# Patient Record
Sex: MALE | Race: WHITE | NOT HISPANIC OR LATINO | Employment: OTHER | URBAN - METROPOLITAN AREA
[De-identification: names, ages, dates, MRNs, and addresses within clinical notes are randomized per-mention and may not be internally consistent; named-entity substitution may affect disease eponyms.]

---

## 2017-03-22 ENCOUNTER — ALLSCRIPTS OFFICE VISIT (OUTPATIENT)
Dept: OTHER | Facility: OTHER | Age: 62
End: 2017-03-22

## 2017-03-29 ENCOUNTER — ALLSCRIPTS OFFICE VISIT (OUTPATIENT)
Dept: OTHER | Facility: OTHER | Age: 62
End: 2017-03-29

## 2017-05-11 ENCOUNTER — GENERIC CONVERSION - ENCOUNTER (OUTPATIENT)
Dept: OTHER | Facility: OTHER | Age: 62
End: 2017-05-11

## 2017-05-18 ENCOUNTER — ALLSCRIPTS OFFICE VISIT (OUTPATIENT)
Dept: OTHER | Facility: OTHER | Age: 62
End: 2017-05-18

## 2017-05-18 DIAGNOSIS — E66.01 MORBID (SEVERE) OBESITY DUE TO EXCESS CALORIES (HCC): ICD-10-CM

## 2017-05-18 DIAGNOSIS — R73.02 IMPAIRED GLUCOSE TOLERANCE: ICD-10-CM

## 2017-05-18 DIAGNOSIS — M19.90 OSTEOARTHRITIS: ICD-10-CM

## 2017-05-18 DIAGNOSIS — E55.9 VITAMIN D DEFICIENCY: ICD-10-CM

## 2017-05-18 DIAGNOSIS — F32.9 MAJOR DEPRESSIVE DISORDER, SINGLE EPISODE: ICD-10-CM

## 2017-05-21 ENCOUNTER — APPOINTMENT (OUTPATIENT)
Dept: LAB | Facility: HOSPITAL | Age: 62
End: 2017-05-21
Payer: COMMERCIAL

## 2017-05-21 ENCOUNTER — TRANSCRIBE ORDERS (OUTPATIENT)
Dept: ADMINISTRATIVE | Facility: HOSPITAL | Age: 62
End: 2017-05-21

## 2017-05-21 DIAGNOSIS — E66.01 MORBID (SEVERE) OBESITY DUE TO EXCESS CALORIES (HCC): ICD-10-CM

## 2017-05-21 DIAGNOSIS — R73.02 IMPAIRED GLUCOSE TOLERANCE: ICD-10-CM

## 2017-05-21 DIAGNOSIS — M19.90 OSTEOARTHRITIS: ICD-10-CM

## 2017-05-21 DIAGNOSIS — F32.9 MAJOR DEPRESSIVE DISORDER, SINGLE EPISODE: ICD-10-CM

## 2017-05-21 DIAGNOSIS — E55.9 VITAMIN D DEFICIENCY: ICD-10-CM

## 2017-05-21 LAB
25(OH)D3 SERPL-MCNC: 17.9 NG/ML (ref 30–100)
ALBUMIN SERPL BCP-MCNC: 3.8 G/DL (ref 3.5–5)
ALP SERPL-CCNC: 53 U/L (ref 46–116)
ALT SERPL W P-5'-P-CCNC: 38 U/L (ref 12–78)
ANION GAP SERPL CALCULATED.3IONS-SCNC: 9 MMOL/L (ref 4–13)
AST SERPL W P-5'-P-CCNC: 18 U/L (ref 5–45)
BASOPHILS # BLD AUTO: 0.04 THOUSANDS/ΜL (ref 0–0.1)
BASOPHILS NFR BLD AUTO: 1 % (ref 0–1)
BILIRUB SERPL-MCNC: 0.5 MG/DL (ref 0.2–1)
BUN SERPL-MCNC: 14 MG/DL (ref 5–25)
CALCIUM SERPL-MCNC: 8.6 MG/DL (ref 8.3–10.1)
CHLORIDE SERPL-SCNC: 102 MMOL/L (ref 100–108)
CHOLEST SERPL-MCNC: 210 MG/DL (ref 50–200)
CO2 SERPL-SCNC: 27 MMOL/L (ref 21–32)
CREAT SERPL-MCNC: 0.82 MG/DL (ref 0.6–1.3)
EOSINOPHIL # BLD AUTO: 0.34 THOUSAND/ΜL (ref 0–0.61)
EOSINOPHIL NFR BLD AUTO: 7 % (ref 0–6)
ERYTHROCYTE [DISTWIDTH] IN BLOOD BY AUTOMATED COUNT: 13.9 % (ref 11.6–15.1)
EST. AVERAGE GLUCOSE BLD GHB EST-MCNC: 128 MG/DL
GFR SERPL CREATININE-BSD FRML MDRD: >60 ML/MIN/1.73SQ M
GLUCOSE P FAST SERPL-MCNC: 121 MG/DL (ref 65–99)
HBA1C MFR BLD: 6.1 % (ref 4.2–6.3)
HCT VFR BLD AUTO: 41.1 % (ref 36.5–49.3)
HDLC SERPL-MCNC: 66 MG/DL (ref 40–60)
HGB BLD-MCNC: 13.4 G/DL (ref 12–17)
LDLC SERPL CALC-MCNC: 106 MG/DL (ref 0–100)
LYMPHOCYTES # BLD AUTO: 1.26 THOUSANDS/ΜL (ref 0.6–4.47)
LYMPHOCYTES NFR BLD AUTO: 26 % (ref 14–44)
MCH RBC QN AUTO: 28.9 PG (ref 26.8–34.3)
MCHC RBC AUTO-ENTMCNC: 32.6 G/DL (ref 31.4–37.4)
MCV RBC AUTO: 89 FL (ref 82–98)
MONOCYTES # BLD AUTO: 0.48 THOUSAND/ΜL (ref 0.17–1.22)
MONOCYTES NFR BLD AUTO: 10 % (ref 4–12)
NEUTROPHILS # BLD AUTO: 2.83 THOUSANDS/ΜL (ref 1.85–7.62)
NEUTS SEG NFR BLD AUTO: 56 % (ref 43–75)
PLATELET # BLD AUTO: 189 THOUSANDS/UL (ref 149–390)
PMV BLD AUTO: 9.8 FL (ref 8.9–12.7)
POTASSIUM SERPL-SCNC: 3.9 MMOL/L (ref 3.5–5.3)
PROT SERPL-MCNC: 6.9 G/DL (ref 6.4–8.2)
RBC # BLD AUTO: 4.63 MILLION/UL (ref 3.88–5.62)
SODIUM SERPL-SCNC: 138 MMOL/L (ref 136–145)
TRIGL SERPL-MCNC: 191 MG/DL
TSH SERPL DL<=0.05 MIU/L-ACNC: 1.81 UIU/ML (ref 0.36–3.74)
WBC # BLD AUTO: 4.95 THOUSAND/UL (ref 4.31–10.16)

## 2017-05-21 PROCEDURE — 36415 COLL VENOUS BLD VENIPUNCTURE: CPT

## 2017-05-21 PROCEDURE — 84443 ASSAY THYROID STIM HORMONE: CPT

## 2017-05-21 PROCEDURE — 85025 COMPLETE CBC W/AUTO DIFF WBC: CPT

## 2017-05-21 PROCEDURE — 82306 VITAMIN D 25 HYDROXY: CPT

## 2017-05-21 PROCEDURE — 83036 HEMOGLOBIN GLYCOSYLATED A1C: CPT

## 2017-05-21 PROCEDURE — 80061 LIPID PANEL: CPT

## 2017-05-21 PROCEDURE — 80053 COMPREHEN METABOLIC PANEL: CPT

## 2017-05-22 ENCOUNTER — GENERIC CONVERSION - ENCOUNTER (OUTPATIENT)
Dept: OTHER | Facility: OTHER | Age: 62
End: 2017-05-22

## 2017-06-28 ENCOUNTER — ALLSCRIPTS OFFICE VISIT (OUTPATIENT)
Dept: OTHER | Facility: OTHER | Age: 62
End: 2017-06-28

## 2017-09-05 ENCOUNTER — ALLSCRIPTS OFFICE VISIT (OUTPATIENT)
Dept: OTHER | Facility: OTHER | Age: 62
End: 2017-09-05

## 2017-09-05 DIAGNOSIS — Z12.5 ENCOUNTER FOR SCREENING FOR MALIGNANT NEOPLASM OF PROSTATE: ICD-10-CM

## 2017-09-05 DIAGNOSIS — R73.02 IMPAIRED GLUCOSE TOLERANCE: ICD-10-CM

## 2017-09-05 DIAGNOSIS — F32.9 MAJOR DEPRESSIVE DISORDER, SINGLE EPISODE: ICD-10-CM

## 2017-09-05 DIAGNOSIS — E78.1 PURE HYPERGLYCERIDEMIA: ICD-10-CM

## 2017-09-05 DIAGNOSIS — E66.01 MORBID (SEVERE) OBESITY DUE TO EXCESS CALORIES (HCC): ICD-10-CM

## 2017-09-05 DIAGNOSIS — E55.9 VITAMIN D DEFICIENCY: ICD-10-CM

## 2017-10-25 NOTE — PROGRESS NOTES
Assessment  1  Morbid obesity (278 01) (E66 01)   2  Depression (311) (F32 9)    Plan  Depression    · Call [phone] if: The symptoms seem worse ; Status:Complete;   Done:  50OWN5640   · Call [phone] if: You are unable to eat a normal amount of food and you do not feel  hungry ; Status:Complete;   Done: 55SYK3582   · Call [phone] if: You or your family members notice any confusion or difficulty with  memory ; Status:Complete;   Done: 60YVC5202   · Call [phone] if: Your depression is worse ; Status:Complete;   Done: 39VMN0285   · Call [phone] if: Your moods often change suddenly for no reason ;  Status:Complete;   Done: 62XNC0827   · Call [phone] if: Your symptoms return during treatment ; Status:Complete;    Done: 59MJQ1603   · Call 911 if: You are considering suicide ; Status:Complete;   Done: 29BPI4193   · Call 911 if: You are thinking about harming yourself or someone else ; Status:Complete;    Done: 52YSE6937   · Continue with our present treatment plan ; Status:Complete;   Done: 11LFK1192   · Many things can be done to treat your insomnia ; Status:Complete;   Done: 78VQB1624   · There are ways to decrease your stress and improve your sense of well-being  We  encourage you to keep active and exercise regularly  Make time to take care of yourself  and participate in activities that you enjoy  Stay connected to friends and family that can  support and comfort you  If at any time you have thoughts of harming yourself or  someone else, contact us immediately ; Status:Active; Requested for:62Byd5966;   Depression, Hypertriglyceridemia, Impaired glucose tolerance, Morbid obesity, Vitamin D  deficiency    · (1) CBC/ PLT (NO DIFF); Status:Active; Requested EXE:48KSO3879;    · (1) COMPREHENSIVE METABOLIC PANEL; Status:Active; Requested FEU:79LOI8527;    · (1) HEMOGLOBIN A1C; Status:Active; Requested YKR:43AAY0575;    · (1) LIPID PANEL, FASTING; Status:Active;  Requested EPN:08IYX2530;    · (1) VITAMIN D 25-HYDROXY; Status:Active; Requested for:01Udi3029;   Depression, Morbid obesity    · BuPROPion HCl ER (XL) 150 MG Oral Tablet Extended Release 24 Hour; TAKE 1  TABLET DAILY IN AM AS DIRECTED  Prostate cancer screening    · (1) PSA (SCREEN) (Dx V76 44 Screen for Prostate Cancer); Status:Active; Requested  [facility]:29GVV0826;     Discussion/Summary    Weight has been relatively stable  He is satisfied with the current dosage of the medication  He is going to try to watch his diet more closely  Going to attempt to increase aerobic activity but this is difficult because of his orthopedic issues  Continue follow-up with orthopedics as previously  Continue with the Wellbutrin as previously  Discussed with him possibly increasing the dose at the next visit  Continue to follow up with a counselor  Will have him follow up in about 3 months or sooner if needed  Possible side effects of new medications were reviewed with the patient/guardian today  The treatment plan was reviewed with the patient/guardian  The patient/guardian understands and agrees with the treatment plan      Chief Complaint  CC Patient is here for follow up visit  No complaints  History of Present Illness  He presents for follow-up regarding his medications and weight loss  He has been trying to watch his diet more closely  Admits that this is difficult at times  Has been tolerating the Wellbutrin without difficulty  Mood is somewhat better  Denies any significant side effects from the medication  Does notice it may suppress his appetite somewhat  Sleep is not significantly affected  Still with pain in the joints especially his left knee  Still remains very active both at work and around his house  Usually sleeps relatively well  Review of Systems    Constitutional: not feeling poorly-- and-- not feeling tired  Eyes: no eyesight problems  Cardiovascular: no chest pain-- and-- no palpitations     Respiratory: no shortness of breath  Musculoskeletal: arthralgias-- and-- joint stiffness  Psychiatric: as noted in HPI  ROS reviewed  Active Problems  1  Abrasion of finger of right hand (915 0) (S60 419A)   2  Chronic back pain (724 5,338 29) (M54 9,G89 29)   3  Depression (311) (F32 9)   4  Elevated blood pressure reading without diagnosis of hypertension (796 2) (R03 0)   5  Heart murmur (785 2) (R01 1)   6  Hypertriglyceridemia (272 1) (E78 1)   7  Impaired glucose tolerance (790 22) (R73 02)   8  Morbid obesity (278 01) (E66 01)   9  Nail avulsion, finger (883 0) (S61 309A)   10  Osteoarthritis (715 90) (M19 90)   11  Prostate cancer screening (V76 44) (Z12 5)   12  Screening for colon cancer (V76 51) (Z12 11)   13  Vitamin D deficiency (268 9) (E55 9)    Past Medical History  1  History of Dislocation of wrist, right, initial encounter (833 00) (S63 004A)   2  History of Fractured sternum (807 2) (S22 20XA)   3  History of compression fracture of spine (V15 51) (Z87 81)   4  History of concussion (V15 52) (Z87 820)   5  History of esophageal reflux (V12 79) (Z87 19)   6  History of Nasal fracture (802 0) (S02  2XXA)   7  History of S/P colonoscopy (V45 89) (D31 008)    The active problems and past medical history were reviewed and updated today  Surgical History  1  History of Gallbladder Surgery    The surgical history was reviewed and updated today  Family History  Mother    1  Family history of dementia (V17 2) (Z81 8)   2  Family history of hyperlipidemia (V18 19) (Z83 49)   3  Family history of High cholesterol  Father    4  Family history of malignant neoplasm (V16 9) (Z80 9)   5  Family history of malignant neoplasm of urinary bladder (L64 29) (Z80 52)    The family history was reviewed and updated today         Social History   · Always uses seat belt   · Daily caffeine consumption   · Former smokeless tobacco user   · Full-time employment   ·    · No living will   · Occasional alcohol use  The social history was reviewed and updated today  Current Meds   1  BuPROPion HCl ER (XL) 300 MG Oral Tablet Extended Release 24 Hour; TAKE 1   TABLET DAILY; Therapy: 48LHJ0002 to (Pepe Barksdale)  Requested for: 08Aug2017; Last   Rx:08Aug2017 Ordered   2  Mobic 15 MG Oral Tablet; TAKE 1 TABLET DAILY; Therapy: (Recorded:15Sep2016) to Recorded   3  TraMADol HCl - 50 MG Oral Tablet; Take 1 tablet daily; Therapy: (Recorded:15Sep2016) to Recorded   4  Tylenol Arthritis Pain 650 MG TBCR; take 1 tablet daily prn; Therapy: (Recorded:15Sep2016) to Recorded   5  Voltaren 1 % GEL; APPLY SPARINGLY TO AFFECTED AREA(S) ONCE DAILY; Therapy: (Recorded:15Sep2016) to Recorded    The medication list was reviewed and updated today  Allergies  1  Neosporin OINT  2  Adhesive Tape    Vitals  Vital Signs    Recorded: 05Sep2017 11:21AM   Temperature 97 7 F   Heart Rate 76   Respiration 18   Systolic 933, LUE, Sitting   Diastolic 76, LUE, Sitting   Height 6 ft    Weight 349 lb    BMI Calculated 47 33   BSA Calculated 2 7   O2 Saturation 97     Physical Exam    Constitutional   General appearance: Abnormal   morbidly obese,-- appears tired-- and-- well hydrated  Eyes   Conjunctiva and lids: No swelling, erythema, or discharge  Pulmonary   Auscultation of lungs: Clear to auscultation, equal breath sounds bilaterally, no wheezes, no rales, no rhonci  Cardiovascular   Auscultation of heart: Normal rate and rhythm, normal S1 and S2, without murmurs  Musculoskeletal   Gait and station: Abnormal  -- slow antalgic gait  Inspection/palpation of joints, bones, and muscles: Abnormal  -- Degenerative changes most notable in left knee  Psychiatric   Mood and affect: Abnormal  -- Tired appearing          Signatures   Electronically signed by : Rama Spurling, M D ; Oct 24 2017  9:22PM EST                       (Author)

## 2018-01-09 NOTE — RESULT NOTES
Verified Results  (1) LIPID PANEL, FASTING 35Yek7532 11:04AM [facility]     Test Name Result Flag Reference   CHOLESTEROL 223 mg/dL H    HDL,DIRECT 71 mg/dL H 40-60   Specimen collection should occur prior to Metamizole administration due to the potential for falsely depressed results  LDL CHOLESTEROL CALCULATED 120 mg/dL H 0-100   Triglyceride:         Normal              <150 mg/dl       Borderline High    150-199 mg/dl       High               200-499 mg/dl       Very High          >499 mg/dl  Cholesterol:         Desirable        <200 mg/dl      Borderline High  200-239 mg/dl      High             >239 mg/dl  HDL Cholesterol:        High    >59 mg/dL      Low     <41 mg/dL  LDL CALCULATED:    This screening LDL is a calculated result  It does not have the accuracy of the Direct Measured LDL in the monitoring of patients with hyperlipidemia and/or statin therapy  Direct Measure LDL (MOE259) must be ordered separately in these patients  TRIGLYCERIDES 161 mg/dL H <=150   Specimen collection should occur prior to N-Acetylcysteine or Metamizole administration due to the potential for falsely depressed results  (1) COMPREHENSIVE METABOLIC PANEL 11SWA3724 14:49PF [facility]     Test Name Result Flag Reference   GLUCOSE,RANDM 121 mg/dL     If the patient is fasting, the ADA then defines impaired fasting glucose as > 100 mg/dL and diabetes as > or equal to 123 mg/dL     SODIUM 136 mmol/L  136-145   POTASSIUM 4 4 mmol/L  3 5-5 3   CHLORIDE 100 mmol/L  100-108   CARBON DIOXIDE 27 mmol/L  21-32   ANION GAP (CALC) 9 mmol/L  4-13   BLOOD UREA NITROGEN 21 mg/dL  5-25   CREATININE 0 73 mg/dL  0 60-1 30   Standardized to IDMS reference method   CALCIUM 9 0 mg/dL  8 3-10 1   BILI, TOTAL 0 70 mg/dL  0 20-1 00   ALK PHOSPHATAS 58 U/L     ALT (SGPT) 33 U/L  12-78   AST(SGOT) 16 U/L  5-45   ALBUMIN 3 9 g/dL  3 5-5 0   TOTAL PROTEIN 7 1 g/dL  6 4-8 2   eGFR Non-      >60 0 ml/min/1 73sq m   [facility] Disease Education Program recommendations are as follows:  GFR calculation is accurate only with a steady state creatinine  Chronic Kidney disease less than 60 ml/min/1 73 sq  meters  Kidney failure less than 15 ml/min/1 73 sq  meters  (1) CBC/PLT/DIFF [id number] 11:04AM [facility]     Test Name Result Flag Reference   WBC COUNT 6 01 Thousand/uL  4 31-10 16   RBC COUNT 4 90 Million/uL  3 88-5 62   HEMOGLOBIN 14 2 g/dL  12 0-17 0   HEMATOCRIT 42 5 %  36 5-49 3   MCV 87 fL  82-98   MCH 29 0 pg  26 8-34 3   MCHC 33 4 g/dL  31 4-37 4   RDW 14 0 %  11 6-15 1   MPV 9 6 fL  8 9-12 7   PLATELET COUNT 824 Thousands/uL  149-390   This is an appended report  These results have been appended to a previously verified report  (1) CBC/PLT/DIFF [id number] 11:04AM [facility]     Test Name Result Flag Reference   NEUTROPHILS - REL 64 %  43-75   LYMPHOCYTES - REL 27 %  14-44   MONOCYTES - REL 6 %  4-12   EOSINOPHILS - REL 3 %  0-6   BASOPHILS - REL 0 %  0-1   NEUTROPHILS ABS 3 85 Thousand/uL  1 85-7 62   LYMPHOTCYTES ABS 1 62 Thousand/uL  0 60-4 47   MONOCYTES ABS 0 36 Thousand/uL  0 00-1 22   EOSINOPHILS ABS 0 18 Thousand/uL  0 00-0 40   BASOPHILS ABS 0 00 Thousand/uL  0 00-0 10   TOTAL COUNTED 100     PLT ESTIMATE Adequate  Adequate     (1) VITAMIN D 25-HYDROXY [id number] 11:04AM [facility]     Test Name Result Flag Reference   VIT D 25-HYDROX 20 2 ng/mL L 30 0-100 0   This assay is a certified procedure of the CDC Vitamin D Standardization Certification Program (VDSCP)     Deficiency <20ng/ml   Insufficiency 20-30ng/ml   Sufficient  ng/ml     *Patients undergoing fluorescein dye angiography may retain small amounts of fluorescein in the body for 48-72 hours post procedure  Samples containing fluorescein can produce falsely elevated Vitamin D values  If the patient had this procedure, a specimen should be resubmitted post fluorescein clearance

## 2018-01-12 VITALS
HEART RATE: 76 BPM | WEIGHT: 315 LBS | HEIGHT: 72 IN | OXYGEN SATURATION: 97 % | TEMPERATURE: 96.4 F | DIASTOLIC BLOOD PRESSURE: 78 MMHG | BODY MASS INDEX: 42.66 KG/M2 | SYSTOLIC BLOOD PRESSURE: 132 MMHG | RESPIRATION RATE: 18 BRPM

## 2018-01-13 VITALS
DIASTOLIC BLOOD PRESSURE: 82 MMHG | TEMPERATURE: 96.1 F | HEART RATE: 66 BPM | BODY MASS INDEX: 42.66 KG/M2 | RESPIRATION RATE: 18 BRPM | WEIGHT: 315 LBS | HEIGHT: 72 IN | OXYGEN SATURATION: 98 % | SYSTOLIC BLOOD PRESSURE: 162 MMHG

## 2018-01-13 VITALS
BODY MASS INDEX: 42.66 KG/M2 | HEART RATE: 76 BPM | WEIGHT: 315 LBS | DIASTOLIC BLOOD PRESSURE: 76 MMHG | OXYGEN SATURATION: 97 % | TEMPERATURE: 97.7 F | SYSTOLIC BLOOD PRESSURE: 112 MMHG | RESPIRATION RATE: 18 BRPM | HEIGHT: 72 IN

## 2018-01-14 VITALS
TEMPERATURE: 97.5 F | HEIGHT: 72 IN | OXYGEN SATURATION: 98 % | RESPIRATION RATE: 18 BRPM | SYSTOLIC BLOOD PRESSURE: 132 MMHG | DIASTOLIC BLOOD PRESSURE: 68 MMHG | HEART RATE: 78 BPM

## 2018-01-14 VITALS
OXYGEN SATURATION: 98 % | SYSTOLIC BLOOD PRESSURE: 118 MMHG | HEART RATE: 67 BPM | BODY MASS INDEX: 42.66 KG/M2 | WEIGHT: 315 LBS | RESPIRATION RATE: 18 BRPM | DIASTOLIC BLOOD PRESSURE: 72 MMHG | TEMPERATURE: 96.6 F | HEIGHT: 72 IN

## 2018-01-17 NOTE — RESULT NOTES
Verified Results  (1) CBC/PLT/DIFF [id number] 08:49AM [facility] Order Number: [id number]     Test Name Result Flag Reference   WBC COUNT 4 95 Thousand/uL  4 31-10 16   RBC COUNT 4 63 Million/uL  3 88-5 62   HEMOGLOBIN 13 4 g/dL  12 0-17 0   HEMATOCRIT 41 1 %  36 5-49 3   MCV 89 fL  82-98   MCH 28 9 pg  26 8-34 3   MCHC 32 6 g/dL  31 4-37 4   RDW 13 9 %  11 6-15 1   MPV 9 8 fL  8 9-12 7   PLATELET COUNT 931 Thousands/uL  149-390   NEUTROPHILS RELATIVE PERCENT 56 %  43-75   LYMPHOCYTES RELATIVE PERCENT 26 %  14-44   MONOCYTES RELATIVE PERCENT 10 %  4-12   EOSINOPHILS RELATIVE PERCENT 7 % H 0-6   BASOPHILS RELATIVE PERCENT 1 %  0-1   NEUTROPHILS ABSOLUTE COUNT 2 83 Thousands/? ??L  1 85-7 62   LYMPHOCYTES ABSOLUTE COUNT 1 26 Thousands/? ??L  0 60-4 47   MONOCYTES ABSOLUTE COUNT 0 48 Thousand/? ??L  0 17-1 22   EOSINOPHILS ABSOLUTE COUNT 0 34 Thousand/? ??L  0 00-0 61   BASOPHILS ABSOLUTE COUNT 0 04 Thousands/? ??L  0 00-0 10     (1) COMPREHENSIVE METABOLIC PANEL [id number] 61:85TQ [facility] Order Number: [id number]     Test Name Result Flag Reference   SODIUM 138 mmol/L  136-145   POTASSIUM 3 9 mmol/L  3 5-5 3   CHLORIDE 102 mmol/L  100-108   CARBON DIOXIDE 27 mmol/L  21-32   ANION GAP (CALC) 9 mmol/L  4-13   BLOOD UREA NITROGEN 14 mg/dL  5-25   CREATININE 0 82 mg/dL  0 60-1 30   Standardized to IDMS reference method   CALCIUM 8 6 mg/dL  8 3-10 1   BILI, TOTAL 0 50 mg/dL  0 20-1 00   ALK PHOSPHATAS 53 U/L     ALT (SGPT) 38 U/L  12-78   AST(SGOT) 18 U/L  5-45   ALBUMIN 3 8 g/dL  3 5-5 0   TOTAL PROTEIN 6 9 g/dL  6 4-8 2   eGFR Non-African American      >60 0 ml/min/1 73sq m   [facility] Disease Education Program recommendations are as follows:  GFR calculation is accurate only with a steady state creatinine  Chronic Kidney disease less than 60 ml/min/1 73 sq  meters  Kidney failure less than 15 ml/min/1 73 sq  meters     GLUCOSE FASTING 121 mg/dL H 65-99     (1) HEMOGLOBIN A1C [id number] 08:49AM [facility] Order Number: [id number]     Test Name Result Flag Reference   HEMOGLOBIN A1C 6 1 %  4 2-6 3   EST  AVG  GLUCOSE 128 mg/dl       (1) LIPID PANEL, FASTING [id number] 08:49AM [facility] Order Number: [id number]     Test Name Result Flag Reference   CHOLESTEROL 210 mg/dL H    HDL,DIRECT 66 mg/dL H 40-60   Specimen collection should occur prior to Metamizole administration due to the potential for falsely depressed results  LDL CHOLESTEROL CALCULATED 106 mg/dL H 0-100   Triglyceride:         Normal              <150 mg/dl       Borderline High    150-199 mg/dl       High               200-499 mg/dl       Very High          >499 mg/dl  Cholesterol:         Desirable        <200 mg/dl      Borderline High  200-239 mg/dl      High             >239 mg/dl  HDL Cholesterol:        High    >59 mg/dL      Low     <41 mg/dL  LDL CALCULATED:    This screening LDL is a calculated result  It does not have the accuracy of the Direct Measured LDL in the monitoring of patients with hyperlipidemia and/or statin therapy  Direct Measure LDL (GBX141) must be ordered separately in these patients  TRIGLYCERIDES 191 mg/dL H <=150   Specimen collection should occur prior to N-Acetylcysteine or Metamizole administration due to the potential for falsely depressed results  (1) TSH [id number] 08:49AM [facility] Order Number: [id number]     Test Name Result Flag Reference   TSH 1 808 uIU/mL  0 358-3 740   Patients undergoing fluorescein dye angiography may retain small amounts of fluorescein in the body for 48-72 hours post procedure  Samples containing fluorescein can produce falsely depressed TSH values  If the patient had this procedure,a specimen should be resubmitted post fluorescein clearance       (1) VITAMIN D 25-HYDROXY [id number] 08:49AM [facility] Order Number: [id number]     Test Name Result Flag Reference   VIT D 25-HYDROX 17 9 ng/mL L 30 0-100 0   This assay is a certified procedure of the CDC Vitamin D Standardization Certification Program (VDSCP)     Deficiency <20ng/ml   Insufficiency 20-30ng/ml   Sufficient  ng/ml     *Patients undergoing fluorescein dye angiography may retain small amounts of fluorescein in the body for 48-72 hours post procedure  Samples containing fluorescein can produce falsely elevated Vitamin D values  If the patient had this procedure, a specimen should be resubmitted post fluorescein clearance

## 2018-03-16 ENCOUNTER — OFFICE VISIT (OUTPATIENT)
Dept: INTERNAL MEDICINE CLINIC | Facility: CLINIC | Age: 63
End: 2018-03-16
Payer: COMMERCIAL

## 2018-03-16 VITALS
DIASTOLIC BLOOD PRESSURE: 82 MMHG | HEART RATE: 88 BPM | WEIGHT: 315 LBS | OXYGEN SATURATION: 97 % | RESPIRATION RATE: 16 BRPM | SYSTOLIC BLOOD PRESSURE: 140 MMHG | TEMPERATURE: 97.6 F | BODY MASS INDEX: 42.66 KG/M2 | HEIGHT: 72 IN

## 2018-03-16 DIAGNOSIS — E66.01 MORBID OBESITY (HCC): ICD-10-CM

## 2018-03-16 DIAGNOSIS — R06.02 SHORTNESS OF BREATH ON EXERTION: Primary | ICD-10-CM

## 2018-03-16 PROBLEM — F32.A DEPRESSION: Status: ACTIVE | Noted: 2017-05-18

## 2018-03-16 PROCEDURE — 99214 OFFICE O/P EST MOD 30 MIN: CPT | Performed by: NURSE PRACTITIONER

## 2018-03-16 RX ORDER — BUPROPION HYDROCHLORIDE 150 MG/1
TABLET ORAL
COMMUNITY
Start: 2017-05-18 | End: 2018-04-06

## 2018-03-16 RX ORDER — SENNOSIDES 8.6 MG
650 CAPSULE ORAL EVERY 8 HOURS
COMMUNITY
End: 2019-08-28

## 2018-03-16 RX ORDER — MELOXICAM 15 MG/1
15 TABLET ORAL
COMMUNITY
Start: 2018-03-13 | End: 2018-04-06

## 2018-03-16 RX ORDER — MELOXICAM 15 MG/1
TABLET ORAL
COMMUNITY
Start: 2018-03-13 | End: 2019-02-26

## 2018-03-16 RX ORDER — TRAMADOL HYDROCHLORIDE 50 MG/1
TABLET ORAL
COMMUNITY
Start: 2018-03-13 | End: 2019-08-28

## 2018-03-16 NOTE — PROGRESS NOTES
Assessment/Plan: Patient to have ECHO done and CT of the chest without contrast   I feel most of his symptoms are related to his weight gain  He was given a referral for weight management  He was instructed if any chest pain, any increase SOB, or palpitations to go to Er  Will follow up with him after testing is done  If any issues or concerns please call the office  No problem-specific Assessment & Plan notes found for this encounter  Problem List Items Addressed This Visit     Morbid obesity ([facility] [address] )    Relevant Orders    Ambulatory referral to Weight Management    Shortness of breath on exertion - Primary    Relevant Orders    Echo complete with contrast if indicated    CT chest wo contrast            Subjective:      Patient ID: Char Dooley is a 60 y o  male  [facility] is here today for an acute visit  He states for the past several weeks he has been having issues with SOB at work and while walking  He states he did have some SOB in the past but this is the worse it has ever been  He was called in the his boss's office at work due to them noticing this and was checked out by his company doctor  They did recommend following up with his PCP  He states he does know he needs to lose weight and has been trying to lose weight and is out walking with his two dogs  He does need a left knee replacement and does limp when he walks due to his pain  He did smoke in the past and chew but has not done this in over 2 years  He denies any chest pain or palpitations  He would like to possible see weight management for weight loss and does not want any kind of surgery done  He denies any other issues  The following portions of the patient's history were reviewed and updated as appropriate:   He  has no past medical history on file    He   Patient Active Problem List    Diagnosis Date Noted    Shortness of breath on exertion 03/16/2018    Depression 05/18/2017    Morbid obesity ([facility] [address] ) 05/18/2017    Chronic back pain 09/15/2016    Elevated blood-pressure reading without diagnosis of hypertension 09/15/2016    Heart murmur 09/15/2016    Hypertriglyceridemia 09/15/2016    Osteoarthritis 09/15/2016    Vitamin D deficiency 09/15/2016     He  has no past surgical history on file  His family history is not on file  He  has no tobacco, alcohol, and drug history on file  Current Outpatient Prescriptions   Medication Sig Dispense Refill    buPROPion (WELLBUTRIN XL) 150 mg 24 hr tablet Take by mouth      meloxicam (MOBIC) 15 mg tablet Take 15 mg by mouth      traMADol (ULTRAM) 50 mg tablet 1/2 to 1 tab po qdaily prn      acetaminophen (TYLENOL) 650 mg CR tablet Take 650 mg by mouth every 8 (eight) hours      Cholecalciferol 1000 UNIT/10ML LIQD Take 1,000 Units by mouth      meloxicam (MOBIC) 15 mg tablet        No current facility-administered medications for this visit  No current outpatient prescriptions on file prior to visit  No current facility-administered medications on file prior to visit  He is allergic to cephalexin; latex; neomycin-bacitracin zn-polymyx; and other       Review of Systems   Constitutional: Negative  HENT: Negative  Eyes: Negative  Respiratory: Positive for shortness of breath  Cardiovascular: Negative  Gastrointestinal: Negative  Endocrine: Negative  Genitourinary: Negative  Musculoskeletal: Negative  Skin: Negative  Allergic/Immunologic: Negative  Neurological: Negative  Hematological: Negative  Psychiatric/Behavioral: Negative  Objective:      /82 (BP Location: Right arm, Patient Position: Sitting, Cuff Size: Large)   Pulse 88   Temp 97 6 °F (36 4 °C) (Temporal)   Resp 16   Ht 6' (1 829 m)   Wt (!) 161 kg (356 lb)   SpO2 97%   BMI 48 28 kg/m²          Physical Exam   Constitutional: He is oriented to person, place, and time  He appears well-developed and well-nourished     HENT:   Head: Normocephalic and atraumatic  Right Ear: External ear normal    Left Ear: External ear normal    Nose: Nose normal    Mouth/Throat: Oropharynx is clear and moist    Eyes: Conjunctivae and EOM are normal  Pupils are equal, round, and reactive to light  Neck: Normal range of motion  Neck supple  Cardiovascular: Normal rate, regular rhythm, normal heart sounds and intact distal pulses  Pulmonary/Chest: Effort normal and breath sounds normal    Abdominal: Soft  Bowel sounds are normal    Musculoskeletal: Normal range of motion  Neurological: He is alert and oriented to person, place, and time  He has normal reflexes  Skin: Skin is warm and dry  Psychiatric: He has a normal mood and affect  His behavior is normal  Judgment and thought content normal    Vitals reviewed

## 2018-03-16 NOTE — PATIENT INSTRUCTIONS
Heart Healthy Diet   WHAT YOU NEED TO KNOW:   What is a heart healthy diet? A heart healthy diet is an eating plan low in total fat, unhealthy fats, and sodium (salt)  A heart healthy diet helps decrease your risk for heart disease and stroke  Limit the amount of fat you eat to 25% to 35% of your total daily calories  Limit sodium to less than 2,300 mg each day  What is healthy fat, and where is it found? Healthy fats can help improve cholesterol levels  The risk for heart disease is decreased when cholesterol levels are normal  Choose healthy fats, such as the following:  · Unsaturated fat  is found in foods such as soybean, canola, olive, corn, and safflower oils  It is also found in soft tub margarine that is made with liquid vegetable oil  · Omega-3 fat  is found in certain fish, such as salmon, tuna, and trout, and in walnuts and flaxseed  What is unhealthy fat, and where is it found? Unhealthy fats can cause unhealthy cholesterol levels in your blood and increase your risk of heart disease  Limit unhealthy fats, such as the following:  · Cholesterol  is found in animal foods, such as eggs and lobster, and in dairy products made from whole milk  Limit cholesterol to less than 300 milligrams (mg) each day  You may need to limit cholesterol to 200 mg each day if you have heart disease  · Saturated fat  is found in meats, such as farah and hamburger  It is also found in chicken or turkey skin, whole milk, and butter  Limit saturated fat to less than 7% of your total daily calories  Limit saturated fat to less than 6% if you have heart disease or are at increased risk for it  · Trans fat  is found in packaged foods, such as potato chips and cookies  It is also in hard margarine, some fried foods, and shortening  Avoid trans fats as much as possible  What can I eat and drink on a heart healthy diet?   Ask your dietitian or healthcare provider how many servings to have from each of the following food groups:  · Grains:      ¨ Whole-wheat breads, cereals, and pastas, and brown rice    ¨ Low-fat, low-sodium crackers and chips    · Vegetables:      ¨ Broccoli, green beans, green peas, and spinach    ¨ Collards, kale, and lima beans    ¨ Carrots, sweet potatoes, tomatoes, and peppers    ¨ Canned vegetables with no salt added    · Fruits:      ¨ Bananas, peaches, pears, and pineapple    ¨ Grapes, raisins, and dates    ¨ Oranges, tangerines, grapefruit, orange juice, and grapefruit juice    ¨ Apricots, mangoes, melons, and papaya    ¨ Raspberries and strawberries    ¨ Canned fruit with no added sugar    · Low-fat dairy products:      ¨ Nonfat (skim) milk, 1% milk, and low-fat almond, cashew, or soy milks fortified with calcium    ¨ Low-fat cheese, regular or frozen yogurt, and cottage cheese    · Meats and proteins , such as lean cuts of beef and pork (loin, leg, round), skinless chicken and turkey, legumes, soy products, egg whites, and nuts  Which foods and drinks do I need to limit or avoid?   Ask your dietitian or healthcare provider about these and other foods that are high in unhealthy fat, sodium, and sugar:  · Snack or packaged foods , such as frozen dinners, cookies, macaroni and cheese, and cereals with more than 300 mg of sodium per serving    · Canned or dry mixes  for cakes, soups, sauces, or gravies    · Vegetables with added sodium , such as instant potatoes, vegetables with added sauces, or regular canned vegetables    · Other foods high in sodium , such as ketchup, barbecue sauce, salad dressing, pickles, olives, soy sauce, and miso    · High-fat dairy foods  such as whole or 2% milk, cream cheese, or sour cream, and cheeses     · High-fat protein foods  such as high-fat cuts of beef (T-bone steaks, ribs), chicken or turkey with skin, and organ meats, such as liver    · Cured or smoked meats , such as hot dogs, farah, and sausage    · Unhealthy fats and oils , such as butter, stick margarine, shortening, and cooking oils such as coconut or palm oil    · Food and drinks high in sugar , such as soft drinks (soda), sports drinks, sweetened tea, candy, cake, cookies, pies, and doughnuts  What other diet guidelines should I follow? · Eat more foods containing omega-3 fats  Eat fish high in omega-3 fats at least 2 times a week  · Limit alcohol  Too much alcohol can damage your heart and raise your blood pressure  Women should limit alcohol to 1 drink a day  Men should limit alcohol to 2 drinks a day  A drink of alcohol is 12 ounces of beer, 5 ounces of wine, or 1½ ounces of liquor  · Choose low-sodium foods  High-sodium foods can lead to high blood pressure  Add little or no salt to food you prepare  Use herbs and spices in place of salt  · Eat more fiber  to help lower cholesterol levels  Eat at least 5 servings of fruits and vegetables each day  Eat 3 ounces of whole-grain foods each day  Legumes (beans) are also a good source of fiber  What lifestyle guidelines should I follow? · Do not smoke  Nicotine and other chemicals in cigarettes and cigars can cause lung and heart damage  Ask your healthcare provider for information if you currently smoke and need help to quit  E-cigarettes or smokeless tobacco still contain nicotine  Talk to your healthcare provider before you use these products  · Exercise regularly  to help you maintain a healthy weight and improve your blood pressure and cholesterol levels  Ask your healthcare provider about the best exercise plan for you  Do not start an exercise program without asking your healthcare provider  CARE AGREEMENT:   You have the right to help plan your care  Discuss treatment options with your caregivers to decide what care you want to receive  You always have the right to refuse treatment  The above information is an  only  It is not intended as medical advice for individual conditions or treatments   Talk to your doctor, nurse or pharmacist before following any medical regimen to see if it is safe and effective for you  © 2017 [address] [employer] Information is for End User's use only and may not be sold, redistributed or otherwise used for commercial purposes  All illustrations and images included in CareNotes® are the copyrighted property of A [employer]  or [facility]

## 2018-03-21 ENCOUNTER — HOSPITAL ENCOUNTER (OUTPATIENT)
Dept: NON INVASIVE DIAGNOSTICS | Facility: HOSPITAL | Age: 63
Discharge: HOME | End: 2018-03-21
Payer: COMMERCIAL

## 2018-03-21 DIAGNOSIS — R06.02 SHORTNESS OF BREATH ON EXERTION: ICD-10-CM

## 2018-03-21 PROCEDURE — 93306 TTE W/DOPPLER COMPLETE: CPT

## 2018-03-22 PROCEDURE — 93325 DOPPLER ECHO COLOR FLOW MAPG: CPT | Performed by: INTERNAL MEDICINE

## 2018-03-22 PROCEDURE — 93308 TTE F-UP OR LMTD: CPT | Performed by: INTERNAL MEDICINE

## 2018-03-22 PROCEDURE — 93321 DOPPLER ECHO F-UP/LMTD STD: CPT | Performed by: INTERNAL MEDICINE

## 2018-03-22 NOTE — PROGRESS NOTES
Can you let Kimberli Milligan know his ECHO did come back essentially normal at this time    Will work on authorization of CT scan

## 2018-03-26 DIAGNOSIS — R06.00 DYSPNEA, UNSPECIFIED TYPE: Primary | ICD-10-CM

## 2018-03-27 ENCOUNTER — HOSPITAL ENCOUNTER (OUTPATIENT)
Dept: RADIOLOGY | Facility: HOSPITAL | Age: 63
Discharge: HOME | End: 2018-03-27
Payer: COMMERCIAL

## 2018-03-27 DIAGNOSIS — R06.00 DYSPNEA, UNSPECIFIED TYPE: ICD-10-CM

## 2018-03-27 PROCEDURE — 71046 X-RAY EXAM CHEST 2 VIEWS: CPT

## 2018-03-28 ENCOUNTER — HOSPITAL ENCOUNTER (OUTPATIENT)
Dept: PULMONOLOGY | Facility: HOSPITAL | Age: 63
Discharge: HOME | End: 2018-03-28
Payer: COMMERCIAL

## 2018-03-28 DIAGNOSIS — R06.00 DYSPNEA, UNSPECIFIED TYPE: ICD-10-CM

## 2018-03-28 PROCEDURE — 94060 EVALUATION OF WHEEZING: CPT | Performed by: INTERNAL MEDICINE

## 2018-03-28 PROCEDURE — 94729 DIFFUSING CAPACITY: CPT

## 2018-03-28 PROCEDURE — 94060 EVALUATION OF WHEEZING: CPT

## 2018-03-28 PROCEDURE — 94729 DIFFUSING CAPACITY: CPT | Performed by: INTERNAL MEDICINE

## 2018-03-28 PROCEDURE — 94760 N-INVAS EAR/PLS OXIMETRY 1: CPT

## 2018-03-28 PROCEDURE — 94726 PLETHYSMOGRAPHY LUNG VOLUMES: CPT | Performed by: INTERNAL MEDICINE

## 2018-03-28 PROCEDURE — 94727 GAS DIL/WSHOT DETER LNG VOL: CPT

## 2018-03-28 RX ORDER — ALBUTEROL SULFATE 2.5 MG/3ML
2.5 SOLUTION RESPIRATORY (INHALATION) ONCE AS NEEDED
Status: COMPLETED | OUTPATIENT
Start: 2018-03-28 | End: 2018-03-28

## 2018-03-28 RX ADMIN — ALBUTEROL SULFATE 2.5 MG: 2.5 SOLUTION RESPIRATORY (INHALATION) at 11:27

## 2018-04-06 ENCOUNTER — OFFICE VISIT (OUTPATIENT)
Dept: INTERNAL MEDICINE CLINIC | Facility: CLINIC | Age: 63
End: 2018-04-06
Payer: COMMERCIAL

## 2018-04-06 VITALS
BODY MASS INDEX: 42.66 KG/M2 | SYSTOLIC BLOOD PRESSURE: 130 MMHG | DIASTOLIC BLOOD PRESSURE: 68 MMHG | TEMPERATURE: 99.1 F | WEIGHT: 315 LBS | RESPIRATION RATE: 20 BRPM | OXYGEN SATURATION: 96 % | HEIGHT: 72 IN | HEART RATE: 87 BPM

## 2018-04-06 DIAGNOSIS — E66.01 MORBID OBESITY (HCC): Primary | ICD-10-CM

## 2018-04-06 DIAGNOSIS — R06.00 DYSPNEA ON EXERTION: ICD-10-CM

## 2018-04-06 PROCEDURE — 99214 OFFICE O/P EST MOD 30 MIN: CPT | Performed by: NURSE PRACTITIONER

## 2018-04-06 NOTE — PROGRESS NOTES
Assessment/Plan: Will refer patient to weight management at this time  Will order fasting labs to have done  Patient was instructed if any increased SOB or chest pain to go to Er  Will follow up after his visit with weight management  No problem-specific Assessment & Plan notes found for this encounter  Problem List Items Addressed This Visit     Morbid obesity ([facility] [address] ) - Primary    Relevant Orders    Ambulatory referral to Weight Management    CBC and differential    Comprehensive metabolic panel    TSH, 3rd generation with T4 reflex    Lipid panel    Dyspnea    Relevant Orders    CBC and differential    Comprehensive metabolic panel    TSH, 3rd generation with T4 reflex    Lipid panel            Subjective:      Patient ID: Kristen Both is a 60 y o  male  Doren Needles is here today for a follow up visit  He was seen several weeks ago after having SOB at work when ambulating  He did have a work up done and his ECHO was normal with an 55 to 60%  He did have a CXR done and PFTs which were normal   He is not experiencing any chest pain or SOB  He would like to be referred to weight management at this time and feels his SOB is related to his weight  He is trying to lose weight at home and is exercising every day by walking his dogs  He does have issues with his knees and in the future is looking at a possible knee replacement  He has not had blood work done in some time and is willing to have this done  He offers no other complaints  The following portions of the patient's history were reviewed and updated as appropriate:   He  has no past medical history on file    He   Patient Active Problem List    Diagnosis Date Noted    Dyspnea 03/16/2018    Depression 05/18/2017    Morbid obesity ([facility] [address] ) 05/18/2017    Chronic back pain 09/15/2016    Elevated blood-pressure reading without diagnosis of hypertension 09/15/2016    Heart murmur 09/15/2016    Hypertriglyceridemia 09/15/2016    Osteoarthritis 09/15/2016    Vitamin D deficiency 09/15/2016     He  has no past surgical history on file  His family history is not on file  He  has no tobacco, alcohol, and drug history on file  Current Outpatient Prescriptions   Medication Sig Dispense Refill    acetaminophen (TYLENOL) 650 mg CR tablet Take 650 mg by mouth every 8 (eight) hours      meloxicam (MOBIC) 15 mg tablet       traMADol (ULTRAM) 50 mg tablet 1/2 to 1 tab po qdaily prn       No current facility-administered medications for this visit  Current Outpatient Prescriptions on File Prior to Visit   Medication Sig    acetaminophen (TYLENOL) 650 mg CR tablet Take 650 mg by mouth every 8 (eight) hours    meloxicam (MOBIC) 15 mg tablet     traMADol (ULTRAM) 50 mg tablet 1/2 to 1 tab po qdaily prn    [DISCONTINUED] Cholecalciferol 1000 UNIT/10ML LIQD Take 1,000 Units by mouth    [DISCONTINUED] buPROPion (WELLBUTRIN XL) 150 mg 24 hr tablet Take by mouth    [DISCONTINUED] meloxicam (MOBIC) 15 mg tablet Take 15 mg by mouth     No current facility-administered medications on file prior to visit  He is allergic to cephalexin; latex; neomycin-bacitracin zn-polymyx; and other       Review of Systems   All other systems reviewed and are negative  Objective:      /68 (BP Location: Left arm, Patient Position: Sitting, Cuff Size: Large)   Pulse 87   Temp 99 1 °F (37 3 °C) (Probe)   Resp 20   Ht 6' (1 829 m)   Wt (!) 158 kg (349 lb)   SpO2 96%   BMI 47 33 kg/m²          Physical Exam   Constitutional: He is oriented to person, place, and time  He appears well-developed and well-nourished  HENT:   Head: Normocephalic and atraumatic  Right Ear: External ear normal    Left Ear: External ear normal    Nose: Nose normal    Mouth/Throat: Oropharynx is clear and moist    Eyes: Conjunctivae and EOM are normal  Pupils are equal, round, and reactive to light  Neck: Normal range of motion  Neck supple     Cardiovascular: Normal rate, regular rhythm, normal heart sounds and intact distal pulses  Pulmonary/Chest: Effort normal and breath sounds normal    Abdominal: Soft  Bowel sounds are normal    Musculoskeletal: Normal range of motion  Neurological: He is alert and oriented to person, place, and time  He has normal reflexes  Skin: Skin is warm and dry  Psychiatric: He has a normal mood and affect  His behavior is normal  Judgment and thought content normal    Vitals reviewed

## 2018-04-07 ENCOUNTER — LAB (OUTPATIENT)
Dept: LAB | Facility: HOSPITAL | Age: 63
End: 2018-04-07
Payer: COMMERCIAL

## 2018-04-07 DIAGNOSIS — E66.01 MORBID OBESITY (HCC): ICD-10-CM

## 2018-04-07 DIAGNOSIS — R06.00 DYSPNEA ON EXERTION: ICD-10-CM

## 2018-04-07 LAB
ALBUMIN SERPL BCP-MCNC: 4 G/DL (ref 3.5–5)
ALP SERPL-CCNC: 50 U/L (ref 46–116)
ALT SERPL W P-5'-P-CCNC: 35 U/L (ref 12–78)
ANION GAP SERPL CALCULATED.3IONS-SCNC: 10 MMOL/L (ref 4–13)
AST SERPL W P-5'-P-CCNC: 20 U/L (ref 5–45)
BASOPHILS # BLD AUTO: 0.03 THOUSANDS/ΜL (ref 0–0.1)
BASOPHILS NFR BLD AUTO: 1 % (ref 0–1)
BILIRUB SERPL-MCNC: 0.7 MG/DL (ref 0.2–1)
BUN SERPL-MCNC: 17 MG/DL (ref 5–25)
CALCIUM SERPL-MCNC: 9 MG/DL (ref 8.3–10.1)
CHLORIDE SERPL-SCNC: 103 MMOL/L (ref 100–108)
CHOLEST SERPL-MCNC: 220 MG/DL (ref 50–200)
CO2 SERPL-SCNC: 26 MMOL/L (ref 21–32)
CREAT SERPL-MCNC: 0.78 MG/DL (ref 0.6–1.3)
EOSINOPHIL # BLD AUTO: 0.26 THOUSAND/ΜL (ref 0–0.61)
EOSINOPHIL NFR BLD AUTO: 4 % (ref 0–6)
ERYTHROCYTE [DISTWIDTH] IN BLOOD BY AUTOMATED COUNT: 13.2 % (ref 11.6–15.1)
GFR SERPL CREATININE-BSD FRML MDRD: 96 ML/MIN/1.73SQ M
GLUCOSE P FAST SERPL-MCNC: 124 MG/DL (ref 65–99)
HCT VFR BLD AUTO: 43 % (ref 36.5–49.3)
HDLC SERPL-MCNC: 67 MG/DL (ref 40–60)
HGB BLD-MCNC: 14.6 G/DL (ref 12–17)
LDLC SERPL CALC-MCNC: 123 MG/DL (ref 0–100)
LYMPHOCYTES # BLD AUTO: 1.05 THOUSANDS/ΜL (ref 0.6–4.47)
LYMPHOCYTES NFR BLD AUTO: 18 % (ref 14–44)
MCH RBC QN AUTO: 29.7 PG (ref 26.8–34.3)
MCHC RBC AUTO-ENTMCNC: 34 G/DL (ref 31.4–37.4)
MCV RBC AUTO: 88 FL (ref 82–98)
MONOCYTES # BLD AUTO: 0.4 THOUSAND/ΜL (ref 0.17–1.22)
MONOCYTES NFR BLD AUTO: 7 % (ref 4–12)
NEUTROPHILS # BLD AUTO: 4.24 THOUSANDS/ΜL (ref 1.85–7.62)
NEUTS SEG NFR BLD AUTO: 70 % (ref 43–75)
NONHDLC SERPL-MCNC: 153 MG/DL
PLATELET # BLD AUTO: 219 THOUSANDS/UL (ref 149–390)
PMV BLD AUTO: 10 FL (ref 8.9–12.7)
POTASSIUM SERPL-SCNC: 4.2 MMOL/L (ref 3.5–5.3)
PROT SERPL-MCNC: 7.1 G/DL (ref 6.4–8.2)
RBC # BLD AUTO: 4.91 MILLION/UL (ref 3.88–5.62)
SODIUM SERPL-SCNC: 139 MMOL/L (ref 136–145)
TRIGL SERPL-MCNC: 148 MG/DL
TSH SERPL DL<=0.05 MIU/L-ACNC: 1 UIU/ML (ref 0.36–3.74)
WBC # BLD AUTO: 5.98 THOUSAND/UL (ref 4.31–10.16)

## 2018-04-07 PROCEDURE — 85025 COMPLETE CBC W/AUTO DIFF WBC: CPT

## 2018-04-07 PROCEDURE — 84443 ASSAY THYROID STIM HORMONE: CPT

## 2018-04-07 PROCEDURE — 80053 COMPREHEN METABOLIC PANEL: CPT

## 2018-04-07 PROCEDURE — 36415 COLL VENOUS BLD VENIPUNCTURE: CPT

## 2018-04-07 PROCEDURE — 80061 LIPID PANEL: CPT

## 2018-04-07 NOTE — PROGRESS NOTES
Can you let Marton Halsted know his blood work did come back showing his fasting sugar was up at 124 and one year it ago it was up as well I would like to check an A1C to make sure he is not diabetic  His cholesterol is up 220 we like this less than 200 and his LDL is up at 124 this is his bad cholesterol we like this less than 100  He can bring this down by adding fiber to his diet

## 2018-04-11 ENCOUNTER — TELEPHONE (OUTPATIENT)
Dept: INTERNAL MEDICINE CLINIC | Facility: CLINIC | Age: 63
End: 2018-04-11

## 2018-04-24 ENCOUNTER — HOSPITAL ENCOUNTER (OUTPATIENT)
Dept: CT IMAGING | Facility: HOSPITAL | Age: 63
Discharge: HOME | End: 2018-04-24
Payer: COMMERCIAL

## 2018-04-24 DIAGNOSIS — R06.02 SHORTNESS OF BREATH ON EXERTION: ICD-10-CM

## 2018-04-24 PROCEDURE — 71250 CT THORAX DX C-: CPT

## 2018-05-01 ENCOUNTER — OFFICE VISIT (OUTPATIENT)
Dept: INTERNAL MEDICINE CLINIC | Facility: CLINIC | Age: 63
End: 2018-05-01
Payer: COMMERCIAL

## 2018-05-01 VITALS
DIASTOLIC BLOOD PRESSURE: 82 MMHG | TEMPERATURE: 99.3 F | HEIGHT: 72 IN | HEART RATE: 73 BPM | WEIGHT: 315 LBS | OXYGEN SATURATION: 97 % | SYSTOLIC BLOOD PRESSURE: 124 MMHG | BODY MASS INDEX: 42.66 KG/M2

## 2018-05-01 DIAGNOSIS — R06.00 DYSPNEA ON EXERTION: Primary | ICD-10-CM

## 2018-05-01 DIAGNOSIS — E78.49 OTHER HYPERLIPIDEMIA: ICD-10-CM

## 2018-05-01 PROCEDURE — 3008F BODY MASS INDEX DOCD: CPT | Performed by: NURSE PRACTITIONER

## 2018-05-01 PROCEDURE — 99213 OFFICE O/P EST LOW 20 MIN: CPT | Performed by: NURSE PRACTITIONER

## 2018-05-01 NOTE — PROGRESS NOTES
Assessment/Plan: Patient is cleared to return to work from a Cardiac and Pulmonary standpoint  Most recent blood work did show his cholesterol was up at 220 and his LDL was up at 123  He was advised to increase fiber in his diet  Will repeat labs again in 6 months  He was advised if any issues or concerns to please call the office  No problem-specific Assessment & Plan notes found for this encounter  Problem List Items Addressed This Visit     Dyspnea - Primary    Relevant Orders    Comprehensive metabolic panel    CBC and differential    TSH, 3rd generation with T4 reflex      Other Visit Diagnoses     Other hyperlipidemia        Relevant Orders    Comprehensive metabolic panel    CBC and differential    TSH, 3rd generation with T4 reflex    Lipid panel            Subjective:      Patient ID: Charlene Austin is a 60 y o  male  Elizabeth Alarcon is here today for a follow up visit  He was recently instructed by his place of employment after having episodes of SOB while walking to have a work up done  He did have an ECHO, chest XR, and CT of the chest which as all normal   He did have recent labs done which were essentially normal with his cholesterol being up at 223 and his LDL being up at 123  He is in today for a visit to get cleared to return to work  He will be evaluated again by his employer's physician  He denies any chest pain, SOB, or palpitations  He is wearing a knee brace today for his chronic right knee pain  He did inquire about weight management at his last visit with his wife and they were both given referrals  He was waiting to return back to work before setting this up  He offers no other issues  The following portions of the patient's history were reviewed and updated as appropriate:   He  has a past medical history of Arthritis and Concussion    He   Patient Active Problem List    Diagnosis Date Noted    Dyspnea 03/16/2018    Depression 05/18/2017    Morbid obesity ([facility] [address] ) 05/18/2017    Chronic back pain 09/15/2016    Elevated blood-pressure reading without diagnosis of hypertension 09/15/2016    Heart murmur 09/15/2016    Hypertriglyceridemia 09/15/2016    Osteoarthritis 09/15/2016    Vitamin D deficiency 09/15/2016     He  has a past surgical history that includes Cholecystectomy open and Foot surgery (Right)  His family history is not on file  He  reports that he quit smoking about 5 years ago  His smoking use included Cigarettes  He quit smokeless tobacco use about 5 years ago  He reports that he drinks alcohol  His drug history is not on file  Current Outpatient Prescriptions   Medication Sig Dispense Refill    acetaminophen (TYLENOL) 650 mg CR tablet Take 650 mg by mouth every 8 (eight) hours      meloxicam (MOBIC) 15 mg tablet       traMADol (ULTRAM) 50 mg tablet 1/2 to 1 tab po qdaily prn       No current facility-administered medications for this visit  Current Outpatient Prescriptions on File Prior to Visit   Medication Sig    acetaminophen (TYLENOL) 650 mg CR tablet Take 650 mg by mouth every 8 (eight) hours    meloxicam (MOBIC) 15 mg tablet     traMADol (ULTRAM) 50 mg tablet 1/2 to 1 tab po qdaily prn     No current facility-administered medications on file prior to visit  He is allergic to cephalexin; latex; neomycin-bacitracin zn-polymyx; and other       Review of Systems   All other systems reviewed and are negative  Below is the patient's most recent value for Albumin, ALT, AST, BUN, Calcium, Chloride, Cholesterol, CO2, Creatinine, GFR, Glucose, HDL, Hematocrit, Hemoglobin, Hemoglobin A1C, LDL, Magnesium, Phosphorus, Platelets, Potassium, PSA, Sodium, Triglycerides, and WBC     Lab Results   Component Value Date    ALT 35 04/07/2018    AST 20 04/07/2018    BUN 17 04/07/2018    CALCIUM 9 0 04/07/2018     04/07/2018    CHOL 220 (H) 04/07/2018    CO2 26 04/07/2018    CREATININE 0 78 04/07/2018    HDL 67 (H) 04/07/2018    HCT 43 0 04/07/2018    HGB 14 6 04/07/2018    HGBA1C 6 1 05/21/2017     04/07/2018    K 4 2 04/07/2018     04/07/2018    TRIG 148 04/07/2018    WBC 5 98 04/07/2018     Note: for a comprehensive list of the patient's lab results, access the Results Review activity  Objective:      /82 (BP Location: Right arm, Patient Position: Sitting, Cuff Size: Large)   Pulse 73   Temp 99 3 °F (37 4 °C) (Temporal)   Ht 6' (1 829 m)   Wt (!) 156 kg (343 lb 8 oz)   SpO2 97%   BMI 46 59 kg/m²          Physical Exam   Constitutional: He is oriented to person, place, and time  He appears well-developed and well-nourished  HENT:   Head: Normocephalic and atraumatic  Right Ear: External ear normal    Left Ear: External ear normal    Nose: Nose normal    Mouth/Throat: Oropharynx is clear and moist    Eyes: Conjunctivae and EOM are normal  Pupils are equal, round, and reactive to light  Neck: Normal range of motion  Neck supple  Cardiovascular: Normal rate, regular rhythm, normal heart sounds and intact distal pulses  Pulmonary/Chest: Effort normal and breath sounds normal    Abdominal: Soft  Bowel sounds are normal    Musculoskeletal: Normal range of motion  Neurological: He is alert and oriented to person, place, and time  He has normal reflexes  Skin: Skin is warm and dry  Psychiatric: He has a normal mood and affect  His behavior is normal  Judgment and thought content normal    Vitals reviewed

## 2018-05-09 ENCOUNTER — TELEPHONE (OUTPATIENT)
Dept: INTERNAL MEDICINE CLINIC | Facility: CLINIC | Age: 63
End: 2018-05-09

## 2018-05-09 NOTE — TELEPHONE ENCOUNTER
Patient had called and stated that he was cleared to go back to work and the company doctor also cleared him as well but needs to have FCE test done?    Patient is asking what is FCE?

## 2018-10-01 ENCOUNTER — EVALUATION (OUTPATIENT)
Dept: PHYSICAL THERAPY | Facility: CLINIC | Age: 63
End: 2018-10-01
Payer: COMMERCIAL

## 2018-10-01 ENCOUNTER — TRANSCRIBE ORDERS (OUTPATIENT)
Dept: PHYSICAL THERAPY | Facility: CLINIC | Age: 63
End: 2018-10-01

## 2018-10-01 DIAGNOSIS — M17.0 PRIMARY OSTEOARTHRITIS OF BOTH KNEES: Primary | ICD-10-CM

## 2018-10-01 PROCEDURE — 97140 MANUAL THERAPY 1/> REGIONS: CPT | Performed by: PHYSICAL THERAPIST

## 2018-10-01 PROCEDURE — G8978 MOBILITY CURRENT STATUS: HCPCS | Performed by: PHYSICAL THERAPIST

## 2018-10-01 PROCEDURE — 97110 THERAPEUTIC EXERCISES: CPT | Performed by: PHYSICAL THERAPIST

## 2018-10-01 PROCEDURE — 97163 PT EVAL HIGH COMPLEX 45 MIN: CPT | Performed by: PHYSICAL THERAPIST

## 2018-10-01 PROCEDURE — G8979 MOBILITY GOAL STATUS: HCPCS | Performed by: PHYSICAL THERAPIST

## 2018-10-01 NOTE — PROGRESS NOTES
PT Evaluation     Today's date: 10/1/2018  Patient name: Stefanie Banerjee  : 1955  MRN: [id number]  Referring provider: Onesimo Kmi MD  Dx:   Encounter Diagnosis     ICD-10-CM    1  Primary osteoarthritis of both knees M17 0                   Assessment    Assessment details:   CURRENT FUNCTIONAL STATUS    Standing/ADL tolerance 5 minutes  Walking tolerance 300 feet  Ascends stairs step by step/descends stairs step by step  Difficulty arising from sitting: Moderate  Unable to squat  Work Status: Full Duty    SHORT TERM GOALS (2 WEEKS)    Increase knee AROM and PROM 10-15 degrees  Increase knee strength 3-5 lbs in all weak areas  Girth MP: 49 cm  Decrease pain to 0-5/10  Standing/ADL tolerance 15 minutes with a walker  Walking tolerance 200 feet with a walker  Ascends stairs step by step/descends stairs step by step  Difficulty arising from sitting: Mild  Avoid squatting  Work Status: Off    LONG TERM GOALS (DISCHARGE)    Knee AROM: -5-120 deg  Knee PROM: 0-125 deg  Knee Strength: E=87 lbs, F=61 lbs  Girth MP: 49 cm  Decrease pain to 0-2/10  Standing/ADL tolerance 60 minutes  Walking tolerance 1 mile  Ascends/descends stairs reciprocally  Difficulty arising from sitting: None  Able to squat 45 degrees  Work Status: Full Duty  Understanding of Dx/Px/POC: good   Prognosis: good    Goals  See assessment details above  Plan  Planned modality interventions: unattended electrical stimulation and cryotherapy  Planned therapy interventions: manual therapy, gait training, neuromuscular re-education, therapeutic exercise and therapeutic activities  Frequency: 2x week  Duration in weeks: 4  Plan details: Stefanie Banerjee is a 58 y o  male presenting to PT with pain, decreased range of motion, decreased strength, and decreased tolerance to activity  This patient would benefit from skilled PT services to address these issues and to maximize function   A home exercise program was provided and all questions were answered  Thank you for the referral           Subjective Evaluation    History of Present Illness  Mechanism of injury: CC: Bilateral knee pain, swelling, and instability  HPI: The patient's knee problems began 2 years ago without any history of injury  Testing revealed bilateral knee arthritis  He takes medication and wears a brace on his left knee for relief  He will be having his left knee replaced on 10/19/2018  He is currently working full duty as a   Hobbies include walking his dogs in the woods  Pain  Current pain ratin  At best pain ratin  At worst pain ratin    Patient Goals  Patient goals for therapy: increased strength, decreased pain, increased motion, improved balance, independence with ADLs/IADLs and return to sport/leisure activities          Objective     Observations     Additional Observation Details  Gait is severely antalgic without an AD  Patient is wearing a sleeve on his left knee  General Comments     Knee Comments  CURRENT OBJECTIVE MEASUREMENTS    R Knee AROM: - deg  L Knee AROM: - deg  L Knee PROM: - deg  Knee Strength R/L: E=87/75 lbs, F=61/56 lbs  Girth MP R/L: 50/49 cm               Flowsheet Rows      Most Recent Value   PT/OT G-Codes   Current Score  28   Projected Score  52        Precautions: None    Daily Treatment Diary     Manual  10/1        PROM RK        Hamstring Stretch         Calf Stretch                  Exercise Diary          QS 10x        SLR 10x        SAQ 10x        Heel slides with Strap 10x        T-Band Press         T-Band Hamstring         T-Band TKE         LAQ         Mini Squats         Steps Forward         Steps Lateral         PYR HAM: S  P , C         PYR QUAD: S   P , C         Leg Press: S         XO TKE         Hack Squat         SLS         NuStep: S 13, A 12 L 2 10'        Bike: S         Treadmill                  Modalities          CP/IFC

## 2018-10-01 NOTE — LETTER
[id number]    MD [address] [place] [address]    Patient: José Antonio Yañez   YOB: 1955   Date of Visit: 10/1/2018     Encounter Diagnosis     ICD-10-CM    1  Primary osteoarthritis of both knees M17 0        Dear Dr Simona Amanda:    Please review the attached Plan of Care from [facility] recent visit  Please verify that you agree therapy should continue by signing the attached document and sending it back to our office  If you have any questions or concerns, please don't hesitate to call  Sincerely,    Sol Chairez, PT      Referring Provider:      I certify that I have read the below Plan of Care and certify the need for these services furnished under this plan of treatment while under my care  Titi Rich MD  [facility]: [phone]          PT Evaluation     Today's date: 10/1/2018  Patient name: José Antonio Yañez  : 1955  MRN: [id number]  Referring provider: Gilbert Mathews MD  Dx:   Encounter Diagnosis     ICD-10-CM    1  Primary osteoarthritis of both knees M17 0                   Assessment    Assessment details:   CURRENT FUNCTIONAL STATUS    Standing/ADL tolerance 5 minutes  Walking tolerance 300 feet  Ascends stairs step by step/descends stairs step by step  Difficulty arising from sitting: Moderate  Unable to squat  Work Status: Full Duty    SHORT TERM GOALS (2 WEEKS)    Increase knee AROM and PROM 10-15 degrees  Increase knee strength 3-5 lbs in all weak areas  Girth MP: 49 cm  Decrease pain to 0-5/10  Standing/ADL tolerance 15 minutes with a walker  Walking tolerance 200 feet with a walker  Ascends stairs step by step/descends stairs step by step  Difficulty arising from sitting: Mild  Avoid squatting  Work Status: Off    LONG TERM GOALS (DISCHARGE)    Knee AROM: -5-120 deg  Knee PROM: 0-125 deg  Knee Strength: E=87 lbs, F=61 lbs    Girth MP: 49 cm  Decrease pain to 0-2/10  Standing/ADL tolerance 60 minutes  Walking tolerance 1 mile  Ascends/descends stairs reciprocally  Difficulty arising from sitting: None  Able to squat 45 degrees  Work Status: Full Duty  Understanding of Dx/Px/POC: good   Prognosis: good    Goals  See assessment details above  Plan  Planned modality interventions: unattended electrical stimulation and cryotherapy  Planned therapy interventions: manual therapy, gait training, neuromuscular re-education, therapeutic exercise and therapeutic activities  Frequency: 2x week  Duration in weeks: 4  Plan details: Bertrand Barahona is a 58 y o  male presenting to PT with pain, decreased range of motion, decreased strength, and decreased tolerance to activity  This patient would benefit from skilled PT services to address these issues and to maximize function  A home exercise program was provided and all questions were answered  Thank you for the referral           Subjective Evaluation    History of Present Illness  Mechanism of injury: CC: Bilateral knee pain, swelling, and instability  HPI: The patient's knee problems began 2 years ago without any history of injury  Testing revealed bilateral knee arthritis  He takes medication and wears a brace on his left knee for relief  He will be having his left knee replaced on 10/19/2018  He is currently working full duty as a   Hobbies include walking his dogs in the woods  Pain  Current pain ratin  At best pain ratin  At worst pain ratin    Patient Goals  Patient goals for therapy: increased strength, decreased pain, increased motion, improved balance, independence with ADLs/IADLs and return to sport/leisure activities          Objective     Observations     Additional Observation Details  Gait is severely antalgic without an AD  Patient is wearing a sleeve on his left knee      General Comments     Knee Comments  CURRENT OBJECTIVE MEASUREMENTS    R Knee AROM: - deg  L Knee AROM: - deg  L Knee PROM: - deg  Knee Strength R/L: E=87/75 lbs, F=61/56 lbs  Girth MP R/L: 50/49 cm               Flowsheet Rows      Most Recent Value   PT/OT G-Codes   Current Score  28   Projected Score  52        Precautions: None    Daily Treatment Diary     Manual  10/1        PROM RK        Hamstring Stretch         Calf Stretch                  Exercise Diary          QS 10x        SLR 10x        SAQ 10x        Heel slides with Strap 10x        T-Band Press         T-Band Hamstring         T-Band TKE         LAQ         Mini Squats         Steps Forward         Steps Lateral         PYR HAM: S  P , C         PYR QUAD: S   P , C         Leg Press: S         XO TKE         Hack Squat         SLS         NuStep: S 13, A 12 L 2 10'        Bike: S         Treadmill                  Modalities          CP/IFC

## 2018-10-02 ENCOUNTER — OFFICE VISIT (OUTPATIENT)
Dept: INTERNAL MEDICINE CLINIC | Facility: CLINIC | Age: 63
End: 2018-10-02
Payer: COMMERCIAL

## 2018-10-02 VITALS
HEIGHT: 72 IN | OXYGEN SATURATION: 99 % | HEART RATE: 62 BPM | BODY MASS INDEX: 42.66 KG/M2 | SYSTOLIC BLOOD PRESSURE: 132 MMHG | TEMPERATURE: 98.2 F | DIASTOLIC BLOOD PRESSURE: 80 MMHG | WEIGHT: 315 LBS

## 2018-10-02 DIAGNOSIS — M17.12 PRIMARY OSTEOARTHRITIS OF ONE KNEE, LEFT: ICD-10-CM

## 2018-10-02 DIAGNOSIS — E66.01 MORBID OBESITY (HCC): ICD-10-CM

## 2018-10-02 DIAGNOSIS — Z01.818 PRE-OP EVALUATION: Primary | ICD-10-CM

## 2018-10-02 PROBLEM — R06.00 DYSPNEA: Status: RESOLVED | Noted: 2018-03-16 | Resolved: 2018-10-02

## 2018-10-02 PROCEDURE — 99214 OFFICE O/P EST MOD 30 MIN: CPT | Performed by: FAMILY MEDICINE

## 2018-10-03 ENCOUNTER — OFFICE VISIT (OUTPATIENT)
Dept: PHYSICAL THERAPY | Facility: CLINIC | Age: 63
End: 2018-10-03
Payer: COMMERCIAL

## 2018-10-03 DIAGNOSIS — M17.0 PRIMARY OSTEOARTHRITIS OF BOTH KNEES: Primary | ICD-10-CM

## 2018-10-03 PROCEDURE — 97110 THERAPEUTIC EXERCISES: CPT | Performed by: PHYSICAL THERAPIST

## 2018-10-03 PROCEDURE — 97116 GAIT TRAINING THERAPY: CPT | Performed by: PHYSICAL THERAPIST

## 2018-10-03 NOTE — PROGRESS NOTES
Assessment/Plan:    No problem-specific Assessment & Plan notes found for this encounter  Diagnoses and all orders for this visit:    Pre-op evaluation    Primary osteoarthritis of one knee, left    Morbid obesity ([address] )        Recent preop testing was reviewed  Blood work is generally stable  Blood sugar was mildly elevated but he was not fasting for a full 8-10 hours  Remainder of laboratory testing was stable  Urinalysis was negative chest x-ray was normal with no cardiopulmonary abnormalities  EKG was normal sinus rhythm with no ischemic changes seen  He did have an echocardiogram done within the past 6 months which showed a normal ejection fraction  He has had significant weight loss which will help with recovery following the surgery  He is cleared for the upcoming left total knee replacement  He is  mild risk mostly because of his weight and mild hyperlipidemia  Based on his body habitus, it is likely that he does have a component of sleep apnea  Copy of this report will be sent to his orthopedic doctor  Subjective:      Patient ID: Shannon Rocha is a 58 y o  male  He presents for preop visit prior to his upcoming left total knee replacement on October 24ZG by Dr Anshul Liz  He has been working hard to lose weight in anticipation of the upcoming surgery  He has lost close to 50 lb per his report  Has been trying to eat smaller portions and more healthy  Cannot exercise regularly because of the knee pain but does remain active at work  Has generally been feeling well except for the left knee  Left knee has been increasingly painful and unsteady  He did have a minor fall recently when his left knee gave out but he did not have any significant injury  Denies any chest pain or palpitations  He did have some cardiac evaluation done within the last year because of some shortness of breath  No significant abnormalities were seen    He has difficulty walking extended distances or taking stairs because of the orthopedic issues  He does not feel overly short of breath with this at present  He does feel that the weight loss has helped with this  Denies any significant sleep issues  He does have larger neck and is significantly overweight despite the weight loss  He is likely at risk for sleep apnea  Denies any nausea, vomiting, or diarrhea  Denies any significant reflux symptoms  Denies any difficulty with anesthesia  Denies any urinary issues  Denies any dysuria  Denies any recent fevers or chills  Usually sleeps relatively well  Does have some chronic swelling in his legs near the end of his work day but does usually resolve after sleeping  The following portions of the patient's history were reviewed and updated as appropriate:   He  has a past medical history of Arthritis; Compression fracture of spine ([facility] [address] ); Concussion; Dislocation of wrist; Esophageal reflux; Fractured sternum; and Nasal fracture  He   Patient Active Problem List    Diagnosis Date Noted    Pre-op evaluation 10/02/2018    Primary osteoarthritis of one knee, left 08/31/2018    Depression 05/18/2017    Morbid obesity ([facility] [address] ) 05/18/2017    Chronic back pain 09/15/2016    Hypertriglyceridemia 09/15/2016    Osteoarthritis 09/15/2016    Vitamin D deficiency 09/15/2016    Osteoarthritis of spine with radiculopathy, lumbar region 03/07/2016     He  has a past surgical history that includes Cholecystectomy open; Foot surgery (Right); and Colonoscopy  His family history includes Cancer in his father; Dementia in his mother; Hyperlipidemia in his mother  He  reports that he quit smoking about 5 years ago  His smoking use included Cigarettes  He quit smokeless tobacco use about 5 years ago  He reports that he drinks alcohol  His drug history is not on file    Current Outpatient Prescriptions   Medication Sig Dispense Refill    acetaminophen (TYLENOL) 650 mg CR tablet Take 650 mg by mouth every 8 (eight) hours      meloxicam (MOBIC) 15 mg tablet       traMADol (ULTRAM) 50 mg tablet 1/2 to 1 tab po qdaily prn       No current facility-administered medications for this visit  Current Outpatient Prescriptions on File Prior to Visit   Medication Sig    acetaminophen (TYLENOL) 650 mg CR tablet Take 650 mg by mouth every 8 (eight) hours    meloxicam (MOBIC) 15 mg tablet     traMADol (ULTRAM) 50 mg tablet 1/2 to 1 tab po qdaily prn     No current facility-administered medications on file prior to visit  He is allergic to cephalexin; latex; neomycin-bacitracin zn-polymyx; other; and medical tape       Review of Systems   Constitutional: Positive for appetite change  Negative for activity change, chills and fever  HENT: Negative for congestion and hearing loss  Eyes: Negative for pain and visual disturbance  Respiratory: Negative for cough, chest tightness and shortness of breath  Cardiovascular: Positive for leg swelling  Negative for chest pain and palpitations  Gastrointestinal: Negative for abdominal pain, blood in stool, diarrhea, nausea and vomiting  Endocrine: Negative for polydipsia, polyphagia and polyuria  Genitourinary: Negative for dysuria, frequency and urgency  Musculoskeletal: Positive for arthralgias, back pain and gait problem  Skin: Negative for color change and rash  Neurological: Negative for dizziness, light-headedness and headaches  Hematological: Does not bruise/bleed easily  Psychiatric/Behavioral: Negative for behavioral problems  The patient is not nervous/anxious  Objective:      /80 (BP Location: Right arm, Patient Position: Sitting, Cuff Size: Large)   Pulse 62   Temp 98 2 °F (36 8 °C) (Temporal)   Ht 6' (1 829 m)   Wt (!) 146 kg (320 lb 14 4 oz)   SpO2 99%   BMI 43 52 kg/m²          Physical Exam   Constitutional: He is oriented to person, place, and time  He is cooperative  No distress     Morbidly obese   HENT:   Head: Normocephalic and atraumatic  Nose: Nose normal    Eyes: Pupils are equal, round, and reactive to light  Conjunctivae are normal    Neck: No JVD present  Carotid bruit is not present  No thyromegaly present  Cardiovascular: Normal rate, regular rhythm and normal heart sounds  Exam reveals no gallop and no friction rub  No murmur heard  Trace peripheral edema bilaterally   Pulmonary/Chest: He has no wheezes  He has no rales  He exhibits no tenderness  Abdominal: He exhibits no distension  There is no tenderness  There is no rebound and no guarding  Musculoskeletal:   Extensive degenerative changes bilateral knees left greater than right; tenderness bilateral knees over the joint line left greater than right   Lymphadenopathy:     He has no cervical adenopathy  Neurological: He is alert and oriented to person, place, and time  Skin: Skin is warm and dry  Psychiatric: He has a normal mood and affect  His behavior is normal    Nursing note and vitals reviewed

## 2018-10-03 NOTE — PROGRESS NOTES
Daily Note     Today's date: 10/3/2018  Patient name: Dale Ruiz  : 1955  MRN: [id number]  Referring provider: Markel Mcgee MD  Dx:   Encounter Diagnosis     ICD-10-CM    1  Primary osteoarthritis of both knees M17 0                   Subjective: Patient had a lot of pain and swelling in the knee after the last session  He does not wish to do any gym exercises today  He did not try his HEP due to pain  He is not sure how to walk on stairs with a cane  Objective: See treatment diary below      Assessment: Patient is independent with his HEP and walking on levels and stairs with a SPC  Plan: Resume PT after surgery        Precautions: None     Daily Treatment Diary      Manual  10/1  10/3           PROM RK             Hamstring Stretch               Calf Stretch                               Exercise Diary                QS 10x  5x           SLR 10x  5x           SAQ 10x  5x           Heel slides with Strap 10x  5x           T-Band Press               T-Band Hamstring               T-Band TKE               LAQ               Mini Squats               Steps Forward               Steps Lateral               PYR HAM: S  P , C               PYR QUAD: S   P , C               Leg Press: S               XO TKE               Hack Squat               SLS               NuStep: S 13, A 12 L 2 10'             Bike: S               Treadmill                Gait training  SPC levels and stairs             Modalities                CP/IFC

## 2018-10-26 NOTE — PROGRESS NOTES
Discharge Note     Today's date: 10/26/2018  Patient name: Roldan Nñúez  : 1955  MRN: [id number]  Referring provider: Jerry Gibson MD  Dx:   Encounter Diagnosis     ICD-10-CM    1  Primary osteoarthritis of both knees M17 0        The patient called our office stating that his knee surgery has been postponed  He therefore has been discharged from our care  No updated objective measures are available for this report due to self discharge

## 2018-12-27 ENCOUNTER — TRANSCRIBE ORDERS (OUTPATIENT)
Dept: LAB | Facility: CLINIC | Age: 63
End: 2018-12-27

## 2018-12-27 ENCOUNTER — APPOINTMENT (OUTPATIENT)
Dept: LAB | Facility: CLINIC | Age: 63
End: 2018-12-27
Payer: COMMERCIAL

## 2018-12-27 ENCOUNTER — OFFICE VISIT (OUTPATIENT)
Dept: INTERNAL MEDICINE CLINIC | Facility: CLINIC | Age: 63
End: 2018-12-27
Payer: COMMERCIAL

## 2018-12-27 VITALS
SYSTOLIC BLOOD PRESSURE: 136 MMHG | WEIGHT: 301 LBS | OXYGEN SATURATION: 98 % | DIASTOLIC BLOOD PRESSURE: 82 MMHG | BODY MASS INDEX: 40.77 KG/M2 | HEIGHT: 72 IN | TEMPERATURE: 98.5 F | HEART RATE: 64 BPM

## 2018-12-27 DIAGNOSIS — Z01.818 PRE-OP EVALUATION: Primary | ICD-10-CM

## 2018-12-27 DIAGNOSIS — M17.12 PRIMARY OSTEOARTHRITIS OF ONE KNEE, LEFT: ICD-10-CM

## 2018-12-27 DIAGNOSIS — Z01.818 PRE-OP EVALUATION: ICD-10-CM

## 2018-12-27 PROBLEM — M17.0 PRIMARY OSTEOARTHRITIS OF BOTH KNEES: Status: ACTIVE | Noted: 2018-12-06

## 2018-12-27 LAB
INR PPP: 0.98 (ref 0.86–1.17)
PROTHROMBIN TIME: 13.1 SECONDS (ref 11.8–14.2)

## 2018-12-27 PROCEDURE — 1036F TOBACCO NON-USER: CPT | Performed by: NURSE PRACTITIONER

## 2018-12-27 PROCEDURE — 85610 PROTHROMBIN TIME: CPT

## 2018-12-27 PROCEDURE — 99213 OFFICE O/P EST LOW 20 MIN: CPT | Performed by: NURSE PRACTITIONER

## 2018-12-27 PROCEDURE — 36415 COLL VENOUS BLD VENIPUNCTURE: CPT

## 2018-12-27 NOTE — PROGRESS NOTES
Assessment/Plan: Will obtain PT/INR today other labs were reviewed along with imaging which was within normal limits  /82  Once PT/INR is back patient will be cleared for surgery on 1/9/19  Will follow up after surgery  If any issues or concerns he was advised to call the office  No problem-specific Assessment & Plan notes found for this encounter  Problem List Items Addressed This Visit     Primary osteoarthritis of one knee, left    Pre-op evaluation - Primary    Relevant Orders    Protime-INR            Subjective:      Patient ID: Mj Tamayo is a 58 y o  male  Marvivi Morris is here today for a preoperative visit  He is having surgery with [facility] on 1/9 for a left knee replacement  He did have preoperative blood work done with CXR and EKG  He did also have a urine done which was all normal   He did not have a PT/INR done  He states he is feeling great and ready to have the surgery done  He is to stop his Mobic 5 days before surgery  He denies any chest pain, SOB, or palpitations  He offers no other issues  The following portions of the patient's history were reviewed and updated as appropriate:   He  has a past medical history of Arthritis; Compression fracture of spine ([address] ); Concussion; Dislocation of wrist; Esophageal reflux; Fractured sternum; and Nasal fracture  He   Patient Active Problem List    Diagnosis Date Noted    Primary osteoarthritis of both knees 12/06/2018    Pre-op evaluation 10/02/2018    Primary osteoarthritis of one knee, left 08/31/2018    Depression 05/18/2017    Morbid obesity ([facility] [address] ) 05/18/2017    Chronic back pain 09/15/2016    Hypertriglyceridemia 09/15/2016    Osteoarthritis 09/15/2016    Vitamin D deficiency 09/15/2016    Osteoarthritis of spine with radiculopathy, lumbar region 03/07/2016     He  has a past surgical history that includes Cholecystectomy open; Foot surgery (Right); and Colonoscopy    His family history includes Cancer in his father; Dementia in his mother; Hyperlipidemia in his mother  He  reports that he quit smoking about 5 years ago  His smoking use included Cigarettes  He quit smokeless tobacco use about 5 years ago  He reports that he drinks alcohol  His drug history is not on file  Current Outpatient Prescriptions   Medication Sig Dispense Refill    acetaminophen (TYLENOL) 650 mg CR tablet Take 650 mg by mouth every 8 (eight) hours      meloxicam (MOBIC) 15 mg tablet       traMADol (ULTRAM) 50 mg tablet 1/2 to 1 tab po qdaily prn       No current facility-administered medications for this visit  Current Outpatient Prescriptions on File Prior to Visit   Medication Sig    acetaminophen (TYLENOL) 650 mg CR tablet Take 650 mg by mouth every 8 (eight) hours    meloxicam (MOBIC) 15 mg tablet     traMADol (ULTRAM) 50 mg tablet 1/2 to 1 tab po qdaily prn     No current facility-administered medications on file prior to visit  He is allergic to latex; neomycin-bacitracin zn-polymyx; neomycin-polymyxin-pramoxine; other; cephalexin; and medical tape       Review of Systems   Constitutional: Negative  HENT: Negative  Eyes: Negative  Respiratory: Negative  Cardiovascular: Negative  Gastrointestinal: Negative  Endocrine: Negative  Genitourinary: Negative  Musculoskeletal:        Left knee pain   Skin: Negative  Allergic/Immunologic: Negative  Neurological: Negative  Hematological: Negative  Psychiatric/Behavioral: Negative  Objective:      /82 (BP Location: Left arm, Patient Position: Sitting, Cuff Size: Large)   Pulse 64   Temp 98 5 °F (36 9 °C) (Temporal)   Ht 6' (1 829 m)   Wt (!) 137 kg (301 lb)   SpO2 98%   BMI 40 82 kg/m²          Physical Exam   Constitutional: He is oriented to person, place, and time  He appears well-developed and well-nourished  HENT:   Head: Normocephalic and atraumatic     Right Ear: External ear normal    Left Ear: External ear normal  Nose: Nose normal    Mouth/Throat: Oropharynx is clear and moist    Eyes: Pupils are equal, round, and reactive to light  Conjunctivae and EOM are normal    Neck: Normal range of motion  Neck supple  Cardiovascular: Normal rate, regular rhythm, normal heart sounds and intact distal pulses  Pulmonary/Chest: Effort normal and breath sounds normal    Abdominal: Soft  Bowel sounds are normal    Musculoskeletal: Normal range of motion  Neurological: He is alert and oriented to person, place, and time  He has normal reflexes  Skin: Skin is warm and dry  Psychiatric: He has a normal mood and affect  His behavior is normal  Judgment and thought content normal    Vitals reviewed

## 2019-01-14 ENCOUNTER — TRANSCRIBE ORDERS (OUTPATIENT)
Dept: PHYSICAL THERAPY | Facility: CLINIC | Age: 64
End: 2019-01-14

## 2019-01-14 ENCOUNTER — EVALUATION (OUTPATIENT)
Dept: PHYSICAL THERAPY | Facility: CLINIC | Age: 64
End: 2019-01-14
Payer: COMMERCIAL

## 2019-01-14 DIAGNOSIS — M25.562 CHRONIC PAIN OF LEFT KNEE: Primary | ICD-10-CM

## 2019-01-14 DIAGNOSIS — G89.29 CHRONIC PAIN OF LEFT KNEE: Primary | ICD-10-CM

## 2019-01-14 DIAGNOSIS — Z96.652 S/P TKR (TOTAL KNEE REPLACEMENT), LEFT: ICD-10-CM

## 2019-01-14 PROCEDURE — 97010 HOT OR COLD PACKS THERAPY: CPT | Performed by: PHYSICAL THERAPIST

## 2019-01-14 PROCEDURE — 97140 MANUAL THERAPY 1/> REGIONS: CPT | Performed by: PHYSICAL THERAPIST

## 2019-01-14 PROCEDURE — 97162 PT EVAL MOD COMPLEX 30 MIN: CPT | Performed by: PHYSICAL THERAPIST

## 2019-01-14 PROCEDURE — 97110 THERAPEUTIC EXERCISES: CPT | Performed by: PHYSICAL THERAPIST

## 2019-01-14 PROCEDURE — G8979 MOBILITY GOAL STATUS: HCPCS | Performed by: PHYSICAL THERAPIST

## 2019-01-14 PROCEDURE — G8978 MOBILITY CURRENT STATUS: HCPCS | Performed by: PHYSICAL THERAPIST

## 2019-01-14 NOTE — PROGRESS NOTES
PT Evaluation     Today's date: 2019  Patient name: Bertrand Barahona  : 1955  MRN: [id number]  Referring provider: Evette Orourke MD  Dx:   Encounter Diagnosis     ICD-10-CM    1  Chronic pain of left knee M25 562     G89 29    2  S/P TKR (total knee replacement), left [id number]                   Assessment  Assessment details:   CURRENT FUNCTIONAL STATUS    Standing/ADL tolerance 10 minutes  Walking tolerance 100 feet with a walker  Ascends stairs step by step/descends stairs step by step  Difficulty arising from sitting: Moderate  Unable to squat  Work Status: Off    SHORT TERM GOALS (2 WEEKS)    Increase knee AROM and PROM 10-15 degrees  Increase knee strength 3-5 lbs in all weak areas  Girth MP: 54 cm  Decrease pain to 0-4/10  Standing/ADL tolerance 30 minutes with a walker  Walking tolerance 300 feet with a walker  Ascends stairs step by step/descends stairs step by step  Difficulty arising from sitting: Mild  Able to squat 30 degrees  Work Status: Remain off    LONG TERM GOALS (DISCHARGE)    Knee AROM: -5-120 deg  Knee PROM: 0-125 deg  Knee Strength: E=87 lbs, F=61 lbs  Girth MP: 50 cm  Decrease pain to 0-2/10  Standing/ADL tolerance 60 minutes  Walking tolerance 1 mile  Ascends/descends stairs reciprocally  Difficulty arising from sitting: None  Able to squat 45 degrees  Work Status: Full Duty  Understanding of Dx/Px/POC: good   Prognosis: good    Goals  See assessment details above  Plan  Plan details: Bertrand Barahona is a 58 y o  male presenting to PT with pain, decreased range of motion, decreased strength, and decreased tolerance to activity  This patient would benefit from skilled PT services to address these issues and to maximize function  A home exercise program was provided and all questions were answered   Thank you for the referral     Planned modality interventions: unattended electrical stimulation and cryotherapy  Planned therapy interventions: manual therapy, gait training, neuromuscular re-education, therapeutic exercise and therapeutic activities  Frequency: 2x week  Duration in weeks: 4        Subjective Evaluation    History of Present Illness  Mechanism of injury: CC: Left knee pain, swelling, stiffness, and weakness  HPI: The patient's knee problems began 2 years ago without any history of injury  He had his left knee replaced on 2019  His right knee also has advanced OA  He lives with his wife in a 2 story home  He is using a walker and is off of work as a   Hobbies include walking his dogs in the woods  Pain  Current pain ratin  At best pain ratin  At worst pain ratin    Patient Goals  Patient goals for therapy: increased strength, decreased pain, increased motion, improved balance, independence with ADLs/IADLs, return to sport/leisure activities, return to work and decreased edema          Objective     Observations     Additional Observation Details  Gait is mildly antalgic with a RW  Patient is wearing a sleeve with cold packs on his left knee  Incsion is healing well without redness or discharge  General Comments     Knee Comments  CURRENT OBJECTIVE MEASUREMENTS    L Knee AROM: -45-85 deg  L Knee PROM: -30-90 deg  Knee Strength: E=7 lbs, F=33 lbs  Girth MP: 57 cm                 Precautions: None    Daily Treatment Diary     Manual          PROM RK        Hamstring Stretch         Calf Stretch                  Exercise Diary          QS         SLR         SAQ         Heel slides with Strap 2/10        T-Band Press         T-Band Hamstring         T-Band TKE         LAQ 2/10        Mini Squats         Steps Forward         Steps Lateral         PYR HAM: S  P , C         PYR QUAD: S   P , C         Leg Press: S         XO TKE         Hack Squat         SLS         NuStep: S 14, A 10 L 3 7'        Bike: S         Treadmill                  Modalities          CP 15'

## 2019-01-14 NOTE — LETTER
[id number]    Michaela Alex MD  [address] [place] [address]    Patient: Gina Sandoval   YOB: 1955   Date of Visit: 2019     Encounter Diagnosis     ICD-10-CM    1  Chronic pain of left knee M25 562     G89 29    2  S/P TKR (total knee replacement), left [id number]        Dear Dr Wynema Dakins:    Please review the attached Plan of Care from [facility] recent visit  Please verify that you agree therapy should continue by signing the attached document and sending it back to our office  If you have any questions or concerns, please don't hesitate to call  Sincerely,    Colletta Colt, PT      Referring Provider:      I certify that I have read the below Plan of Care and certify the need for these services furnished under this plan of treatment while under my care  Michaela Alex MD  [facility] 31: [phone]          PT Evaluation     Today's date: 2019  Patient name: Gina Sandoval  : 1955  MRN: [id number]  Referring provider: Kyele Reyes MD  Dx:   Encounter Diagnosis     ICD-10-CM    1  Chronic pain of left knee M25 562     G89 29    2  S/P TKR (total knee replacement), left [id number]                   Assessment  Assessment details:   CURRENT FUNCTIONAL STATUS    Standing/ADL tolerance 10 minutes  Walking tolerance 100 feet with a walker  Ascends stairs step by step/descends stairs step by step  Difficulty arising from sitting: Moderate  Unable to squat  Work Status: Off    SHORT TERM GOALS (2 WEEKS)    Increase knee AROM and PROM 10-15 degrees  Increase knee strength 3-5 lbs in all weak areas  Girth MP: 54 cm  Decrease pain to 0-4/10  Standing/ADL tolerance 30 minutes with a walker  Walking tolerance 300 feet with a walker  Ascends stairs step by step/descends stairs step by step  Difficulty arising from sitting: Mild  Able to squat 30 degrees    Work Status: Remain off    LONG TERM GOALS (DISCHARGE)    Knee AROM: -5-120 deg  Knee PROM: 0-125 deg  Knee Strength: E=87 lbs, F=61 lbs  Girth MP: 50 cm  Decrease pain to 0-2/10  Standing/ADL tolerance 60 minutes  Walking tolerance 1 mile  Ascends/descends stairs reciprocally  Difficulty arising from sitting: None  Able to squat 45 degrees  Work Status: Full Duty  Understanding of Dx/Px/POC: good   Prognosis: good    Goals  See assessment details above  Plan  Plan details: Viktor Greenwood is a 58 y o  male presenting to PT with pain, decreased range of motion, decreased strength, and decreased tolerance to activity  This patient would benefit from skilled PT services to address these issues and to maximize function  A home exercise program was provided and all questions were answered  Thank you for the referral     Planned modality interventions: unattended electrical stimulation and cryotherapy  Planned therapy interventions: manual therapy, gait training, neuromuscular re-education, therapeutic exercise and therapeutic activities  Frequency: 2x week  Duration in weeks: 4        Subjective Evaluation    History of Present Illness  Mechanism of injury: CC: Left knee pain, swelling, stiffness, and weakness  HPI: The patient's knee problems began 2 years ago without any history of injury  He had his left knee replaced on 2019  His right knee also has advanced OA  He lives with his wife in a 2 story home  He is using a walker and is off of work as a   Hobbies include walking his dogs in the woods  Pain  Current pain ratin  At best pain ratin  At worst pain ratin    Patient Goals  Patient goals for therapy: increased strength, decreased pain, increased motion, improved balance, independence with ADLs/IADLs, return to sport/leisure activities, return to work and decreased edema          Objective     Observations     Additional Observation Details  Gait is mildly antalgic with a RW  Patient is wearing a sleeve with cold packs on his left knee  Incsion is healing well without redness or discharge  General Comments     Knee Comments  CURRENT OBJECTIVE MEASUREMENTS    L Knee AROM: -45-85 deg  L Knee PROM: -30-90 deg  Knee Strength: E=7 lbs, F=33 lbs  Girth MP: 57 cm                 Precautions: None    Daily Treatment Diary     Manual  1/14        PROM RK        Hamstring Stretch         Calf Stretch                  Exercise Diary          QS         SLR         SAQ         Heel slides with Strap 2/10        T-Band Press         T-Band Hamstring         T-Band TKE         LAQ 2/10        Mini Squats         Steps Forward         Steps Lateral         PYR HAM: S  P , C         PYR QUAD: S   P , C         Leg Press: S         XO TKE         Hack Squat         SLS         NuStep: S 14, A 10 L 3 7'        Bike: S         Treadmill                  Modalities          CP 15'

## 2019-01-15 ENCOUNTER — TRANSITIONAL CARE MANAGEMENT (OUTPATIENT)
Dept: INTERNAL MEDICINE CLINIC | Facility: CLINIC | Age: 64
End: 2019-01-15

## 2019-01-17 ENCOUNTER — OFFICE VISIT (OUTPATIENT)
Dept: PHYSICAL THERAPY | Facility: CLINIC | Age: 64
End: 2019-01-17
Payer: COMMERCIAL

## 2019-01-17 DIAGNOSIS — M25.562 CHRONIC PAIN OF LEFT KNEE: Primary | ICD-10-CM

## 2019-01-17 DIAGNOSIS — G89.29 CHRONIC PAIN OF LEFT KNEE: Primary | ICD-10-CM

## 2019-01-17 DIAGNOSIS — Z96.652 S/P TKR (TOTAL KNEE REPLACEMENT), LEFT: ICD-10-CM

## 2019-01-17 PROCEDURE — 97110 THERAPEUTIC EXERCISES: CPT | Performed by: PHYSICAL THERAPIST

## 2019-01-17 PROCEDURE — 97010 HOT OR COLD PACKS THERAPY: CPT | Performed by: PHYSICAL THERAPIST

## 2019-01-17 PROCEDURE — 97140 MANUAL THERAPY 1/> REGIONS: CPT | Performed by: PHYSICAL THERAPIST

## 2019-01-17 NOTE — PROGRESS NOTES
Daily Note     Today's date: 2019  Patient name: Sasha Sevilla  : 1955  MRN: [id number]  Referring provider: John Martini MD  Dx:   Encounter Diagnosis     ICD-10-CM    1  Chronic pain of left knee M25 562     G89 29    2  S/P TKR (total knee replacement), left Z96 652                   Subjective: Patient is still having a lot of pain and swelling  Objective: Knee extension AROM and strength significantly limited pain  Assessment: Patient needed assistance with quad exercises due to weakness, and he required frequent rests due to pain and fatigue  Plan: Progress treament per protocol         Precautions: None     Daily Treatment Diary      Manual             PROM RK  RK           Hamstring Stretch               Calf Stretch                               Exercise Diary                QS               SLR               SAQ    10x with A           Heel slides with Strap 2/10  2/10           T-Band Press    L 3 2/10           T-Band Hamstring    L 3 2/10           T-Band TKE    L 2 2/10           LAQ 2/10 Deferred           Mini Squats               Steps Forward               Steps Lateral               PYR HAM: S  P , C               PYR QUAD: S   P , C               Leg Press: S               XO TKE               Hack Squat               SLS               NuStep: S 14, A 10 L 3 7'  L 3 10'           Bike: S               Treadmill                               Modalities                CP 13'  15'

## 2019-01-21 ENCOUNTER — OFFICE VISIT (OUTPATIENT)
Dept: PHYSICAL THERAPY | Facility: CLINIC | Age: 64
End: 2019-01-21
Payer: COMMERCIAL

## 2019-01-21 DIAGNOSIS — M25.562 CHRONIC PAIN OF LEFT KNEE: Primary | ICD-10-CM

## 2019-01-21 DIAGNOSIS — G89.29 CHRONIC PAIN OF LEFT KNEE: Primary | ICD-10-CM

## 2019-01-21 PROCEDURE — 97110 THERAPEUTIC EXERCISES: CPT

## 2019-01-21 PROCEDURE — 97010 HOT OR COLD PACKS THERAPY: CPT

## 2019-01-21 PROCEDURE — 97140 MANUAL THERAPY 1/> REGIONS: CPT

## 2019-01-21 NOTE — PROGRESS NOTES
Daily Note     Today's date: 2019  Patient name: Palak Hernández  : 1955  MRN: [id number]  Referring provider: Hilary Dowd MD  Dx:   Encounter Diagnosis     ICD-10-CM    1  Chronic pain of left knee M25 562     G89 29                   Subjective: Patient reports he had increased soreness/swelling in the knee post last session  Objective: See treatment diary below      Assessment: Tolerated treatment well  Patient exhibited good technique with therapeutic exercises      Plan: Continue per plan of care       Precautions: None     Daily Treatment Diary      Manual           PROM RK  RK  LF         Hamstring Stretch               Calf Stretch                               Exercise Diary                QS      2/10         SLR               SAQ    10x with A  1/10  1/6         Heel slides with Strap /10  2/10  2/10         T-Band Press    L 3 2/10  L3 2/10         T-Band Hamstring    L 3 2/10  L3 2/10         T-Band TKE    L 2 2/10  L3 2/10         LAQ 2/10 Deferred  AA 10X         Mini Squats               Steps Forward               Steps Lateral               PYR HAM: S  P , C               PYR QUAD: S   P , C               Leg Press: S               XO TKE               Hack Squat               SLS               NuStep: S 14, A 10 L 3 7'  L 3 10'  L3 10'         Bike: S               Treadmill                               Modalities                CP 13'  15'  15'

## 2019-01-22 ENCOUNTER — APPOINTMENT (OUTPATIENT)
Dept: PHYSICAL THERAPY | Facility: CLINIC | Age: 64
End: 2019-01-22
Payer: COMMERCIAL

## 2019-01-24 ENCOUNTER — OFFICE VISIT (OUTPATIENT)
Dept: PHYSICAL THERAPY | Facility: CLINIC | Age: 64
End: 2019-01-24
Payer: COMMERCIAL

## 2019-01-24 DIAGNOSIS — Z96.652 S/P TKR (TOTAL KNEE REPLACEMENT), LEFT: ICD-10-CM

## 2019-01-24 DIAGNOSIS — M25.562 CHRONIC PAIN OF LEFT KNEE: Primary | ICD-10-CM

## 2019-01-24 DIAGNOSIS — G89.29 CHRONIC PAIN OF LEFT KNEE: Primary | ICD-10-CM

## 2019-01-24 PROCEDURE — 97010 HOT OR COLD PACKS THERAPY: CPT

## 2019-01-24 PROCEDURE — 97110 THERAPEUTIC EXERCISES: CPT

## 2019-01-24 PROCEDURE — 97140 MANUAL THERAPY 1/> REGIONS: CPT

## 2019-01-24 NOTE — PROGRESS NOTES
Daily Note     Today's date: 2019  Patient name: Stefanie Banerjee  : 1955  MRN: [id number]  Referring provider: Onesimo Kim MD  Dx:   Encounter Diagnosis     ICD-10-CM    1  Chronic pain of left knee M25 562     G89 29    2  S/P TKR (total knee replacement), left [id number]                   Subjective: Patient c/o increased soreness post sessions  Objective: See treatment diary below  Some progressions made to program       Assessment: Tolerated treatment well  Patient exhibited good technique with therapeutic exercises      Plan: Continue per plan of care       Precautions: None     Daily Treatment Diary      Manual         PROM RK  RK  LF  LF       Hamstring Stretch               Calf Stretch                               Exercise Diary                QS      2/10  2/10       SLR               SAQ    10x with A  1/10  1/6  3/10       Heel slides with Strap /10  2/10  2/10  2/10       T-Band Press    L 3 2/10  L3 2/10  L3 2/10       T-Band Hamstring    L 3 2/10  L3 2/10  L3 2/10       T-Band TKE    L 2 2/10  L3 2/10  L3 3/10       LAQ 2/10 Deferred  AA 10X  2/10       Mini Squats        13X       Steps Forward        4" 1/10       Steps Lateral        4" 2/10       PYR HAM: S  P , C               PYR QUAD: S   P , C               Leg Press: S               XO TKE               Hack Squat               SLS               NuStep: S 14, A 10 L 3 7'  L 3 10'  L3 10'  L3 10'       Bike: S               Treadmill                               Modalities                CP 13'  15'  15'  15'

## 2019-01-25 ENCOUNTER — OFFICE VISIT (OUTPATIENT)
Dept: PHYSICAL THERAPY | Facility: CLINIC | Age: 64
End: 2019-01-25
Payer: COMMERCIAL

## 2019-01-25 DIAGNOSIS — M25.562 CHRONIC PAIN OF LEFT KNEE: Primary | ICD-10-CM

## 2019-01-25 DIAGNOSIS — G89.29 CHRONIC PAIN OF LEFT KNEE: Primary | ICD-10-CM

## 2019-01-25 DIAGNOSIS — Z96.652 S/P TKR (TOTAL KNEE REPLACEMENT), LEFT: ICD-10-CM

## 2019-01-25 PROCEDURE — 97010 HOT OR COLD PACKS THERAPY: CPT | Performed by: PHYSICAL THERAPIST

## 2019-01-25 PROCEDURE — 97110 THERAPEUTIC EXERCISES: CPT | Performed by: PHYSICAL THERAPIST

## 2019-01-25 PROCEDURE — 97140 MANUAL THERAPY 1/> REGIONS: CPT | Performed by: PHYSICAL THERAPIST

## 2019-01-25 NOTE — PROGRESS NOTES
Daily Note     Today's date: 2019  Patient name: Michael Galo  : 1955  MRN: [id number]  Referring provider: Vince Rivera MD  Dx:   Encounter Diagnosis     ICD-10-CM    1  Chronic pain of left knee M25 562     G89 29    2  S/P TKR (total knee replacement), left Z96 652                   Subjective: Knee pain and stiffness are slowly improving  Objective: See treatment diary below      Assessment: Tolerated treatment well  Patient demonstrated fatigue post treatment      Plan: Progress treament per protocol         Precautions: None     Daily Treatment Diary      Manual       PROM RK  RK  LF  LF  RK     Hamstring Stretch               Calf Stretch                               Exercise Diary                QS      2/10  2/10  2/10     SLR               SAQ    10x with A  /10  1/  3/10  3/10     Heel slides with Strap /10  2/10  2/10  2/10  2/10     T-Band Press    L 3 2/10  L3 2/10  L3 2/10  L 3 2/10     T-Band Hamstring    L 3 2/10  L3 2/10  L3 2/10  L 3 2/10     T-Band TKE    L 2 2/10  L3 2/10  L3 3/10  L 3 3/10     LAQ 2/10 Deferred  AA 10X  2/10  2/10     Mini Squats        13X  18x     Steps Forward        4" 1/10  4" 1/10     Steps Lateral        4" 2/10  4" 2/10     PYR HAM: S  P , C               PYR QUAD: S   P , C               Leg Press: S               XO TKE               Hack Squat               SLS               NuStep: S 14, A 10 L 3 7'  L 3 10'  L3 10'  L3 10'  L 3 10'     Bike: S               Treadmill                               Modalities                CP 13'  15'  15'  15'  15'

## 2019-01-28 ENCOUNTER — OFFICE VISIT (OUTPATIENT)
Dept: PHYSICAL THERAPY | Facility: CLINIC | Age: 64
End: 2019-01-28
Payer: COMMERCIAL

## 2019-01-28 DIAGNOSIS — Z96.652 S/P TKR (TOTAL KNEE REPLACEMENT), LEFT: ICD-10-CM

## 2019-01-28 DIAGNOSIS — G89.29 CHRONIC PAIN OF LEFT KNEE: Primary | ICD-10-CM

## 2019-01-28 DIAGNOSIS — M25.562 CHRONIC PAIN OF LEFT KNEE: Primary | ICD-10-CM

## 2019-01-28 PROCEDURE — 97110 THERAPEUTIC EXERCISES: CPT

## 2019-01-28 PROCEDURE — 97140 MANUAL THERAPY 1/> REGIONS: CPT

## 2019-01-28 PROCEDURE — 97010 HOT OR COLD PACKS THERAPY: CPT

## 2019-01-28 NOTE — PROGRESS NOTES
Daily Note     Today's date: 2019  Patient name: Justus Green  : 1955  MRN: [id number]  Referring provider: John Caceres MD  Dx:   Encounter Diagnosis     ICD-10-CM    1  Chronic pain of left knee M25 562     G89 29    2  S/P TKR (total knee replacement), left [id number]                   Subjective: Patient reports he had increased pain post last session, attributed to 2 days of PT in a row  Objective: See treatment diary below  PROM flexion 102 degrees  Assessment: Tolerated treatment well  Patient would benefit from continued PT      Plan: Continue per plan of care       Precautions: None     Daily Treatment Diary      Manual     PROM RK  RK  LF  LF  RK  LF   Hamstring Stretch               Calf Stretch                               Exercise Diary                QS      2/10  2/10  2/10  2/10   SLR               SAQ    10x with A  /10  /  3/10  3/10  3/10   Heel slides with Strap /10  2/10  2/10  2/10  2/10  2/10   T-Band Press    L 3 2/10  L3 2/10  L3 2/10  L 3 2/10  L3 3/10   T-Band Hamstring    L 3 2/10  L3 2/10  L3 2/10  L 3 2/10  L3 3/10   T-Band TKE    L 2 2/10  L3 2/10  L3 3/10  L 3 3/10  L3 3/10   LAQ 2/10 Deferred  AA 10X  2/10  2/10  3/10   Mini Squats        13X  18x  25X   Steps Forward        4" 2/10  4" 2/10  4" 2/10   Steps Lateral        4" 1/10  4" 1/10  4" 1/10   PYR HAM: S  P , C               PYR QUAD: S   P , C               Leg Press: S               XO TKE               Hack Squat               SLS               NuStep: S 14, A 10 L 3 7'  L 3 10'  L3 10'  L3 10'  L 3 10'  L3 12'   Bike: S               Treadmill                               Modalities                CP 13'  15'  15'  15'  15'  15'

## 2019-01-30 ENCOUNTER — APPOINTMENT (OUTPATIENT)
Dept: PHYSICAL THERAPY | Facility: CLINIC | Age: 64
End: 2019-01-30
Payer: COMMERCIAL

## 2019-02-01 ENCOUNTER — OFFICE VISIT (OUTPATIENT)
Dept: PHYSICAL THERAPY | Facility: CLINIC | Age: 64
End: 2019-02-01
Payer: COMMERCIAL

## 2019-02-01 DIAGNOSIS — M25.562 CHRONIC PAIN OF LEFT KNEE: Primary | ICD-10-CM

## 2019-02-01 DIAGNOSIS — Z96.652 S/P TKR (TOTAL KNEE REPLACEMENT), LEFT: ICD-10-CM

## 2019-02-01 DIAGNOSIS — G89.29 CHRONIC PAIN OF LEFT KNEE: Primary | ICD-10-CM

## 2019-02-01 PROCEDURE — 97110 THERAPEUTIC EXERCISES: CPT

## 2019-02-01 PROCEDURE — 97140 MANUAL THERAPY 1/> REGIONS: CPT

## 2019-02-01 PROCEDURE — 97010 HOT OR COLD PACKS THERAPY: CPT

## 2019-02-01 NOTE — PROGRESS NOTES
Daily Note     Today's date: 2019  Patient name: Sylvia Mccord  : 1955  MRN: [id number]  Referring provider: Silas Terrazas MD  Dx:   Encounter Diagnosis     ICD-10-CM    1  Chronic pain of left knee M25 562     G89 29    2  S/P TKR (total knee replacement), left [id number]                   Subjective: Patient reports he usually has increased pain and fatique post sessions  He states he can feel the strength is improving  Objective: See treatment diary below  Some progressions to program       Assessment: Tolerated treatment well   Patient exhibited good technique with therapeutic exercises      Plan: Continue per plan of care           Precautions: None     Daily Treatment Diary      Manual     PROM LF  RK  LF  LF  RK  LF   Hamstring Stretch               Calf Stretch                               Exercise Diary                QS  2/10    2/10  2/10  2/10  2/10   SLR               SAQ  1# 3/10  10x with A  1/10  /6  3/10  3/10  3/10   Heel slides with Strap 1# 2/10  2/10  2/10  2/10  2/10  2/10   T-Band Press  1# L4 2/10  L 3 2/10  L3 2/10  L3 2/10  L 3 2/10  L3 3/10   T-Band Hamstring  L4 3/10  L 3 2/10  L3 2/10  L3 2/10  L 3 2/10  L3 3/10   T-Band TKE  L4 3/10  L 2 2/10  L3 2/10  L3 3/10  L 3 3/10  L3 3/10   LAQ 1# 3/10 Deferred  AA 10X  2/10  2/10  3/10   Mini Squats  3/10      13X  18x  25X   Steps Forward  6" 2/10      4" 2/10  4" 2/10  4" 2/10   Steps Lateral  4" 2/10      4" 1/10  4" 1/10  4" 1/10   PYR HAM: S  P , C               PYR QUAD: S   P , C               Leg Press: S               XO TKE               Hack Squat               SLS               NuStep: S 14, A 10 L 3 15'  L 3 10'  L3 10'  L3 10'  L 3 10'  L3 12'   Bike: S               Treadmill                               Modalities                CP 13'  15'  15'  15'  15'  15'

## 2019-02-04 ENCOUNTER — EVALUATION (OUTPATIENT)
Dept: PHYSICAL THERAPY | Facility: CLINIC | Age: 64
End: 2019-02-04
Payer: COMMERCIAL

## 2019-02-04 DIAGNOSIS — Z96.652 S/P TKR (TOTAL KNEE REPLACEMENT), LEFT: ICD-10-CM

## 2019-02-04 DIAGNOSIS — G89.29 CHRONIC PAIN OF LEFT KNEE: Primary | ICD-10-CM

## 2019-02-04 DIAGNOSIS — M25.562 CHRONIC PAIN OF LEFT KNEE: Primary | ICD-10-CM

## 2019-02-04 PROCEDURE — 97110 THERAPEUTIC EXERCISES: CPT | Performed by: PHYSICAL THERAPIST

## 2019-02-04 PROCEDURE — G8979 MOBILITY GOAL STATUS: HCPCS | Performed by: PHYSICAL THERAPIST

## 2019-02-04 PROCEDURE — 97140 MANUAL THERAPY 1/> REGIONS: CPT | Performed by: PHYSICAL THERAPIST

## 2019-02-04 PROCEDURE — G8978 MOBILITY CURRENT STATUS: HCPCS | Performed by: PHYSICAL THERAPIST

## 2019-02-04 PROCEDURE — 97010 HOT OR COLD PACKS THERAPY: CPT | Performed by: PHYSICAL THERAPIST

## 2019-02-04 NOTE — PROGRESS NOTES
PT Evaluation     Today's date: 2019  Patient name: Fide Das  : 1955  MRN: [id number]  Referring provider: Leena Suazo MD  Dx:   Encounter Diagnosis     ICD-10-CM    1  Chronic pain of left knee M25 562     G89 29    2  S/P TKR (total knee replacement), left [id number]                   Assessment  Assessment details:   CURRENT FUNCTIONAL STATUS    Standing/ADL tolerance 30 minutes  Walking tolerance 160 feet with a SPC  Ascends stairs reciprocally/descends stairs step by step  Difficulty arising from sitting: Mild  Able to squat 30 degrees  Work Status: Off    SHORT TERM GOALS (2 WEEKS)    Increase knee AROM and PROM 10-15 degrees  Increase knee strength 3-5 lbs in all weak areas  Girth MP: 55 cm  Decrease pain to 0-2/10  Standing/ADL tolerance 60 minutes without an AD  Walking tolerance 300 feet without an AD  Ascends/descends stairs reciprocally  Difficulty arising from sitting: Minimal  Able to squat 45 degrees  Work Status: Remain off    LONG TERM GOALS (DISCHARGE)    Knee AROM: -5-120 deg  Knee PROM: 0-125 deg  Knee Strength: E=87 lbs, F=61 lbs  Girth MP: 50 cm  Decrease pain to 0-2/10  Standing/ADL tolerance 60 minutes  Walking tolerance 1 mile  Ascends/descends stairs reciprocally  Difficulty arising from sitting: None  Able to squat 45 degrees  Work Status: Full Duty  Understanding of Dx/Px/POC: good   Prognosis: good    Goals  See assessment details above  Plan  Plan details: The patient has shown improvement in PT demonstrating decreased pain, increased range of motion, increased strength, and increased tolerance to activity  The patient continues to present with pain, decreased ROM, decreased strength, and decreased tolerance to activity  The patient would benefit from continued skilled PT services to address these issues and to maximize function        Planned modality interventions: unattended electrical stimulation and cryotherapy  Planned therapy interventions: manual therapy, gait training, neuromuscular re-education, therapeutic exercise and therapeutic activities  Frequency: 2x week  Duration in weeks: 4        Subjective Evaluation    History of Present Illness  Mechanism of injury: Subjective: The patient's knee pain and swelling is gradually improving  He no longer uses his SPC inside of his home  He has just recently resumed driving  Pain  Current pain ratin  At best pain ratin  At worst pain rating: 3    Patient Goals  Patient goals for therapy: increased strength, decreased pain, increased motion, improved balance, independence with ADLs/IADLs, return to sport/leisure activities, return to work and decreased edema          Objective     Observations     Additional Observation Details  Gait is mildly antalgic with a SPC  Incsion is healing well without redness or discharge  Moderate knee and leg swelling is noted  General Comments:      Knee Comments  CURRENT OBJECTIVE MEASUREMENTS    L Knee AROM: - deg  L Knee PROM: - deg  Knee Strength: E=32 lbs, F=48 lbs  Girth MP: 56 cm               Precautions: None     Daily Treatment Diary      Manual    2   PROM LF  RK  LF  LF  RK  LF   Hamstring Stretch               Calf Stretch                               Exercise Diary                QS  2/10  2/10  2/10  2/10  2/10  2/10   SLR               SAQ  1# 3/10  1# 3/10  1/10  1/6  3/10  3/10  3/10   Heel slides with Strap 1# 2/10  1# 3/10  2/10  2/10  2/10  2/10   T-Band Press  1# L4 2/10  L 4 1# 2/10  L3 2/10  L3 2/10  L 3 2/10  L3 3/10   T-Band Hamstring  L4 3/10  L 4 3/10  L3 2/10  L3 2/10  L 3 2/10  L3 3/10   T-Band TKE  L4 3/10  L 4 3/10  L3 2/10  L3 3/10  L 3 3/10  L3 3/10   LAQ 1# 3/10 1# 3/10  AA 10X  2/10  2/10  3/10   Mini Squats  3/10  3/10    13X  18x  25X   Steps Forward  6" 2/10  6" 2/10    4" 2/10  4" 2/10  4" 2/10   Steps Lateral  4" 2/10  4" 2/10    4" /10  4" /10  4" 1/10 PYR HAM: S  P  C               PYR QUAD: S   P  C               Leg Press: S               XO TKE               Hack Squat               SLS               NuStep: S 14, A 10 L 3 15'  L 3 15'  L3 10'  L3 10'  L 3 10'  L3 12'   Bike: S               Treadmill                               Modalities                CP 13'  15'  15'  15'  15'  15'

## 2019-02-04 NOTE — LETTER
1354    Zeb Sapp MD  [address] [place] [address]    Patient: Palak Hernández   YOB: 1955   Date of Visit: 2019     Encounter Diagnosis     ICD-10-CM    1  Chronic pain of left knee M25 562     G89 29    2  S/P TKR (total knee replacement), left [id number]        Dear Dr Marixa Tyler:    Please review the attached Plan of Care from [facility] recent visit  Please verify that you agree therapy should continue by signing the attached document and sending it back to our office  If you have any questions or concerns, please don't hesitate to call  Sincerely,    Mynor Wang, PT      Referring Provider:      I certify that I have read the below Plan of Care and certify the need for these services furnished under this plan of treatment while under my care  Zeb Sapp MD  [facility] 31: [phone]          PT Evaluation     Today's date: 2019  Patient name: Palak Hernández  : 1955  MRN: [id number]  Referring provider: Hilary Dowd MD  Dx:   Encounter Diagnosis     ICD-10-CM    1  Chronic pain of left knee M25 562     G89 29    2  S/P TKR (total knee replacement), left [id number]                   Assessment  Assessment details:   CURRENT FUNCTIONAL STATUS    Standing/ADL tolerance 30 minutes  Walking tolerance 160 feet with a SPC  Ascends stairs reciprocally/descends stairs step by step  Difficulty arising from sitting: Mild  Able to squat 30 degrees  Work Status: Off    SHORT TERM GOALS (2 WEEKS)    Increase knee AROM and PROM 10-15 degrees  Increase knee strength 3-5 lbs in all weak areas  Girth MP: 55 cm  Decrease pain to 0-2/10  Standing/ADL tolerance 60 minutes without an AD  Walking tolerance 300 feet without an AD  Ascends/descends stairs reciprocally  Difficulty arising from sitting: Minimal  Able to squat 45 degrees    Work Status: Remain off    [address] [place] [address] TERM GOALS (DISCHARGE)    Knee AROM: -5-120 deg  Knee PROM: 0-125 deg  Knee Strength: E=87 lbs, F=61 lbs  Girth MP: 50 cm  Decrease pain to 0-2/10  Standing/ADL tolerance 60 minutes  Walking tolerance 1 mile  Ascends/descends stairs reciprocally  Difficulty arising from sitting: None  Able to squat 45 degrees  Work Status: Full Duty  Understanding of Dx/Px/POC: good   Prognosis: good    Goals  See assessment details above  Plan  Plan details: The patient has shown improvement in PT demonstrating decreased pain, increased range of motion, increased strength, and increased tolerance to activity  The patient continues to present with pain, decreased ROM, decreased strength, and decreased tolerance to activity  The patient would benefit from continued skilled PT services to address these issues and to maximize function  Planned modality interventions: unattended electrical stimulation and cryotherapy  Planned therapy interventions: manual therapy, gait training, neuromuscular re-education, therapeutic exercise and therapeutic activities  Frequency: 2x week  Duration in weeks: 4        Subjective Evaluation    History of Present Illness  Mechanism of injury: Subjective: The patient's knee pain and swelling is gradually improving  He no longer uses his SPC inside of his home  He has just recently resumed driving  Pain  Current pain ratin  At best pain ratin  At worst pain rating: 3    Patient Goals  Patient goals for therapy: increased strength, decreased pain, increased motion, improved balance, independence with ADLs/IADLs, return to sport/leisure activities, return to work and decreased edema          Objective     Observations     Additional Observation Details  Gait is mildly antalgic with a SPC  Incsion is healing well without redness or discharge  Moderate knee and leg swelling is noted      General Comments:      Knee Comments  CURRENT OBJECTIVE MEASUREMENTS    L Knee AROM: - deg     L Knee PROM: - deg  Knee Strength: E=32 lbs, F=48 lbs  Girth MP: 56 cm               Precautions: None     Daily Treatment Diary      Manual  2/1  2/4 1/21 1/24 1/25 1/28   PROM LF  RK  LF  LF  RK  LF   Hamstring Stretch               Calf Stretch                               Exercise Diary                QS  2/10  2/10  2/10  2/10  2/10  2/10   SLR               SAQ  1# 3/10  1# 3/10  1/10  1/6  3/10  3/10  3/10   Heel slides with Strap 1# 2/10  1# 3/10  2/10  2/10  2/10  2/10   T-Band Press  1# L4 2/10  L 4 1# 2/10  L3 2/10  L3 2/10  L 3 2/10  L3 3/10   T-Band Hamstring  L4 3/10  L 4 3/10  L3 2/10  L3 2/10  L 3 2/10  L3 3/10   T-Band TKE  L4 3/10  L 4 3/10  L3 2/10  L3 3/10  L 3 3/10  L3 3/10   LAQ 1# 3/10 1# 3/10  AA 10X  2/10  2/10  3/10   Mini Squats  3/10  3/10    13X  18x  25X   Steps Forward  6" 2/10  6" 2/10    4" 2/10  4" 2/10  4" 2/10   Steps Lateral  4" 2/10  4" 2/10    4" 1/10  4" 1/10  4" 1/10   PYR HAM: S  P , C               PYR QUAD: S   P , C               Leg Press: S               XO TKE               Hack Squat               SLS               NuStep: S 14, A 10 L 3 15'  L 3 15'  L3 10'  L3 10'  L 3 10'  L3 12'   Bike: S               Treadmill                               Modalities                CP 13'  15'  15'  15'  15'  15'

## 2019-02-05 ENCOUNTER — TRANSCRIBE ORDERS (OUTPATIENT)
Dept: PHYSICAL THERAPY | Facility: CLINIC | Age: 64
End: 2019-02-05

## 2019-02-05 DIAGNOSIS — M25.572 CHRONIC PAIN OF LEFT ANKLE: ICD-10-CM

## 2019-02-05 DIAGNOSIS — Z96.652 S/P TKR (TOTAL KNEE REPLACEMENT), LEFT: ICD-10-CM

## 2019-02-05 DIAGNOSIS — M25.562 CHRONIC PAIN OF LEFT KNEE: Primary | ICD-10-CM

## 2019-02-05 DIAGNOSIS — G89.29 CHRONIC PAIN OF LEFT KNEE: Primary | ICD-10-CM

## 2019-02-05 DIAGNOSIS — G89.29 CHRONIC PAIN OF LEFT ANKLE: ICD-10-CM

## 2019-02-06 ENCOUNTER — OFFICE VISIT (OUTPATIENT)
Dept: PHYSICAL THERAPY | Facility: CLINIC | Age: 64
End: 2019-02-06
Payer: COMMERCIAL

## 2019-02-06 DIAGNOSIS — Z96.652 S/P TKR (TOTAL KNEE REPLACEMENT), LEFT: ICD-10-CM

## 2019-02-06 DIAGNOSIS — G89.29 CHRONIC PAIN OF LEFT KNEE: Primary | ICD-10-CM

## 2019-02-06 DIAGNOSIS — M25.562 CHRONIC PAIN OF LEFT KNEE: Primary | ICD-10-CM

## 2019-02-06 PROCEDURE — 97110 THERAPEUTIC EXERCISES: CPT

## 2019-02-06 PROCEDURE — 97010 HOT OR COLD PACKS THERAPY: CPT

## 2019-02-06 PROCEDURE — 97140 MANUAL THERAPY 1/> REGIONS: CPT

## 2019-02-06 NOTE — PROGRESS NOTES
Daily Note     Today's date: 2019  Patient name: Duong Lozano  : 1955  MRN: [id number]  Referring provider: Sylvie Santacruz MD  Dx:   Encounter Diagnosis     ICD-10-CM    1  Chronic pain of left knee M25 562     G89 29    2  S/P TKR (total knee replacement), left [id number]                   Subjective: Patient reports he is having less knee pain  He is trying to walk without limp that he had for years prior to [facility]  Objective: See treatment diary below  TM added with gait pattern improved  Assessment: Tolerated treatment well   Patient exhibited good technique with therapeutic exercises      Plan: Continue per plan of care            Precautions: None     Daily Treatment Diary      Manual  /   PROM LF  RK  LF  LF  RK  LF   Hamstring Stretch               Calf Stretch                               Exercise Diary                QS  2/10  2/10  3/10  2/10  2/10  2/10   SLR               SAQ  1# 3/10  1# 3/10  2# 3/10  3/10  3/10  3/10   Heel slides with Strap 1# 2/10  1# 3/10  2# 3/10  2/10  2/10  2/10   T-Band Press  1# L4 2/10  L 4 1# 2/10  2# L4 3/10  L3 2/10  L 3 2/10  L3 3/10   T-Band Hamstring  L4 3/10  L 4 3/10  L4 3/10  L3 2/10  L 3 2/10  L3 3/10   T-Band TKE  L4 3/10  L 4 3/10  L4 3/10  L3 3/10  L 3 3/10  L3 3/10   LAQ 1# 3/10 1# 3/10  2# 3/10  2/10  2/10  3/10   Mini Squats  3/10  3/10  3/10  13X  18x  25X   Steps Forward  6" 2/10  6" 2/10  6" 25X  4" 2/10  4" 2/10  4" 2/10   Steps Lateral  4" 2/10  4" 2/10  4" 25X  4" 1/10  4" 1/10  4" 1/10   PYR HAM: S  P , C               PYR QUAD: S   P , C               Leg Press: S               XO TKE               Hack Squat               SLS               NuStep: S 14, A 10 L 3 15'  L 3 15'  L4 15'  L3 10'  L 3 10'  L3 12'   Bike: S               Treadmill      1 8 6'                         Modalities                CP 13'  15'  15'  15'  15'  15'

## 2019-02-08 ENCOUNTER — OFFICE VISIT (OUTPATIENT)
Dept: PHYSICAL THERAPY | Facility: CLINIC | Age: 64
End: 2019-02-08
Payer: COMMERCIAL

## 2019-02-08 DIAGNOSIS — M25.562 CHRONIC PAIN OF LEFT KNEE: Primary | ICD-10-CM

## 2019-02-08 DIAGNOSIS — Z96.652 S/P TKR (TOTAL KNEE REPLACEMENT), LEFT: ICD-10-CM

## 2019-02-08 DIAGNOSIS — G89.29 CHRONIC PAIN OF LEFT KNEE: Primary | ICD-10-CM

## 2019-02-08 PROCEDURE — 97110 THERAPEUTIC EXERCISES: CPT | Performed by: PHYSICAL THERAPIST

## 2019-02-08 PROCEDURE — 97010 HOT OR COLD PACKS THERAPY: CPT | Performed by: PHYSICAL THERAPIST

## 2019-02-08 PROCEDURE — 97140 MANUAL THERAPY 1/> REGIONS: CPT | Performed by: PHYSICAL THERAPIST

## 2019-02-08 NOTE — PROGRESS NOTES
Daily Note     Today's date: 2019  Patient name: Freddy Benavides  : 1955  MRN: [id number]  Referring provider: Addi Brown MD  Dx:   Encounter Diagnosis     ICD-10-CM    1  Chronic pain of left knee M25 562     G89 29    2  S/P TKR (total knee replacement), left V31 825                   Subjective: Patient walked in the woods yesterday  He was very careful not to slip or fall  His pain and swelling was no worse afterward  Objective: See treatment diary below  Gait is less antalgic since initiating TM  Assessment: Tolerated treatment well  Patient exhibited good technique with therapeutic exercises      Plan: Progress treatment as tolerated        Precautions: None     Daily Treatment Diary      Manual     PROM LF  RK  LF  RK  RK  LF   Hamstring Stretch               Calf Stretch                               Exercise Diary                QS  2/10  2/10  3/10  3/10  2/10  2/10   SLR               SAQ  1# 3/10  1# 3/10  2# 3/10  2# 3/10  3/10  3/10   Heel slides with Strap 1# 2/10  1# 3/10  2# 3/10 2# 3/10  2/10  2/10   T-Band Press  1# L4 2/10  L 4 1# 2/10  2# L4 3/10  L4 2# 3/10  L 3 2/10  L3 3/10   T-Band Hamstring  L4 3/10  L 4 3/10  L4 3/10  L4 3/10  L 3 2/10  L3 3/10   T-Band TKE  L4 3/10  L 4 3/10  L4 3/10  L4 3/10  L 3 3/10  L3 3/10   LAQ 1# 3/10 1# 3/10  2# 3/10  2# 3/10  2/10  3/10   Mini Squats  3/10  3/10  3/10  3/10  18x  25X   Steps Forward  6" 2/10  6" 2/10  6" 25X  6" 25x  4" 2/10  4" 2/10   Steps Lateral  4" 2/10  4" 2/10  4" 25X  4" 25x  4" 1/10  4" 1/10   PYR HAM: S  P , C               PYR QUAD: S   P , C               Leg Press: S               XO TKE               Hack Squat               SLS               NuStep: S 14, A 10 L 3 15'  L 3 15'  L4 15'  L4 15'  L 3 10'  L3 12'   Bike: S               Treadmill      1 8 6'  1 8 6'                       Modalities                CP 13'  15'  15'  15'  15'  15'

## 2019-02-11 ENCOUNTER — OFFICE VISIT (OUTPATIENT)
Dept: PHYSICAL THERAPY | Facility: CLINIC | Age: 64
End: 2019-02-11
Payer: COMMERCIAL

## 2019-02-11 DIAGNOSIS — Z96.652 S/P TKR (TOTAL KNEE REPLACEMENT), LEFT: ICD-10-CM

## 2019-02-11 DIAGNOSIS — G89.29 CHRONIC PAIN OF LEFT KNEE: Primary | ICD-10-CM

## 2019-02-11 DIAGNOSIS — M25.562 CHRONIC PAIN OF LEFT KNEE: Primary | ICD-10-CM

## 2019-02-11 PROCEDURE — 97010 HOT OR COLD PACKS THERAPY: CPT | Performed by: PHYSICAL THERAPIST

## 2019-02-11 PROCEDURE — 97140 MANUAL THERAPY 1/> REGIONS: CPT | Performed by: PHYSICAL THERAPIST

## 2019-02-11 PROCEDURE — 97110 THERAPEUTIC EXERCISES: CPT | Performed by: PHYSICAL THERAPIST

## 2019-02-11 NOTE — PROGRESS NOTES
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  Objective: See treatment diary below      Assessment: Tolerated treatment well  Patient exhibited good technique with therapeutic exercises      Plan: Progress treament per protocol         Precautions: None     Daily Treatment Diary      Manual     PROM LF  RK  LF  RK  RK  LF   Hamstring Stretch               Calf Stretch                               Exercise Diary                QS  2/10  2/10  3/10  3/10  3/10  2/10   SLR               SAQ  1# 3/10  1# 3/10  2# 3/10  2# 3/10  2# 3/10  3/10   Heel slides with Strap 1# 2/10  1# 3/10  2# 3/10 2# 3/10  2# 3/10  2/10   T-Band Press  1# L4 2/10  L 4 1# 2/10  2# L4 3/10  L4 2# 3/10  L 4 2#  3/10  L3 3/10   T-Band Hamstring  L4 3/10  L 4 3/10  L4 3/10  L4 3/10  L 4 3/10  L3 3/10   T-Band TKE  L4 3/10  L 4 3/10  L4 3/10  L4 3/10  L 4 3/10  L3 3/10   LAQ 1# 3/10 1# 3/10  2# 3/10  2# 3/10  2# 3/10  3/10   Mini Squats  3/10  3/10  3/10  3/10  3/10  25X   Steps Forward  6" 2/10  6" 2/10  6" 25X  6" 25x  6" 3/10  4" 2/10   Steps Lateral  4" 2/10  4" 2/10  4" 25X  4" 25x  6" 3/10  4" 1/10   PYR HAM: S  P , C               PYR QUAD: S   P , C               Leg Press: S               XO TKE               Hack Squat               SLS               NuStep: S 14, A 10 L 3 15'  L 3 15'  L4 15'  L4 15'  L 4 15'  L3 12'   Bike: S               Treadmill      1 8 6'  1 8 6'  1 8 6'                     Modalities                CP 13'  15'  15'  15'  15'  15'

## 2019-02-13 ENCOUNTER — APPOINTMENT (OUTPATIENT)
Dept: PHYSICAL THERAPY | Facility: CLINIC | Age: 64
End: 2019-02-13
Payer: COMMERCIAL

## 2019-02-15 ENCOUNTER — OFFICE VISIT (OUTPATIENT)
Dept: PHYSICAL THERAPY | Facility: CLINIC | Age: 64
End: 2019-02-15
Payer: COMMERCIAL

## 2019-02-15 DIAGNOSIS — Z96.652 S/P TKR (TOTAL KNEE REPLACEMENT), LEFT: ICD-10-CM

## 2019-02-15 DIAGNOSIS — G89.29 CHRONIC PAIN OF LEFT KNEE: Primary | ICD-10-CM

## 2019-02-15 DIAGNOSIS — M25.562 CHRONIC PAIN OF LEFT KNEE: Primary | ICD-10-CM

## 2019-02-15 PROCEDURE — 97010 HOT OR COLD PACKS THERAPY: CPT

## 2019-02-15 PROCEDURE — 97110 THERAPEUTIC EXERCISES: CPT

## 2019-02-15 PROCEDURE — 97140 MANUAL THERAPY 1/> REGIONS: CPT

## 2019-02-15 NOTE — PROGRESS NOTES
Daily Note     Today's date: 2/15/2019  Patient name: Stefanie Banerjee  : 1955  MRN: [id number]  Referring provider: Onesimo Kim MD  Dx:   Encounter Diagnosis     ICD-10-CM    1  Chronic pain of left knee M25 562     G89 29    2  S/P TKR (total knee replacement), left D68 052                   Subjective: Patient reports his knee is more sore today with unknown cause  Objective: See treatment diary below      Assessment: Tolerated treatment well   Patient exhibited good technique with therapeutic exercises      Plan: Continue per plan of care             Precautions: None     Daily Treatment Diary      Manual    2/4  2/6  2/8  2/11  2/15   PROM LF  RK  LF  RK  RK  LF   Hamstring Stretch               Calf Stretch                               Exercise Diary                QS  2/10  2/10  3/10  3/10  3/10  2/10   SLR               SAQ  1# 3/10  1# 3/10  2# 3/10  2# 3/10  2# 3/10  2# 3/10   Heel slides with Strap 1# 2/10  1# 3/10  2# 3/10 2# 3/10  2# 3/10  2# 3/10   T-Band Press  1# L4 2/10  L 4 1# 2/10  2# L4 3/10  L4 2# 3/10  L 4 2#  3/10  2# L4 3/10   T-Band Hamstring  L4 3/10  L 4 3/10  L4 3/10  L4 3/10  L 4 3/10  L4 3/10   T-Band TKE  L4 3/10  L 4 3/10  L4 3/10  L4 3/10  L 4 3/10  L4 3/10   LAQ 1# 3/10 1# 3/10  2# 3/10  2# 3/10  2# 3/10  32# 310   Mini Squats  3/10  3/10  3/10  3/10  3/10  3/10   Steps Forward  6" 2/10  6" 2/10  6" 25X  6" 25x  6" 3/10  6" 2/10   Steps Lateral  4" 2/10  4" 2/10  4" 25X  4" 25x  6" 3/10  6" 1/10   PYR HAM: S  P , C               PYR QUAD: S   P , C               Leg Press: S               XO TKE               Hack Squat               SLS               NuStep: S 14, A 10 L 3 15'  L 3 15'  L4 15'  L4 15'  L 4 15'  L4 15'   Bike: S               Treadmill      1 8 6'  1 8 6'  1 8 6'  10'                   Modalities                CP 13'  15'  15'  15'  15'  15'

## 2019-02-18 ENCOUNTER — OFFICE VISIT (OUTPATIENT)
Dept: PHYSICAL THERAPY | Facility: CLINIC | Age: 64
End: 2019-02-18
Payer: COMMERCIAL

## 2019-02-18 DIAGNOSIS — M25.562 CHRONIC PAIN OF LEFT KNEE: Primary | ICD-10-CM

## 2019-02-18 DIAGNOSIS — Z96.652 S/P TKR (TOTAL KNEE REPLACEMENT), LEFT: ICD-10-CM

## 2019-02-18 DIAGNOSIS — G89.29 CHRONIC PAIN OF LEFT KNEE: Primary | ICD-10-CM

## 2019-02-18 PROCEDURE — 97010 HOT OR COLD PACKS THERAPY: CPT

## 2019-02-18 PROCEDURE — 97110 THERAPEUTIC EXERCISES: CPT

## 2019-02-18 PROCEDURE — 97140 MANUAL THERAPY 1/> REGIONS: CPT

## 2019-02-18 NOTE — PROGRESS NOTES
Daily Note     Today's date: 2019  Patient name: Jesus Hernandez  : 1955  MRN: [id number]  Referring provider: Ernie Fiore MD  Dx:   Encounter Diagnosis     ICD-10-CM    1  Chronic pain of left knee M25 562     G89 29    2  S/P TKR (total knee replacement), left [id number]                   Subjective: Patient reports he continues to notice improvements in the L knee during daily activities  He is able to use the clutch on his vehicle with greater ease  Objective: See treatment diary below      Assessment: Tolerated treatment well   Patient exhibited good technique with therapeutic exercises      Plan: Continue per plan of care             Precautions: None     Daily Treatment Diary      Manual    2/4  2/6  2/8  2/11  2/15   PROM LF  RK  LF  RK  RK  LF   Hamstring Stretch               Calf Stretch                               Exercise Diary                QS  3/10  2/10  3/10  3/10  3/10  2/10   SLR               SAQ  3# 3/10  1# 3/10  2# 3/10  2# 3/10  2# 3/10  2# 3/10   Heel slides with Strap 3# 2/10  1# 3/10  2# 3/10 2# 3/10  2# 3/10  2# 3/10   T-Band Press  3# L5 3/10  L 4 1# 2/10  2# L4 3/10  L4 2# 3/10  L 4 2#  3/10  2# L4 3/10   T-Band Hamstring  L5 3/10  L 4 3/10  L4 3/10  L4 3/10  L 4 3/10  L4 3/10   T-Band TKE  L5 3/10  L 4 3/10  L4 3/10  L4 3/10  L 4 3/10  L4 3/10   LAQ 4# 3/10 1# 3/10  2# 3/10  2# 3/10  2# 3/10  32# 310   Mini Squats  3/10  3/10  3/10  3/10  3/10  3/10   Steps Forward  6" 3/10  6" 2/10  6" 25X  6" 25x  6" 3/10  6" 2/10   Steps Lateral  6" 3/10  4" 2/10  4" 25X  4" 25x  6" 3/10  6" 1/10   PYR HAM: S  P , C               PYR QUAD: S   P , C               Leg Press: S               XO TKE               Hack Squat               SLS               NuStep: S 12, A 10 L 3 15'  L 3 15'  L4 15'  L4 15'  L 4 15'  L4 15'   Bike: S               Treadmill      1 8 6'  1 8 6'  1 8 6'  10'                   Modalities                CP 13'  15'  15'  15'  15'  15'

## 2019-02-20 ENCOUNTER — OFFICE VISIT (OUTPATIENT)
Dept: PHYSICAL THERAPY | Facility: CLINIC | Age: 64
End: 2019-02-20
Payer: COMMERCIAL

## 2019-02-20 DIAGNOSIS — G89.29 CHRONIC PAIN OF LEFT KNEE: Primary | ICD-10-CM

## 2019-02-20 DIAGNOSIS — Z96.652 S/P TKR (TOTAL KNEE REPLACEMENT), LEFT: ICD-10-CM

## 2019-02-20 DIAGNOSIS — M25.562 CHRONIC PAIN OF LEFT KNEE: Primary | ICD-10-CM

## 2019-02-20 PROCEDURE — 97110 THERAPEUTIC EXERCISES: CPT

## 2019-02-20 PROCEDURE — 97140 MANUAL THERAPY 1/> REGIONS: CPT

## 2019-02-20 PROCEDURE — 97010 HOT OR COLD PACKS THERAPY: CPT

## 2019-02-20 NOTE — PROGRESS NOTES
PT Re-Evaluation     Today's date: 2019  Patient name: Bertrand Barahona  : 1955  MRN: [id number]  Referring provider: Evette Orourke MD  Dx:   Encounter Diagnosis     ICD-10-CM    1  Chronic pain of left knee M25 562     G89 29    2  S/P TKR (total knee replacement), left [id number]                   Assessment  Assessment details:   CURRENT FUNCTIONAL STATUS    Standing/ADL tolerance 45-60 minutes  Walking tolerance 3-4 blocks without an AD  Ascends stairs reciprocally concistently/descends stairs reciprocally inconsistently  Difficulty arising from sitting: Minimal  Able to squat 60 degrees  Work Status: Off    SHORT TERM GOALS (2 WEEKS)    Increase knee AROM and PROM 10-15 degrees  Increase knee strength 3-5 lbs in all weak areas  Girth MP: 55 cm  Decrease pain to 0-2/10  Standing/ADL tolerance 60 minutes without an AD  Walking tolerance 5-6 blocks without an AD  Ascends/descends stairs reciprocally consistently  Difficulty arising from sitting: None  Able to squat 60 degrees  Work Status: Remain off    LONG TERM GOALS (DISCHARGE)    Knee AROM: -5-120 deg  Knee PROM: 0-125 deg  Knee Strength: E=87 lbs, F=61 lbs  Girth MP: 50 cm  Decrease pain to 0-2/10  Standing/ADL tolerance 60 minutes  Walking tolerance 1 mile  Ascends/descends stairs reciprocally  Difficulty arising from sitting: None  Able to squat 45 degrees  Work Status: Full Duty  Understanding of Dx/Px/POC: good   Prognosis: good    Goals  See assessment details above  Plan  Plan details: The patient has shown improvement in PT demonstrating decreased pain, increased range of motion, increased strength, and increased tolerance to activity  The patient continues to present with pain, decreased ROM, decreased strength, and decreased tolerance to activity  The patient would benefit from continued skilled PT services to address these issues and to maximize function        Planned modality interventions: unattended electrical stimulation and cryotherapy  Planned therapy interventions: manual therapy, gait training, neuromuscular re-education, therapeutic exercise and therapeutic activities  Frequency: 2x week  Duration in weeks: 4        Subjective Evaluation    History of Present Illness  Mechanism of injury: Subjective: The patient continues to make steady progress in PT  He is not using an AD, but he does have a mild limp  Mild pain and swelling also persists in the knee  Pain  Current pain ratin  At best pain ratin  At worst pain ratin    Patient Goals  Patient goals for therapy: increased strength, decreased pain, increased motion, improved balance, independence with ADLs/IADLs, return to sport/leisure activities, return to work and decreased edema          Objective     Observations     Additional Observation Details  Gait is mildly antalgic without an AD  Incsion is healing well without redness or discharge  Moderate knee and leg swelling is noted  General Comments:      Knee Comments  CURRENT OBJECTIVE MEASUREMENTS    L Knee AROM: - deg  L Knee PROM: - deg  Knee Strength: E=40 lbs, F=52 lbs  Girth MP: 55 5 cm           Precautions: None     Daily Treatment Diary      Manual  2/18  2/20  2/22  2/8  2/11  2/15   PROM LF  LF  RK  RK  RK  LF   Hamstring Stretch               Calf Stretch                               Exercise Diary                QS  3/10  3/10  3/10  3/10  3/10  210   SLR               SAQ  3# 3/10  3# 3/10  3# 3/10  2# 3/10  2# 3/10  2# 3/10   Heel slides with Strap 3# 2/10  3# 3/10  3# 3/10 2# 3/10  2# 3/10  2# 3/10   T-Band Press  3# L5 3/10  L 5 3# 2/10  3# L5 3/10  L4 2# 3/10  L 4 2#  3/10  2# L4 3/10   T-Band Hamstring  L5 3/10  L 5 3/10  L5 3/10  L4 3/10  L 4 3/10  L4 3/10   T-Band TKE  L5 3/10  L 5 3/10  L5 3/10  L4 3/10  L 4 3/10  L4 3/10   LAQ 3# 3/10 3# 3/10  3# 3/10  2# 3/10  2# 3/10  32# 310   Mini Squats  3/10  3/10  3/10  3/10  3/10  3/10 Steps Forward  6" 3/10  6" 3/10  6" 3/10  6" 25x  6" 3/10  6" 2/10   Steps Lateral  6" 3/10  6" 3/10 6" 3/10  4" 25x  6" 3/10  6" 1/10   PYR HAM: JONATAN GONSALES               PYR QUAD: JONATAN GONSALES               Leg Press: S               XO TKE               Hack Squat               SLS               NuStep: S 12, A 10 L 3 15'  L 3 15'  L4 15'  L4 15'  L 4 15'  L4 15'   Bike: S               Treadmill      1 8 10'  1 8 6'  1 8 6'  10'                   Modalities                CP 13'  15'  15'  15'  15'  15'

## 2019-02-20 NOTE — PROGRESS NOTES
Daily Note     Today's date: 2019  Patient name: Stevo Abrams  : 1955  MRN: [id number]  Referring provider: Brigida Burnette MD  Dx:   Encounter Diagnosis     ICD-10-CM    1  Chronic pain of left knee M25 562     G89 29    2  S/P TKR (total knee replacement), left [id number]                   Subjective: Patient reports he has little soreness in the knee  He feels swelling temporarily increases post sessions  Objective: See treatment diary below  PROM flexion 105 degrees  Assessment: Tolerated treatment well   Patient exhibited good technique with therapeutic exercises      Plan: Continue per plan of care             Precautions: None     Daily Treatment Diary      Manual  2/18  2/20  2/6  2/8  2/11  2/15   PROM LF  LF  LF  RK  RK  LF   Hamstring Stretch               Calf Stretch                               Exercise Diary                QS  3/10  3/10  3/10  3/10  3/10  2/10   SLR               SAQ  3# 3/10  3# 3/10  2# 3/10  2# 3/10  2# 3/10  2# 3/10   Heel slides with Strap 3# 2/10  3# 3/10  2# 3/10 2# 3/10  2# 3/10  2# 3/10   T-Band Press  3# L5 3/10  L 5 3# 2/10  2# L4 3/10  L4 2# 3/10  L 4 2#  3/10  2# L4 3/10   T-Band Hamstring  L5 3/10  L 5 3/10  L4 3/10  L4 3/10  L 4 3/10  L4 3/10   T-Band TKE  L5 3/10  L 5 3/10  L4 3/10  L4 3/10  L 4 3/10  L4 3/10   LAQ 3# 3/10 3# 3/10  2# 3/10  2# 3/10  2# 3/10  32# 310   Mini Squats  3/10  3/10  3/10  3/10  3/10  3/10   Steps Forward  6" 3/10  6" 3/10  6" 25X  6" 25x  6" 3/10  6" 2/10   Steps Lateral  6" 3/10  6" 3/10  4" 25X  4" 25x  6" 3/10  6" 1/10   PYR HAM: S  P , C               PYR QUAD: S   P , C               Leg Press: S               XO TKE               Hack Squat               SLS               NuStep: S 12, A 10 L 3 15'  L 3 15'  L4 15'  L4 15'  L 4 15'  L4 15'   Bike: S               Treadmill      1 8 6'  1 8 6'  1 8 6'  10'                   Modalities                CP 13'  15'  15'  15'  15'  15'

## 2019-02-22 ENCOUNTER — EVALUATION (OUTPATIENT)
Dept: PHYSICAL THERAPY | Facility: CLINIC | Age: 64
End: 2019-02-22
Payer: COMMERCIAL

## 2019-02-22 DIAGNOSIS — G89.29 CHRONIC PAIN OF LEFT KNEE: Primary | ICD-10-CM

## 2019-02-22 DIAGNOSIS — Z96.652 S/P TKR (TOTAL KNEE REPLACEMENT), LEFT: ICD-10-CM

## 2019-02-22 DIAGNOSIS — M25.562 CHRONIC PAIN OF LEFT KNEE: Primary | ICD-10-CM

## 2019-02-22 PROCEDURE — 97140 MANUAL THERAPY 1/> REGIONS: CPT | Performed by: PHYSICAL THERAPIST

## 2019-02-22 PROCEDURE — 97010 HOT OR COLD PACKS THERAPY: CPT | Performed by: PHYSICAL THERAPIST

## 2019-02-22 PROCEDURE — 97110 THERAPEUTIC EXERCISES: CPT | Performed by: PHYSICAL THERAPIST

## 2019-02-22 NOTE — LETTER
[id number]    Valeri Koehler MD  [address] [place] [address]    Patient: Bertrand Barahona   YOB: 1955   Date of Visit: 2019     Encounter Diagnosis     ICD-10-CM    1  Chronic pain of left knee M25 562     G89 29    2  S/P TKR (total knee replacement), left C90 099        Dear Dr Magallon Don:    Please review the attached Plan of Care from [facility] recent visit  Please verify that you agree therapy should continue by signing the attached document and sending it back to our office  If you have any questions or concerns, please don't hesitate to call  Sincerely,    Federico Manjarrez PT      Referring Provider:      I certify that I have read the below Plan of Care and certify the need for these services furnished under this plan of treatment while under my care  Valeri Koehler MD  [facility]: [phone]          PT Re-Evaluation     Today's date: 2019  Patient name: Bertrand Barahona  : 1955  MRN: [id number]  Referring provider: Evette Orourke MD  Dx:   Encounter Diagnosis     ICD-10-CM    1  Chronic pain of left knee M25 562     G89 29    2  S/P TKR (total knee replacement), left [id number]                   Assessment  Assessment details:   CURRENT FUNCTIONAL STATUS    Standing/ADL tolerance 45-60 minutes  Walking tolerance 3-4 blocks without an AD  Ascends stairs reciprocally concistently/descends stairs reciprocally inconsistently  Difficulty arising from sitting: Minimal  Able to squat 60 degrees  Work Status: Off    SHORT TERM GOALS (2 WEEKS)    Increase knee AROM and PROM 10-15 degrees  Increase knee strength 3-5 lbs in all weak areas  Girth MP: 55 cm  Decrease pain to 0-2/10  Standing/ADL tolerance 60 minutes without an AD  Walking tolerance 5-6 blocks without an AD  Ascends/descends stairs reciprocally consistently     Difficulty arising from sitting: None  Able to squat 60 degrees  Work Status: Remain off    LONG TERM GOALS (DISCHARGE)    Knee AROM: -5-120 deg  Knee PROM: 0-125 deg  Knee Strength: E=87 lbs, F=61 lbs  Girth MP: 50 cm  Decrease pain to 0-2/10  Standing/ADL tolerance 60 minutes  Walking tolerance 1 mile  Ascends/descends stairs reciprocally  Difficulty arising from sitting: None  Able to squat 45 degrees  Work Status: Full Duty  Understanding of Dx/Px/POC: good   Prognosis: good    Goals  See assessment details above  Plan  Plan details: The patient has shown improvement in PT demonstrating decreased pain, increased range of motion, increased strength, and increased tolerance to activity  The patient continues to present with pain, decreased ROM, decreased strength, and decreased tolerance to activity  The patient would benefit from continued skilled PT services to address these issues and to maximize function  Planned modality interventions: unattended electrical stimulation and cryotherapy  Planned therapy interventions: manual therapy, gait training, neuromuscular re-education, therapeutic exercise and therapeutic activities  Frequency: 2x week  Duration in weeks: 4        Subjective Evaluation    History of Present Illness  Mechanism of injury: Subjective: The patient continues to make steady progress in PT  He is not using an AD, but he does have a mild limp  Mild pain and swelling also persists in the knee  Pain  Current pain ratin  At best pain ratin  At worst pain ratin    Patient Goals  Patient goals for therapy: increased strength, decreased pain, increased motion, improved balance, independence with ADLs/IADLs, return to sport/leisure activities, return to work and decreased edema          Objective     Observations     Additional Observation Details  Gait is mildly antalgic without an AD  Incsion is healing well without redness or discharge  Moderate knee and leg swelling is noted      General Comments:      Knee Comments  CURRENT OBJECTIVE MEASUREMENTS    L Knee AROM: - deg  L Knee PROM: - deg  Knee Strength: E=40 lbs, F=52 lbs  Girth MP: 55 5 cm           Precautions: None     Daily Treatment Diary      Manual  2/18  2/20  2/22  2/8  2/11  2/15   PROM LF  LF  RK  RK  RK  LF   Hamstring Stretch               Calf Stretch                               Exercise Diary                QS  3/10  3/10  3/10  3/10  3/10  2/10   SLR               SAQ  3# 3/10  3# 3/10  3# 3/10  2# 3/10  2# 3/10  2# 3/10   Heel slides with Strap 3# 2/10  3# 3/10  3# 3/10 2# 3/10  2# 3/10  2# 3/10   T-Band Press  3# L5 3/10  L 5 3# 2/10  3# L5 3/10  L4 2# 3/10  L 4 2#  3/10  2# L4 3/10   T-Band Hamstring  L5 3/10  L 5 3/10  L5 3/10  L4 3/10  L 4 3/10  L4 3/10   T-Band TKE  L5 3/10  L 5 3/10  L5 3/10  L4 3/10  L 4 3/10  L4 3/10   LAQ 3# 3/10 3# 3/10  3# 3/10  2# 3/10  2# 3/10  32# 310   Mini Squats  3/10  3/10  3/10  3/10  3/10  3/10   Steps Forward  6" 3/10  6" 3/10  6" 3/10  6" 25x  6" 3/10  6" 2/10   Steps Lateral  6" 3/10  6" 3/10 6" 3/10  4" 25x  6" 3/10  6" 1/10   PYR HAM: S  P , C               PYR QUAD: S   P , C               Leg Press: S               XO TKE               Hack Squat               SLS               NuStep: S 12, A 10 L 3 15'  L 3 15'  L4 15'  L4 15'  L 4 15'  L4 15'   Bike: S               Treadmill      1 8 10'  1 8 6'  1 8 6'  10'                   Modalities                CP 13'  15'  15'  15'  15'  15'

## 2019-02-25 ENCOUNTER — APPOINTMENT (OUTPATIENT)
Dept: PHYSICAL THERAPY | Facility: CLINIC | Age: 64
End: 2019-02-25
Payer: COMMERCIAL

## 2019-02-26 ENCOUNTER — OFFICE VISIT (OUTPATIENT)
Dept: INTERNAL MEDICINE CLINIC | Facility: CLINIC | Age: 64
End: 2019-02-26
Payer: COMMERCIAL

## 2019-02-26 VITALS
DIASTOLIC BLOOD PRESSURE: 74 MMHG | HEIGHT: 72 IN | SYSTOLIC BLOOD PRESSURE: 120 MMHG | OXYGEN SATURATION: 99 % | WEIGHT: 302.3 LBS | HEART RATE: 85 BPM | TEMPERATURE: 99.5 F | BODY MASS INDEX: 40.94 KG/M2

## 2019-02-26 DIAGNOSIS — J06.9 ACUTE UPPER RESPIRATORY INFECTION: Primary | ICD-10-CM

## 2019-02-26 PROCEDURE — 3008F BODY MASS INDEX DOCD: CPT | Performed by: NURSE PRACTITIONER

## 2019-02-26 PROCEDURE — 99213 OFFICE O/P EST LOW 20 MIN: CPT | Performed by: NURSE PRACTITIONER

## 2019-02-26 RX ORDER — OXYCODONE HYDROCHLORIDE 5 MG/1
TABLET ORAL
COMMUNITY
Start: 2019-01-08 | End: 2019-08-28

## 2019-02-26 RX ORDER — DOXYCYCLINE 100 MG/1
100 CAPSULE ORAL 2 TIMES DAILY
Qty: 20 CAPSULE | Refills: 0 | Status: SHIPPED | OUTPATIENT
Start: 2019-02-26 | End: 2019-03-08

## 2019-02-26 NOTE — PROGRESS NOTES
Assessment/Plan: Will start patient on Doxycyline 100 mg BID for 10 days and Robitussin 100mg/5ml take 10 ml TID PRN for cough  He was advised to continue supportive care increase fluid intake and alternate Tylenol or Motrin for pain or fever  If any worsening of symptoms please call the office  No problem-specific Assessment & Plan notes found for this encounter  Problem List Items Addressed This Visit        Respiratory    Acute upper respiratory infection - Primary    Relevant Medications    doxycycline monohydrate (MONODOX) 100 mg capsule    guaiFENesin (ROBITUSSIN) 100 MG/5ML oral liquid            Subjective:      Patient ID: Jesus Hernandez is a 58 y o  male  [address] [place] [address] is here today for an acute visit  He states for the past several days he has been having a sore throat, congestion, low grade fever, and nonproductive cough  He denies any chills or body aches  He states he does have some chest tightness when coughing  He denies any ear pressure and states he is having some postnasal drip  He does not get the Flu vaccine  He offers no other issues  The following portions of the patient's history were reviewed and updated as appropriate:   He  has a past medical history of Arthritis, Compression fracture of spine ([address] ), Concussion, Dislocation of wrist, Esophageal reflux, Fractured sternum, and Nasal fracture  He   Patient Active Problem List    Diagnosis Date Noted    Acute upper respiratory infection 02/26/2019    Primary osteoarthritis of both knees 12/06/2018    Pre-op evaluation 10/02/2018    Primary osteoarthritis of one knee, left 08/31/2018    Depression 05/18/2017    Morbid obesity ([address] ) 05/18/2017    Chronic back pain 09/15/2016    Hypertriglyceridemia 09/15/2016    Osteoarthritis 09/15/2016    Vitamin D deficiency 09/15/2016    Osteoarthritis of spine with radiculopathy, lumbar region 03/07/2016     He  has a past surgical history that includes Cholecystectomy open;  Foot surgery (Right); and Colonoscopy  His family history includes Cancer in his father; Dementia in his mother; Hyperlipidemia in his mother  He  reports that he quit smoking about 5 years ago  His smoking use included cigarettes  He quit smokeless tobacco use about 5 years ago  He reports that he drinks alcohol  He reports that he does not use drugs  Current Outpatient Medications   Medication Sig Dispense Refill    acetaminophen (TYLENOL) 650 mg CR tablet Take 650 mg by mouth every 8 (eight) hours      oxyCODONE (ROXICODONE) 5 mg immediate release tablet oxycodone 5 mg tablet   TAKE 1 TABLET BY MOUTH EVERY 6 HOURS      traMADol (ULTRAM) 50 mg tablet 1/2 to 1 tab po qdaily prn      doxycycline monohydrate (MONODOX) 100 mg capsule Take 1 capsule (100 mg total) by mouth 2 (two) times a day for 10 days 20 capsule 0    guaiFENesin (ROBITUSSIN) 100 MG/5ML oral liquid Take 10 mL (200 mg total) by mouth 3 (three) times a day as needed for cough 473 mL 0     No current facility-administered medications for this visit  Current Outpatient Medications on File Prior to Visit   Medication Sig    acetaminophen (TYLENOL) 650 mg CR tablet Take 650 mg by mouth every 8 (eight) hours    oxyCODONE (ROXICODONE) 5 mg immediate release tablet oxycodone 5 mg tablet   TAKE 1 TABLET BY MOUTH EVERY 6 HOURS    traMADol (ULTRAM) 50 mg tablet 1/2 to 1 tab po qdaily prn    [DISCONTINUED] meloxicam (MOBIC) 15 mg tablet      No current facility-administered medications on file prior to visit  He is allergic to latex; neomycin-bacitracin zn-polymyx; neomycin-polymyxin-pramoxine; other; cephalexin; and medical tape       Review of Systems   Constitutional: Negative  HENT: Positive for congestion, postnasal drip and sore throat  Eyes: Negative  Respiratory: Positive for cough  Cardiovascular: Negative  Gastrointestinal: Negative  Endocrine: Negative  Genitourinary: Negative  Musculoskeletal: Negative      Skin: Negative  Allergic/Immunologic: Negative  Neurological: Negative  Hematological: Negative  Psychiatric/Behavioral: Negative  Objective:      /74 (BP Location: Right arm, Patient Position: Sitting, Cuff Size: Large)   Pulse 85   Temp 99 5 °F (37 5 °C) (Temporal)   Ht 6' (1 829 m)   Wt (!) 137 kg (302 lb 4 8 oz)   SpO2 99%   BMI 41 00 kg/m²          Physical Exam   Constitutional: He is oriented to person, place, and time  He appears well-developed and well-nourished  HENT:   Head: Normocephalic and atraumatic  Right Ear: Hearing, tympanic membrane and ear canal normal    Left Ear: Hearing, tympanic membrane and ear canal normal    Mouth/Throat: Mucous membranes are normal  Posterior oropharyngeal erythema present  Tonsils are 0 on the right  Tonsils are 0 on the left  Eyes: Pupils are equal, round, and reactive to light  EOM are normal    Neck: Normal range of motion  Neck supple  Cardiovascular: Normal rate, regular rhythm, normal heart sounds and intact distal pulses  Pulmonary/Chest: Effort normal and breath sounds normal    Abdominal: Soft  Bowel sounds are normal    Neurological: He is alert and oriented to person, place, and time  Skin: Skin is warm and dry  Capillary refill takes less than 2 seconds  Psychiatric: He has a normal mood and affect  His behavior is normal    Vitals reviewed

## 2019-02-26 NOTE — PATIENT INSTRUCTIONS
Upper Respiratory Infection   WHAT YOU NEED TO KNOW:   What is an upper respiratory infection? An upper respiratory infection is also called a common cold  It can affect your nose, throat, ears, and sinuses  What causes a cold? The common cold is caused by a virus  There are many different cold viruses, and each is contagious  This means the virus can be easily spread to another person when the sick person coughs or sneezes  The virus can also be spread if you touch something that a person with a cold has touched  You are more likely to get a cold in the winter  Your risk of getting a cold may be increased if you smoke cigarettes or have allergies, such as hay fever  What are the signs and symptoms of a cold? Cold symptoms are usually worst for the first 3 to 5 days  You may have any of the following:  · Runny or stuffy nose    · Sneezing and coughing    · Sore throat or hoarseness    · Red, watery, and sore eyes    · Fatigue     · Chills and fever    · Headache, body aches, or sore muscles  How is a cold treated? There is no cure for the common cold  Colds are caused by viruses and do not get better with antibiotics  Most people get better in 7 to 14 days  You may continue to cough for 2 to 3 weeks  The following may help decrease your symptoms:  · Decongestants  help reduce nasal congestion and help you breathe more easily  If you take decongestant pills, they may make you feel restless or cause problems with your sleep  Do not use decongestant sprays for more than a few days  · Cough suppressants  help reduce coughing  Ask your healthcare provider which type of cough medicine is best for you  · NSAIDs , such as ibuprofen, help decrease swelling, pain, and fever  NSAIDs can cause stomach bleeding or kidney problems in certain people  If you take blood thinner medicine, always ask your healthcare provider if NSAIDs are safe for you   Always read the medicine label and follow directions  · Acetaminophen  decreases pain and fever  It is available without a doctor's order  Ask how much to take and how often to take it  Follow directions  Read the labels of all other medicines you are using to see if they also contain acetaminophen, or ask your doctor or pharmacist  Acetaminophen can cause liver damage if not taken correctly  Do not use more than 4 grams (4,000 milligrams) total of acetaminophen in one day  How can I manage my cold? · Rest as much as possible  Slowly start to do more each day  · Drink more liquids as directed  Liquids will help thin and loosen mucus so you can cough it up  Liquids will also help prevent dehydration  Liquids that help prevent dehydration include water, fruit juice, and broth  Do not drink liquids that contain caffeine  Caffeine can increase your risk for dehydration  Ask your healthcare provider how much liquid to drink each day  · Soothe a sore throat  Gargle with warm salt water  This helps your sore throat feel better  Make salt water by dissolving ¼ teaspoon salt in 1 cup warm water  You may also suck on hard candy or throat lozenges  You may use a sore throat spray  · Use a humidifier or vaporizer  Use a cool mist humidifier or a vaporizer to increase air moisture in your home  This may make it easier for you to breathe and help decrease your cough  · Use saline nasal drops as directed  These help relieve congestion  · Apply petroleum-based jelly around the outside of your nostrils  This can decrease irritation from blowing your nose  · Do not smoke  Nicotine and other chemicals in cigarettes and cigars can make your symptoms worse  They can also cause infections such as bronchitis or pneumonia  Ask your healthcare provider for information if you currently smoke and need help to quit  E-cigarettes or smokeless tobacco still contain nicotine  Talk to your healthcare provider before you use these products    What can I do to prevent the spread of the common cold? · Try to stay away from other people during the first 2 to 3 days of your cold when it is more easily spread  · Do not share food or drinks  · Do not share hand towels with household members  · Wash your hands often, especially after you blow your nose  Turn away from other people and cover your mouth and nose with a tissue when you sneeze or cough  When should I seek immediate care? · You have chest pain or trouble breathing  When should I contact my healthcare provider? · You have a fever over 102ºF (39ºC)  · Your sore throat gets worse or you see white or yellow spots in your throat  · Your symptoms get worse after 3 to 5 days or your cold is not better in 14 days  · You have a rash anywhere on your skin  · You have large, tender lumps in your neck  · You have thick, green, or yellow drainage from your nose  · You cough up thick yellow, green, or bloody mucus  · You are vomiting for more than 24 hours and cannot keep fluids down  · You have a bad earache  · You have questions or concerns about your condition or care  CARE AGREEMENT:   You have the right to help plan your care  Learn about your health condition and how it may be treated  Discuss treatment options with your caregivers to decide what care you want to receive  You always have the right to refuse treatment  The above information is an  only  It is not intended as medical advice for individual conditions or treatments  Talk to your doctor, nurse or pharmacist before following any medical regimen to see if it is safe and effective for you  © 2017 [employer]  Information is for End User's use only and may not be sold, redistributed or otherwise used for commercial purposes  All illustrations and images included in CareNotes® are the copyrighted property of [employer]  or [employer] Rawls

## 2019-02-27 ENCOUNTER — TELEPHONE (OUTPATIENT)
Dept: INTERNAL MEDICINE CLINIC | Facility: CLINIC | Age: 64
End: 2019-02-27

## 2019-02-27 ENCOUNTER — APPOINTMENT (OUTPATIENT)
Dept: PHYSICAL THERAPY | Facility: CLINIC | Age: 64
End: 2019-02-27
Payer: COMMERCIAL

## 2019-02-27 NOTE — TELEPHONE ENCOUNTER
That because it is most likely not covered which I told him to ask if he did not get the medication will have [place] call when she gets in

## 2019-02-27 NOTE — TELEPHONE ENCOUNTER
Patient called the office this morning and stated that he never received his cough medication  He had picked up his doxycycline pills and never thought of to ask about the cough medication  Both medications were sent yesterday and pharmacy received  Patient never got cough medication

## 2019-02-28 ENCOUNTER — TRANSCRIBE ORDERS (OUTPATIENT)
Dept: PHYSICAL THERAPY | Facility: CLINIC | Age: 64
End: 2019-02-28

## 2019-02-28 ENCOUNTER — TELEPHONE (OUTPATIENT)
Dept: INTERNAL MEDICINE CLINIC | Facility: CLINIC | Age: 64
End: 2019-02-28

## 2019-02-28 DIAGNOSIS — G89.29 CHRONIC PAIN OF LEFT KNEE: Primary | ICD-10-CM

## 2019-02-28 DIAGNOSIS — M25.562 CHRONIC PAIN OF LEFT KNEE: Primary | ICD-10-CM

## 2019-02-28 NOTE — TELEPHONE ENCOUNTER
Pt called to see what was going on with the cough medicine, that the pharmacy didn't give it to him or say anything about it  Called [facility] and they let me know that it isn't covered under his insurance since its OTC  Did contact [facility] and let him know that was what happened, and that he can get it OTC  [facility] verbalized understanding

## 2019-03-01 ENCOUNTER — TELEPHONE (OUTPATIENT)
Dept: INTERNAL MEDICINE CLINIC | Facility: CLINIC | Age: 64
End: 2019-03-01

## 2019-03-01 ENCOUNTER — APPOINTMENT (OUTPATIENT)
Dept: PHYSICAL THERAPY | Facility: CLINIC | Age: 64
End: 2019-03-01
Payer: COMMERCIAL

## 2019-03-01 DIAGNOSIS — J06.9 ACUTE UPPER RESPIRATORY INFECTION: Primary | ICD-10-CM

## 2019-03-01 RX ORDER — GUAIFENESIN AND CODEINE PHOSPHATE 100; 10 MG/5ML; MG/5ML
5 SOLUTION ORAL 3 TIMES DAILY PRN
Qty: 120 ML | Refills: 0 | Status: SHIPPED | OUTPATIENT
Start: 2019-03-01 | End: 2019-03-19

## 2019-03-01 NOTE — TELEPHONE ENCOUNTER
Patient called today and stated the OTC cough medication is not working  He would like to know if you can call something in or not for him that will work and is prescription  He stated he hasn't slept for 3 nights and right now he is "just wasting his time" and "can we or can't we do something"  I told patient I would have to ask you and will then give him a call back

## 2019-03-04 ENCOUNTER — APPOINTMENT (OUTPATIENT)
Dept: PHYSICAL THERAPY | Facility: CLINIC | Age: 64
End: 2019-03-04
Payer: COMMERCIAL

## 2019-03-06 ENCOUNTER — EVALUATION (OUTPATIENT)
Dept: PHYSICAL THERAPY | Facility: CLINIC | Age: 64
End: 2019-03-06
Payer: COMMERCIAL

## 2019-03-06 DIAGNOSIS — Z96.652 S/P TKR (TOTAL KNEE REPLACEMENT), LEFT: ICD-10-CM

## 2019-03-06 DIAGNOSIS — M25.562 CHRONIC PAIN OF LEFT KNEE: Primary | ICD-10-CM

## 2019-03-06 DIAGNOSIS — G89.29 CHRONIC PAIN OF LEFT KNEE: Primary | ICD-10-CM

## 2019-03-06 PROCEDURE — 97110 THERAPEUTIC EXERCISES: CPT | Performed by: PHYSICAL THERAPIST

## 2019-03-06 PROCEDURE — 97010 HOT OR COLD PACKS THERAPY: CPT | Performed by: PHYSICAL THERAPIST

## 2019-03-06 PROCEDURE — 97140 MANUAL THERAPY 1/> REGIONS: CPT | Performed by: PHYSICAL THERAPIST

## 2019-03-06 NOTE — PROGRESS NOTES
PT Discharge    Today's date: 3/6/2019  Patient name: [place] Goldsmith  : 1955  MRN: [id number]  Referring provider: Isra Carrasco MD  Dx:   Encounter Diagnosis     ICD-10-CM    1  Chronic pain of left knee M25 562     G89 29    2  S/P TKR (total knee replacement), left [id number]                   Assessment  Assessment details:   CURRENT FUNCTIONAL STATUS    Standing/ADL tolerance 60 minutes  Walking tolerance 4/10 mile without an AD  Ascends/descends stairs reciprocally  Difficulty arising from sitting: None  Able to squat 60 degrees  Work Status: Hopes to return next month  LONG TERM GOALS (DISCHARGE)    Knee AROM: -5-120 deg -partially met  Knee PROM: 0-125 deg -partially met  Knee Strength: E=87 lbs, F=61 lbs -partially met  Girth MP: 50 cm -met  Decrease pain to 0-2/10 -met  Standing/ADL tolerance 60 minutes  -met  Walking tolerance 1 mile -partially met   Ascends/descends stairs reciprocally  -met   Difficulty arising from sitting: None -met  Able to squat 45 degrees  -met  Work Status: Full Duty  Understanding of Dx/Px/POC: good   Prognosis: good    Goals  See assessment details above  Plan  Plan details: The patient has shown good improvement in PT demonstrating decreased pain, increased range of motion, increased strength, and increased tolerance to activity  Goals have been met, the patient is satisfied with his current status, and he has been discharged from our care  Planned modality interventions: unattended electrical stimulation and cryotherapy  Planned therapy interventions: manual therapy, gait training, neuromuscular re-education, therapeutic exercise and therapeutic activities        Subjective Evaluation    History of Present Illness  Mechanism of injury: Subjective: The patient has made steady progress in PT  He gets pain infrequently, and only uses his cane when walking in the woods with his dogs   He can ascend and descend stairs reciprocally, and hope to return to work next month  Pain  Current pain ratin  At best pain ratin  At worst pain ratin    Patient Goals  Patient goals for therapy: increased strength, decreased pain, increased motion, improved balance, independence with ADLs/IADLs, return to sport/leisure activities, return to work and decreased edema          Objective     Observations     Additional Observation Details  Patient ambulates without an AD  Incsion is well healed  Minimal knee and leg swelling is present  General Comments:      Knee Comments  CURRENT OBJECTIVE MEASUREMENTS    L Knee AROM: -5-105 deg  L Knee PROM: 0-110 deg  Knee Strength: E=48 lbs, F=60 lbs  Girth MP: 50 cm           Precautions: None     Daily Treatment Diary      Manual    3/6  2/11  2/15   PROM LF  LF  RK  RK  RK  LF   Hamstring Stretch               Calf Stretch                               Exercise Diary                QS  3/10  3/10  3/10  3/10  3/10  2/10   SLR               SAQ  3# 3/10  3# 3/10  3# 3/10  3# 3/10  2# 3/10  2# 3/10   Heel slides with Strap 3# 2/10  3# 3/10  3# 3/10 3# 3/10  2# 3/10  2# 3/10   T-Band Press  3# L5 3/10  L 5 3# 2/10  3# L5 3/10  L5 3# 3/10  L 4 2#  3/10  2# L4 3/10   T-Band Hamstring  L5 3/10  L 5 3/10  L5 3/10  L5 3/10  L 4 3/10  L4 3/10   T-Band TKE  L5 3/10  L 5 3/10  L5 3/10  L5 3/10  L 4 3/10  L4 3/10   LAQ 3# 3/10 3# 3/10  3# 3/10  3# 3/10  2# 3/10  32# 310   Mini Squats  3/10  3/10  3/10  3/10  3/10  3/10   Steps Forward  6" 3/10  6" 3/10  6" 3/10  6" 30x  6" 3/10  6" 2/10   Steps Lateral  6" 3/10  6" 3/10 6" 3/10  6" 30x  6" 3/10  6" 1/10   PYR HAM: S  P , C               PYR QUAD: S   P , C               Leg Press: S               XO TKE               Hack Squat               SLS               NuStep: S 12, A 10 L 3 15'  L 3 15'  L4 15'  L4 15'  L 4 15'  L4 15'   Bike: S               Treadmill      1 8 10'  2 2 10'  1 8 6'  10'                   Modalities                CP 13'  15'  15'  15'  15'  15'

## 2019-03-19 ENCOUNTER — OFFICE VISIT (OUTPATIENT)
Dept: INTERNAL MEDICINE CLINIC | Facility: CLINIC | Age: 64
End: 2019-03-19
Payer: COMMERCIAL

## 2019-03-19 VITALS
HEART RATE: 81 BPM | DIASTOLIC BLOOD PRESSURE: 80 MMHG | WEIGHT: 303 LBS | SYSTOLIC BLOOD PRESSURE: 130 MMHG | HEIGHT: 72 IN | OXYGEN SATURATION: 95 % | TEMPERATURE: 99.4 F | BODY MASS INDEX: 41.04 KG/M2

## 2019-03-19 DIAGNOSIS — J06.9 VIRAL URI WITH COUGH: Primary | ICD-10-CM

## 2019-03-19 PROBLEM — Z01.818 PRE-OP EVALUATION: Status: RESOLVED | Noted: 2018-10-02 | Resolved: 2019-03-19

## 2019-03-19 PROCEDURE — 99213 OFFICE O/P EST LOW 20 MIN: CPT | Performed by: NURSE PRACTITIONER

## 2019-03-19 PROCEDURE — 1036F TOBACCO NON-USER: CPT | Performed by: NURSE PRACTITIONER

## 2019-03-19 PROCEDURE — 3008F BODY MASS INDEX DOCD: CPT | Performed by: NURSE PRACTITIONER

## 2019-03-19 RX ORDER — FLUTICASONE PROPIONATE 50 MCG
2 SPRAY, SUSPENSION (ML) NASAL DAILY
Qty: 1 BOTTLE | Refills: 5 | Status: SHIPPED | OUTPATIENT
Start: 2019-03-19 | End: 2019-08-28

## 2019-03-19 RX ORDER — LORATADINE 10 MG/1
10 TABLET ORAL DAILY
Qty: 30 TABLET | Refills: 3 | Status: SHIPPED | OUTPATIENT
Start: 2019-03-19 | End: 2019-08-28

## 2019-03-19 NOTE — PATIENT INSTRUCTIONS
Postnasal Drip   WHAT YOU NEED TO KNOW:   What is postnasal drip? Postnasal drip is a condition that causes a large amount of mucus to collect in your throat or nose  It may also be called upper airway cough syndrome because the mucus causes repeated coughing  You may have a sore throat, or throat tissues may swell  This may feel like a lump in your throat  You may also feel like you need to clear your throat often  What causes postnasal drip? · A cold or the flu    · Allergies, such as hay fever or a milk allergy    · Cold air, or dry air in a heated area    · Pregnancy or hormone changes    · Medical conditions such as a deviated septum, gastroesophageal reflux (GERD), or problems with structures in your throat    · Certain medicines, such as birth control pills and blood pressure medicines    · An infection in your sinuses or nose  How is postnasal drip diagnosed and treated? Your healthcare provider will examine you and ask about your symptoms  Tell your provider if you have symptoms all the time or if they come and go  Include anything that triggers your symptoms, such as cold air or pollen  A sample of the mucus may be tested for bacteria that could be causing your symptoms  · Medicines  may be given to thin the mucus  You may need to swallow the medicine or use a device to flush your sinuses with liquid squirted into your nose  Nasal sprays may also be needed to keep the tissues in your nose moist  Medicines can also relieve congestion  Allergy medicine may help if your symptoms are caused by seasonal allergies, such as hay fever  You may need medicine to help control GERD  · Antibiotics  may be needed to treat a bacterial infection  What can I do to manage postnasal drip? · Use a humidifier or vaporizer  Use a cool mist humidifier or a vaporizer to increase air moisture in your home  This may make it easier for you to breathe  · Drink more liquids as directed    Liquids help keep your air passages moist and help you cough up mucus  Ask how much liquid to drink each day and which liquids are best for you  · Avoid cold air and dry, heated air  Cold or dry air can trigger postnasal drip  Try to stay inside on cold days, or keep your mouth covered  Do not stay long in areas that have dry, heated air  · Do not smoke, and avoid secondhand smoke  Nicotine and other chemicals in cigarettes and cigars can irritate your throat and make coughing worse  Ask your healthcare provider for information if you currently smoke and need help to quit  E-cigarettes or smokeless tobacco still contain nicotine  Talk to your healthcare provider before you use these products  When should I contact my healthcare provider? · You have trouble breathing because of the mucus  · You have new or worsening symptoms, even with treatment  · You have signs of an infection, such as yellow or green mucus, or a fever  · You have questions or concerns about your condition or care  CARE AGREEMENT:   You have the right to help plan your care  Learn about your health condition and how it may be treated  Discuss treatment options with your caregivers to decide what care you want to receive  You always have the right to refuse treatment  The above information is an  only  It is not intended as medical advice for individual conditions or treatments  Talk to your doctor, nurse or pharmacist before following any medical regimen to see if it is safe and effective for you  © 2017 [employer] [employer] Information is for End User's use only and may not be sold, redistributed or otherwise used for commercial purposes  All illustrations and images included in CareNotes® are the copyrighted property of [employer]  or Danish Rawls

## 2019-03-19 NOTE — PROGRESS NOTES
Assessment/Plan: His symptoms are most likely viral in nature or related to seasonal allergies  Will start him on Claritin 10 mg daily and Flonase 2 sprays to each nostril daily  He was advised to continue supportive care increase fluid intake and take Tylenol or Motrin for pain or fever  If any worsening of symptoms he was advised to call the office  No problem-specific Assessment & Plan notes found for this encounter  Problem List Items Addressed This Visit        Respiratory    Viral URI with cough - Primary    Relevant Medications    fluticasone (FLONASE) 50 mcg/act nasal spray    loratadine (CLARITIN) 10 mg tablet            Subjective:      Patient ID: Serenity Blackman is a 58 y o  male  Smith Denver is here today for an acute visit  He states he started over the weekend with a dry cough, postnasal drip, and watery eyes  He states his wife is sick and he just got over being sick and now is starting with symptoms again  He denies any fever, chills, or body aches  He denies any chest pain, SOB, or palpitations  He is eating and drinking without any issues  He denies any allergies  He offers no other issues  The following portions of the patient's history were reviewed and updated as appropriate:   He  has a past medical history of Arthritis, Compression fracture of spine ([address] ), Concussion, Dislocation of wrist, Esophageal reflux, Fractured sternum, and Nasal fracture    He   Patient Active Problem List    Diagnosis Date Noted    Viral URI with cough 03/19/2019    Primary osteoarthritis of both knees 12/06/2018    Primary osteoarthritis of one knee, left 08/31/2018    Depression 05/18/2017    Morbid obesity ([address] ) 05/18/2017    Chronic back pain 09/15/2016    Hypertriglyceridemia 09/15/2016    Osteoarthritis 09/15/2016    Vitamin D deficiency 09/15/2016    Osteoarthritis of spine with radiculopathy, lumbar region 03/07/2016     He  has a past surgical history that includes Cholecystectomy open; Foot surgery (Right); and Colonoscopy  His family history includes Cancer in his father; Dementia in his mother; Hyperlipidemia in his mother  He  reports that he quit smoking about 5 years ago  His smoking use included cigarettes  He quit smokeless tobacco use about 5 years ago  He reports that he drinks alcohol  He reports that he does not use drugs  Current Outpatient Medications   Medication Sig Dispense Refill    acetaminophen (TYLENOL) 650 mg CR tablet Take 650 mg by mouth every 8 (eight) hours      traMADol (ULTRAM) 50 mg tablet 1/2 to 1 tab po qdaily prn      fluticasone (FLONASE) 50 mcg/act nasal spray 2 sprays into each nostril daily 1 Bottle 5    loratadine (CLARITIN) 10 mg tablet Take 1 tablet (10 mg total) by mouth daily 30 tablet 3    oxyCODONE (ROXICODONE) 5 mg immediate release tablet oxycodone 5 mg tablet   TAKE 1 TABLET BY MOUTH EVERY 6 HOURS       No current facility-administered medications for this visit  Current Outpatient Medications on File Prior to Visit   Medication Sig    acetaminophen (TYLENOL) 650 mg CR tablet Take 650 mg by mouth every 8 (eight) hours    traMADol (ULTRAM) 50 mg tablet 1/2 to 1 tab po qdaily prn    oxyCODONE (ROXICODONE) 5 mg immediate release tablet oxycodone 5 mg tablet   TAKE 1 TABLET BY MOUTH EVERY 6 HOURS    [DISCONTINUED] guaiFENesin (ROBITUSSIN) 100 MG/5ML oral liquid Take 10 mL (200 mg total) by mouth 3 (three) times a day as needed for cough (Patient not taking: Reported on 3/19/2019)    [DISCONTINUED] guaifenesin-codeine (GUAIFENESIN AC) 100-10 MG/5ML liquid Take 5 mL by mouth 3 (three) times a day as needed for cough (Patient not taking: Reported on 3/19/2019)     No current facility-administered medications on file prior to visit  He is allergic to latex; neomycin-bacitracin zn-polymyx; neomycin-polymyxin-pramoxine; other; cephalexin; and medical tape       Review of Systems   Constitutional: Positive for fatigue     HENT: Positive for congestion  Eyes: Positive for redness  Respiratory: Positive for cough  Cardiovascular: Negative  Gastrointestinal: Negative  Endocrine: Negative  Genitourinary: Negative  Musculoskeletal: Negative  Skin: Negative  Allergic/Immunologic: Negative  Neurological: Negative  Hematological: Negative  Psychiatric/Behavioral: Negative  Objective:      /80 (BP Location: Left arm, Patient Position: Sitting, Cuff Size: Large)   Pulse 81   Temp 99 4 °F (37 4 °C) (Temporal)   Ht 6' (1 829 m)   Wt (!) 137 kg (303 lb)   SpO2 95%   BMI 41 09 kg/m²          Physical Exam   Constitutional: He is oriented to person, place, and time  He appears well-developed and well-nourished  HENT:   Head: Normocephalic and atraumatic  Right Ear: External ear normal    Left Ear: External ear normal    Nose: Nose normal    Post nasal drip noted   Eyes: Pupils are equal, round, and reactive to light  Conjunctivae and EOM are normal    Neck: Normal range of motion  Neck supple  Cardiovascular: Normal rate, regular rhythm, normal heart sounds and intact distal pulses  Pulmonary/Chest: Effort normal and breath sounds normal    Abdominal: Soft  Bowel sounds are normal    Musculoskeletal: Normal range of motion  Neurological: He is alert and oriented to person, place, and time  Skin: Skin is warm and dry  Capillary refill takes less than 2 seconds  Psychiatric: He has a normal mood and affect  His behavior is normal  Judgment and thought content normal    Vitals reviewed

## 2019-08-28 ENCOUNTER — HOSPITAL ENCOUNTER (OUTPATIENT)
Dept: RADIOLOGY | Facility: HOSPITAL | Age: 64
Discharge: HOME | End: 2019-08-28
Payer: COMMERCIAL

## 2019-08-28 ENCOUNTER — OFFICE VISIT (OUTPATIENT)
Dept: INTERNAL MEDICINE CLINIC | Facility: CLINIC | Age: 64
End: 2019-08-28
Payer: COMMERCIAL

## 2019-08-28 VITALS
BODY MASS INDEX: 42.66 KG/M2 | HEIGHT: 72 IN | DIASTOLIC BLOOD PRESSURE: 80 MMHG | WEIGHT: 315 LBS | HEART RATE: 74 BPM | TEMPERATURE: 97.2 F | OXYGEN SATURATION: 99 % | RESPIRATION RATE: 16 BRPM | SYSTOLIC BLOOD PRESSURE: 128 MMHG

## 2019-08-28 DIAGNOSIS — Z13.29 SCREENING FOR THYROID DISORDER: ICD-10-CM

## 2019-08-28 DIAGNOSIS — E78.1 HYPERTRIGLYCERIDEMIA: Primary | ICD-10-CM
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
## 2019-08-28 DIAGNOSIS — E55.9 VITAMIN D DEFICIENCY: ICD-10-CM

## 2019-08-28 DIAGNOSIS — E66.01 MORBID OBESITY WITH BMI OF 40.0-44.9, ADULT (HCC): ICD-10-CM

## 2019-08-28 DIAGNOSIS — Z11.59 NEED FOR HEPATITIS C SCREENING TEST: ICD-10-CM

## 2019-08-28 DIAGNOSIS — Z12.11 SCREENING FOR COLON CANCER: ICD-10-CM

## 2019-08-28 DIAGNOSIS — M47.26 OSTEOARTHRITIS OF SPINE WITH RADICULOPATHY, LUMBAR REGION: ICD-10-CM

## 2019-08-28 DIAGNOSIS — Z12.5 SCREENING FOR PROSTATE CANCER: ICD-10-CM

## 2019-08-28 DIAGNOSIS — Z23 NEED FOR TDAP VACCINATION: ICD-10-CM

## 2019-08-28 PROBLEM — J06.9 VIRAL URI WITH COUGH: Status: RESOLVED | Noted: 2019-03-19 | Resolved: 2019-08-28

## 2019-08-28 PROBLEM — M17.12 ARTHRITIS OF LEFT KNEE: Status: ACTIVE | Noted: 2019-01-09

## 2019-08-28 PROCEDURE — 99214 OFFICE O/P EST MOD 30 MIN: CPT | Performed by: NURSE PRACTITIONER

## 2019-08-28 PROCEDURE — 3008F BODY MASS INDEX DOCD: CPT | Performed by: NURSE PRACTITIONER

## 2019-08-28 PROCEDURE — 1036F TOBACCO NON-USER: CPT | Performed by: NURSE PRACTITIONER

## 2019-08-28 PROCEDURE — 90715 TDAP VACCINE 7 YRS/> IM: CPT | Performed by: NURSE PRACTITIONER

## 2019-08-28 PROCEDURE — 73100 X-RAY EXAM OF WRIST: CPT

## 2019-08-28 PROCEDURE — 73130 X-RAY EXAM OF HAND: CPT

## 2019-08-28 PROCEDURE — 90471 IMMUNIZATION ADMIN: CPT | Performed by: NURSE PRACTITIONER

## 2019-08-28 RX ORDER — DOXYCYCLINE 100 MG/1
100 CAPSULE ORAL 2 TIMES DAILY
Qty: 20 CAPSULE | Refills: 0 | Status: SHIPPED | OUTPATIENT
Start: 2019-08-28 | End: 2019-09-07

## 2019-08-28 NOTE — PATIENT INSTRUCTIONS

## 2019-08-28 NOTE — PROGRESS NOTES
Assessment/Plan: Will start patient on Doxycycline 100 mg BID for 10 days and will give Adacel today  Will send for STAT Xrs of right hand and wrist  He is refusing all labs at this time along with Cologuard  Will give him script for future in case he would like to have them done  BP stable 128/80  Patient is following up with Ortho and recently did have a left knee replacement  Will notify him once XRs are back and will consider sending him to a hand specialist due to him limited ROM  Will follow up again in 6 months or sooner depending on XR results  No problem-specific Assessment & Plan notes found for this encounter  Problem List Items Addressed This Visit        Nervous and Auditory    Osteoarthritis of spine with radiculopathy, lumbar region    Relevant Orders    Comprehensive metabolic panel    CBC and differential       Other    Hypertriglyceridemia - Primary    Relevant Orders    Comprehensive metabolic panel    CBC and differential    Lipid panel    Vitamin D deficiency    Relevant Orders    Comprehensive metabolic panel    CBC and differential    Vitamin D 25 hydroxy    Screening for colon cancer    Screening for prostate cancer    Relevant Orders    PSA Total, Diagnostic    Screening for thyroid disorder    Relevant Orders    TSH, 3rd generation with Free T4 reflex    Need for hepatitis C screening test    Relevant Orders    Hepatitis C antibody    Dog bite    Relevant Medications    doxycycline monohydrate (MONODOX) 100 mg capsule    Other Relevant Orders    XR hand 3+ vw right    XR wrist 2 vw right    Morbid obesity with BMI of 40 0-44 9, adult (HCC)    Need for Tdap vaccination    Relevant Orders    TDAP VACCINE GREATER THAN OR EQUAL TO 6YO IM (Completed)            Subjective:      Patient ID: Yasmani Weeks is a 71 y o  male  Richmond Dopp is here today for a follow up visit  He states he was bite by his dogs on Friday   They were fighting and he did break them up and he was bitten in the right wrist  He states his dogs are up to date on their vaccines  He is having limited ROM to his right hand and wrist,redness and tenderness  He states he is getting concerned about this  He also states he does not remember when his last tetanus was  He is deferring all labs today due to his insurance not being very good and his co pay his high  He states he is not doing any other screenings  He denies any chest pain, SOB, or palpitations  He states he is feeling great since having his knee replaced  He denies any fever, constipation or diarrhea  He offers no other issues  The following portions of the patient's history were reviewed and updated as appropriate:   He  has a past medical history of Arthritis, Compression fracture of spine ([facility] ), Concussion, Dislocation of wrist, Esophageal reflux, Fractured sternum, and Nasal fracture  He   Patient Active Problem List    Diagnosis Date Noted    Screening for colon cancer 08/28/2019    Screening for prostate cancer 08/28/2019    Screening for thyroid disorder 08/28/2019    Need for hepatitis C screening test 08/28/2019    Dog bite 08/28/2019    Morbid obesity with BMI of 40 0-44 9, adult ([facility] ) 08/28/2019    Need for Tdap vaccination 08/28/2019    Arthritis of left knee 01/09/2019    Primary osteoarthritis of both knees 12/06/2018    Primary osteoarthritis of one knee, left 08/31/2018    Depression 05/18/2017    Morbid obesity ([facility] [address] [facility] ) 05/18/2017    Chronic back pain 09/15/2016    Hypertriglyceridemia 09/15/2016    Osteoarthritis 09/15/2016    Vitamin D deficiency 09/15/2016    Osteoarthritis of spine with radiculopathy, lumbar region 03/07/2016     He  has a past surgical history that includes Cholecystectomy open; Foot surgery (Right); and Colonoscopy  His family history includes Cancer in his father; Dementia in his mother; Hyperlipidemia in his mother  He  reports that he quit smoking about 6 years ago  His smoking use included cigarettes   He quit smokeless tobacco use about 6 years ago  He reports that he drinks alcohol  He reports that he does not use drugs  Current Outpatient Medications   Medication Sig Dispense Refill    doxycycline monohydrate (MONODOX) 100 mg capsule Take 1 capsule (100 mg total) by mouth 2 (two) times a day for 10 days 20 capsule 0     No current facility-administered medications for this visit  Current Outpatient Medications on File Prior to Visit   Medication Sig    [DISCONTINUED] acetaminophen (TYLENOL) 650 mg CR tablet Take 650 mg by mouth every 8 (eight) hours    [DISCONTINUED] fluticasone (FLONASE) 50 mcg/act nasal spray 2 sprays into each nostril daily (Patient not taking: Reported on 8/28/2019)    [DISCONTINUED] loratadine (CLARITIN) 10 mg tablet Take 1 tablet (10 mg total) by mouth daily (Patient not taking: Reported on 8/28/2019)    [DISCONTINUED] oxyCODONE (ROXICODONE) 5 mg immediate release tablet oxycodone 5 mg tablet   TAKE 1 TABLET BY MOUTH EVERY 6 HOURS    [DISCONTINUED] traMADol (ULTRAM) 50 mg tablet 1/2 to 1 tab po qdaily prn     No current facility-administered medications on file prior to visit  He is allergic to latex; neomycin-bacitracin zn-polymyx; neomycin-polymyxin-pramoxine; other; cephalexin; and medical tape       Review of Systems   Constitutional: Negative  HENT: Negative  Eyes: Negative  Respiratory: Negative  Cardiovascular: Negative  Gastrointestinal: Negative  Endocrine: Negative  Genitourinary: Negative  Musculoskeletal: Negative  Skin: Positive for wound  Allergic/Immunologic: Negative  Neurological: Negative  Hematological: Negative  Psychiatric/Behavioral: Negative            Objective:      /80 (BP Location: Left arm, Patient Position: Sitting, Cuff Size: Large)   Pulse 74   Temp (!) 97 2 °F (36 2 °C) (Temporal)   Resp 16   Ht 6' (1 829 m)   Wt (!) 146 kg (322 lb 12 8 oz)   SpO2 99%   BMI 43 78 kg/m²          Physical Exam Constitutional: He is oriented to person, place, and time  He appears well-developed and well-nourished  HENT:   Head: Normocephalic and atraumatic  Right Ear: External ear normal    Left Ear: External ear normal    Nose: Nose normal    Mouth/Throat: Oropharynx is clear and moist    Eyes: Pupils are equal, round, and reactive to light  Conjunctivae and EOM are normal    Neck: Normal range of motion  Neck supple  Cardiovascular: Normal rate, regular rhythm, normal heart sounds and intact distal pulses  Pulmonary/Chest: Effort normal and breath sounds normal    Abdominal: Soft  Bowel sounds are normal    Musculoskeletal: Normal range of motion  He exhibits edema  +1 edema to right hand and wrist   Neurological: He is alert and oriented to person, place, and time  Skin: Skin is warm and dry  Capillary refill takes less than 2 seconds  Redness noted to right hand and wrist limited ROM   Psychiatric: He has a normal mood and affect  His behavior is normal  Judgment and thought content normal    Vitals reviewed  BMI Counseling: Body mass index is 43 78 kg/m²  Discussed the patient's BMI with him  The BMI is above average  BMI counseling and education was provided to the patient  Nutrition recommendations include reducing portion sizes, decreasing overall calorie intake, 3-5 servings of fruits/vegetables daily, reducing fast food intake, consuming healthier snacks, decreasing soda and/or juice intake, moderation in carbohydrate intake, increasing intake of lean protein, reducing intake of saturated fat and trans fat and reducing intake of cholesterol

## 2021-03-10 DIAGNOSIS — Z23 ENCOUNTER FOR IMMUNIZATION: ICD-10-CM

## 2021-03-12 ENCOUNTER — OFFICE VISIT (OUTPATIENT)
Dept: INTERNAL MEDICINE CLINIC | Facility: CLINIC | Age: 66
End: 2021-03-12
Payer: COMMERCIAL

## 2021-03-12 VITALS
HEIGHT: 72 IN | WEIGHT: 315 LBS | HEART RATE: 75 BPM | BODY MASS INDEX: 42.66 KG/M2 | TEMPERATURE: 98.2 F | DIASTOLIC BLOOD PRESSURE: 80 MMHG | SYSTOLIC BLOOD PRESSURE: 126 MMHG | OXYGEN SATURATION: 95 %

## 2021-03-12 DIAGNOSIS — L03.119 CELLULITIS OF HAND: Primary | ICD-10-CM
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
## 2021-03-12 PROCEDURE — 3725F SCREEN DEPRESSION PERFORMED: CPT | Performed by: NURSE PRACTITIONER

## 2021-03-12 PROCEDURE — 1101F PT FALLS ASSESS-DOCD LE1/YR: CPT | Performed by: NURSE PRACTITIONER

## 2021-03-12 PROCEDURE — 3288F FALL RISK ASSESSMENT DOCD: CPT | Performed by: NURSE PRACTITIONER

## 2021-03-12 PROCEDURE — 99213 OFFICE O/P EST LOW 20 MIN: CPT | Performed by: NURSE PRACTITIONER

## 2021-03-12 RX ORDER — AMOXICILLIN AND CLAVULANATE POTASSIUM 875; 125 MG/1; MG/1
1 TABLET, FILM COATED ORAL 2 TIMES DAILY
Qty: 20 TABLET | Refills: 0 | Status: SHIPPED | OUTPATIENT
Start: 2021-03-12 | End: 2021-03-22

## 2021-03-12 NOTE — PROGRESS NOTES
Assessment/Plan: Patient is up to date on his tetanus  Will start on Augmentin he does not believe he has had any issues with this  Will follow back up with him next week for a recheck  He was advised if any worsening of symptoms to go to ER  No problem-specific Assessment & Plan notes found for this encounter  Problem List Items Addressed This Visit        Other    Dog bite - Primary    Relevant Medications    amoxicillin-clavulanate (AUGMENTIN) 875-125 mg per tablet            Subjective:      Patient ID: Brook Moreira is a 76 y o  male  Mik Look is for an acute visit  He states on Wednesday he did break up his dogs from fighting  He states he did get bit in the right wrist area  He states he is having some pain and his hand is getting red  He states he can move his hand and fingers with no issue  He denies any arm pain  He states he is not having any fever  He offers no other issues  The following portions of the patient's history were reviewed and updated as appropriate: He  has a past medical history of Arthritis, Compression fracture of spine ([address] ), Concussion, Dislocation of wrist, Esophageal reflux, Fractured sternum, and Nasal fracture    He   Patient Active Problem List    Diagnosis Date Noted    Screening for colon cancer 08/28/2019    Screening for prostate cancer 08/28/2019    Screening for thyroid disorder 08/28/2019    Need for hepatitis C screening test 08/28/2019    Dog bite 08/28/2019    Morbid obesity with BMI of 40 0-44 9, adult ([address] ) 08/28/2019    Need for Tdap vaccination 08/28/2019    Arthritis of left knee 01/09/2019    Primary osteoarthritis of both knees 12/06/2018    Primary osteoarthritis of one knee, left 08/31/2018    Depression 05/18/2017    Morbid obesity ([address] ) 05/18/2017    Chronic back pain 09/15/2016    Hypertriglyceridemia 09/15/2016    Osteoarthritis 09/15/2016    Vitamin D deficiency 09/15/2016    Osteoarthritis of spine with radiculopathy, lumbar region 03/07/2016     He  has a past surgical history that includes Cholecystectomy open; Foot surgery (Right); and Colonoscopy  His family history includes Cancer in his father; Dementia in his mother; Hyperlipidemia in his mother  He  reports that he quit smoking about 7 years ago  His smoking use included cigarettes  He quit smokeless tobacco use about 7 years ago  He reports current alcohol use  He reports that he does not use drugs  Current Outpatient Medications   Medication Sig Dispense Refill    amoxicillin-clavulanate (AUGMENTIN) 875-125 mg per tablet Take 1 tablet by mouth 2 (two) times a day for 10 days 20 tablet 0     No current facility-administered medications for this visit  No current outpatient medications on file prior to visit  No current facility-administered medications on file prior to visit  He is allergic to latex; neomycin-bacitracin zn-polymyx; neomycin-polymyxin-pramoxine; other; cephalexin; and medical tape       Review of Systems   Constitutional: Negative  HENT: Negative  Eyes: Negative  Respiratory: Negative  Cardiovascular: Negative  Gastrointestinal: Negative  Endocrine: Negative  Genitourinary: Negative  Musculoskeletal: Negative  Skin: Positive for wound  Allergic/Immunologic: Negative  Neurological: Negative  Psychiatric/Behavioral: Negative  Objective:      /80 (BP Location: Left arm, Patient Position: Sitting, Cuff Size: Large)   Pulse 75   Temp 98 2 °F (36 8 °C) (Temporal)   Ht 6' (1 829 m)   Wt (!) 146 kg (322 lb)   SpO2 95%   BMI 43 67 kg/m²          Physical Exam  Constitutional:       Appearance: Normal appearance  He is obese  Musculoskeletal:         General: Swelling present  Skin:     General: Skin is warm and dry  Capillary Refill: Capillary refill takes less than 2 seconds        Comments: Two bite marks noted to right wrist with mild redness noted   Neurological:      General: No focal deficit present  Mental Status: He is alert and oriented to person, place, and time  Mental status is at baseline  Psychiatric:         Mood and Affect: Mood normal          Behavior: Behavior normal          Thought Content:  Thought content normal          Judgment: Judgment normal

## 2021-03-16 ENCOUNTER — TELEPHONE (OUTPATIENT)
Dept: INTERNAL MEDICINE CLINIC | Facility: CLINIC | Age: 66
End: 2021-03-16

## 2021-03-16 NOTE — TELEPHONE ENCOUNTER
Pt called back to confirm appt  He stated he stopped the Augmentin  Stated made him nauseated, even with food  Stated he feels it was not helping  Stated arm is looking better and he does not want to take the Augmentin again

## 2021-03-17 ENCOUNTER — OFFICE VISIT (OUTPATIENT)
Dept: INTERNAL MEDICINE CLINIC | Facility: CLINIC | Age: 66
End: 2021-03-17
Payer: COMMERCIAL

## 2021-03-17 VITALS
SYSTOLIC BLOOD PRESSURE: 140 MMHG | WEIGHT: 315 LBS | OXYGEN SATURATION: 96 % | BODY MASS INDEX: 42.66 KG/M2 | TEMPERATURE: 97.5 F | HEIGHT: 72 IN | DIASTOLIC BLOOD PRESSURE: 80 MMHG | HEART RATE: 71 BPM

## 2021-03-17 DIAGNOSIS — R73.03 PREDIABETES: ICD-10-CM

## 2021-03-17 DIAGNOSIS — Z12.5 SCREENING FOR PROSTATE CANCER: ICD-10-CM

## 2021-03-17 DIAGNOSIS — Z13.29 SCREENING FOR THYROID DISORDER: ICD-10-CM

## 2021-03-17 DIAGNOSIS — E66.01 MORBID OBESITY WITH BMI OF 40.0-44.9, ADULT (HCC): ICD-10-CM

## 2021-03-17 DIAGNOSIS — E78.1 HYPERTRIGLYCERIDEMIA: ICD-10-CM

## 2021-03-17 DIAGNOSIS — L03.119 CELLULITIS OF HAND: Primary | ICD-10-CM

## 2021-03-17 PROCEDURE — 3008F BODY MASS INDEX DOCD: CPT | Performed by: NURSE PRACTITIONER

## 2021-03-17 PROCEDURE — 1160F RVW MEDS BY RX/DR IN RCRD: CPT | Performed by: NURSE PRACTITIONER

## 2021-03-17 PROCEDURE — 1036F TOBACCO NON-USER: CPT | Performed by: NURSE PRACTITIONER

## 2021-03-17 PROCEDURE — 99213 OFFICE O/P EST LOW 20 MIN: CPT | Performed by: NURSE PRACTITIONER

## 2021-03-17 NOTE — PROGRESS NOTES
Assessment/Plan: Hand is looking well did advise to continue to monitor and if any redness or worsening of symptoms to call office  Will order labs and follow up for his routine wellness  No problem-specific Assessment & Plan notes found for this encounter  Problem List Items Addressed This Visit        Other    Hypertriglyceridemia    Relevant Orders    Comprehensive metabolic panel    CBC and differential    Lipid panel    Screening for prostate cancer    Relevant Orders    PSA, Total Screen    Screening for thyroid disorder    Relevant Orders    TSH, 3rd generation with Free T4 reflex    Morbid obesity with BMI of 40 0-44 9, adult (HCC)    Relevant Orders    Comprehensive metabolic panel    CBC and differential    Cellulitis of hand - Primary    Relevant Orders    Comprehensive metabolic panel    CBC and differential      Other Visit Diagnoses     Prediabetes        Relevant Orders    HEMOGLOBIN A1C W/ EAG ESTIMATION            Subjective:      Patient ID: Kacey Dexter is a 76 y o  male  Jonnie Chas is for a follow up visit  He was seen on 3/12 for a dog bite  He was put on Augmentin and UTD on his tetanus  He did stop this due to upsetting his stomach on Monday  He states he was cleaning the hand and using Neosporin which seemed to help  He states he is not having any pain or redness  He also would like labs put in due to not having labs done in a long time  He offers no other issues  The following portions of the patient's history were reviewed and updated as appropriate: He  has a past medical history of Arthritis, Compression fracture of spine ([address] ), Concussion, Dislocation of wrist, Esophageal reflux, Fractured sternum, and Nasal fracture    He   Patient Active Problem List    Diagnosis Date Noted    Cellulitis of hand 03/17/2021    Screening for colon cancer 08/28/2019    Screening for prostate cancer 08/28/2019    Screening for thyroid disorder 08/28/2019    Need for hepatitis C screening test 08/28/2019    Dog bite 08/28/2019    Morbid obesity with BMI of 40 0-44 9, adult ([facility] [address] ) 08/28/2019    Need for Tdap vaccination 08/28/2019    Arthritis of left knee 01/09/2019    Primary osteoarthritis of both knees 12/06/2018    Primary osteoarthritis of one knee, left 08/31/2018    Depression 05/18/2017    Morbid obesity ([facility] ) 05/18/2017    Chronic back pain 09/15/2016    Hypertriglyceridemia 09/15/2016    Osteoarthritis 09/15/2016    Vitamin D deficiency 09/15/2016    Osteoarthritis of spine with radiculopathy, lumbar region 03/07/2016     He  has a past surgical history that includes Cholecystectomy open; Foot surgery (Right); and Colonoscopy  His family history includes Cancer in his father; Dementia in his mother; Hyperlipidemia in his mother  He  reports that he quit smoking about 7 years ago  His smoking use included cigarettes  He quit smokeless tobacco use about 7 years ago  He reports current alcohol use  He reports that he does not use drugs  Current Outpatient Medications   Medication Sig Dispense Refill    amoxicillin-clavulanate (AUGMENTIN) 875-125 mg per tablet Take 1 tablet by mouth 2 (two) times a day for 10 days (Patient not taking: Reported on 3/17/2021) 20 tablet 0     No current facility-administered medications for this visit  Current Outpatient Medications on File Prior to Visit   Medication Sig    amoxicillin-clavulanate (AUGMENTIN) 875-125 mg per tablet Take 1 tablet by mouth 2 (two) times a day for 10 days (Patient not taking: Reported on 3/17/2021)     No current facility-administered medications on file prior to visit  He is allergic to neomycin-bacitracin zn-polymyx; neomycin-polymyxin-pramoxine; other; adhesive [medical tape]; and cephalexin       Review of Systems   All other systems reviewed and are negative          Objective:      /80 (BP Location: Left arm)   Pulse 71   Temp 97 5 °F (36 4 °C) (Temporal)   Ht 6' (1 829 m)   Wt (!) 143 kg (315 lb 11 2 oz)   SpO2 96%   BMI 42 82 kg/m²          Physical Exam  Constitutional:       Appearance: Normal appearance  He is obese  Skin:     General: Skin is warm and dry  Capillary Refill: Capillary refill takes less than 2 seconds  Comments: Scabbed areas noted to right hand with no redness or warmth    Neurological:      General: No focal deficit present  Mental Status: He is alert and oriented to person, place, and time  Mental status is at baseline  Psychiatric:         Mood and Affect: Mood normal          Behavior: Behavior normal          Thought Content:  Thought content normal          Judgment: Judgment normal

## 2021-04-02 ENCOUNTER — APPOINTMENT (OUTPATIENT)
Dept: LAB | Facility: HOSPITAL | Age: 66
End: 2021-04-02
Payer: COMMERCIAL

## 2021-04-02 DIAGNOSIS — L03.119 CELLULITIS OF HAND: ICD-10-CM

## 2021-04-02 DIAGNOSIS — R73.03 PREDIABETES: ICD-10-CM

## 2021-04-02 DIAGNOSIS — E66.01 MORBID OBESITY WITH BMI OF 40.0-44.9, ADULT (HCC): ICD-10-CM

## 2021-04-02 DIAGNOSIS — E78.1 HYPERTRIGLYCERIDEMIA: ICD-10-CM

## 2021-04-02 DIAGNOSIS — Z12.5 SCREENING FOR PROSTATE CANCER: ICD-10-CM

## 2021-04-02 DIAGNOSIS — Z13.29 SCREENING FOR THYROID DISORDER: ICD-10-CM

## 2021-04-02 LAB
ALBUMIN SERPL BCP-MCNC: 3.7 G/DL (ref 3.5–5)
ALP SERPL-CCNC: 63 U/L (ref 46–116)
ALT SERPL W P-5'-P-CCNC: 36 U/L (ref 12–78)
ANION GAP SERPL CALCULATED.3IONS-SCNC: 9 MMOL/L (ref 4–13)
AST SERPL W P-5'-P-CCNC: 22 U/L (ref 5–45)
BASOPHILS # BLD AUTO: 0.05 THOUSANDS/ΜL (ref 0–0.1)
BASOPHILS NFR BLD AUTO: 1 % (ref 0–1)
BILIRUB SERPL-MCNC: 0.63 MG/DL (ref 0.2–1)
BUN SERPL-MCNC: 10 MG/DL (ref 5–25)
CALCIUM SERPL-MCNC: 8.8 MG/DL (ref 8.3–10.1)
CHLORIDE SERPL-SCNC: 106 MMOL/L (ref 100–108)
CHOLEST SERPL-MCNC: 194 MG/DL (ref 50–200)
CO2 SERPL-SCNC: 28 MMOL/L (ref 21–32)
CREAT SERPL-MCNC: 0.71 MG/DL (ref 0.6–1.3)
EOSINOPHIL # BLD AUTO: 0.23 THOUSAND/ΜL (ref 0–0.61)
EOSINOPHIL NFR BLD AUTO: 5 % (ref 0–6)
ERYTHROCYTE [DISTWIDTH] IN BLOOD BY AUTOMATED COUNT: 13.4 % (ref 11.6–15.1)
EST. AVERAGE GLUCOSE BLD GHB EST-MCNC: 114 MG/DL
GFR SERPL CREATININE-BSD FRML MDRD: 98 ML/MIN/1.73SQ M
GLUCOSE SERPL-MCNC: 108 MG/DL (ref 65–140)
HBA1C MFR BLD: 5.6 %
HCT VFR BLD AUTO: 43.2 % (ref 36.5–49.3)
HDLC SERPL-MCNC: 73 MG/DL
HGB BLD-MCNC: 14 G/DL (ref 12–17)
IMM GRANULOCYTES # BLD AUTO: 0.03 THOUSAND/UL (ref 0–0.2)
IMM GRANULOCYTES NFR BLD AUTO: 1 % (ref 0–2)
LDLC SERPL CALC-MCNC: 88 MG/DL (ref 0–100)
LYMPHOCYTES # BLD AUTO: 1.37 THOUSANDS/ΜL (ref 0.6–4.47)
LYMPHOCYTES NFR BLD AUTO: 32 % (ref 14–44)
MCH RBC QN AUTO: 29.9 PG (ref 26.8–34.3)
MCHC RBC AUTO-ENTMCNC: 32.4 G/DL (ref 31.4–37.4)
MCV RBC AUTO: 92 FL (ref 82–98)
MONOCYTES # BLD AUTO: 0.35 THOUSAND/ΜL (ref 0.17–1.22)
MONOCYTES NFR BLD AUTO: 8 % (ref 4–12)
NEUTROPHILS # BLD AUTO: 2.21 THOUSANDS/ΜL (ref 1.85–7.62)
NEUTS SEG NFR BLD AUTO: 53 % (ref 43–75)
NONHDLC SERPL-MCNC: 121 MG/DL
NRBC BLD AUTO-RTO: 0 /100 WBCS
PLATELET # BLD AUTO: 194 THOUSANDS/UL (ref 149–390)
PMV BLD AUTO: 9.6 FL (ref 8.9–12.7)
POTASSIUM SERPL-SCNC: 3.8 MMOL/L (ref 3.5–5.3)
PROT SERPL-MCNC: 7.1 G/DL (ref 6.4–8.2)
PSA SERPL-MCNC: 0.5 NG/ML (ref 0–4)
RBC # BLD AUTO: 4.69 MILLION/UL (ref 3.88–5.62)
SODIUM SERPL-SCNC: 143 MMOL/L (ref 136–145)
TRIGL SERPL-MCNC: 164 MG/DL
TSH SERPL DL<=0.05 MIU/L-ACNC: 2.41 UIU/ML (ref 0.36–3.74)
WBC # BLD AUTO: 4.24 THOUSAND/UL (ref 4.31–10.16)

## 2021-04-02 PROCEDURE — 84443 ASSAY THYROID STIM HORMONE: CPT

## 2021-04-02 PROCEDURE — 36415 COLL VENOUS BLD VENIPUNCTURE: CPT

## 2021-04-02 PROCEDURE — 85025 COMPLETE CBC W/AUTO DIFF WBC: CPT

## 2021-04-02 PROCEDURE — 80053 COMPREHEN METABOLIC PANEL: CPT

## 2021-04-02 PROCEDURE — G0103 PSA SCREENING: HCPCS

## 2021-04-02 PROCEDURE — 83036 HEMOGLOBIN GLYCOSYLATED A1C: CPT

## 2021-04-02 PROCEDURE — 80061 LIPID PANEL: CPT

## 2021-04-09 ENCOUNTER — OFFICE VISIT (OUTPATIENT)
Dept: INTERNAL MEDICINE CLINIC | Facility: CLINIC | Age: 66
End: 2021-04-09
Payer: COMMERCIAL

## 2021-04-09 VITALS
HEART RATE: 77 BPM | WEIGHT: 314 LBS | HEIGHT: 72 IN | OXYGEN SATURATION: 99 % | SYSTOLIC BLOOD PRESSURE: 120 MMHG | BODY MASS INDEX: 42.53 KG/M2 | DIASTOLIC BLOOD PRESSURE: 76 MMHG | TEMPERATURE: 97.7 F

## 2021-04-09 DIAGNOSIS — E78.1 HYPERTRIGLYCERIDEMIA: ICD-10-CM

## 2021-04-09 DIAGNOSIS — E66.01 MORBID OBESITY WITH BMI OF 40.0-44.9, ADULT (HCC): ICD-10-CM

## 2021-04-09 DIAGNOSIS — Z00.00 ROUTINE ADULT HEALTH MAINTENANCE: Primary | ICD-10-CM

## 2021-04-09 DIAGNOSIS — E55.9 VITAMIN D DEFICIENCY: ICD-10-CM
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
## 2021-04-09 PROBLEM — L03.119 CELLULITIS OF HAND: Status: RESOLVED | Noted: 2021-03-17 | Resolved: 2021-04-09

## 2021-04-09 PROBLEM — Z12.11 SCREENING FOR COLON CANCER: Status: RESOLVED | Noted: 2019-08-28 | Resolved: 2021-04-09

## 2021-04-09 PROBLEM — Z11.59 NEED FOR HEPATITIS C SCREENING TEST: Status: RESOLVED | Noted: 2019-08-28 | Resolved: 2021-04-09

## 2021-04-09 PROBLEM — Z12.5 SCREENING FOR PROSTATE CANCER: Status: RESOLVED | Noted: 2019-08-28 | Resolved: 2021-04-09

## 2021-04-09 PROBLEM — Z23 NEED FOR TDAP VACCINATION: Status: RESOLVED | Noted: 2019-08-28 | Resolved: 2021-04-09

## 2021-04-09 PROBLEM — Z13.29 SCREENING FOR THYROID DISORDER: Status: RESOLVED | Noted: 2019-08-28 | Resolved: 2021-04-09

## 2021-04-09 PROCEDURE — 1036F TOBACCO NON-USER: CPT | Performed by: NURSE PRACTITIONER

## 2021-04-09 PROCEDURE — 99397 PER PM REEVAL EST PAT 65+ YR: CPT | Performed by: NURSE PRACTITIONER

## 2021-04-09 PROCEDURE — 1160F RVW MEDS BY RX/DR IN RCRD: CPT | Performed by: NURSE PRACTITIONER

## 2021-04-09 PROCEDURE — 3008F BODY MASS INDEX DOCD: CPT | Performed by: NURSE PRACTITIONER

## 2021-04-09 NOTE — PATIENT INSTRUCTIONS
Low Fat Diet   AMBULATORY CARE:   A low-fat diet  is an eating plan that is low in total fat, unhealthy fat, and cholesterol  You may need to follow a low-fat diet if you have trouble digesting or absorbing fat  You may also need to follow this diet if you have high cholesterol  You can also lower your cholesterol by increasing the amount of fiber in your diet  Soluble fiber is a type of fiber that helps to decrease cholesterol levels  Different types of fat in food:   · Limit unhealthy fats  A diet that is high in cholesterol, saturated fat, and trans fat may cause unhealthy cholesterol levels  Unhealthy cholesterol levels increase your risk of heart disease  ? Cholesterol:  Limit intake of cholesterol to less than 200 mg per day  Cholesterol is found in meat, eggs, and dairy  ? Saturated fat:  Limit saturated fat to less than 7% of your total daily calories  Ask your dietitian how many calories you need each day  Saturated fat is found in butter, cheese, ice cream, whole milk, and palm oil  Saturated fat is also found in meat, such as beef, pork, chicken skin, and processed meats  Processed meats include sausage, hot dogs, and bologna  ? Trans fat:  Avoid trans fat as much as possible  Trans fat is used in fried and baked foods  Foods that say trans fat free on the label may still have up to 0 5 grams of trans fat per serving  · Include healthy fats  Replace foods that are high in saturated and trans fat with foods high in healthy fats  This may help to decrease high cholesterol levels  ? Monounsaturated fats: These are found in avocados, nuts, and vegetable oils, such as olive, canola, and sunflower oil  ? Polyunsaturated fats: These can be found in vegetable oils, such as soybean or corn oil  Omega-3 fats can help to decrease the risk of heart disease  Omega-3 fats are found in fish, such as salmon, herring, trout, and tuna   Omega-3 fats can also be found in plant foods, such as walnuts, flaxseed, soybeans, and canola oil  Foods to limit or avoid:   · Grains:      ? Snacks that are made with partially hydrogenated oils, such as chips, regular crackers, and butter-flavored popcorn    ? High-fat baked goods, such as biscuits, croissants, doughnuts, pies, cookies, and pastries    · Dairy:      ? Whole milk, 2% milk, and yogurt and ice cream made with whole milk    ? Half and half creamer, heavy cream, and whipping cream    ? Cheese, cream cheese, and sour cream    · Meats and proteins:      ? High-fat cuts of meat (T-bone steak, regular hamburger, and ribs)    ? Fried meat, poultry (turkey and chicken), and fish    ? Poultry (chicken and turkey) with skin    ? Cold cuts (salami or bologna), hot dogs, farah, and sausage    ? Whole eggs and egg yolks    · Vegetables and fruits with added fat:      ? Fried vegetables or vegetables in butter or high-fat sauces, such as cream or cheese sauces    ? Fried fruit or fruit served with butter or cream    · Fats:      ? Butter, stick margarine, and shortening    ? Coconut, palm oil, and palm kernel oil    Foods to include:   · Grains:      ? Whole-grain breads, cereals, pasta, and brown rice    ? Low-fat crackers and pretzels    · Vegetables and fruits:      ? Fresh, frozen, or canned vegetables (no salt or low-sodium)    ? Fresh, frozen, dried, or canned fruit (canned in light syrup or fruit juice)    ? Avocado    · Low-fat dairy products:      ? Nonfat (skim) or 1% milk    ? Nonfat or low-fat cheese, yogurt, and cottage cheese    · Meats and proteins:      ? Chicken or turkey with no skin    ? Baked or broiled fish    ? Lean beef and pork (loin, round, extra lean hamburger)    ? Beans and peas, unsalted nuts, soy products    ? Egg whites and substitutes    ? Seeds and nuts    · Fats:      ? Unsaturated oil, such as canola, olive, peanut, soybean, or sunflower oil    ? Soft or liquid margarine and vegetable oil spread    ?  Low-fat salad dressing    Other ways to decrease fat:   · Read food labels before you buy foods  Choose foods that have less than 30% of calories from fat  Choose low-fat or fat-free dairy products  Remember that fat free does not mean calorie free  These foods still contain calories, and too many calories can lead to weight gain  · Trim fat from meat and avoid fried food  Trim all visible fat from meat before you cook it  Remove the skin from poultry  Do not royal meat, fish, or poultry  Bake, roast, boil, or broil these foods instead  Avoid fried foods  Eat a baked potato instead of Western Jennifer fries  Steam vegetables instead of sautéing them in butter  · Add less fat to foods  Use imitation farah bits on salads and baked potatoes instead of regular farah bits  Use fat-free or low-fat salad dressings instead of regular dressings  Use low-fat or nonfat butter-flavored topping instead of regular butter or margarine on popcorn and other foods  Ways to decrease fat in recipes:  Replace high-fat ingredients with low-fat or nonfat ones  This may cause baked goods to be drier than usual  You may need to use nonfat cooking spray on pans to prevent food from sticking  You also may need to change the amount of other ingredients, such as water, in the recipe  Try the following:  · Use low-fat or light margarine instead of regular margarine or shortening  · Use lean ground turkey breast or chicken, or lean ground beef (less than 5% fat) instead of hamburger  · Add 1 teaspoon of canola oil to 8 ounces of skim milk instead of using cream or half and half  · Use grated zucchini, carrots, or apples in breads instead of coconut  · Use blenderized, low-fat cottage cheese, plain tofu, or low-fat ricotta cheese instead of cream cheese  · Use 1 egg white and 1 teaspoon of canola oil, or use ¼ cup (2 ounces) of fat-free egg substitute instead of a whole egg       · Replace half of the oil that is called for in a recipe with applesauce when you bake  Use 3 tablespoons of cocoa powder and 1 tablespoon of canola oil instead of a square of baking chocolate  How to increase fiber:  Eat enough high-fiber foods to get 20 to 30 grams of fiber every day  Slowly increase your fiber intake to avoid stomach cramps, gas, and other problems  · Eat 3 ounces of whole-grain foods each day  An ounce is about 1 slice of bread  Eat whole-grain breads, such as whole-wheat bread  Whole wheat, whole-wheat flour, or other whole grains should be listed as the first ingredient on the food label  Replace white flour with whole-grain flour or use half of each in recipes  Whole-grain flour is heavier than white flour, so you may have to add more yeast or baking powder  · Eat a high-fiber cereal for breakfast   Oatmeal is a good source of soluble fiber  Look for cereals that have bran or fiber in the name  Choose whole-grain products, such as brown rice, barley, and whole-wheat pasta  · Eat more beans, peas, and lentils  For example, add beans to soups or salads  Eat at least 5 cups of fruits and vegetables each day  Eat fruits and vegetables with the peel because the peel is high in fiber  © Copyright [address] Information is for End User's use only and may not be sold, redistributed or otherwise used for commercial purposes  All illustrations and images included in CareNotes® are the copyrighted property of A D A M , [employer]  or [address] [place] [address]  The above information is an  only  It is not intended as medical advice for individual conditions or treatments  Talk to your doctor, nurse or pharmacist before following any medical regimen to see if it is safe and effective for you  Heart Healthy Diet   AMBULATORY CARE:   A heart healthy diet  is an eating plan low in unhealthy fats and sodium (salt)  The plan is high in healthy fats and fiber   A heart healthy diet helps improve your cholesterol levels and lowers your risk for heart disease and stroke  A dietitian will teach you how to read and understand food labels  Heart healthy diet guidelines to follow:   · Choose foods that contain healthy fats  ? Unsaturated fats  include monounsaturated and polyunsaturated fats  Unsaturated fat is found in foods such as soybean, canola, olive, corn, and safflower oils  It is also found in soft tub margarine that is made with liquid vegetable oil  ? Omega-3 fat  is found in certain fish, such as salmon, tuna, and trout, and in walnuts and flaxseed  Eat fish high in omega-3 fats at least 2 times a week  · Get 20 to 30 grams of fiber each day  Fruits, vegetables, whole-grain foods, and legumes (cooked beans) are good sources of fiber  · Limit or do not have unhealthy fats  ? Cholesterol  is found in animal foods, such as eggs and lobster, and in dairy products made from whole milk  Limit cholesterol to less than 200 mg each day  ? Saturated fat  is found in meats, such as farah and hamburger  It is also found in chicken or turkey skin, whole milk, and butter  Limit saturated fat to less than 7% of your total daily calories  ? Trans fat  is found in packaged foods, such as potato chips and cookies  It is also in hard margarine, some fried foods, and shortening  Do not eat foods that contain trans fats  · Limit sodium as directed  You may be told to limit sodium to 2,000 to 2,300 mg each day  Choose low-sodium or no-salt-added foods  Add little or no salt to food you prepare  Use herbs and spices in place of salt  Include the following in your heart healthy plan:  Ask your dietitian or healthcare provider how many servings to have from each of the following food groups:  · Grains:      ? Whole-wheat breads, cereals, and pastas, and brown rice    ? Low-fat, low-sodium crackers and chips    · Vegetables:      ? Broccoli, green beans, green peas, and spinach    ? Collards, kale, and lima beans    ?  Carrots, sweet potatoes, tomatoes, and peppers    ? Canned vegetables with no salt added    · Fruits:      ? Bananas, peaches, pears, and pineapple    ? Grapes, raisins, and dates    ? Oranges, tangerines, grapefruit, orange juice, and grapefruit juice    ? Apricots, mangoes, melons, and papaya    ? Raspberries and strawberries    ? Canned fruit with no added sugar    · Low-fat dairy:      ? Nonfat (skim) milk, 1% milk, and low-fat almond, cashew, or soy milks fortified with calcium    ? Low-fat cheese, regular or frozen yogurt, and cottage cheese    · Meats and proteins:      ? Lean cuts of beef and pork (loin, leg, round), skinless chicken and turkey    ? Legumes, soy products, egg whites, or nuts    Limit or do not include the following in your heart healthy plan:   · Unhealthy fats and oils:      ? Whole or 2% milk, cream cheese, sour cream, or cheese    ? High-fat cuts of beef (T-bone steaks, ribs), chicken or turkey with skin, and organ meats such as liver    ? Butter, stick margarine, shortening, and cooking oils such as coconut or palm oil    · Foods and liquids high in sodium:      ? Packaged foods, such as frozen dinners, cookies, macaroni and cheese, and cereals with more than 300 mg of sodium per serving    ? Vegetables with added sodium, such as instant potatoes, vegetables with added sauces, or regular canned vegetables    ? Cured or smoked meats, such as hot dogs, farah, and sausage    ? High-sodium ketchup, barbecue sauce, salad dressing, pickles, olives, soy sauce, or miso    · Foods and liquids high in sugar:      ? Candy, cake, cookies, pies, or doughnuts    ? Soft drinks (soda), sports drinks, or sweetened tea    ? Canned or dry mixes for cakes, soups, sauces, or gravies    Other healthy heart guidelines:   · Do not smoke  Nicotine and other chemicals in cigarettes and cigars can cause lung and heart damage  Ask your healthcare provider for information if you currently smoke and need help to quit  E-cigarettes or smokeless tobacco still contain nicotine  Talk to your healthcare provider before you use these products  · Limit or do not drink alcohol as directed  Alcohol can damage your heart and raise your blood pressure  Your healthcare provider may give you specific daily and weekly limits  The general recommended limit is 1 drink a day for women 21 or older and for men 72 or older  Do not have more than 3 drinks in a day or 7 in a week  The recommended limit is 2 drinks a day for men 24to 59years of age  Do not have more than 4 drinks in a day or 14 in a week  A drink of alcohol is 12 ounces of beer, 5 ounces of wine, or 1½ ounces of liquor  · Exercise regularly  Exercise can help you maintain a healthy weight and improve your blood pressure and cholesterol levels  Regular exercise can also decrease your risk for heart problems  Ask your healthcare provider about the best exercise plan for you  Do not start an exercise program without asking your healthcare provider  Follow up with your doctor or cardiologist as directed:  Write down your questions so you remember to ask them during your visits  © Copyright [address] Information is for End User's use only and may not be sold, redistributed or otherwise used for commercial purposes  All illustrations and images included in CareNotes® are the copyrighted property of A D A M , [employer]  or [address] [place] [address]  The above information is an  only  It is not intended as medical advice for individual conditions or treatments  Talk to your doctor, nurse or pharmacist before following any medical regimen to see if it is safe and effective for you

## 2021-04-09 NOTE — PROGRESS NOTES
Assessment/Plan: Did review labs with patient  A1C stable at 5 6  He is deferring Cologuard or colonoscopy  He is waiting to have the J and J vaccine  Will follow back up with him in one year or sooner with repeat labs  No problem-specific Assessment & Plan notes found for this encounter  Problem List Items Addressed This Visit        Other    Hypertriglyceridemia    Relevant Orders    Comprehensive metabolic panel    CBC and differential    TSH, 3rd generation with Free T4 reflex    Lipid panel    Vitamin D deficiency    Relevant Orders    Comprehensive metabolic panel    CBC and differential    TSH, 3rd generation with Free T4 reflex    Vitamin D 25 hydroxy    Morbid obesity with BMI of 40 0-44 9, adult ([facility])    Relevant Orders    Comprehensive metabolic panel    CBC and differential    TSH, 3rd generation with Free T4 reflex    Routine adult health maintenance - Primary    Relevant Orders    Comprehensive metabolic panel    CBC and differential    TSH, 3rd generation with Free T4 reflex            Subjective:      Patient ID: Dale Ruiz is a 76 y o  male  [facility]  He is doing well and has since changed his diet to vegan  He states he is loosing weight and has been feeling great  He denies any chest pain, SOB, or palpitations  He is deferring a cologuard or any screenings  He states he is waiting to get the J and J vaccine  He states he did have his labs done and did not see them yet  He denies any depression or anxiety  The following portions of the patient's history were reviewed and updated as appropriate:   He  has a past medical history of Arthritis, Compression fracture of spine ([address] ), Concussion, Dislocation of wrist, Esophageal reflux, Fractured sternum, and Nasal fracture    He   Patient Active Problem List    Diagnosis Date Noted    Routine adult health maintenance 04/09/2021    Morbid obesity with BMI of 40 0-44 9, adult ([facility] ) 08/28/2019    Arthritis of left knee 01/09/2019    Primary osteoarthritis of both knees 12/06/2018    Primary osteoarthritis of one knee, left 08/31/2018    Depression 05/18/2017    Morbid obesity ([address] ) 05/18/2017    Chronic back pain 09/15/2016    Hypertriglyceridemia 09/15/2016    Osteoarthritis 09/15/2016    Vitamin D deficiency 09/15/2016    Osteoarthritis of spine with radiculopathy, lumbar region 03/07/2016     He  has a past surgical history that includes Cholecystectomy open; Foot surgery (Right); and Colonoscopy  His family history includes Cancer in his father; Dementia in his mother; Hyperlipidemia in his mother  He  reports that he quit smoking about 7 years ago  His smoking use included cigarettes  He quit smokeless tobacco use about 7 years ago  He reports current alcohol use  He reports that he does not use drugs  No current outpatient medications on file  No current facility-administered medications for this visit  No current outpatient medications on file prior to visit  No current facility-administered medications on file prior to visit  He is allergic to neomycin-bacitracin zn-polymyx; neomycin-polymyxin-pramoxine; other; adhesive [medical tape]; and cephalexin       Review of Systems   All other systems reviewed and are negative        CBC ETOH     Lab Results   Component Value Date    WBC 4 24 (L) 04/02/2021    WBC 4 62 04/04/2014    RBC 4 69 04/02/2021    RBC 4 70 04/04/2014    HGB 14 0 04/02/2021    HGB 13 8 04/04/2014    HCT 43 2 04/02/2021    HCT 40 9 04/04/2014    MCV 92 04/02/2021    MCV 87 04/04/2014    MCH 29 9 04/02/2021    MCH 29 4 04/04/2014    MCHC 32 4 04/02/2021    MCHC 33 7 04/04/2014    RDW 13 4 04/02/2021    RDW 13 1 04/04/2014     04/02/2021     04/04/2014    MPV 9 6 04/02/2021    MPV 10 3 04/04/2014      No results found for: LACTICACID   CMP UA         Component Value Date/Time     04/04/2014 1029    K 3 8 04/02/2021 1304    K 3 8 04/04/2014 1029    CL 106 04/02/2021 1304     04/04/2014 1029    CO2 28 04/02/2021 1304    CO2 29 2 04/04/2014 1029    BUN 10 04/02/2021 1304    BUN 18 04/04/2014 1029    CREATININE 0 71 04/02/2021 1304    CREATININE 0 61 04/04/2014 1029         Component Value Date/Time    CALCIUM 8 8 04/02/2021 1304    CALCIUM 7 8 (L) 04/04/2014 1029    ALKPHOS 63 04/02/2021 1304    ALKPHOS 71 04/04/2014 1029    AST 22 04/02/2021 1304    AST 17 04/04/2014 1029    ALT 36 04/02/2021 1304    ALT 38 04/04/2014 1029    BILITOT 0 7 04/04/2014 1029      No results found for: CLARITYU, COLORU, SPECGRAV, PHUR, GLUCOSEU, KETONESU, BLOODU, PROTEIN UA, NITRITE, BILIRUBINUR, UROBILINOGEN, LEUKOCYTESUR, WBCUA, RBCUA, HYALINE, BACTERIA, EPIS     Liver Function Test: ASA     Lab Results   Component Value Date    TBILI 0 63 04/02/2021    ALKPHOS 63 04/02/2021    ALKPHOS 71 04/04/2014    AST 22 04/02/2021    AST 17 04/04/2014    ALT 36 04/02/2021    ALT 38 04/04/2014    TP 7 1 04/02/2021    ALB 3 7 04/02/2021    ALB 3 8 04/04/2014      No results found for: SALICYLATE   Troponin APAP     No results found for: TROPONINI   No results found for: ACTMNPHEN   VBG HCG     No results found for: PHVEN, VVV3AMG, PO2VEN, VLX8FOK, BEVEN, [id number], V1QFPQA   No results found for: HCGQUANT   ABG Urine Drug Screen     No results found for: PHART, IRM4HKU, PO2ART, NTX3ZWI, BEART, M6DGDLIUY, O2HGB, SOURC, FRANCHESCA, VTAC, ACRATE, INSPIREDAIR, PEEP   No results found for: AMPMETHUR, BARBTUR, BDZUR, COCAINEUR, METHADONEUR, OPIATEUR, PCPUR, THCUR, OXYCODONEUR   Lactate INR     No results found for: LACTICACID   Lab Results   Component Value Date    INR 0 98 12/27/2018      PTT Protime     No results found for: PTT     Lab Results   Component Value Date/Time    PROTIME 13 1 12/27/2018 09:21 AM        Objective:      /76 (BP Location: Left arm, Patient Position: Sitting, Cuff Size: Large)   Pulse 77   Temp 97 7 °F (36 5 °C) (Temporal)   Ht 6' (1 829 m)   Wt (!) 142 kg (314 lb) SpO2 99%   BMI 42 59 kg/m²          Physical Exam  Vitals signs reviewed  Constitutional:       Appearance: Normal appearance  He is obese  He is ill-appearing  HENT:      Head: Normocephalic and atraumatic  Right Ear: Tympanic membrane, ear canal and external ear normal       Left Ear: Tympanic membrane, ear canal and external ear normal       Nose: Nose normal       Mouth/Throat:      Mouth: Mucous membranes are moist       Pharynx: Oropharynx is clear  Eyes:      Extraocular Movements: Extraocular movements intact  Conjunctiva/sclera: Conjunctivae normal       Pupils: Pupils are equal, round, and reactive to light  Neck:      Musculoskeletal: Normal range of motion and neck supple  Cardiovascular:      Rate and Rhythm: Normal rate and regular rhythm  Pulses: Normal pulses  Heart sounds: Normal heart sounds  Pulmonary:      Effort: Pulmonary effort is normal       Breath sounds: Normal breath sounds  Abdominal:      General: Abdomen is flat  Bowel sounds are normal       Palpations: Abdomen is soft  Musculoskeletal: Normal range of motion  Skin:     General: Skin is warm and dry  Capillary Refill: Capillary refill takes less than 2 seconds  Neurological:      General: No focal deficit present  Mental Status: He is alert and oriented to person, place, and time  Mental status is at baseline  Psychiatric:         Mood and Affect: Mood normal          Behavior: Behavior normal          Thought Content: Thought content normal          Judgment: Judgment normal          BMI Counseling: Body mass index is 42 59 kg/m²   The BMI is above normal  Nutrition recommendations include reducing portion sizes, decreasing overall calorie intake, 3-5 servings of fruits/vegetables daily, reducing fast food intake, consuming healthier snacks, decreasing soda and/or juice intake, moderation in carbohydrate intake, increasing intake of lean protein, reducing intake of saturated fat and trans fat and reducing intake of cholesterol

## 2021-05-01 ENCOUNTER — HOSPITAL ENCOUNTER (EMERGENCY)
Facility: HOSPITAL | Age: 66
Discharge: HOME | End: 2021-05-01
Attending: EMERGENCY MEDICINE | Admitting: EMERGENCY MEDICINE
Payer: COMMERCIAL

## 2021-05-01 ENCOUNTER — APPOINTMENT (EMERGENCY)
Dept: RADIOLOGY | Facility: HOSPITAL | Age: 66
End: 2021-05-01
Payer: COMMERCIAL

## 2021-05-01 VITALS
TEMPERATURE: 98.5 F | WEIGHT: 313.27 LBS | RESPIRATION RATE: 18 BRPM | BODY MASS INDEX: 42.49 KG/M2 | HEART RATE: 78 BPM | DIASTOLIC BLOOD PRESSURE: 87 MMHG | SYSTOLIC BLOOD PRESSURE: 156 MMHG | OXYGEN SATURATION: 94 %

## 2021-05-01 DIAGNOSIS — Z20.822 ENCOUNTER FOR LABORATORY TESTING FOR COVID-19 VIRUS: ICD-10-CM

## 2021-05-01 DIAGNOSIS — R50.9 FEVER: ICD-10-CM

## 2021-05-01 DIAGNOSIS — R05.9 COUGH: Primary | ICD-10-CM

## 2021-05-01 LAB — SARS-COV-2 RNA RESP QL NAA+PROBE: NEGATIVE

## 2021-05-01 PROCEDURE — U0005 INFEC AGEN DETEC AMPLI PROBE: HCPCS | Performed by: EMERGENCY MEDICINE

## 2021-05-01 PROCEDURE — 99284 EMERGENCY DEPT VISIT MOD MDM: CPT

## 2021-05-01 PROCEDURE — U0003 INFECTIOUS AGENT DETECTION BY NUCLEIC ACID (DNA OR RNA); SEVERE ACUTE RESPIRATORY SYNDROME CORONAVIRUS 2 (SARS-COV-2) (CORONAVIRUS DISEASE [COVID-19]), AMPLIFIED PROBE TECHNIQUE, MAKING USE OF HIGH THROUGHPUT TECHNOLOGIES AS DESCRIBED BY CMS-2020-01-R: HCPCS | Performed by: EMERGENCY MEDICINE

## 2021-05-01 PROCEDURE — 99284 EMERGENCY DEPT VISIT MOD MDM: CPT | Performed by: EMERGENCY MEDICINE

## 2021-05-01 PROCEDURE — 71045 X-RAY EXAM CHEST 1 VIEW: CPT

## 2021-05-02 NOTE — ED PROVIDER NOTES
History  Chief Complaint   Patient presents with    Fever - 9 weeks to 74 years     reports fever, headache, and fatigue past 2 days  Did take tylenol  Wants to be tested for covid 23     HPI  58-year-old male comes in with fever occasional headache in increasing fatigue for last 2 days  Patient took Tylenol approximately 1 hour going feels better  Patient's biggest concern is that he has COVID-19 and is asking for COVID-19 test   He also states he has had occasional cough  He has no other complaints, denies any vision changes, denies any headache currently, denies neck pain chest pain denies any abdominal pain nausea vomiting  Denies any history of diabetes, he is on no immunosuppressive drugs  None       Past Medical History:   Diagnosis Date    Arthritis     Compression fracture of spine ([address] )     last assessed 09/15/2016    Concussion     Dislocation of wrist     right ,  last assessed 09/15/2016    Esophageal reflux     Fractured sternum     Nasal fracture        Past Surgical History:   Procedure Laterality Date    CHOLECYSTECTOMY OPEN      COLONOSCOPY      2006    FOOT SURGERY Right        Family History   Problem Relation Age of Onset    Dementia Mother     Hyperlipidemia Mother     Cancer Father         bladder      I have reviewed and agree with the history as documented  E-Cigarette/Vaping    E-Cigarette Use Never User      E-Cigarette/Vaping Substances    Nicotine No     THC No     CBD No     Flavoring No     Other No     Unknown No      Social History     Tobacco Use    Smoking status: Former Smoker     Types: Cigarettes     Quit date: 2013     Years since quittin 0    Smokeless tobacco: Former User     Quit date: 2013   Substance Use Topics    Alcohol use: Yes     Comment: occassional     Drug use: Never       Review of Systems   Constitutional: Positive for fever  Negative for chills  HENT: Negative  Negative for ear pain and sore throat      Eyes: Negative  Negative for pain and discharge  Respiratory: Positive for cough  Negative for chest tightness and shortness of breath  Cardiovascular: Negative  Negative for chest pain and palpitations  Gastrointestinal: Negative  Negative for abdominal pain, nausea and vomiting  Endocrine: Negative  Negative for polyphagia and polyuria  Genitourinary: Negative  Negative for dysuria and flank pain  Musculoskeletal: Negative  Negative for arthralgias and back pain  Skin: Negative  Negative for color change and wound  Allergic/Immunologic: Negative  Negative for food allergies and immunocompromised state  Neurological: Negative  Negative for weakness and headaches  Hematological: Negative  Negative for adenopathy  Does not bruise/bleed easily  Psychiatric/Behavioral: Negative  Negative for suicidal ideas  The patient is not nervous/anxious  Physical Exam  Physical Exam  Vitals signs and nursing note reviewed  Constitutional:       General: He is not in acute distress  Appearance: Normal appearance  He is not diaphoretic  HENT:      Head: Normocephalic and atraumatic  Right Ear: External ear normal       Left Ear: External ear normal       Nose: Nose normal  No congestion or rhinorrhea  Mouth/Throat:      Mouth: Mucous membranes are moist    Eyes:      General: No scleral icterus  Right eye: No discharge  Left eye: No discharge  Conjunctiva/sclera: Conjunctivae normal       Pupils: Pupils are equal, round, and reactive to light  Neck:      Musculoskeletal: Normal range of motion and neck supple  No neck rigidity  Cardiovascular:      Rate and Rhythm: Regular rhythm  Pulses: Normal pulses  Heart sounds: Normal heart sounds  Pulmonary:      Effort: Pulmonary effort is normal  No respiratory distress  Breath sounds: Normal breath sounds  No stridor  No wheezing, rhonchi or rales  Abdominal:      General: Abdomen is flat   There is no distension  Palpations: Abdomen is soft  Tenderness: There is no abdominal tenderness  There is no guarding  Musculoskeletal: Normal range of motion  General: No swelling, tenderness or deformity  Skin:     General: Skin is warm and dry  Capillary Refill: Capillary refill takes less than 2 seconds  Findings: No lesion or rash  Neurological:      General: No focal deficit present  Mental Status: He is alert and oriented to person, place, and time  Cranial Nerves: No cranial nerve deficit  Sensory: No sensory deficit  Psychiatric:         Mood and Affect: Mood normal          Behavior: Behavior normal          Thought Content: Thought content normal          Vital Signs  ED Triage Vitals [05/01/21 1916]   Temperature Pulse Respirations Blood Pressure SpO2   98 5 °F (36 9 °C) 93 18 (!) 171/79 96 %      Temp Source Heart Rate Source Patient Position - Orthostatic VS BP Location FiO2 (%)   Temporal Monitor Sitting Left arm --      Pain Score       --           Vitals:    05/01/21 1916 05/01/21 1930 05/01/21 2000 05/01/21 2030   BP: (!) 171/79 149/70 125/58 156/87   Pulse: 93 75 72 78   Patient Position - Orthostatic VS: Sitting Sitting Sitting Sitting         Visual Acuity      ED Medications  Medications - No data to display    Diagnostic Studies  Results Reviewed     Procedure Component Value Units Date/Time    [id number]]  (Normal) Collected: 05/01/21 1931    Lab Status: Final result Specimen: Nares from Nose Updated: 05/01/21 2026     SARS-CoV-2 Negative    Narrative: The specimen collection materials, transport medium, and/or testing methodology utilized in the production of these test results have been proven to be reliable in a limited validation with an abbreviated program under the Emergency Utilization Authorization provided by the FDA    Testing reported as "Presumptive positive" will be confirmed with secondary testing to ensure result accuracy  Clinical caution and judgement should be used with the interpretation of these results with consideration of the clinical impression and other laboratory testing  Testing reported as "Positive" or "Negative" has been proven to be accurate according to standard laboratory validation requirements  All testing is performed with control materials showing appropriate reactivity at standard intervals  XR chest 1 view portable   ED Interpretation by Keshia Michael MD (05/01 2037)   Abnormal   No cardiopulmonary disease      Final Result by Phyllis Buitrago DO (05/02 0981)      No acute cardiopulmonary disease  Workstation performed: [id number]                    Procedures  Procedures         ED Course      workup unremarkable  Did discuss return precautions the patient the patient's wife  Patient will be discharged  SBIRT 20yo+      Most Recent Value   SBIRT (24 yo +)   In order to provide better care to our patients, we are screening all of our patients for alcohol and drug use  Would it be okay to ask you these screening questions? Yes Filed at: 05/01/2021 1918   Initial Alcohol Screen: US AUDIT-C    1  How often do you have a drink containing alcohol?  0 Filed at: 05/01/2021 1918   2  How many drinks containing alcohol do you have on a typical day you are drinking? 0 Filed at: 05/01/2021 1918   3a  Male UNDER 65: How often do you have five or more drinks on one occasion? 0 Filed at: 05/01/2021 1918   3b  FEMALE Any Age, or MALE 65+: How often do you have 4 or more drinks on one occassion? 0 Filed at: 05/01/2021 1918   Audit-C Score  0 Filed at: 05/01/2021 1918   FREDERICK: How many times in the past year have you    Used an illegal drug or used a prescription medication for non-medical reasons?   Never Filed at: 05/01/2021 1918                    MDM  Number of Diagnoses or Management Options  Cough:   Encounter for laboratory testing for COVID-19 virus:   Fever:   Diagnosis management comments: This is a 77-year-old male who presents with cough, fever, concern for COVID-19 virus  He has normal vital signs now, he is offering no other complaints  Will defer any lab work  Will get a chest x-ray given the cough, will move forward with COVID-19 testing  Disposition  Final diagnoses:   Cough   Fever   Encounter for laboratory testing for COVID-19 virus     Time reflects when diagnosis was documented in both MDM as applicable and the Disposition within this note     Time User Action Codes Description Comment    5/1/2021  8:37 PM Kristie Holbrook Add [R05] Cough     5/1/2021  8:37 PM Kristie Holbroko Add [R50 9] Fever     5/1/2021  8:38 PM Kristie Holbrook Add [Z20 822] Encounter for laboratory testing for COVID-19 virus       ED Disposition     ED Disposition Condition Date/Time Comment    Discharge Stable Sat May 1, 2021  8:37 PM [facility] discharge to home/self care  Follow-up Information     Follow up With Specialties Details Why Contact Info Additional Aster, [address] Nurse Practitioner Call in 1 day follow up being seen in the emergency department [address] [place] [address]  [phone]       [facility] Emergency Department Emergency Medicine Go to  As needed, If symptoms worsen [facility] [id number]  [address] [place] [address] Emergency Department[address], [address]          There are no discharge medications for this patient  No discharge procedures on file      PDMP Review     None          ED Provider  Electronically Signed by           Keshia Michael MD  05/02/21 2793

## 2021-05-07 ENCOUNTER — IMMUNIZATIONS (OUTPATIENT)
Dept: FAMILY MEDICINE CLINIC | Facility: HOSPITAL | Age: 66
End: 2021-05-07

## 2021-05-07 PROCEDURE — 0031A: CPT

## 2021-05-07 PROCEDURE — 91303: CPT

## 2021-07-06 ENCOUNTER — VBI (OUTPATIENT)
Dept: ADMINISTRATIVE | Facility: OTHER | Age: 66
End: 2021-07-06

## 2021-11-16 ENCOUNTER — IMMUNIZATIONS (OUTPATIENT)
Dept: FAMILY MEDICINE CLINIC | Facility: HOSPITAL | Age: 66
End: 2021-11-16

## 2021-11-16 DIAGNOSIS — Z23 ENCOUNTER FOR IMMUNIZATION: Primary | ICD-10-CM

## 2021-11-16 PROCEDURE — 91306 COVID-19 MODERNA VACC 0.25 ML BOOSTER: CPT

## 2021-11-16 PROCEDURE — 0064A COVID-19 MODERNA VACC 0.25 ML BOOSTER: CPT

## 2022-04-05 ENCOUNTER — APPOINTMENT (OUTPATIENT)
Dept: LAB | Facility: HOSPITAL | Age: 67
End: 2022-04-05
Payer: COMMERCIAL

## 2022-04-05 ENCOUNTER — OFFICE VISIT (OUTPATIENT)
Dept: INTERNAL MEDICINE CLINIC | Facility: CLINIC | Age: 67
End: 2022-04-05
Payer: COMMERCIAL

## 2022-04-05 DIAGNOSIS — A69.20 ERYTHEMA MIGRANS (LYME DISEASE): ICD-10-CM

## 2022-04-05 DIAGNOSIS — A69.20 ERYTHEMA MIGRANS (LYME DISEASE): Primary | ICD-10-CM

## 2022-04-05 LAB
BASOPHILS # BLD AUTO: 0.03 THOUSANDS/ΜL (ref 0–0.1)
BASOPHILS NFR BLD AUTO: 1 % (ref 0–1)
EOSINOPHIL # BLD AUTO: 0.15 THOUSAND/ΜL (ref 0–0.61)
EOSINOPHIL NFR BLD AUTO: 3 % (ref 0–6)
ERYTHROCYTE [DISTWIDTH] IN BLOOD BY AUTOMATED COUNT: 12.8 % (ref 11.6–15.1)
HCT VFR BLD AUTO: 35.2 % (ref 36.5–49.3)
HGB BLD-MCNC: 12.3 G/DL (ref 12–17)
IMM GRANULOCYTES # BLD AUTO: 0.04 THOUSAND/UL (ref 0–0.2)
IMM GRANULOCYTES NFR BLD AUTO: 1 % (ref 0–2)
LYMPHOCYTES # BLD AUTO: 1 THOUSANDS/ΜL (ref 0.6–4.47)
LYMPHOCYTES NFR BLD AUTO: 21 % (ref 14–44)
MCH RBC QN AUTO: 29.9 PG (ref 26.8–34.3)
MCHC RBC AUTO-ENTMCNC: 34.9 G/DL (ref 31.4–37.4)
MCV RBC AUTO: 86 FL (ref 82–98)
MONOCYTES # BLD AUTO: 0.34 THOUSAND/ΜL (ref 0.17–1.22)
MONOCYTES NFR BLD AUTO: 7 % (ref 4–12)
NEUTROPHILS # BLD AUTO: 3.2 THOUSANDS/ΜL (ref 1.85–7.62)
NEUTS SEG NFR BLD AUTO: 67 % (ref 43–75)
NRBC BLD AUTO-RTO: 0 /100 WBCS
PLATELET # BLD AUTO: 196 THOUSANDS/UL (ref 149–390)
PMV BLD AUTO: 9.8 FL (ref 8.9–12.7)
RBC # BLD AUTO: 4.11 MILLION/UL (ref 3.88–5.62)
WBC # BLD AUTO: 4.76 THOUSAND/UL (ref 4.31–10.16)

## 2022-04-05 PROCEDURE — 86618 LYME DISEASE ANTIBODY: CPT

## 2022-04-05 PROCEDURE — 99213 OFFICE O/P EST LOW 20 MIN: CPT | Performed by: FAMILY MEDICINE

## 2022-04-05 PROCEDURE — 36415 COLL VENOUS BLD VENIPUNCTURE: CPT

## 2022-04-05 PROCEDURE — 85025 COMPLETE CBC W/AUTO DIFF WBC: CPT

## 2022-04-05 PROCEDURE — 1160F RVW MEDS BY RX/DR IN RCRD: CPT | Performed by: FAMILY MEDICINE

## 2022-04-05 RX ORDER — DOXYCYCLINE 100 MG/1
100 CAPSULE ORAL 2 TIMES DAILY
Qty: 42 CAPSULE | Refills: 0 | Status: SHIPPED | OUTPATIENT
Start: 2022-04-05 | End: 2022-04-26

## 2022-04-05 NOTE — PATIENT INSTRUCTIONS
Lyme Disease   WHAT YOU NEED TO KNOW:   What is Lyme disease? Lyme disease is a bacterial infection caused by the bite of an infected tick  What increases your risk for Lyme disease? · You work in a wooded area or area with heavy brush    · You travel to or live in areas where ticks are common    · You walk, hike, hunt, camp, or fish in a grassy or wooded area    What are the signs and symptoms of Lyme disease? · A red rash that is often round and may look like a target or bull's eye    · Fever, chills, or sore throat    · Weakness and tiredness    · Headache or muscle aches    · Joint pain    · Abdominal pain, nausea, or diarrhea    How is Lyme disease diagnosed? Your healthcare provider will examine you and ask about your symptoms  Tell him or her if you live or work in a grassy or wooded area or have been active outside in these areas  You may need any of the following:  · Blood tests  may show the bacteria that cause Lyme disease  · A sample of joint fluid  may be tested for the bacteria that cause Lyme disease  How is Lyme disease treated? Antibiotics treat the bacteria infection  How can I prevent a tick bite? Ticks live in areas covered by brush and grass  They may even be found in your lawn if you live in certain areas  Outdoor pets can carry ticks inside the house  Ticks can grab onto you or your clothes when you walk by grass or brush  If you go into areas that contain many trees, tall grasses, and underbrush, do the following:     · Wear light colored pants and a long-sleeved shirt  Tuck your pants into your socks or boots  Tuck in your shirt  Wear sleeves that fit close to the skin at your wrists and neck  This will help prevent ticks from crawling through gaps in your clothing and onto your skin  Wear a hat in areas with trees  · Apply insect repellant on your skin  The insect repellant should contain DEET  Do not put insect repellant on skin that is cut, scratched, or irritated  Always use soap and water to wash the insect repellant off as soon as possible once you are indoors  Do not apply insect repellant on your child's face or hands  · Spray insect repellant onto your clothes  Use permethrin spray  This spray kills ticks that crawl on your clothing  Be sure to spray the tops of your boots, bottom of pant legs, and sleeve cuffs  As soon as possible, wash and dry clothing in hot water and high heat  · Check your and your child's clothing, hair, and skin for ticks  Shower within 2 hours of coming indoors  Carefully check the hairline, armpits, neck, and waist     · Decrease the risk for ticks in your yard  Ticks like to live in shady, moist areas  Dorrine Chito your lawn regularly to keep the grass short  Trim the grass around birdbaths and fences  Cut branches that are overgrown and take them out of the yard  Clear out leaf piles  Vaughn Goltz firewood in a dry, obed area  · Treat pets with tick control products  as directed  This will decrease your risk for a tick bite  Check your pets for ticks  Remove ticks from pets the same way as you remove them from people  Ask your pet's  about the best product to use on your pet  · Remove a tick with tweezers  Wear gloves  Grasp the tick as close to your skin as possible  Pull the tick straight up and out  Do not touch the tick with your bare hands  Check to make sure you removed the whole tick, including the head  Clean the area with soap and water or rubbing alcohol  Then wash your hands with soap and water  Call your local emergency number (911 in the [address] [facility]) if:   · Your heart is beating faster than usual and you feel dizzy  · You have chest pain or trouble breathing  · You suddenly cannot talk or see well, or you have trouble moving an area of your body  When should I seek immediate care? · You have a headache and a stiff neck  · You have trouble concentrating or thinking clearly      · You have numbness or tingling in your arms or legs, or you have trouble walking  When should I call my doctor? · Your rash grows or spreads to other areas of your body  · You suddenly have trouble falling or staying asleep  · You have new or worsening pain and swelling in your joints  · You have new or worsening weakness and muscle pain  · You have a new tick bite  · You have questions or concerns about your condition or care  CARE AGREEMENT:   You have the right to help plan your care  Learn about your health condition and how it may be treated  Discuss treatment options with your healthcare providers to decide what care you want to receive  You always have the right to refuse treatment  The above information is an  only  It is not intended as medical advice for individual conditions or treatments  Talk to your doctor, nurse or pharmacist before following any medical regimen to see if it is safe and effective for you  © Copyright [employer] 2022 Information is for End User's use only and may not be sold, redistributed or otherwise used for commercial purposes   All illustrations and images included in CareNotes® are the copyrighted property of A D A M , Inc  or [address] [place] [address]

## 2022-04-06 LAB — B BURGDOR IGG+IGM SER-ACNC: 51

## 2022-04-06 NOTE — PROGRESS NOTES
Assessment/Plan:    No problem-specific Assessment & Plan notes found for this encounter  Diagnoses and all orders for this visit:    Erythema migrans (Lyme disease)  -     Lyme Antibody Profile with reflex to WB; Future  -     CBC and differential; Future  -     doxycycline monohydrate (MONODOX) 100 mg capsule; Take 1 capsule (100 mg total) by mouth 2 (two) times a day for 21 days    Other orders  -     Diclofenac Sodium (Voltaren) 1 %; Voltaren 1 % topical gel      orders and recommendations as noted above  He has had potential exposures to areas where ticks are prevalent and the rash is consistent with erythema migrans  Will start him on doxycycline as noted above  Completed 3 week course of this  Slip given for laboratory testing  Discussed with him that this may need to be repeated since sometimes it is initially negative in the initial phase especially early on  Complete the entire course of the doxycycline  Watch for any worsening of symptoms  Advised him to call to give us an update over the next 2-3 days  Watch for any worsening of systemic symptoms  Subjective:      Patient ID: Gina Muñoz is a 78 y o  male  He presents for acute visit  He had been out walking his dogs over the weekend  Does not remember getting any bites or irritation to the left chest wall area  Awoke that night with pain in the area and a small area of erythema  Over the next few days the area has expanded significantly  Somewhat sore  Somewhat warm to the touch  No drainage  Has been feeling more achy and more tired  Denies any definite fevers but has had some chills and worsening body aches  The following portions of the patient's history were reviewed and updated as appropriate:   He  has a past medical history of Arthritis, Compression fracture of spine ([address] ), Concussion, Dislocation of wrist, Esophageal reflux, Fractured sternum, and Nasal fracture    He   Patient Active Problem List Diagnosis Date Noted    Erythema migrans (Lyme disease) 04/05/2022    Routine adult health maintenance 04/09/2021    Morbid obesity with BMI of 40 0-44 9, adult ([facility] ) 08/28/2019    Arthritis of left knee 01/09/2019    Primary osteoarthritis of both knees 12/06/2018    Primary osteoarthritis of one knee, left 08/31/2018    Depression 05/18/2017    Morbid [facility] ([email or web address] ) 05/18/2017    Chronic back pain 09/15/2016    Hypertriglyceridemia 09/15/2016    Osteoarthritis 09/15/2016    Vitamin D deficiency 09/15/2016    Osteoarthritis of spine with radiculopathy, lumbar region 03/07/2016     He  has a past surgical history that includes Cholecystectomy open; Foot surgery (Right); and Colonoscopy  His family history includes Cancer in his father; Dementia in his mother; Hyperlipidemia in his mother  He  reports that he quit smoking about 8 years ago  His smoking use included cigarettes  He quit smokeless tobacco use about 8 years ago  He reports current alcohol use  He reports that he does not use drugs  Current Outpatient Medications   Medication Sig Dispense Refill    Diclofenac Sodium (Voltaren) 1 % Voltaren 1 % topical gel      doxycycline monohydrate (MONODOX) 100 mg capsule Take 1 capsule (100 mg total) by mouth 2 (two) times a day for 21 days 42 capsule 0     No current facility-administered medications for this visit  Current Outpatient Medications on File Prior to Visit   Medication Sig    Diclofenac Sodium (Voltaren) 1 % Voltaren 1 % topical gel     No current facility-administered medications on file prior to visit  He is allergic to neomycin-bacitracin zn-polymyx, neomycin-polymyxin-pramoxine, other, adhesive [medical tape], and cephalexin       Review of Systems   Constitutional: Positive for chills and fatigue  HENT: Positive for congestion and rhinorrhea  Musculoskeletal: Positive for arthralgias, gait problem and myalgias  Skin: Positive for color change and rash  Objective: There were no vitals taken for this visit  Physical Exam  Vitals and nursing note reviewed  Constitutional:       Appearance: He is well-developed and well-groomed  He is morbidly obese  Skin:     Comments: Extensive area of erythema over the left lateral chest wall from the area just below the axillary area to the lateral chest/abdomen; crescent-shaped area of erythema with ring like areas and a central area of darker erythema; slightly warm to touch; no fluctuance; no drainage   Neurological:      Mental Status: He is alert  Psychiatric:         Behavior: Behavior is cooperative

## 2022-04-08 ENCOUNTER — TELEPHONE (OUTPATIENT)
Dept: INTERNAL MEDICINE CLINIC | Facility: CLINIC | Age: 67
End: 2022-04-08

## 2022-04-08 NOTE — TELEPHONE ENCOUNTER
Patient called stating that the redness is getting a little better, not like it was and that he is feeling better than he ever did  I informed him that the lyme test was negative but that it may have been too soon to test   Patient states that he is finishing the antibiotic and I told him that most likely the doctor would want you to and I will call him back to let him know what Dr Guzman Manriquez says

## 2022-04-08 NOTE — TELEPHONE ENCOUNTER
After consulting Dr Jasiel Mcmahon I told patient, as instructed, to definitely finish the antibiotics and the blood work will be repeated  Call us if it anything gets worse and also call when done with antibiotics and we'll let him know when to repeat blood work

## 2022-05-03 ENCOUNTER — TELEPHONE (OUTPATIENT)
Dept: INTERNAL MEDICINE CLINIC | Facility: CLINIC | Age: 67
End: 2022-05-03

## 2022-05-03 DIAGNOSIS — A69.20 ERYTHEMA MIGRANS (LYME DISEASE): Primary | ICD-10-CM

## 2022-05-03 NOTE — TELEPHONE ENCOUNTER
Patient called made appointment for 5/27/22 but needs blood work order put in chart    He is wondering if you can add an order to repeat the lyme disease test

## 2022-05-03 NOTE — TELEPHONE ENCOUNTER
I did put the order in for the lyme disease, but I did not order the regular labs  Now he states he will get retested if you want it

## 2022-05-19 ENCOUNTER — TELEPHONE (OUTPATIENT)
Dept: INTERNAL MEDICINE CLINIC | Facility: CLINIC | Age: 67
End: 2022-05-19

## 2022-05-19 ENCOUNTER — APPOINTMENT (OUTPATIENT)
Dept: LAB | Facility: HOSPITAL | Age: 67
End: 2022-05-19
Payer: COMMERCIAL

## 2022-05-19 DIAGNOSIS — E78.1 HYPERTRIGLYCERIDEMIA: ICD-10-CM

## 2022-05-19 DIAGNOSIS — E55.9 VITAMIN D DEFICIENCY: ICD-10-CM

## 2022-05-19 DIAGNOSIS — E78.1 HYPERTRIGLYCERIDEMIA: Primary | ICD-10-CM

## 2022-05-19 LAB
ALBUMIN SERPL BCP-MCNC: 3.7 G/DL (ref 3.5–5)
ALP SERPL-CCNC: 60 U/L (ref 46–116)
ALT SERPL W P-5'-P-CCNC: 34 U/L (ref 12–78)
ANION GAP SERPL CALCULATED.3IONS-SCNC: 12 MMOL/L (ref 4–13)
AST SERPL W P-5'-P-CCNC: 20 U/L (ref 5–45)
BASOPHILS # BLD AUTO: 0.04 THOUSANDS/ΜL (ref 0–0.1)
BASOPHILS NFR BLD AUTO: 1 % (ref 0–1)
BILIRUB SERPL-MCNC: 0.98 MG/DL (ref 0.2–1)
BUN SERPL-MCNC: 13 MG/DL (ref 5–25)
CALCIUM SERPL-MCNC: 8.6 MG/DL (ref 8.3–10.1)
CHLORIDE SERPL-SCNC: 102 MMOL/L (ref 100–108)
CHOLEST SERPL-MCNC: 184 MG/DL
CO2 SERPL-SCNC: 23 MMOL/L (ref 21–32)
CREAT SERPL-MCNC: 0.75 MG/DL (ref 0.6–1.3)
EOSINOPHIL # BLD AUTO: 0.17 THOUSAND/ΜL (ref 0–0.61)
EOSINOPHIL NFR BLD AUTO: 4 % (ref 0–6)
ERYTHROCYTE [DISTWIDTH] IN BLOOD BY AUTOMATED COUNT: 13.4 % (ref 11.6–15.1)
GFR SERPL CREATININE-BSD FRML MDRD: 94 ML/MIN/1.73SQ M
GLUCOSE SERPL-MCNC: 108 MG/DL (ref 65–140)
HCT VFR BLD AUTO: 41.6 % (ref 36.5–49.3)
HDLC SERPL-MCNC: 86 MG/DL
HGB BLD-MCNC: 13.5 G/DL (ref 12–17)
IMM GRANULOCYTES # BLD AUTO: 0.03 THOUSAND/UL (ref 0–0.2)
IMM GRANULOCYTES NFR BLD AUTO: 1 % (ref 0–2)
LDLC SERPL CALC-MCNC: 81 MG/DL (ref 0–100)
LYMPHOCYTES # BLD AUTO: 1.09 THOUSANDS/ΜL (ref 0.6–4.47)
LYMPHOCYTES NFR BLD AUTO: 24 % (ref 14–44)
MCH RBC QN AUTO: 29 PG (ref 26.8–34.3)
MCHC RBC AUTO-ENTMCNC: 32.5 G/DL (ref 31.4–37.4)
MCV RBC AUTO: 89 FL (ref 82–98)
MONOCYTES # BLD AUTO: 0.37 THOUSAND/ΜL (ref 0.17–1.22)
MONOCYTES NFR BLD AUTO: 8 % (ref 4–12)
NEUTROPHILS # BLD AUTO: 2.94 THOUSANDS/ΜL (ref 1.85–7.62)
NEUTS SEG NFR BLD AUTO: 62 % (ref 43–75)
NONHDLC SERPL-MCNC: 98 MG/DL
NRBC BLD AUTO-RTO: 0 /100 WBCS
PLATELET # BLD AUTO: 211 THOUSANDS/UL (ref 149–390)
PMV BLD AUTO: 10 FL (ref 8.9–12.7)
POTASSIUM SERPL-SCNC: 3.6 MMOL/L (ref 3.5–5.3)
PROT SERPL-MCNC: 6.8 G/DL (ref 6.4–8.2)
RBC # BLD AUTO: 4.66 MILLION/UL (ref 3.88–5.62)
SODIUM SERPL-SCNC: 137 MMOL/L (ref 136–145)
TRIGL SERPL-MCNC: 86 MG/DL
TSH SERPL DL<=0.05 MIU/L-ACNC: 1.1 UIU/ML (ref 0.45–4.5)
WBC # BLD AUTO: 4.64 THOUSAND/UL (ref 4.31–10.16)

## 2022-05-19 PROCEDURE — 84443 ASSAY THYROID STIM HORMONE: CPT

## 2022-05-19 PROCEDURE — 36415 COLL VENOUS BLD VENIPUNCTURE: CPT

## 2022-05-19 PROCEDURE — 80053 COMPREHEN METABOLIC PANEL: CPT

## 2022-05-19 PROCEDURE — 80061 LIPID PANEL: CPT

## 2022-05-19 PROCEDURE — 85025 COMPLETE CBC W/AUTO DIFF WBC: CPT

## 2022-05-19 NOTE — TELEPHONE ENCOUNTER
[facility] called because they are looking for Lab Work for [facility]       He stated he has been fasting

## 2022-05-27 ENCOUNTER — OFFICE VISIT (OUTPATIENT)
Dept: INTERNAL MEDICINE CLINIC | Facility: CLINIC | Age: 67
End: 2022-05-27
Payer: COMMERCIAL

## 2022-05-27 VITALS
WEIGHT: 315 LBS | TEMPERATURE: 97.6 F | SYSTOLIC BLOOD PRESSURE: 130 MMHG | OXYGEN SATURATION: 98 % | HEART RATE: 82 BPM | DIASTOLIC BLOOD PRESSURE: 68 MMHG | HEIGHT: 72 IN | BODY MASS INDEX: 42.66 KG/M2

## 2022-05-27 DIAGNOSIS — E66.01 MORBID OBESITY WITH BMI OF 40.0-44.9, ADULT (HCC): ICD-10-CM

## 2022-05-27 DIAGNOSIS — E55.9 VITAMIN D DEFICIENCY: ICD-10-CM

## 2022-05-27 DIAGNOSIS — Z00.00 ROUTINE ADULT HEALTH MAINTENANCE: Primary | ICD-10-CM

## 2022-05-27 DIAGNOSIS — E78.1 HYPERTRIGLYCERIDEMIA: ICD-10-CM

## 2022-05-27 PROCEDURE — 3008F BODY MASS INDEX DOCD: CPT | Performed by: FAMILY MEDICINE

## 2022-05-27 PROCEDURE — 99397 PER PM REEVAL EST PAT 65+ YR: CPT | Performed by: NURSE PRACTITIONER

## 2022-05-27 NOTE — PROGRESS NOTES
Assessment/Plan: Did review labs with patient which are stable  Will repeat labs in one year or sooner if need be  Up to date on vaccines  Deferring Cologuard  Will follow up in one year or sooner if need be  No problem-specific Assessment & Plan notes found for this encounter  Problem List Items Addressed This Visit        Other    Hypertriglyceridemia    Relevant Orders    Comprehensive metabolic panel    CBC and differential    TSH, 3rd generation with Free T4 reflex    Lipid panel    Vitamin D deficiency    Relevant Orders    Comprehensive metabolic panel    CBC and differential    TSH, 3rd generation with Free T4 reflex    Morbid obesity with BMI of 40 0-44 9, adult ([facility])    Relevant Orders    Comprehensive metabolic panel    CBC and differential    TSH, 3rd generation with Free T4 reflex    Routine adult health maintenance - Primary    Relevant Orders    Comprehensive metabolic panel    CBC and differential    TSH, 3rd generation with Free T4 reflex            Subjective:      Patient ID: Danielle De La Rosa is a 75 y o  male  Samella Kady is for a wellness  He is doing well and is eating a vegan diet  He did have his labs done  He denies any chest pain, SOB, or palpitations  He denies any depression or anxiety  He did get the covid vaccines with boosters  He is walking daily and staying active  He is deferring a cologuard  He offers no other issues  The following portions of the patient's history were reviewed and updated as appropriate:   He  has a past medical history of Arthritis, Compression fracture of spine ([address] ), Concussion, Dislocation of wrist, Esophageal reflux, Fractured sternum, and Nasal fracture    He   Patient Active Problem List    Diagnosis Date Noted    Erythema migrans (Lyme disease) 04/05/2022    Routine adult health maintenance 04/09/2021    Morbid obesity with BMI of 40 0-44 9, adult ([facility] [address] ) 08/28/2019    Arthritis of left knee 01/09/2019    Primary osteoarthritis of both knees 12/06/2018    Primary osteoarthritis of one knee, left 08/31/2018    Depression 05/18/2017    Morbid obesity ([address] ) 05/18/2017    Chronic back pain 09/15/2016    Hypertriglyceridemia 09/15/2016    Osteoarthritis 09/15/2016    Vitamin D deficiency 09/15/2016    Osteoarthritis of spine with radiculopathy, lumbar region 03/07/2016     He  has a past surgical history that includes Cholecystectomy open; Foot surgery (Right); and Colonoscopy  His family history includes Cancer in his father; Dementia in his mother; Hyperlipidemia in his mother  He  reports that he quit smoking about 9 years ago  His smoking use included cigarettes  He quit smokeless tobacco use about 9 years ago  He reports current alcohol use of about 6 0 standard drinks of alcohol per week  He reports that he does not use drugs  Current Outpatient Medications   Medication Sig Dispense Refill    Diclofenac Sodium (VOLTAREN) 1 % Voltaren 1 % topical gel (Patient not taking: Reported on 5/27/2022)       No current facility-administered medications for this visit  Current Outpatient Medications on File Prior to Visit   Medication Sig    Diclofenac Sodium (VOLTAREN) 1 % Voltaren 1 % topical gel (Patient not taking: Reported on 5/27/2022)     No current facility-administered medications on file prior to visit  He is allergic to neomycin-bacitracin zn-polymyx, neomycin-polymyxin-pramoxine, other, adhesive [medical tape], and cephalexin       Review of Systems   All other systems reviewed and are negative  Objective:      /68 (BP Location: Left arm, Patient Position: Sitting, Cuff Size: Large)   Pulse 82   Temp 97 6 °F (36 4 °C)   Ht 6' (1 829 m)   Wt (!) 150 kg (330 lb 9 6 oz)   SpO2 98%   BMI 44 84 kg/m²          Physical Exam  Vitals reviewed  Constitutional:       Appearance: Normal appearance  He is obese  HENT:      Head: Normocephalic and atraumatic        Right Ear: Tympanic membrane, ear canal and external ear normal       Left Ear: Tympanic membrane, ear canal and external ear normal       Nose: Nose normal       Mouth/Throat:      Mouth: Mucous membranes are moist       Pharynx: Oropharynx is clear  Eyes:      Extraocular Movements: Extraocular movements intact  Conjunctiva/sclera: Conjunctivae normal       Pupils: Pupils are equal, round, and reactive to light  Cardiovascular:      Rate and Rhythm: Normal rate and regular rhythm  Pulses: Normal pulses  Heart sounds: Normal heart sounds  Pulmonary:      Effort: Pulmonary effort is normal       Breath sounds: Normal breath sounds  Abdominal:      General: Abdomen is flat  Bowel sounds are normal       Palpations: Abdomen is soft  Musculoskeletal:         General: Normal range of motion  Skin:     General: Skin is warm and dry  Capillary Refill: Capillary refill takes less than 2 seconds  Neurological:      General: No focal deficit present  Mental Status: He is alert and oriented to person, place, and time  Mental status is at baseline  Psychiatric:         Mood and Affect: Mood normal          Behavior: Behavior normal          Thought Content: Thought content normal          Judgment: Judgment normal          BMI Counseling: Body mass index is 44 84 kg/m²  The BMI is above normal  Nutrition recommendations include reducing portion sizes, decreasing overall calorie intake, 3-5 servings of fruits/vegetables daily, reducing fast food intake, consuming healthier snacks, decreasing soda and/or juice intake, moderation in carbohydrate intake, increasing intake of lean protein, reducing intake of saturated fat and trans fat and reducing intake of cholesterol

## 2022-05-27 NOTE — PATIENT INSTRUCTIONS
Low Fat Diet   AMBULATORY CARE:   A low-fat diet  is an eating plan that is low in total fat, unhealthy fat, and cholesterol  You may need to follow a low-fat diet if you have trouble digesting or absorbing fat  You may also need to follow this diet if you have high cholesterol  You can also lower your cholesterol by increasing the amount of fiber in your diet  Soluble fiber is a type of fiber that helps to decrease cholesterol levels  Different types of fat in food:   · Limit unhealthy fats  A diet that is high in cholesterol, saturated fat, and trans fat may cause unhealthy cholesterol levels  Unhealthy cholesterol levels increase your risk of heart disease  ? Cholesterol:  Limit intake of cholesterol to less than 200 mg per day  Cholesterol is found in meat, eggs, and dairy  ? Saturated fat:  Limit saturated fat to less than 7% of your total daily calories  Ask your dietitian how many calories you need each day  Saturated fat is found in butter, cheese, ice cream, whole milk, and palm oil  Saturated fat is also found in meat, such as beef, pork, chicken skin, and processed meats  Processed meats include sausage, hot dogs, and bologna  ? Trans fat:  Avoid trans fat as much as possible  Trans fat is used in fried and baked foods  Foods that say trans fat free on the label may still have up to 0 5 grams of trans fat per serving  · Include healthy fats  Replace foods that are high in saturated and trans fat with foods high in healthy fats  This may help to decrease high cholesterol levels  ? Monounsaturated fats: These are found in avocados, nuts, and vegetable oils, such as olive, canola, and sunflower oil  ? Polyunsaturated fats: These can be found in vegetable oils, such as soybean or corn oil  Omega-3 fats can help to decrease the risk of heart disease  Omega-3 fats are found in fish, such as salmon, herring, trout, and tuna   Omega-3 fats can also be found in plant foods, such as walnuts, flaxseed, soybeans, and canola oil  Foods to limit or avoid:   · Grains:      ? Snacks that are made with partially hydrogenated oils, such as chips, regular crackers, and butter-flavored popcorn    ? High-fat baked goods, such as biscuits, croissants, doughnuts, pies, cookies, and pastries    · Dairy:      ? Whole milk, 2% milk, and yogurt and ice cream made with whole milk    ? Half and half creamer, heavy cream, and whipping cream    ? Cheese, cream cheese, and sour cream    · Meats and proteins:      ? High-fat cuts of meat (T-bone steak, regular hamburger, and ribs)    ? Fried meat, poultry (turkey and chicken), and fish    ? Poultry (chicken and turkey) with skin    ? Cold cuts (salami or bologna), hot dogs, farah, and sausage    ? Whole eggs and egg yolks    · Vegetables and fruits with added fat:      ? Fried vegetables or vegetables in butter or high-fat sauces, such as cream or cheese sauces    ? Fried fruit or fruit served with butter or cream    · Fats:      ? Butter, stick margarine, and shortening    ? Coconut, palm oil, and palm kernel oil    Foods to include:   · Grains:      ? Whole-grain breads, cereals, pasta, and brown rice    ? Low-fat crackers and pretzels    · Vegetables and fruits:      ? Fresh, frozen, or canned vegetables (no salt or low-sodium)    ? Fresh, frozen, dried, or canned fruit (canned in light syrup or fruit juice)    ? Avocado    · Low-fat dairy products:      ? Nonfat (skim) or 1% milk    ? Nonfat or low-fat cheese, yogurt, and cottage cheese    · Meats and proteins:      ? Chicken or turkey with no skin    ? Baked or broiled fish    ? Lean beef and pork (loin, round, extra lean hamburger)    ? Beans and peas, unsalted nuts, soy products    ? Egg whites and substitutes    ? Seeds and nuts    · Fats:      ? Unsaturated oil, such as canola, olive, peanut, soybean, or sunflower oil    ? Soft or liquid margarine and vegetable oil spread    ?  Low-fat salad dressing    Other ways to decrease fat:   · Read food labels before you buy foods  Choose foods that have less than 30% of calories from fat  Choose low-fat or fat-free dairy products  Remember that fat free does not mean calorie free  These foods still contain calories, and too many calories can lead to weight gain  · Trim fat from meat and avoid fried food  Trim all visible fat from meat before you cook it  Remove the skin from poultry  Do not royal meat, fish, or poultry  Bake, roast, boil, or broil these foods instead  Avoid fried foods  Eat a baked potato instead of Western Jennifer fries  Steam vegetables instead of sautéing them in butter  · Add less fat to foods  Use imitation farah bits on salads and baked potatoes instead of regular farah bits  Use fat-free or low-fat salad dressings instead of regular dressings  Use low-fat or nonfat butter-flavored topping instead of regular butter or margarine on popcorn and other foods  Ways to decrease fat in recipes:  Replace high-fat ingredients with low-fat or nonfat ones  This may cause baked goods to be drier than usual  You may need to use nonfat cooking spray on pans to prevent food from sticking  You also may need to change the amount of other ingredients, such as water, in the recipe  Try the following:  · Use low-fat or light margarine instead of regular margarine or shortening  · Use lean ground turkey breast or chicken, or lean ground beef (less than 5% fat) instead of hamburger  · Add 1 teaspoon of canola oil to 8 ounces of skim milk instead of using cream or half and half  · Use grated zucchini, carrots, or apples in breads instead of coconut  · Use blenderized, low-fat cottage cheese, plain tofu, or low-fat ricotta cheese instead of cream cheese  · Use 1 egg white and 1 teaspoon of canola oil, or use ¼ cup (2 ounces) of fat-free egg substitute instead of a whole egg       · Replace half of the oil that is called for in a recipe with applesauce when you bake  Use 3 tablespoons of cocoa powder and 1 tablespoon of canola oil instead of a square of baking chocolate  How to increase fiber:  Eat enough high-fiber foods to get 20 to 30 grams of fiber every day  Slowly increase your fiber intake to avoid stomach cramps, gas, and other problems  · Eat 3 ounces of whole-grain foods each day  An ounce is about 1 slice of bread  Eat whole-grain breads, such as whole-wheat bread  Whole wheat, whole-wheat flour, or other whole grains should be listed as the first ingredient on the food label  Replace white flour with whole-grain flour or use half of each in recipes  Whole-grain flour is heavier than white flour, so you may have to add more yeast or baking powder  · Eat a high-fiber cereal for breakfast   Oatmeal is a good source of soluble fiber  Look for cereals that have bran or fiber in the name  Choose whole-grain products, such as brown rice, barley, and whole-wheat pasta  · Eat more beans, peas, and lentils  For example, add beans to soups or salads  Eat at least 5 cups of fruits and vegetables each day  Eat fruits and vegetables with the peel because the peel is high in fiber  © Copyright [employer] 2022 Information is for End User's use only and may not be sold, redistributed or otherwise used for commercial purposes  All illustrations and images included in CareNotes® are the copyrighted property of A D A M , Inc  or [address] [place], [place] [address]   The above information is an  only  It is not intended as medical advice for individual conditions or treatments  Talk to your doctor, nurse or pharmacist before following any medical regimen to see if it is safe and effective for you  Heart Healthy Diet   AMBULATORY CARE:   A heart healthy diet  is an eating plan low in unhealthy fats and sodium (salt)  The plan is high in healthy fats and fiber   A heart healthy diet helps improve your cholesterol levels and lowers your risk for heart disease and stroke  A dietitian will teach you how to read and understand food labels  Heart healthy diet guidelines to follow:   · Choose foods that contain healthy fats  ? Unsaturated fats  include monounsaturated and polyunsaturated fats  Unsaturated fat is found in foods such as soybean, canola, olive, corn, and safflower oils  It is also found in soft tub margarine that is made with liquid vegetable oil  ? Omega-3 fat  is found in certain fish, such as salmon, tuna, and trout, and in walnuts and flaxseed  Eat fish high in omega-3 fats at least 2 times a week  · Get 20 to 30 grams of fiber each day  Fruits, vegetables, whole-grain foods, and legumes (cooked beans) are good sources of fiber  · Limit or do not have unhealthy fats  ? Cholesterol  is found in animal foods, such as eggs and lobster, and in dairy products made from whole milk  Limit cholesterol to less than 200 mg each day  ? Saturated fat  is found in meats, such as farah and hamburger  It is also found in chicken or turkey skin, whole milk, and butter  Limit saturated fat to less than 7% of your total daily calories  ? Trans fat  is found in packaged foods, such as potato chips and cookies  It is also in hard margarine, some fried foods, and shortening  Do not eat foods that contain trans fats  · Limit sodium as directed  You may be told to limit sodium to 2,000 to 2,300 mg each day  Choose low-sodium or no-salt-added foods  Add little or no salt to food you prepare  Use herbs and spices in place of salt  Include the following in your heart healthy plan:  Ask your dietitian or healthcare provider how many servings to have from each of the following food groups:  · Grains:      ? Whole-wheat breads, cereals, and pastas, and brown rice    ? Low-fat, low-sodium crackers and chips    · Vegetables:      ? Broccoli, green beans, green peas, and spinach    ? Collards, kale, and lima beans    ?  Carrots, sweet potatoes, tomatoes, and peppers    ? Canned vegetables with no salt added    · Fruits:      ? Bananas, peaches, pears, and pineapple    ? Grapes, raisins, and dates    ? Oranges, tangerines, grapefruit, orange juice, and grapefruit juice    ? Apricots, mangoes, melons, and papaya    ? Raspberries and strawberries    ? Canned fruit with no added sugar    · Low-fat dairy:      ? Nonfat (skim) milk, 1% milk, and low-fat almond, cashew, or soy milks fortified with calcium    ? Low-fat cheese, regular or frozen yogurt, and cottage cheese    · Meats and proteins:      ? Lean cuts of beef and pork (loin, leg, round), skinless chicken and turkey    ? Legumes, soy products, egg whites, or nuts    Limit or do not include the following in your heart healthy plan:   · Unhealthy fats and oils:      ? Whole or 2% milk, cream cheese, sour cream, or cheese    ? High-fat cuts of beef (T-bone steaks, ribs), chicken or turkey with skin, and organ meats such as liver    ? Butter, stick margarine, shortening, and cooking oils such as coconut or palm oil    · Foods and liquids high in sodium:      ? Packaged foods, such as frozen dinners, cookies, macaroni and cheese, and cereals with more than 300 mg of sodium per serving    ? Vegetables with added sodium, such as instant potatoes, vegetables with added sauces, or regular canned vegetables    ? Cured or smoked meats, such as hot dogs, farah, and sausage    ? High-sodium ketchup, barbecue sauce, salad dressing, pickles, olives, soy sauce, or miso    · Foods and liquids high in sugar:      ? Candy, cake, cookies, pies, or doughnuts    ? Soft drinks (soda), sports drinks, or sweetened tea    ? Canned or dry mixes for cakes, soups, sauces, or gravies    Other healthy heart guidelines:   · Do not smoke  Nicotine and other chemicals in cigarettes and cigars can cause lung and heart damage  Ask your healthcare provider for information if you currently smoke and need help to quit  E-cigarettes or smokeless tobacco still contain nicotine  Talk to your healthcare provider before you use these products  · Limit or do not drink alcohol as directed  Alcohol can damage your heart and raise your blood pressure  Your healthcare provider may give you specific daily and weekly limits  The general recommended limit is 1 drink a day for women 21 or older and for men 72 or older  Do not have more than 3 drinks in a day or 7 in a week  The recommended limit is 2 drinks a day for men 24to 59years of age  Do not have more than 4 drinks in a day or 14 in a week  A drink of alcohol is 12 ounces of beer, 5 ounces of wine, or 1½ ounces of liquor  · Exercise regularly  Exercise can help you maintain a healthy weight and improve your blood pressure and cholesterol levels  Regular exercise can also decrease your risk for heart problems  Ask your healthcare provider about the best exercise plan for you  Do not start an exercise program without asking your healthcare provider  Follow up with your doctor or cardiologist as directed:  Write down your questions so you remember to ask them during your visits  © Copyright [employer] 2022 Information is for End User's use only and may not be sold, redistributed or otherwise used for commercial purposes  All illustrations and images included in CareNotes® are the copyrighted property of A D A M , Inc  or [facility] Evelyn Garcia   The above information is an  only  It is not intended as medical advice for individual conditions or treatments  Talk to your doctor, nurse or pharmacist before following any medical regimen to see if it is safe and effective for you

## 2022-06-13 ENCOUNTER — VBI (OUTPATIENT)
Dept: ADMINISTRATIVE | Facility: OTHER | Age: 67
End: 2022-06-13

## 2022-10-12 PROBLEM — Z00.00 ROUTINE ADULT HEALTH MAINTENANCE: Status: RESOLVED | Noted: 2021-04-09 | Resolved: 2022-10-12

## 2022-11-29 ENCOUNTER — VBI (OUTPATIENT)
Dept: ADMINISTRATIVE | Facility: OTHER | Age: 67
End: 2022-11-29

## 2023-01-09 ENCOUNTER — VBI (OUTPATIENT)
Dept: ADMINISTRATIVE | Facility: OTHER | Age: 68
End: 2023-01-09

## 2023-01-15 ENCOUNTER — APPOINTMENT (EMERGENCY)
Dept: CT IMAGING | Facility: HOSPITAL | Age: 68
End: 2023-01-15

## 2023-01-15 ENCOUNTER — APPOINTMENT (EMERGENCY)
Dept: RADIOLOGY | Facility: HOSPITAL | Age: 68
End: 2023-01-15

## 2023-01-15 ENCOUNTER — APPOINTMENT (INPATIENT)
Dept: CT IMAGING | Facility: HOSPITAL | Age: 68
End: 2023-01-15

## 2023-01-15 ENCOUNTER — HOSPITAL ENCOUNTER (INPATIENT)
Facility: HOSPITAL | Age: 68
LOS: 1 days | Discharge: HOME | End: 2023-01-16
Attending: EMERGENCY MEDICINE | Admitting: INTERNAL MEDICINE

## 2023-01-15 DIAGNOSIS — I67.1 INTRACRANIAL ANEURYSM: ICD-10-CM

## 2023-01-15 DIAGNOSIS — E66.01 MORBID OBESITY WITH BMI OF 40.0-44.9, ADULT (HCC): ICD-10-CM

## 2023-01-15 DIAGNOSIS — E55.9 VITAMIN D DEFICIENCY: ICD-10-CM

## 2023-01-15 DIAGNOSIS — G93.89 INTRACRANIAL CALCIFICATION: ICD-10-CM

## 2023-01-15 DIAGNOSIS — I10 ACCELERATED HYPERTENSION: ICD-10-CM

## 2023-01-15 DIAGNOSIS — I67.1 CEREBRAL ANEURYSM: ICD-10-CM

## 2023-01-15 DIAGNOSIS — R47.9 SPEECH DISTURBANCE: ICD-10-CM

## 2023-01-15 DIAGNOSIS — R47.01 APHASIA: Primary | ICD-10-CM

## 2023-01-15 PROBLEM — R91.8 ABNORMAL CT SCAN OF LUNG: Status: ACTIVE | Noted: 2023-01-15

## 2023-01-15 PROBLEM — J98.11 ATELECTASIS: Status: ACTIVE | Noted: 2023-01-15

## 2023-01-15 LAB
2HR DELTA HS TROPONIN: 0 NG/L
4HR DELTA HS TROPONIN: 0 NG/L
ALBUMIN SERPL BCP-MCNC: 3.7 G/DL (ref 3.5–5)
ALP SERPL-CCNC: 62 U/L (ref 46–116)
ALT SERPL W P-5'-P-CCNC: 32 U/L (ref 12–78)
ANION GAP SERPL CALCULATED.3IONS-SCNC: 9 MMOL/L (ref 4–13)
AST SERPL W P-5'-P-CCNC: 19 U/L (ref 5–45)
BACTERIA UR QL AUTO: ABNORMAL /HPF
BASOPHILS # BLD AUTO: 0.04 THOUSANDS/ÂΜL (ref 0–0.1)
BASOPHILS NFR BLD AUTO: 1 % (ref 0–1)
BILIRUB SERPL-MCNC: 0.67 MG/DL (ref 0.2–1)
BILIRUB UR QL STRIP: NEGATIVE
BUN SERPL-MCNC: 20 MG/DL (ref 5–25)
CALCIUM SERPL-MCNC: 8.8 MG/DL (ref 8.3–10.1)
CARDIAC TROPONIN I PNL SERPL HS: 6 NG/L
CHLORIDE SERPL-SCNC: 101 MMOL/L (ref 96–108)
CLARITY UR: CLEAR
CO2 SERPL-SCNC: 26 MMOL/L (ref 21–32)
COLOR UR: YELLOW
CREAT SERPL-MCNC: 0.6 MG/DL (ref 0.6–1.3)
EOSINOPHIL # BLD AUTO: 0.12 THOUSAND/ÂΜL (ref 0–0.61)
EOSINOPHIL NFR BLD AUTO: 3 % (ref 0–6)
ERYTHROCYTE [DISTWIDTH] IN BLOOD BY AUTOMATED COUNT: 12.5 % (ref 11.6–15.1)
FLUAV RNA RESP QL NAA+PROBE: NEGATIVE
FLUBV RNA RESP QL NAA+PROBE: NEGATIVE
GFR SERPL CREATININE-BSD FRML MDRD: 103 ML/MIN/1.73SQ M
GLUCOSE SERPL-MCNC: 106 MG/DL (ref 65–140)
GLUCOSE SERPL-MCNC: 129 MG/DL (ref 65–140)
GLUCOSE UR STRIP-MCNC: NEGATIVE MG/DL
HCT VFR BLD AUTO: 40.9 % (ref 36.5–49.3)
HGB BLD-MCNC: 13.6 G/DL (ref 12–17)
HGB UR QL STRIP.AUTO: NEGATIVE
IMM GRANULOCYTES # BLD AUTO: 0.03 THOUSAND/UL (ref 0–0.2)
IMM GRANULOCYTES NFR BLD AUTO: 1 % (ref 0–2)
KETONES UR STRIP-MCNC: NEGATIVE MG/DL
LEUKOCYTE ESTERASE UR QL STRIP: NEGATIVE
LYMPHOCYTES # BLD AUTO: 0.69 THOUSANDS/ÂΜL (ref 0.6–4.47)
LYMPHOCYTES NFR BLD AUTO: 16 % (ref 14–44)
MAGNESIUM SERPL-MCNC: 1.8 MG/DL (ref 1.6–2.6)
MCH RBC QN AUTO: 30.9 PG (ref 26.8–34.3)
MCHC RBC AUTO-ENTMCNC: 33.3 G/DL (ref 31.4–37.4)
MCV RBC AUTO: 93 FL (ref 82–98)
MONOCYTES # BLD AUTO: 0.22 THOUSAND/ÂΜL (ref 0.17–1.22)
MONOCYTES NFR BLD AUTO: 5 % (ref 4–12)
NEUTROPHILS # BLD AUTO: 3.25 THOUSANDS/ÂΜL (ref 1.85–7.62)
NEUTS SEG NFR BLD AUTO: 74 % (ref 43–75)
NITRITE UR QL STRIP: NEGATIVE
NON-SQ EPI CELLS URNS QL MICRO: ABNORMAL /HPF
NRBC BLD AUTO-RTO: 0 /100 WBCS
PH UR STRIP.AUTO: 5 [PH]
PLATELET # BLD AUTO: 212 THOUSANDS/UL (ref 149–390)
PMV BLD AUTO: 10.2 FL (ref 8.9–12.7)
POTASSIUM SERPL-SCNC: 4.2 MMOL/L (ref 3.5–5.3)
PROT SERPL-MCNC: 7 G/DL (ref 6.4–8.4)
PROT UR STRIP-MCNC: NEGATIVE MG/DL
RBC # BLD AUTO: 4.4 MILLION/UL (ref 3.88–5.62)
RBC #/AREA URNS AUTO: ABNORMAL /HPF
RSV RNA RESP QL NAA+PROBE: NEGATIVE
SARS-COV-2 RNA RESP QL NAA+PROBE: NEGATIVE
SODIUM SERPL-SCNC: 136 MMOL/L (ref 135–147)
SP GR UR STRIP.AUTO: 1.02 (ref 1–1.03)
TSH SERPL DL<=0.05 MIU/L-ACNC: 1.39 UIU/ML (ref 0.45–4.5)
UROBILINOGEN UR QL STRIP.AUTO: 0.2 E.U./DL
WBC # BLD AUTO: 4.35 THOUSAND/UL (ref 4.31–10.16)
WBC #/AREA URNS AUTO: ABNORMAL /HPF

## 2023-01-15 RX ORDER — ATORVASTATIN CALCIUM 40 MG/1
40 TABLET, FILM COATED ORAL EVERY EVENING
Status: DISCONTINUED | OUTPATIENT
Start: 2023-01-15 | End: 2023-01-16 | Stop reason: HOSPADM

## 2023-01-15 RX ORDER — ENOXAPARIN SODIUM 100 MG/ML
40 INJECTION SUBCUTANEOUS 2 TIMES DAILY
Status: DISCONTINUED | OUTPATIENT
Start: 2023-01-15 | End: 2023-01-16 | Stop reason: HOSPADM

## 2023-01-15 RX ORDER — ASPIRIN 81 MG/1
81 TABLET, CHEWABLE ORAL DAILY
Status: DISCONTINUED | OUTPATIENT
Start: 2023-01-16 | End: 2023-01-16 | Stop reason: HOSPADM

## 2023-01-15 RX ORDER — CLOPIDOGREL BISULFATE 75 MG/1
300 TABLET ORAL ONCE
Status: COMPLETED | OUTPATIENT
Start: 2023-01-15 | End: 2023-01-15

## 2023-01-15 RX ORDER — CLOPIDOGREL BISULFATE 75 MG/1
75 TABLET ORAL DAILY
Status: DISCONTINUED | OUTPATIENT
Start: 2023-01-16 | End: 2023-01-16 | Stop reason: HOSPADM

## 2023-01-15 RX ORDER — LABETALOL HYDROCHLORIDE 5 MG/ML
10 INJECTION, SOLUTION INTRAVENOUS EVERY 4 HOURS PRN
Status: DISCONTINUED | OUTPATIENT
Start: 2023-01-15 | End: 2023-01-16

## 2023-01-15 RX ORDER — ASPIRIN 325 MG
325 TABLET ORAL ONCE
Status: COMPLETED | OUTPATIENT
Start: 2023-01-15 | End: 2023-01-15

## 2023-01-15 RX ORDER — ACETAMINOPHEN 325 MG/1
650 TABLET ORAL EVERY 6 HOURS PRN
Status: DISCONTINUED | OUTPATIENT
Start: 2023-01-15 | End: 2023-01-16 | Stop reason: HOSPADM

## 2023-01-15 RX ADMIN — ENOXAPARIN SODIUM 40 MG: 40 INJECTION SUBCUTANEOUS at 18:18

## 2023-01-15 RX ADMIN — ASPIRIN 325 MG ORAL TABLET 325 MG: 325 PILL ORAL at 15:39

## 2023-01-15 RX ADMIN — IOHEXOL 100 ML: 350 INJECTION, SOLUTION INTRAVENOUS at 15:18

## 2023-01-15 RX ADMIN — ATORVASTATIN CALCIUM 40 MG: 40 TABLET, FILM COATED ORAL at 18:17

## 2023-01-15 RX ADMIN — CLOPIDOGREL BISULFATE 300 MG: 75 TABLET ORAL at 15:39

## 2023-01-15 NOTE — H&P
[address] 1955, 75 y o  male MRN: [id number]  Unit/Bed#: JG12 Encounter: [id number]  Primary Care Provider: LEIGHA Dasilva   Date and time admitted to hospital: 1/15/2023  1:34 PM    * Aphasia  Assessment & Plan  Aphasia since 11am on 1/15/23  Previous history of on and off aphasia lasting only a few seconds  Seen by neurology in ED  Start stroke protocol  Received loading dose of Plavix, continue Plavix 75 mg  Received loading dose of aspirin, continue aspirin 81 mg  Initiate Lipitor 40 mg daily  Follow-up echo  Follow-up MRI  PT/OT  Speech therapy    Accelerated hypertension  Assessment & Plan  /91 upon admission, associated with headache  BP 5/27/2022 was 130/68  Permissive hypertension per neurology  Start lisinopril 10 mg tomorrow   Tylenol for headache    Possible sleep apnea causing hypertension  Follow-up sleep study    Intracranial aneurysm  Assessment & Plan  CT of head shows intracranial aneurysm  Follow-up brain MRI  Follow-up with neurology    VTE Pharmacologic Prophylaxis: VTE Score: 9 High Risk (Score >/= 5) - Pharmacological DVT Prophylaxis Ordered: enoxaparin (Lovenox)  Sequential Compression Devices Ordered  Code Status: Level 1 - Full Code per patient  Discussion with family: Updated  (wife) at bedside  Anticipated Length of Stay: Patient will be admitted on an inpatient basis with an anticipated length of stay of greater than 2 midnights secondary to Hypertension  Total Time for Visit, including Counseling / Coordination of Care: 30 minutes Greater than 50% of this total time spent on direct patient counseling and coordination of care  Chief Complaint: Mixing up words    History of Present Illness:  Claudetta Eric is a 75 y o  male with a PMH of knee arthritis, vitamin D deficiency who presents with mixing up words  Patient has no significant past medical history and takes no daily medications    He woke up this morning at 11 AM and had a terrible headache  Per wife he was mixing up his words and not making sense  Patient has had previous similar episodes but only lasting a few seconds and always resolving without intervention  During this episode the wife notes patient had no facial asymmetry, no weakness, no difficulty walking  Review of Systems:  Review of Systems   Constitutional: Negative for chills and fever  HENT: Negative for ear pain and sore throat  Eyes: Negative for pain and visual disturbance  Respiratory: Negative for cough and shortness of breath  Cardiovascular: Negative for chest pain and palpitations  Gastrointestinal: Negative for abdominal pain and vomiting  Genitourinary: Negative for dysuria and hematuria  Musculoskeletal: Positive for arthralgias  Negative for back pain  Knee pain   Skin: Negative for color change and rash  Neurological: Positive for speech difficulty and headaches  Negative for seizures and syncope  All other systems reviewed and are negative  Past Medical and Surgical History:   Past Medical History:   Diagnosis Date   • Arthritis    • Compression fracture of spine ([facility] [address] )     last assessed 09/15/2016   • Concussion    • Dislocation of wrist     right ,  last assessed 09/15/2016   • Esophageal reflux    • Fractured sternum    • Nasal fracture        Past Surgical History:   Procedure Laterality Date   • CHOLECYSTECTOMY OPEN     • COLONOSCOPY      2006   • FOOT SURGERY Right        Meds/Allergies:  Prior to Admission medications    Medication Sig Start Date End Date Taking? Authorizing Provider   Diclofenac Sodium (VOLTAREN) 1 % Voltaren 1 % topical gel  Patient not taking: Reported on 5/27/2022    Historical Provider, MD     I have reviewed home medications with patient personally  Allergies:    Allergies   Allergen Reactions   • Neomycin-Bacitracin Zn-Polymyx    • Neomycin-Polymyxin-Pramoxine Itching   • Other    • Adhesive [Medical Tape] Rash     Just J&J   • Cephalexin Rash       Social History:  Marital Status: /Civil Union   Occupation: Retired  Patient Pre-hospital Living Situation: Home  Patient Pre-hospital Level of Mobility: walks  Patient Pre-hospital Diet Restrictions: None  Substance Use History:   Social History     Substance and Sexual Activity   Alcohol Use Yes   • Alcohol/week: 6 0 standard drinks   • Types: 6 Cans of beer per week    Comment: occassional      Social History     Tobacco Use   Smoking Status Former   • Types: Cigarettes   • Quit date: 2013   • Years since quittin 7   Smokeless Tobacco Former   • Quit date: 2013     Social History     Substance and Sexual Activity   Drug Use Never       Family History:  Family History   Problem Relation Age of Onset   • Dementia Mother    • Hyperlipidemia Mother    • Cancer Father         bladder        Physical Exam:     Vitals:   Blood Pressure: 163/75 (01/15/23 1530)  Pulse: 86 (01/15/23 1530)  Temperature: 99 5 °F (37 5 °C) (01/15/23 1338)  Temp Source: Tympanic (01/15/23 1338)  Respirations: 18 (01/15/23 1530)  Weight - Scale: (!) 152 kg (335 lb 15 7 oz) (01/15/23 1338)  SpO2: 99 % (01/15/23 1530)    Physical Exam  Vitals and nursing note reviewed  Constitutional:       General: He is not in acute distress  Appearance: He is well-developed  He is obese  HENT:      Head: Normocephalic and atraumatic  Right Ear: External ear normal       Left Ear: External ear normal       Mouth/Throat:      Mouth: Mucous membranes are moist    Eyes:      General:         Right eye: No discharge  Left eye: No discharge  Conjunctiva/sclera: Conjunctivae normal    Cardiovascular:      Rate and Rhythm: Normal rate and regular rhythm  Heart sounds: No murmur heard  Pulmonary:      Effort: Pulmonary effort is normal  No respiratory distress  Breath sounds: Normal breath sounds  Abdominal:      Palpations: Abdomen is soft  Tenderness:  There is no abdominal tenderness  Musculoskeletal:         General: No swelling  Cervical back: Neck supple  Right lower leg: Edema present  Left lower leg: Edema present  Skin:     General: Skin is warm and dry  Capillary Refill: Capillary refill takes less than 2 seconds  Neurological:      Mental Status: He is alert  Mental status is at baseline  Comments: No facial asymmetry  Cranial nerves I through XII intact  Bilateral strength intact  Bilateral sensation intact   Psychiatric:         Mood and Affect: Mood normal          Behavior: Behavior normal          Additional Data:     Lab Results:  Results from last 7 days   Lab Units 01/15/23  1421   WBC Thousand/uL 4 35   HEMOGLOBIN g/dL 13 6   HEMATOCRIT % 40 9   PLATELETS Thousands/uL 212   NEUTROS PCT % 74   LYMPHS PCT % 16   MONOS PCT % 5   EOS PCT % 3     Results from last 7 days   Lab Units 01/15/23  1421   SODIUM mmol/L 136   POTASSIUM mmol/L 4 2   CHLORIDE mmol/L 101   CO2 mmol/L 26   BUN mg/dL 20   CREATININE mg/dL 0 60   ANION GAP mmol/L 9   CALCIUM mg/dL 8 8   ALBUMIN g/dL 3 7   TOTAL BILIRUBIN mg/dL 0 67   ALK PHOS U/L 62   ALT U/L 32   AST U/L 19   GLUCOSE RANDOM mg/dL 129         Results from last 7 days   Lab Units 01/15/23  1426   POC GLUCOSE mg/dl 106               Lines/Drains:  Invasive Devices     Peripheral Intravenous Line  Duration           Peripheral IV 01/15/23 Left Antecubital <1 day                    Imaging: Reviewed radiology reports from this admission including: CT head  CTA stroke alert (head/neck)   Final Result by Janiya Rider MD (01/15 5685)      1  No intracranial large vessel occlusion or critical stenosis  2   No hemodynamically significant stenosis or dissection of cervical carotid and vertebral arteries  3   Incidental 2 mm aneurysm or infundibulum arising from right supraclinoid ICA 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    Also suspected tiny aneurysm versus fenestration at distal A1 segment of right CECIL  Recommend nonurgent    neurovascular consultation  4   Incompletely imaged focal opacity in the medial superior segment of right lower lobe  Aspiration or infection is not excluded  Correlate with clinical assessment and/or chest CT  Findings were directly discussed with Dr Edy Fink at 3:07 PM          This study was marked in [facility] for notification and follow-up  Workstation performed: [id number]         CT stroke alert brain   Final Result by Mohsen Tan MD (01/15 1529)      1  No acute intracranial CT abnormality  2   Stable calcification/mineralization of bilateral globus pallidus and to lesser degree bilateral thalami and dentate nuclei  Main differentials include endocrinopathy versus Fahr's disease  Findings were directly discussed with Dr Edy Fink at 3:07 PM                Workstation performed: [id number]         XR chest 1 view portable    (Results Pending)   CT chest wo contrast    (Results Pending)   MRI Inpatient Order    (Results Pending)       EKG and Other Studies Reviewed on Admission:   · EKG: NSR  HR 81     ** Please Note: This note has been constructed using a voice recognition system   **

## 2023-01-15 NOTE — ED PROVIDER NOTES
History  Chief Complaint   Patient presents with   • Medical Problem     Per pt wife, he woke up around 11:30 and just didn't seem right  Per wife, patient was stating words and phrases that did "not make sense"  Patient only c/o at this time is a headache  Patient denies numbess/tingling  48-year-old male presents for evaluation of speech disturbance  Wife reports this morning patient woke up around 11:30 AM, he ambulated to the bathroom while saying "late" and went back to bed  About 20 minutes later patient woke up and was around the house, at this time wife thought that patient seemed off and started to engage with him  She found him to be speaking in nonsensical sentences although speaking clearly  She tried to write words for him to read however he stated he was unable to understand  1 point patient began to complain of a posterior headache for which he was given Tylenol  Family then eventually presented to the emergency department  On arrival patient's aphasia seemingly resolved however wife feels he has not returned to baseline  Patient does describe a posterior headache is a pressure sensation however is slightly remitted with Tylenol however possibly worsened  His, ear pain or ringing, neck or back pain, extremity numbness tingling or weakness  Denies chest pain or dyspnea, abdominal pain, nausea or vomiting  His wife does note that this is happened to patient several times previously, she states about a month ago is happened however lasted for shorter duration  She otherwise last saw patient normally around 830 last night  Prior to Admission Medications   Prescriptions Last Dose Informant Patient Reported? Taking?    Diclofenac Sodium (VOLTAREN) 1 % Not Taking  Yes No   Sig: Voltaren 1 % topical gel   Patient not taking: Reported on 5/27/2022      Facility-Administered Medications: None       Past Medical History:   Diagnosis Date   • Arthritis    • Compression fracture of spine [facility])     last assessed 09/15/2016   • Concussion    • Dislocation of wrist     right ,  last assessed 09/15/2016   • Esophageal reflux    • Fractured sternum    • Nasal fracture        Past Surgical History:   Procedure Laterality Date   • CHOLECYSTECTOMY OPEN     • COLONOSCOPY      2006   • FOOT SURGERY Right        Family History   Problem Relation Age of Onset   • Dementia Mother    • Hyperlipidemia Mother    • Cancer Father         bladder      I have reviewed and agree with the history as documented  E-Cigarette/Vaping   • E-Cigarette Use Never User      E-Cigarette/Vaping Substances   • Nicotine No    • THC No    • CBD No    • Flavoring No    • Other No    • Unknown No      Social History     Tobacco Use   • Smoking status: Former     Types: Cigarettes     Quit date: 2013     Years since quittin 7   • Smokeless tobacco: Former     Quit date: 2013   Vaping Use   • Vaping Use: Never used   Substance Use Topics   • Alcohol use: Yes     Alcohol/week: 6 0 standard drinks     Types: 6 Cans of beer per week     Comment: occassional    • Drug use: Never       Review of Systems   Constitutional: Negative for activity change, appetite change, chills and fever  Respiratory: Negative for cough and shortness of breath  Cardiovascular: Negative for chest pain  Gastrointestinal: Negative for abdominal pain, nausea and vomiting  Musculoskeletal: Negative for back pain and neck pain  Neurological: Positive for speech difficulty and headaches  Negative for facial asymmetry, weakness and numbness  All other systems reviewed and are negative  Physical Exam  Physical Exam  Vitals reviewed  Constitutional:       General: He is not in acute distress  Appearance: Normal appearance  He is not ill-appearing, toxic-appearing or diaphoretic  HENT:      Head: Normocephalic and atraumatic        Right Ear: External ear normal       Left Ear: External ear normal       Nose: Nose normal    Eyes: General:         Right eye: No discharge  Left eye: No discharge  Extraocular Movements: Extraocular movements intact  Pupils: Pupils are equal, round, and reactive to light  Cardiovascular:      Rate and Rhythm: Normal rate and regular rhythm  Pulmonary:      Effort: Pulmonary effort is normal  No respiratory distress  Breath sounds: Normal breath sounds  Abdominal:      General: There is no distension  Palpations: Abdomen is soft  Tenderness: There is no abdominal tenderness  There is no guarding or rebound  Musculoskeletal:         General: No deformity or signs of injury  Right lower leg: No edema  Left lower leg: No edema  Skin:     General: Skin is warm  Coloration: Skin is not jaundiced or pale  Findings: No bruising or erythema  Neurological:      General: No focal deficit present  Mental Status: He is alert and oriented to person, place, and time  Mental status is at baseline  Cranial Nerves: No cranial nerve deficit  Sensory: No sensory deficit  Motor: No weakness        Coordination: Coordination normal          Vital Signs  ED Triage Vitals [01/15/23 1338]   Temperature Pulse Respirations Blood Pressure SpO2   99 5 °F (37 5 °C) 81 18 (!) 202/91 96 %      Temp Source Heart Rate Source Patient Position - Orthostatic VS BP Location FiO2 (%)   Tympanic Monitor Lying -- --      Pain Score       8           Vitals:    01/15/23 1430 01/15/23 1430 01/15/23 1445 01/15/23 1500   BP: (!) 189/89  (!) 179/84 (!) 184/82   Pulse: 72 72 78 85   Patient Position - Orthostatic VS: Lying  Lying Lying         Visual Acuity  Visual Acuity    Flowsheet Row Most Recent Value   L Pupil Size (mm) 3   R Pupil Size (mm) 3          ED Medications  Medications   atorvastatin (LIPITOR) tablet 40 mg (has no administration in time range)   aspirin chewable tablet 81 mg (has no administration in time range)   enoxaparin (LOVENOX) subcutaneous injection 40 mg (has no administration in time range)   clopidogrel (PLAVIX) tablet 75 mg (has no administration in time range)   clopidogrel (PLAVIX) tablet 300 mg (300 mg Oral Given 1/15/23 1539)   aspirin tablet 325 mg (325 mg Oral Given 1/15/23 1539)   iohexol (OMNIPAQUE) 350 MG/ML injection (SINGLE-DOSE) 100 mL (100 mL Intravenous Given 1/15/23 1518)       Diagnostic Studies  Results Reviewed     Procedure Component Value Units Date/Time    HS Troponin I 4hr [[id number]]     Lab Status: No result Specimen: Blood     Urinalysis with microscopic [[id number]]     Lab Status: No result Specimen: Urine     Urine culture [[id number]]     Lab Status: No result Specimen: Urine, Clean Catch     FLU/RSV/COVID - if FLU/RSV clinically relevant [[id number]]  (Normal) Collected: 01/15/23 1421    Lab Status: Final result Specimen: Nares from Nose Updated: 01/15/23 1543     SARS-CoV-2 Negative     INFLUENZA A PCR Negative     INFLUENZA B PCR Negative     RSV PCR Negative    Narrative:      FOR PEDIATRIC PATIENTS - copy/paste COVID Guidelines URL to browser: [email or web address]    SARS-CoV-2 assay is a Nucleic Acid Amplification assay intended for the  qualitative detection of nucleic acid from SARS-CoV-2 in nasopharyngeal  swabs  Results are for the presumptive identification of SARS-CoV-2 RNA  Positive results are indicative of infection with SARS-CoV-2, the virus  causing COVID-19, but do not rule out bacterial infection or co-infection  with other viruses  Laboratories within the [place] and its  territories are required to report all positive results to the appropriate  public health authorities  Negative results do not preclude SARS-CoV-2  infection and should not be used as the sole basis for treatment or other  patient management decisions  Negative results must be combined with  clinical observations, patient history, and epidemiological information    This test has not been FDA cleared or approved  This test has been authorized by FDA under an Emergency Use Authorization  (EUA)  This test is only authorized for the duration of time the  declaration that circumstances exist justifying the authorization of the  emergency use of an in vitro diagnostic tests for detection of SARS-CoV-2  virus and/or diagnosis of COVID-19 infection under section 564(b)(1) of  the Act, 21 U  S C  063LLD-0(T)(9), unless the authorization is terminated  or revoked sooner  The test has been validated but independent review by FDA  and CLIA is pending  Test performed using [employer] Gene[employer]pert: This RT-PCR assay targets N2,  a region unique to SARS-CoV-2  A conserved region in the E-gene was chosen  for pan-Sarbecovirus detection which includes SARS-CoV-2  According to CMS-2020-01-R, this platform meets the definition of high-throughput technology  TSH, 3rd generation with Free T4 reflex [[id number]]  (Normal) Collected: 01/15/23 1421    Lab Status: Final result Specimen: Blood from Arm, Right Updated: 01/15/23 1519     TSH 3RD GENERATON 1 385 uIU/mL     Narrative:      Patients undergoing fluorescein dye angiography may retain small amounts of fluorescein in the body for 48-72 hours post procedure  Samples containing fluorescein can produce falsely depressed TSH values  If the patient had this procedure,a specimen should be resubmitted post fluorescein clearance        Magnesium [[id number]]  (Normal) Collected: 01/15/23 1421    Lab Status: Final result Specimen: Blood from Arm, Right Updated: 01/15/23 1519     Magnesium 1 8 mg/dL     Comprehensive metabolic panel [[id number]] Collected: 01/15/23 1421    Lab Status: Final result Specimen: Blood from Arm, Right Updated: 01/15/23 1515     Sodium 136 mmol/L      Potassium 4 2 mmol/L      Chloride 101 mmol/L      CO2 26 mmol/L      ANION GAP 9 mmol/L      BUN 20 mg/dL      Creatinine 0 60 mg/dL      Glucose 129 mg/dL      Calcium 8 8 mg/dL      AST 19 U/L ALT 32 U/L      Alkaline Phosphatase 62 U/L      Total Protein 7 0 g/dL      Albumin 3 7 g/dL      Total Bilirubin 0 67 mg/dL      eGFR 103 ml/min/1 73sq m     Narrative:      [place] guidelines for Chronic Kidney Disease (CKD):   •  Stage 1 with normal or high GFR (GFR > 90 mL/min/1 73 square meters)  •  Stage 2 Mild CKD (GFR = 60-89 mL/min/1 73 square meters)  •  Stage 3A Moderate CKD (GFR = 45-59 mL/min/1 73 square meters)  •  Stage 3B Moderate CKD (GFR = 30-44 mL/min/1 73 square meters)  •  Stage 4 Severe CKD (GFR = 15-29 mL/min/1 73 square meters)  •  Stage 5 End Stage CKD (GFR <15 mL/min/1 73 square meters)  Note: GFR calculation is accurate only with a steady state creatinine    HS Troponin 0hr (reflex protocol) [[id number]]  (Normal) Collected: 01/15/23 1421    Lab Status: Final result Specimen: Blood from Arm, Right Updated: 01/15/23 1514     hs TnI 0hr 6 ng/L     HS Troponin I 2hr [[id number]]     Lab Status: No result Specimen: Blood     CBC and differential [[id number]] Collected: 01/15/23 1421    Lab Status: Final result Specimen: Blood from Arm, Right Updated: 01/15/23 1448     WBC 4 35 Thousand/uL      RBC 4 40 Million/uL      Hemoglobin 13 6 g/dL      Hematocrit 40 9 %      MCV 93 fL      MCH 30 9 pg      MCHC 33 3 g/dL      RDW 12 5 %      MPV 10 2 fL      Platelets 901 Thousands/uL      nRBC 0 /100 WBCs      Neutrophils Relative 74 %      Immat GRANS % 1 %      Lymphocytes Relative 16 %      Monocytes Relative 5 %      Eosinophils Relative 3 %      Basophils Relative 1 %      Neutrophils Absolute 3 25 Thousands/µL      Immature Grans Absolute 0 03 Thousand/uL      Lymphocytes Absolute 0 69 Thousands/µL      Monocytes Absolute 0 22 Thousand/µL      Eosinophils Absolute 0 12 Thousand/µL      Basophils Absolute 0 04 Thousands/µL     Fingerstick Glucose (POCT) [[id number]]  (Normal) Collected: 01/15/23 1426    Lab Status: Final result Updated: 01/15/23 1427     POC Glucose 106 mg/dl                  CTA stroke alert (head/neck)   Final Result by Ana Brewer MD (01/15 1528)      1  No intracranial large vessel occlusion or critical stenosis  2   No hemodynamically significant stenosis or dissection of cervical carotid and vertebral arteries  3   Incidental 2 mm aneurysm or infundibulum arising from right supraclinoid ICA 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  Also suspected tiny aneurysm versus fenestration at distal A1 segment of right CECIL  Recommend nonurgent    neurovascular consultation  4   Incompletely imaged focal opacity in the medial superior segment of right lower lobe  Aspiration or infection is not excluded  Correlate with clinical assessment and/or chest CT  Findings were directly discussed with Dr Joanie Winston at 3:07 PM          This study was marked in [facility] for notification and follow-up  Workstation performed: [id number]         CT stroke alert brain   Final Result by Ana Brewer MD (01/15 1529)      1  No acute intracranial CT abnormality  2   Stable calcification/mineralization of bilateral globus pallidus and to lesser degree bilateral thalami and dentate nuclei  Main differentials include endocrinopathy versus Fahr's disease  Findings were directly discussed with Dr Joanie Winston at 3:07 PM                Workstation performed: [id number]         XR chest 1 view portable    (Results Pending)   CT chest wo contrast    (Results Pending)   MRI Inpatient Order    (Results Pending)              Procedures  Procedures         ED Course  ED Course as of 01/15/23 1628   Sun Carlos 15, 2023   1433 Notified by nursing, wife states non-sensical speech again  On my arrival, he is slurring some words--mispronouncing headache, difficulty describing headache  No other CN, motor, sensory deficits  Made a stroke alert     1514 Per rosalia, CTA clean however radiology concerned for early L MCA ischemia  Recs to start plavix load 906npo2 then 75mg daily, ASA 325mg x1 then 81mg daily  Permissive HTN with SBP up to 200  Admit for CVA pathway  1532 IMPRESSION:     1  No intracranial large vessel occlusion or critical stenosis      2  No hemodynamically significant stenosis or dissection of cervical carotid and vertebral arteries       3  Incidental 2 mm aneurysm or infundibulum arising from right supraclinoid ICA 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  Also suspected tiny aneurysm versus fenestration at distal A1 segment of right CECIL  Recommend nonurgent   neurovascular consultation      4  Incompletely imaged focal opacity in the medial superior segment of right lower lobe  Aspiration or infection is not excluded  Correlate with clinical assessment and/or chest CT    1550 Updated pt and wife to results, answered questions  Patient admitted to [facility]  Stroke Assessment     Row Name 01/15/23 1613             NIH Stroke Scale    Interval Baseline      Level of Consciousness (1a ) 0      LOC Questions (1b ) 0      LOC Commands (1c ) 0      Best Gaze (2 ) 0      Visual (3 ) 0      Facial Palsy (4 ) 0      Motor Arm, Left (5a ) 0      Motor Arm, Right (5b ) 0      Motor Leg, Left (6a ) 0      Motor Leg, Right (6b ) 0      Limb Ataxia (7 ) 0      Sensory (8 ) 0      Best Language (9 ) 1      Dysarthria (10 ) 1      Extinction and Inattention (11 ) (Formerly Neglect) 0      Total 2              Flowsheet Row Most Recent Value   Thrombolytic Decision Options    Thrombolytic Decision Patient not a candidate  Patient is not a candidate options Unclear time of onset outside appropriate time window , Symptoms resolved/clearly non disabling  Medical Decision Making  59-year-old male presents for evaluation of speech disturbance    On arrival he is an NIH of 0, he is seemingly taking his time to answer questions however speaking clearly and in logical sentences  Shortly thereafter patient began to experience speech disturbance again, in room he slurs "headache" has a difficult time describing this  He was made a stroke alert  Patient had with Dr Ziggy Abarca of neurology, patient is not a candidate for thrombolytics due to timeframe  He does recommend anticoagulation and admission for stroke pathway  Cerebral aneurysm: acute illness or injury  Speech disturbance: acute illness or injury  Amount and/or Complexity of Data Reviewed  Labs: ordered  Radiology: ordered  Risk  OTC drugs  Prescription drug management  Decision regarding hospitalization  Disposition  Final diagnoses:   Speech disturbance   Cerebral aneurysm     Time reflects when diagnosis was documented in both MDM as applicable and the Disposition within this note     Time User Action Codes Description Comment    1/15/2023  2:42 PM Pleasant Mast Add [R47 9] Speech disturbance     1/15/2023  3:52 PM Rachel Coughlinor [I67 1] Cerebral aneurysm     1/15/2023  4:00 PM Atiya Mckeon Add [R47 01] Aphasia       ED Disposition     ED Disposition   Admit    Condition   Stable    Date/Time   Sun Carlos 15, 2023  3:52 PM    Comment   Case was discussed with SHAYY and the patient's admission status was agreed to be Admission Status: inpatient status to the service of Dr Veronika Hassan   Follow-up Information    None         Patient's Medications   Discharge Prescriptions    No medications on file       No discharge procedures on file      PDMP Review     None          ED Provider  Electronically Signed by           Mahi Rivas DO  01/15/23 3108

## 2023-01-15 NOTE — ASSESSMENT & PLAN NOTE
Aphasia since 11am on 1/15/23  Previous history of on and off aphasia lasting only a few seconds  Seen by neurology in ED  Start stroke protocol  Received loading dose of Plavix, continue Plavix 75 mg  Received loading dose of aspirin, continue aspirin 81 mg  Initiate Lipitor 40 mg daily  Follow-up echo  Follow-up MRI  PT/OT  Speech therapy

## 2023-01-15 NOTE — ASSESSMENT & PLAN NOTE
/91 upon admission, associated with headache  BP 5/27/2022 was 130/68  Permissive hypertension per neurology  Start lisinopril 10 mg tomorrow   Tylenol for headache    Possible sleep apnea causing hypertension  Follow-up sleep study

## 2023-01-15 NOTE — QUICK NOTE
Called by  regarding stroke alert at 15: 32, neuro response was immediate  73year old male with hypertension and diabetes presenting with language difficulties/errors  Patient reports patient has had nonsensical speech, ED reports fluctuating/waxing waning symptoms  Patient admits to headache  He denies any appendicular sensorimotor symptoms or clear facial asymmetry  Of note wife reports prior similar events however this is lasting longer  Need to clarify timeframe of these events and history     - NIHSS 4  - LKW yesterday prior to going to [facility] without any clear acute pathology, there are some subtle changes in the left MCA territory which could represent acute ischemia however nothing definitive  CTA unremarkable for LVO or IR target  IV thrombolysis was not given due to outside time window  Recommend Plavix 200 mg and aspirin 325 mg once followed by 75 and 81 mg daily respectively  - Permissive hypertension up to systolic of 499    - Admit for stroke work-up

## 2023-01-16 ENCOUNTER — APPOINTMENT (OUTPATIENT)
Dept: NON INVASIVE DIAGNOSTICS | Facility: HOSPITAL | Age: 68
End: 2023-01-16

## 2023-01-16 ENCOUNTER — APPOINTMENT (INPATIENT)
Dept: MRI IMAGING | Facility: HOSPITAL | Age: 68
End: 2023-01-16

## 2023-01-16 VITALS
RESPIRATION RATE: 19 BRPM | BODY MASS INDEX: 42.66 KG/M2 | DIASTOLIC BLOOD PRESSURE: 75 MMHG | WEIGHT: 315 LBS | HEIGHT: 72 IN | OXYGEN SATURATION: 95 % | HEART RATE: 61 BPM | TEMPERATURE: 99.5 F | SYSTOLIC BLOOD PRESSURE: 170 MMHG

## 2023-01-16 PROBLEM — G47.33 SLEEP APNEA, OBSTRUCTIVE: Status: ACTIVE | Noted: 2023-01-16

## 2023-01-16 LAB
25(OH)D3 SERPL-MCNC: 10.9 NG/ML (ref 30–100)
ALBUMIN SERPL BCP-MCNC: 3.3 G/DL (ref 3.5–5)
ALP SERPL-CCNC: 55 U/L (ref 46–116)
ALT SERPL W P-5'-P-CCNC: 25 U/L (ref 12–78)
ANION GAP SERPL CALCULATED.3IONS-SCNC: 8 MMOL/L (ref 4–13)
AORTIC ROOT: 3.3 CM
APICAL FOUR CHAMBER EJECTION FRACTION: 60 %
AST SERPL W P-5'-P-CCNC: 17 U/L (ref 5–45)
ATRIAL RATE: 70 BPM
BASOPHILS # BLD AUTO: 0.04 THOUSANDS/ÂΜL (ref 0–0.1)
BASOPHILS NFR BLD AUTO: 1 % (ref 0–1)
BILIRUB SERPL-MCNC: 0.87 MG/DL (ref 0.2–1)
BUN SERPL-MCNC: 14 MG/DL (ref 5–25)
CALCIUM ALBUM COR SERPL-MCNC: 9.2 MG/DL (ref 8.3–10.1)
CALCIUM SERPL-MCNC: 8.6 MG/DL (ref 8.3–10.1)
CHLORIDE SERPL-SCNC: 102 MMOL/L (ref 96–108)
CHOLEST SERPL-MCNC: 192 MG/DL
CK SERPL-CCNC: 35 U/L (ref 39–308)
CO2 SERPL-SCNC: 27 MMOL/L (ref 21–32)
CREAT SERPL-MCNC: 0.67 MG/DL (ref 0.6–1.3)
E WAVE DECELERATION TIME: 166 MS
EOSINOPHIL # BLD AUTO: 0.09 THOUSAND/ÂΜL (ref 0–0.61)
EOSINOPHIL NFR BLD AUTO: 2 % (ref 0–6)
ERYTHROCYTE [DISTWIDTH] IN BLOOD BY AUTOMATED COUNT: 12.4 % (ref 11.6–15.1)
EST. AVERAGE GLUCOSE BLD GHB EST-MCNC: 105 MG/DL
FRACTIONAL SHORTENING: 31 % (ref 28–44)
GFR SERPL CREATININE-BSD FRML MDRD: 98 ML/MIN/1.73SQ M
GLUCOSE SERPL-MCNC: 132 MG/DL (ref 65–140)
HAV IGM SER QL: NORMAL
HBA1C MFR BLD: 5.3 %
HBV CORE IGM SER QL: NORMAL
HBV SURFACE AG SER QL: NORMAL
HCT VFR BLD AUTO: 36.1 % (ref 36.5–49.3)
HCV AB SER QL: NORMAL
HDLC SERPL-MCNC: 63 MG/DL
HGB BLD-MCNC: 12.2 G/DL (ref 12–17)
IMM GRANULOCYTES # BLD AUTO: 0.03 THOUSAND/UL (ref 0–0.2)
IMM GRANULOCYTES NFR BLD AUTO: 1 % (ref 0–2)
INTERVENTRICULAR SEPTUM IN DIASTOLE (PARASTERNAL SHORT AXIS VIEW): 1 CM
INTERVENTRICULAR SEPTUM: 1 CM (ref 0.6–1.1)
LDLC SERPL CALC-MCNC: 77 MG/DL (ref 0–100)
LEFT ATRIUM SIZE: 4.1 CM
LEFT INTERNAL DIMENSION IN SYSTOLE: 3.5 CM (ref 2.1–4)
LEFT VENTRICULAR INTERNAL DIMENSION IN DIASTOLE: 5.1 CM (ref 3.5–6)
LEFT VENTRICULAR POSTERIOR WALL IN END DIASTOLE: 1 CM
LEFT VENTRICULAR STROKE VOLUME: 75 ML
LVSV (TEICH): 75 ML
LYMPHOCYTES # BLD AUTO: 1.27 THOUSANDS/ÂΜL (ref 0.6–4.47)
LYMPHOCYTES NFR BLD AUTO: 27 % (ref 14–44)
MAGNESIUM SERPL-MCNC: 2 MG/DL (ref 1.6–2.6)
MCH RBC QN AUTO: 30.8 PG (ref 26.8–34.3)
MCHC RBC AUTO-ENTMCNC: 33.8 G/DL (ref 31.4–37.4)
MCV RBC AUTO: 91 FL (ref 82–98)
MONOCYTES # BLD AUTO: 0.4 THOUSAND/ÂΜL (ref 0.17–1.22)
MONOCYTES NFR BLD AUTO: 9 % (ref 4–12)
MV PEAK A VEL: 0.92 M/S
MV PEAK E VEL: 79 CM/S
MV STENOSIS PRESSURE HALF TIME: 48 MS
MV VALVE AREA P 1/2 METHOD: 4.58 CM2
NEUTROPHILS # BLD AUTO: 2.88 THOUSANDS/ÂΜL (ref 1.85–7.62)
NEUTS SEG NFR BLD AUTO: 60 % (ref 43–75)
NRBC BLD AUTO-RTO: 0 /100 WBCS
P AXIS: 61 DEGREES
PHOSPHATE SERPL-MCNC: 3.1 MG/DL (ref 2.3–4.1)
PLATELET # BLD AUTO: 191 THOUSANDS/UL (ref 149–390)
PMV BLD AUTO: 9.5 FL (ref 8.9–12.7)
POTASSIUM SERPL-SCNC: 3.6 MMOL/L (ref 3.5–5.3)
PR INTERVAL: 168 MS
PROCALCITONIN SERPL-MCNC: <0.05 NG/ML
PROT SERPL-MCNC: 6.4 G/DL (ref 6.4–8.4)
QRS AXIS: -80 DEGREES
QRSD INTERVAL: 94 MS
QT INTERVAL: 386 MS
QTC INTERVAL: 416 MS
RBC # BLD AUTO: 3.96 MILLION/UL (ref 3.88–5.62)
SL CV LV EF: 60
SL CV PED ECHO LEFT VENTRICLE DIASTOLIC VOLUME (MOD BIPLANE) 2D: 126 ML
SL CV PED ECHO LEFT VENTRICLE SYSTOLIC VOLUME (MOD BIPLANE) 2D: 51 ML
SODIUM SERPL-SCNC: 137 MMOL/L (ref 135–147)
T WAVE AXIS: 27 DEGREES
TR MAX PG: 7 MMHG
TR PEAK VELOCITY: 1.3 M/S
TRICUSPID VALVE PEAK REGURGITATION VELOCITY: 1.29 M/S
TRIGL SERPL-MCNC: 260 MG/DL
VENTRICULAR RATE: 70 BPM
WBC # BLD AUTO: 4.71 THOUSAND/UL (ref 4.31–10.16)

## 2023-01-16 RX ORDER — LISINOPRIL 5 MG/1
10 TABLET ORAL DAILY
Status: DISCONTINUED | OUTPATIENT
Start: 2023-01-16 | End: 2023-01-16 | Stop reason: HOSPADM

## 2023-01-16 RX ORDER — LABETALOL HYDROCHLORIDE 5 MG/ML
10 INJECTION, SOLUTION INTRAVENOUS ONCE
Status: DISCONTINUED | OUTPATIENT
Start: 2023-01-16 | End: 2023-01-16

## 2023-01-16 RX ORDER — MELATONIN
2000 DAILY
Qty: 60 TABLET | Refills: 0 | Status: SHIPPED | OUTPATIENT
Start: 2023-01-16

## 2023-01-16 RX ORDER — POTASSIUM CHLORIDE 20 MEQ/1
40 TABLET, EXTENDED RELEASE ORAL ONCE
Status: COMPLETED | OUTPATIENT
Start: 2023-01-16 | End: 2023-01-16

## 2023-01-16 RX ORDER — LISINOPRIL 10 MG/1
10 TABLET ORAL DAILY
Qty: 30 TABLET | Refills: 0 | Status: SHIPPED | OUTPATIENT
Start: 2023-01-17

## 2023-01-16 RX ORDER — MELATONIN
2000 DAILY
Status: DISCONTINUED | OUTPATIENT
Start: 2023-01-16 | End: 2023-01-16 | Stop reason: HOSPADM

## 2023-01-16 RX ORDER — ATORVASTATIN CALCIUM 20 MG/1
20 TABLET, FILM COATED ORAL EVERY EVENING
Qty: 30 TABLET | Refills: 0 | Status: SHIPPED | OUTPATIENT
Start: 2023-01-16

## 2023-01-16 RX ORDER — ACETAMINOPHEN 325 MG/1
650 TABLET ORAL EVERY 6 HOURS PRN
Refills: 0
Start: 2023-01-16

## 2023-01-16 RX ORDER — ASPIRIN 81 MG/1
81 TABLET, CHEWABLE ORAL DAILY
Qty: 30 TABLET | Refills: 0 | Status: SHIPPED | OUTPATIENT
Start: 2023-01-17

## 2023-01-16 RX ORDER — AMLODIPINE BESYLATE 5 MG/1
5 TABLET ORAL DAILY
Status: DISCONTINUED | OUTPATIENT
Start: 2023-01-16 | End: 2023-01-16

## 2023-01-16 RX ADMIN — Medication 2000 UNITS: at 13:36

## 2023-01-16 RX ADMIN — ASPIRIN 81 MG CHEWABLE TABLET 81 MG: 81 TABLET CHEWABLE at 08:59

## 2023-01-16 RX ADMIN — ENOXAPARIN SODIUM 40 MG: 40 INJECTION SUBCUTANEOUS at 09:00

## 2023-01-16 RX ADMIN — PERFLUTREN 0.4 ML/MIN: 6.52 INJECTION, SUSPENSION INTRAVENOUS at 08:00

## 2023-01-16 RX ADMIN — POTASSIUM CHLORIDE 40 MEQ: 1500 TABLET, EXTENDED RELEASE ORAL at 13:37

## 2023-01-16 RX ADMIN — CLOPIDOGREL BISULFATE 75 MG: 75 TABLET ORAL at 08:59

## 2023-01-16 RX ADMIN — LISINOPRIL 10 MG: 5 TABLET ORAL at 11:35

## 2023-01-16 NOTE — ASSESSMENT & PLAN NOTE
/91 upon admission, associated with headache  BP 5/27/2022 was 130/68  Permissive hypertension per neurology  Started lisinopril 10 mg 1/16/23  Tylenol for headache

## 2023-01-16 NOTE — PHYSICAL THERAPY NOTE
PHYSICAL THERAPY EVALUATION  NAME:  Musa Hercules  DATE: 01/16/23    AGE:   75 y o  Mrn:   [id number]  ADMIT DX:  Slurred speech [R47 [id number]]  Problem List:   Patient Active Problem List   Diagnosis    Chronic back pain    Depression    Hypertriglyceridemia    Morbid obesity ([facility] [address] )    Osteoarthritis    Vitamin D deficiency    Primary osteoarthritis of one knee, left    Osteoarthritis of spine with radiculopathy, lumbar region    Primary osteoarthritis of both knees    Arthritis of left knee    Morbid obesity with BMI of 40 0-44 9, adult (HCC)    Erythema migrans (Lyme disease)    Aphasia    Intracranial aneurysm    Abnormal CT scan of lung    Accelerated hypertension    Atelectasis    Sleep apnea, obstructive       Past Medical History  Past Medical History:   Diagnosis Date    Arthritis     Compression fracture of spine ([facility] )     last assessed 09/15/2016    Concussion     Dislocation of wrist     right ,  last assessed 09/15/2016    Esophageal reflux     Fractured sternum     Nasal fracture        Past Surgical History  Past Surgical History:   Procedure Laterality Date    CHOLECYSTECTOMY OPEN      COLONOSCOPY      2006    FOOT SURGERY Right        Length Of Stay: 1  Performed at least 2 patient identifiers during session: Name and Birthday     01/16/23 1006   PT Last Visit   PT Visit Date 01/16/23   Note Type   Note type Evaluation   Pain Assessment   Pain Assessment Tool 0-10   Pain Score No Pain   Restrictions/Precautions   Weight Bearing Precautions Per Order No   Braces or Orthoses Other (Comment)  (none reported)   Other Precautions Multiple lines   Home Living   Type of [address] Other (Comment)  (0 [address]  "My shop is two stories, about 26 steps to get to the basement  ")   Bathroom Shower/Tub Walk-in shower   Bathroom Toilet Standard   Bathroom Equipment Other (Comment)  (none per pt)   Bathroom Accessibility Accessible   Home Equipment Walker;Cane;Other (Comment)  (walking stick)   Additional Comments Pt reports independence at baseline with ADLs, IADLs, and functional mobility with use of walking stick "when I'm walking out in the woods"   Prior Function   Level of [place] Independent with ADLs; Independent with functional mobility; Independent with IADLS   Lives With Spouse  (2 dogs)   Receives Help From Family   IADLs Independent with driving; Independent with meal prep; Independent with medication management   Falls in the last 6 months 0  (pt denies falls)   General   Family/Caregiver Present No   Cognition   Overall Cognitive Status WFL   Arousal/Participation Alert   Orientation Level Oriented X4   Memory Within functional limits   Following Commands Follows all commands and directions without difficulty   Comments Pt agreed to PT evaluation   Subjective   Subjective "I'm feeling better, no headaches now"   RLE Assessment   RLE Assessment WFL  (B knee extension, ankle DF, hip abduction assesed in sitting 5/5 - no pain during MMT)   LLE Assessment   LLE Assessment WFL  (B knee extension, ankle DF, hip abduction assesed in sitting 5/5 - no pain during MMT)   Coordination   Sensation WFL  (pt without complaints of numbness/tingling to BLEs/BUEs)   Heel to Shin Intact   Rapid Alternating Movements Intact  (intact rapid alternating ankle DF/PF)   Light Touch   RLE Light Touch Grossly intact   LLE Light Touch Grossly intact   Bed Mobility   Supine to Sit 6  Modified independent   Additional items HOB elevated   Sit to Supine 6  Modified independent   Additional items HOB elevated; Increased time required   Additional Comments Pt without complaints of lightheadedness, chest pain, dizziness, SOB throughout session   Transfers   Sit to Stand 7  Independent   Stand to Sit 7  Independent   Stand pivot 7  Independent   Additional Comments no AD used during transfers   Ambulation/Elevation   Gait pattern Wide JEFFREY; Decreased hip extension;Decreased heel strike   Gait Assistance 6  Modified independent Assistive Device None   Distance 200'   Stair Management Assistance Not tested   Ambulation/Elevation Additional Comments Pt without LOB in any direction during ambulation   Balance   Static Sitting Good   Dynamic Sitting Good   Static Standing Good   Dynamic Standing Good   Ambulatory Good   Activity Tolerance   Activity Tolerance Patient tolerated treatment well   Medical Staff Made Aware Co treatment with OT Sukhjinder secondary to complex medical condition of pt, possible A of 2 required to achieve and maintain transitional movements, requiring the need of skilled therapeutic intervention of 2 therapists to achieve delivery of services  Assessment   Prognosis Good   Problem List Decreased mobility;Obesity   Assessment Pt is 75 y o  male seen for PT evaluation s/p admit to [address] on 1/15/2023 w/ Aphasia  PT consulted to assess pt's functional mobility and d/c needs  Order placed for PT eval and tx, w/ up w/ A order  Comorbidities affecting pt's physical performance at time of assessment include: aphasia, atelectasis, accelerated HTN, intracranial aneurysm  PTA, pt was living with his spouse in a multi-level home with 0 [place]  Pt describes 26 steps to access "my shop" and reports independence at baseline with ADLs, IADLs, and functional mobility with use of walking stick when ambulating on uneven surfaces  Personal factors affecting pt at time of IE include: lives in multi story house and stairs to enter home  Please find objective findings from PT assessment regarding body systems outlined above with impairments and limitations including gait deviations  At time of evaluation, patient completed bed mobility [place] with HOB elevated, STS and SPT IND without AD, and ambulation 200' [place] without AD and without LOB in any direction  The following objective measures performed on IE also reveal limitations: AM-PAC 6-Clicks: 85/56   Pt's clinical presentation is currently evolving seen in pt's presentation of gait deviations, review of multiple systems including neuromuscular, sensory, musculoskeletel, review of multiple comorbidities  Pt to benefit from continued PT tx to address deficits as defined above and maximize level of functional independent mobility and consistency with follow up session focusing on gait and stair training  From PT/mobility standpoint, recommendation at time of d/c would be no rehabilitation needs pending progress in order to facilitate return to PLOF  Goals   Patient Goals to go home   STG Expiration Date 01/30/23   Short Term Goal #1 Pt will: Perform ambulation with no AD for 300' IND without LOB in any direction to  improve activity tolerance and improve gait quality  Navigate up and down 26 steps with modified I so patient can enter and exit home and navigate in home environment at [facility]  PT Treatment Day 0   Plan   Treatment/Interventions Elevations;Gait training; Endurance training;Patient/family training; Therapeutic exercise;OT   PT Frequency 1-2x/wk  (follow up for gait training/stair training)   Recommendation   PT Discharge Recommendation No rehabilitation needs   AM-PAC Basic Mobility Inpatient   Turning in Flat Bed Without Bedrails 4   Lying on Back to Sitting on Edge of Flat Bed Without Bedrails 4   Moving Bed to Chair 4   Standing Up From Chair Using Arms 4   Walk in Room 4   Climb 3-5 Stairs With Railing 3   Basic Mobility Inpatient Raw Score 23   Basic Mobility Standardized Score 50 88   Highest Level Of Mobility   JH-HLM Goal 7: Walk 25 feet or more   JH-HLM Achieved 7: Walk 25 feet or more   End of Consult   Patient Position at End of Consult All needs within reach; Supine  (HOB elevated)   Time In: 1006  Time Out: 1018  Total Evaluation Minutes: 12    David Martell, PT

## 2023-01-16 NOTE — ASSESSMENT & PLAN NOTE
Aphasia since 11am on 1/15/23  Previous history of on/off aphasia lasting only a few seconds  Resolved now in ED  Seen by neurology in ED  Start stroke protocol  Echo completed  MRI of brain shows No acute ischemia  Minimal nonspecific white matter change suggesting microangiopathy  Redemonstrated coarse calcification of bilateral globus pallidus and mineralization of bilateral thalami and dentate nuclei     Aphasia most likely secondary to hypertension  Discharge plan:  Discontinue Plavix 75 mg  Discontinue aspirin 81 mg  Continue Lipitor 40 mg daily  Follow-up with neuro as outpatient

## 2023-01-16 NOTE — PROGRESS NOTES
[facility] [address] [place]  Progress Note - Claudetta Eric 1955, 75 y o  male MRN: [id number]  Unit/Bed#: KJ20 Encounter: [id number]  Primary Care Provider: LEIGHA Dasilva   Date and time admitted to hospital: 1/15/2023  1:34 PM    * Aphasia  Assessment & Plan  Aphasia since 11am on 1/15/23  Previous history of on/off aphasia lasting only a few seconds  Resolved now in ED  Seen by neurology in ED  Start stroke protocol  Received loading dose of Plavix, continue Plavix 75 mg  Received loading dose of aspirin, continue aspirin 81 mg  Continue Lipitor 40 mg daily  Follow-up echo  Follow-up MRI  PT/OT  Speech therapy    Sleep apnea, obstructive  Assessment & Plan  Possible sleep apnea causing hypertension  Patient endorses snoring  Follow-up sleep study    Atelectasis  Assessment & Plan  Finding on the CT angiogram of the neck corresponds to paravertebral compressive atelectasis secondary to large osteophyte formation  F/U MRI      Accelerated hypertension  Assessment & Plan  /91 upon admission, associated with headache  BP 5/27/2022 was 130/68  Permissive hypertension per neurology  Started lisinopril 10 mg 1/16/23  Tylenol for headache    Abnormal CT scan of lung  Assessment & Plan  CT shows Incompletely imaged focal opacity in the medial superior segment of right lower lobe  Aspiration or infection is not excluded  Procalcitonin normal, WBC normal, Temp 99 5F  No cough, shortness of breath  Will monitor symptoms      Intracranial aneurysm  Assessment & Plan  Incidental 2 mm aneurysm or infundibulum arising from right supraclinoid ICA 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  Also suspected tiny aneurysm versus fenestration at distal A1 segment of right CECIL  Recommend nonurgent neurovascular consultation    Follow-up brain MRI  Follow-up with Neurovascular        VTE Pharmacologic Prophylaxis: VTE Score: 9 High Risk (Score >/= 5) - Pharmacological DVT Prophylaxis Ordered: enoxaparin (Lovenox)  Sequential Compression Devices Ordered  Patient Centered Rounds: I performed bedside rounds with nursing staff today  Discussions with Specialists or Other Care Team Provider: Neurology    Education and Discussions with Family / Patient: Will discuss with wife when she visits this afternoon  Time Spent for Care: 30 minutes  More than 50% of total time spent on counseling and coordination of care as described above  Current Length of Stay: 1 day(s)  Current Patient Status: Inpatient   Certification Statement: The patient will continue to require additional inpatient hospital stay due to Elevated blood pressure  Discharge Plan: Anticipate discharge tomorrow to home  Code Status: Level 1 - Full Code    Subjective:   Patient seen at bedside this morning, sitting up in bed comfortably  Patient states that he feels well, headache has resolved  Patient does not remember events from yesterday, does not remember meeting me, says he was out of it and does not remember things that his wife told him he said  Patient slept well, he states that his wife says he snores  Breakfast delivered during exam, patient noted he is vegan  Objective:     Vitals:   Temp (24hrs), Av 5 °F (37 5 °C), Min:99 5 °F (37 5 °C), Max:99 5 °F (37 5 °C)    Temp:  [99 5 °F (37 5 °C)] 99 5 °F (37 5 °C)  HR:  [61-87] 64  Resp:  [16-27] 20  BP: (131-202)/(60-91) 171/77  SpO2:  [94 %-99 %] 97 %  Body mass index is 45 57 kg/m²  Input and Output Summary (last 24 hours):   No intake or output data in the 24 hours ending 23 0900    Physical Exam:   Physical Exam  Constitutional:       General: He is not in acute distress  Appearance: He is obese  HENT:      Head: Atraumatic  Right Ear: External ear normal       Left Ear: External ear normal       Nose: No rhinorrhea  Mouth/Throat:      Mouth: Mucous membranes are moist    Eyes:      General:         Right eye: No discharge           Left eye: No discharge  Cardiovascular:      Rate and Rhythm: Normal rate and regular rhythm  Pulses: Normal pulses  Heart sounds: No murmur heard  Pulmonary:      Effort: Pulmonary effort is normal  No respiratory distress  Breath sounds: Normal breath sounds  No wheezing  Abdominal:      General: There is distension  Tenderness: There is no abdominal tenderness  Musculoskeletal:      Cervical back: Normal range of motion  Right lower leg: Edema present  Left lower leg: Edema present  Skin:     Findings: No rash  Neurological:      General: No focal deficit present  Mental Status: He is alert  Mental status is at baseline        Comments: Neuro exam unchanged from yesterday   Psychiatric:         Mood and Affect: Mood normal          Behavior: Behavior normal          Additional Data:     Labs:  Results from last 7 days   Lab Units 01/16/23  0601   WBC Thousand/uL 4 71   HEMOGLOBIN g/dL 12 2   HEMATOCRIT % 36 1*   PLATELETS Thousands/uL 191   NEUTROS PCT % 60   LYMPHS PCT % 27   MONOS PCT % 9   EOS PCT % 2     Results from last 7 days   Lab Units 01/16/23  0601   SODIUM mmol/L 137   POTASSIUM mmol/L 3 6   CHLORIDE mmol/L 102   CO2 mmol/L 27   BUN mg/dL 14   CREATININE mg/dL 0 67   ANION GAP mmol/L 8   CALCIUM mg/dL 8 6   ALBUMIN g/dL 3 3*   TOTAL BILIRUBIN mg/dL 0 87   ALK PHOS U/L 55   ALT U/L 25   AST U/L 17   GLUCOSE RANDOM mg/dL 132         Results from last 7 days   Lab Units 01/15/23  1426   POC GLUCOSE mg/dl 106         Results from last 7 days   Lab Units 01/16/23  0601   PROCALCITONIN ng/ml <0 05       Lines/Drains:  Invasive Devices     Peripheral Intravenous Line  Duration           Peripheral IV 01/15/23 Left Antecubital <1 day                  Telemetry:  Telemetry Orders (From admission, onward)             48 Hour Telemetry Monitoring  Continuous x 48 hours        References:    Telemetry Guidelines   Question:  Reason for 48 Hour Telemetry  Answer:  Acute CVA (<24 hrs old, hemispheric strokes, selected brainstem strokes, cardiac arrhythmias)                          Imaging: Reviewed radiology reports from this admission including: CT head    Recent Cultures (last 7 days):         Last 24 Hours Medication List:   Current Facility-Administered Medications   Medication Dose Route Frequency Provider Last Rate   • acetaminophen  650 mg Oral Q6H PRN Aline Gibson DO     • aspirin  81 mg Oral Daily Aline Gibson DO     • atorvastatin  40 mg Oral QPM Aline Gibson DO     • clopidogrel  75 mg Oral Daily Aline Gibson DO     • enoxaparin  40 mg Subcutaneous BID Aline Gibson DO     • labetalol  10 mg Intravenous Q4H PRN Carlos Burton DO          Today, Patient Was Seen By: Semaj Guevara MD    **Please Note: This note may have been constructed using a voice recognition system  **

## 2023-01-16 NOTE — ASSESSMENT & PLAN NOTE
Finding on the CT angiogram of the neck corresponds to paravertebral compressive atelectasis secondary to large osteophyte formation

## 2023-01-16 NOTE — DISCHARGE SUMMARY
[address] 1955, 75 y o  male MRN: [id number]  Unit/Bed#: LZ61 Encounter: [id number]  Primary Care Provider: LEIGHA Dasilva   Date and time admitted to hospital: 1/15/2023  1:34 PM    * Aphasia  Assessment & Plan  Aphasia since 11am on 1/15/23  Previous history of on/off aphasia lasting only a few seconds  Resolved now in ED  Seen by neurology in ED  Start stroke protocol  Echo completed  MRI of brain shows No acute ischemia  Minimal nonspecific white matter change suggesting microangiopathy  Redemonstrated coarse calcification of bilateral globus pallidus and mineralization of bilateral thalami and dentate nuclei  Aphasia most likely secondary to hypertension  Discharge plan:  Discontinue Plavix 75 mg  Discontinue aspirin 81 mg  Continue Lipitor 40 mg daily  Follow-up with neuro as outpatient    Sleep apnea, obstructive  Assessment & Plan  Possible sleep apnea causing hypertension  Patient endorses snoring  Discharge plan:  Sleep study as outpatient    Atelectasis  Assessment & Plan  Finding on the CT angiogram of the neck corresponds to paravertebral compressive atelectasis secondary to large osteophyte formation  Accelerated hypertension  Assessment & Plan  /91 upon admission, associated with headache  BP 5/27/2022 was 130/68  Permissive hypertension per neurology completed  Headache resolved  1 dose of labetalol  Start lisinopril 10 mg  Discharge plan:  Continue lisinopril 10 mg daily  Continue Tylenol at home for headache  Follow-up with PCP    Abnormal CT scan of lung  Assessment & Plan  CT shows Incompletely imaged focal opacity in the medial superior segment of right lower lobe  Aspiration or infection is not excluded  Procalcitonin normal, WBC normal, Temp 99 5F  No cough, shortness of breath  Discharge plan:   Follow-up with PCP      Intracranial aneurysm  Assessment & Plan  Incidental 2 mm aneurysm or infundibulum arising from right supraclinoid ICA 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  Also suspected tiny aneurysm versus fenestration at distal A1 segment of right CECIL  Recommend nonurgent neurovascular consultation  Follow-up with Neurovascular as outpatient    Vitamin D deficiency  Assessment & Plan  Vitamin D level 10 9  Take vitamin D at home  Follow-up with PCP        Medical Problems     Resolved Problems  Date Reviewed: 5/27/2022   None       Discharging Physician / Practitioner: Francisca Barry MD  PCP: Kyle [id number], [facility]   Admission Date:   Admission Orders (From admission, onward)     Ordered        01/15/23 1552  Inpatient Admission  Once                      Discharge Date: 01/16/23    Consultations During Hospital Stay:  · Neuro    Procedures Performed:   MRI brain wo contrast   Final Result      1  No acute ischemia  Minimal nonspecific white matter change suggesting microangiopathy  2   Redemonstrated coarse calcification of bilateral globus pallidus and mineralization of bilateral thalami and dentate nuclei  Main differentials include endocrinopathy versus Fahr's disease  Workstation performed: [id number]         CT chest wo contrast   Final Result      Finding on the CT angiogram of the neck corresponds to paravertebral compressive atelectasis secondary to large osteophyte formation  Workstation performed: [id number]         CTA stroke alert (head/neck)   Final Result      1  No intracranial large vessel occlusion or critical stenosis  2   No hemodynamically significant stenosis or dissection of cervical carotid and vertebral arteries  3   Incidental 2 mm aneurysm or infundibulum arising from right supraclinoid ICA 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  Also suspected tiny aneurysm versus fenestration at distal A1 segment of right CECIL  Recommend nonurgent    neurovascular consultation        4   Incompletely imaged focal opacity in the medial superior segment of right lower lobe  Aspiration or infection is not excluded  Correlate with clinical assessment and/or chest CT  Findings were directly discussed with Dr Dahlia Virk at 3:07 PM          This study was marked in [facility] for notification and follow-up  Workstation performed: [id number]         CT stroke alert brain   Final Result      1  No acute intracranial CT abnormality  2   Stable calcification/mineralization of bilateral globus pallidus and to lesser degree bilateral thalami and dentate nuclei  Main differentials include endocrinopathy versus Fahr's disease  Findings were directly discussed with Dr Dahlia Virk at 3:07 PM                Workstation performed: [id number]         XR chest 1 view portable   Final Result      No acute cardiopulmonary disease                    Workstation performed: [id number]         ·     Significant Findings / Test Results:   Results for orders placed or performed during the hospital encounter of 01/15/23   FLU/RSV/COVID - if FLU/RSV clinically relevant    Specimen: Nose; Nares   Result Value Ref Range    SARS-CoV-2 Negative Negative    INFLUENZA A PCR Negative Negative    INFLUENZA B PCR Negative Negative    RSV PCR Negative Negative   CBC and differential   Result Value Ref Range    WBC 4 35 4 31 - 10 16 Thousand/uL    RBC 4 40 3 88 - 5 62 Million/uL    Hemoglobin 13 6 12 0 - 17 0 g/dL    Hematocrit 40 9 36 5 - 49 3 %    MCV 93 82 - 98 fL    MCH 30 9 26 8 - 34 3 pg    MCHC 33 3 31 4 - 37 4 g/dL    RDW 12 5 11 6 - 15 1 %    MPV 10 2 8 9 - 12 7 fL    Platelets 266 614 - 682 Thousands/uL    nRBC 0 /100 WBCs    Neutrophils Relative 74 43 - 75 %    Immat GRANS % 1 0 - 2 %    Lymphocytes Relative 16 14 - 44 %    Monocytes Relative 5 4 - 12 %    Eosinophils Relative 3 0 - 6 %    Basophils Relative 1 0 - 1 %    Neutrophils Absolute 3 25 1 85 - 7 62 Thousands/µL    Immature Grans Absolute 0 03 0 00 - 0 20 Thousand/uL    Lymphocytes Absolute 0 69 0 60 - 4 47 Thousands/µL    Monocytes Absolute 0 22 0 17 - 1 22 Thousand/µL    Eosinophils Absolute 0 12 0 00 - 0 61 Thousand/µL    Basophils Absolute 0 04 0 00 - 0 10 Thousands/µL   Comprehensive metabolic panel   Result Value Ref Range    Sodium 136 135 - 147 mmol/L    Potassium 4 2 3 5 - 5 3 mmol/L    Chloride 101 96 - 108 mmol/L    CO2 26 21 - 32 mmol/L    ANION GAP 9 4 - 13 mmol/L    BUN 20 5 - 25 mg/dL    Creatinine 0 60 0 60 - 1 30 mg/dL    Glucose 129 65 - 140 mg/dL    Calcium 8 8 8 3 - 10 1 mg/dL    AST 19 5 - 45 U/L    ALT 32 12 - 78 U/L    Alkaline Phosphatase 62 46 - 116 U/L    Total Protein 7 0 6 4 - 8 4 g/dL    Albumin 3 7 3 5 - 5 0 g/dL    Total Bilirubin 0 67 0 20 - 1 00 mg/dL    eGFR 103 ml/min/1 73sq m   HS Troponin 0hr (reflex protocol)   Result Value Ref Range    hs TnI 0hr 6 "Refer to ACS Flowchart"- see link ng/L   TSH, 3rd generation with Free T4 reflex   Result Value Ref Range    TSH 3RD GENERATON 1 385 0 450 - 4 500 uIU/mL   Magnesium   Result Value Ref Range    Magnesium 1 8 1 6 - 2 6 mg/dL   HS Troponin I 2hr   Result Value Ref Range    hs TnI 2hr 6 "Refer to ACS Flowchart"- see link ng/L    Delta 2hr hsTnI 0 <20 ng/L   Urinalysis with microscopic   Result Value Ref Range    Color, UA Yellow     Clarity, UA Clear     Specific Gravity, UA 1 020 1 003 - 1 030    pH, UA 5 0 4 5, 5 0, 5 5, 6 0, 6 5, 7 0, 7 5, 8 0    Leukocytes, UA Negative Negative    Nitrite, UA Negative Negative    Protein, UA Negative Negative mg/dl    Glucose, UA Negative Negative mg/dl    Ketones, UA Negative Negative mg/dl    Urobilinogen, UA 0 2 0 2, 1 0 E U /dl E U /dl    Bilirubin, UA Negative Negative    Occult Blood, UA Negative Negative    RBC, UA None Seen None Seen, 2-4 /hpf    WBC, UA 0-1 (A) None Seen, 2-4, 5-60 /hpf    Epithelial Cells Occasional None Seen, Occasional /hpf    Bacteria, UA Occasional None Seen, Occasional /hpf   HS Troponin I 4hr   Result Value Ref Range    hs TnI 4hr 6 "Refer to ACS Flowchart"- see link ng/L    Delta 4hr hsTnI 0 <20 ng/L   CBC and differential   Result Value Ref Range    WBC 4 71 4 31 - 10 16 Thousand/uL    RBC 3 96 3 88 - 5 62 Million/uL    Hemoglobin 12 2 12 0 - 17 0 g/dL    Hematocrit 36 1 (L) 36 5 - 49 3 %    MCV 91 82 - 98 fL    MCH 30 8 26 8 - 34 3 pg    MCHC 33 8 31 4 - 37 4 g/dL    RDW 12 4 11 6 - 15 1 %    MPV 9 5 8 9 - 12 7 fL    Platelets 111 859 - 258 Thousands/uL    nRBC 0 /100 WBCs    Neutrophils Relative 60 43 - 75 %    Immat GRANS % 1 0 - 2 %    Lymphocytes Relative 27 14 - 44 %    Monocytes Relative 9 4 - 12 %    Eosinophils Relative 2 0 - 6 %    Basophils Relative 1 0 - 1 %    Neutrophils Absolute 2 88 1 85 - 7 62 Thousands/µL    Immature Grans Absolute 0 03 0 00 - 0 20 Thousand/uL    Lymphocytes Absolute 1 27 0 60 - 4 47 Thousands/µL    Monocytes Absolute 0 40 0 17 - 1 22 Thousand/µL    Eosinophils Absolute 0 09 0 00 - 0 61 Thousand/µL    Basophils Absolute 0 04 0 00 - 0 10 Thousands/µL   CK   Result Value Ref Range    Total CK 35 (L) 39 - 308 U/L   Comprehensive metabolic panel   Result Value Ref Range    Sodium 137 135 - 147 mmol/L    Potassium 3 6 3 5 - 5 3 mmol/L    Chloride 102 96 - 108 mmol/L    CO2 27 21 - 32 mmol/L    ANION GAP 8 4 - 13 mmol/L    BUN 14 5 - 25 mg/dL    Creatinine 0 67 0 60 - 1 30 mg/dL    Glucose 132 65 - 140 mg/dL    Calcium 8 6 8 3 - 10 1 mg/dL    Corrected Calcium 9 2 8 3 - 10 1 mg/dL    AST 17 5 - 45 U/L    ALT 25 12 - 78 U/L    Alkaline Phosphatase 55 46 - 116 U/L    Total Protein 6 4 6 4 - 8 4 g/dL    Albumin 3 3 (L) 3 5 - 5 0 g/dL    Total Bilirubin 0 87 0 20 - 1 00 mg/dL    eGFR 98 ml/min/1 73sq m   Magnesium   Result Value Ref Range    Magnesium 2 0 1 6 - 2 6 mg/dL   Phosphorus   Result Value Ref Range    Phosphorus 3 1 2 3 - 4 1 mg/dL   Procalcitonin   Result Value Ref Range    Procalcitonin <0 05 <=0 25 ng/ml   Hepatitis panel, acute   Result Value Ref Range    Hepatitis B Surface Ag Non-reactive Non-reactive, NonReactive - Confirmed    Hep A IgM Non-reactive Non-reactive, Equivocal-Suggest Recollect    Hepatitis C Ab Non-reactive Non-reactive    Hep B C IgM Non-reactive Non-reactive   Lipid Panel with Direct LDL reflex   Result Value Ref Range    Cholesterol 192 See Comment mg/dL    Triglycerides 260 (H) See Comment mg/dL    HDL, Direct 63 >=40 mg/dL    LDL Calculated 77 0 - 100 mg/dL   Hemoglobin A1c w/EAG Estimation   Result Value Ref Range    Hemoglobin A1C 5 3 Normal 3 8-5 6%; PreDiabetic 5 7-6 4%;  Diabetic >=6 5%; Glycemic control for adults with diabetes <7 0% %     mg/dl   Vitamin D 25 hydroxy   Result Value Ref Range    Vit D, 25-Hydroxy 10 9 (L) 30 0 - 100 0 ng/mL   ECG 12 lead   Result Value Ref Range    Ventricular Rate 70 BPM    Atrial Rate 70 BPM    TN Interval 168 ms    QRSD Interval 94 ms    QT Interval 386 ms    QTC Interval 416 ms    P Axis 61 degrees    QRS Axis -80 degrees    T Wave Axis 27 degrees   Echo complete w/ contrast if indicated   Result Value Ref Range    LA size 4 1 cm    Triscuspid Valve Regurgitation Peak Gradient 7 0 mmHg    Tricuspid valve peak regurgitation velocity 1 29 m/s    LVPWd 1 00 cm    TR Peak Stephan 1 3 m/s    IVSd 2 25 cm    LV DIASTOLIC VOLUME (MOD BIPLANE) 2D 126 mL    LEFT VENTRICLE SYSTOLIC VOLUME (MOD BIPLANE) 2D 51 mL    Left ventricular stroke volume (2D) 75 00 mL    A4C EF 60 %    LVIDd 5 10 cm    IVS 1 cm    LVIDS 3 50 cm    FS 31 28 - 44 %    Ao root 3 30 cm    MV valve area p 1/2 method 4 58 cm2    E wave deceleration time 166 ms    MV Peak E Stephan 79 cm/s    MV Peak A Stephan 0 92 m/s    MV stenosis pressure 1/2 time 48 ms    LVSV, 2D 75 mL    LV EF 60    Fingerstick Glucose (POCT)   Result Value Ref Range    POC Glucose 106 65 - 140 mg/dl   ·     Incidental Findings:   · Brain aneurysm  · I reviewed the above mentioned incidental findings with the patient and/or family and they expressed understanding  Test Results Pending at Discharge (will require follow up):   · none     Outpatient Tests Requested:  · Sleep study    Complications:  none    Reason for Admission: [place] Course:   Lali Granado is a 75 y o  male patient who originally presented to the hospital on 1/15/2023 due to aphasia  Patient was seen by neuro in ED and started on stroke protocol  MRI ruled out stroke  Most likely secondary to hypertension /91 upon admission  Patient received lisinopril and metoprolol and blood pressure is normalized  Patient will be discharged home with instructions to follow-up with neurovascular surgery and complete a sleep study  Please see above list of diagnoses and related plan for additional information  Condition at Discharge: good    Discharge Day Visit / Exam:   * Please refer to separate progress note for these details *    Discussion with Family: Updated  (wife) at bedside  Discharge instructions/Information to patient and family:   See after visit summary for information provided to patient and family  Provisions for Follow-Up Care:  See after visit summary for information related to follow-up care and any pertinent home health orders  Disposition:   Home    Planned Readmission: none     Discharge Statement:  I spent 30 minutes discharging the patient  This time was spent on the day of discharge  I had direct contact with the patient on the day of discharge  Greater than 50% of the total time was spent examining patient, answering all patient questions, arranging and discussing plan of care with patient as well as directly providing post-discharge instructions  Additional time then spent on discharge activities  Discharge Medications:  See after visit summary for reconciled discharge medications provided to patient and/or family        **Please Note: This note may have been constructed using a voice recognition system**

## 2023-01-16 NOTE — MALNUTRITION/BMI
This medical record reflects morbid obesity  BMI 45 45, class 3 obesity  Diet education provided on cardiac, 2 gm sodium diet    BMI Findings:  Adult BMI Classifications: Morbid Obesity 45-49 9        Body mass index is 45 45 kg/m²  See Nutrition note dated 1/16/2023 for additional details  Completed nutrition assessment is viewable in the nutrition documentation

## 2023-01-16 NOTE — ASSESSMENT & PLAN NOTE
Incidental 2 mm aneurysm or infundibulum arising from right supraclinoid ICA 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  Also suspected tiny aneurysm versus fenestration at distal A1 segment of right CECIL  Recommend nonurgent neurovascular consultation      Follow-up with Neurovascular as outpatient

## 2023-01-16 NOTE — ASSESSMENT & PLAN NOTE
CT shows Incompletely imaged focal opacity in the medial superior segment of right lower lobe  Aspiration or infection is not excluded    Procalcitonin normal, WBC normal, Temp 99 5F  No cough, shortness of breath  Will monitor symptoms

## 2023-01-16 NOTE — ASSESSMENT & PLAN NOTE
Finding on the CT angiogram of the neck corresponds to paravertebral compressive atelectasis secondary to large osteophyte formation    F/U MRI

## 2023-01-16 NOTE — ASSESSMENT & PLAN NOTE
Possible sleep apnea causing hypertension  Patient endorses snoring  Discharge plan:  Sleep study as outpatient

## 2023-01-16 NOTE — PLAN OF CARE
Problem: PHYSICAL THERAPY ADULT  Goal: Performs mobility at highest level of function for planned discharge setting  See evaluation for individualized goals  Description: Treatment/Interventions: Elevations, Gait training, Endurance training, Patient/family training, Therapeutic exercise, OT          See flowsheet documentation for full assessment, interventions and recommendations  Note: Prognosis: Good  Problem List: Decreased mobility, Obesity  Assessment: Pt is 75 y o  male seen for PT evaluation s/p admit to [address] on 1/15/2023 w/ Aphasia  PT consulted to assess pt's functional mobility and d/c needs  Order placed for PT eval and tx, w/ up w/ A order  Comorbidities affecting pt's physical performance at time of assessment include: aphasia, atelectasis, accelerated HTN, intracranial aneurysm  PTA, pt was living with his spouse in a multi-level home with 0 LÓPEZ  Pt describes 26 steps to access "my shop" and reports independence at baseline with ADLs, IADLs, and functional mobility with use of walking stick when ambulating on uneven surfaces  Personal factors affecting pt at time of IE include: lives in multi story house and stairs to enter home  Please find objective findings from PT assessment regarding body systems outlined above with impairments and limitations including gait deviations  At time of evaluation, patient completed bed mobility [place] with HOB elevated, STS and SPT IND without AD, and ambulation 200' [place] without AD and without LOB in any direction  The following objective measures performed on IE also reveal limitations: AM-PAC 6-Clicks: 34/44  Pt's clinical presentation is currently evolving seen in pt's presentation of gait deviations, review of multiple systems including neuromuscular, sensory, musculoskeletel, review of multiple comorbidities   Pt to benefit from continued PT tx to address deficits as defined above and maximize level of functional independent mobility and consistency with follow up session focusing on gait and stair training  From PT/mobility standpoint, recommendation at time of d/c would be no rehabilitation needs pending progress in order to facilitate return to PLOF  PT Discharge Recommendation: No rehabilitation needs    See flowsheet documentation for full assessment     David Martell; PT, DPT

## 2023-01-16 NOTE — OCCUPATIONAL THERAPY NOTE
Occupational Therapy Evaluation     Patient Name: Luiz Leung  MSEDY'Y Date: 1/16/2023  Problem List  Principal Problem:    Aphasia  Active Problems:    Vitamin D deficiency    Intracranial aneurysm    Abnormal CT scan of lung    Accelerated hypertension    Atelectasis    Sleep apnea, obstructive    Past Medical History  Past Medical History:   Diagnosis Date    Arthritis     Compression fracture of spine ([address] )     last assessed 09/15/2016    Concussion     Dislocation of wrist     right ,  last assessed 09/15/2016    Esophageal reflux     Fractured sternum     Nasal fracture      Past Surgical History  Past Surgical History:   Procedure Laterality Date    CHOLECYSTECTOMY OPEN      COLONOSCOPY      2006    FOOT SURGERY Right            01/16/23 1019   OT Last Visit   OT Visit Date 01/16/23   Note Type   Note type Evaluation   Pain Assessment   Pain Score No Pain   Restrictions/Precautions   Weight Bearing Precautions Per Order No   Other Precautions Fall Risk   Home Living   Type of [address] [place] [address] One level;Performs ADLs on one level; Able to live on main level with bedroom/bathroom  (no LÓPEZ; "shop" is downstairs ~24 steps)   Bathroom Shower/Tub Walk-in shower   Bathroom Toilet Standard   Bathroom Accessibility Accessible   Home Equipment Walker;Cane   Additional Comments pt reports active lifestyle and walks dogs every other day in woods and will use a walking stick, otherwise no device during functional mobility   Prior Function   Level of [place] Independent with ADLs; Independent with functional mobility; Independent with IADLS   Lives With Spouse   IADLs Independent with driving   Falls in the last 6 months 0   Subjective   Subjective "I have two dogs that are sisters"   ADL   Where Assessed Edge of bed   LB Dressing Assistance 7  Independent   Additional Comments pt dons socks EOB with no difficulties   Bed Mobility   Supine to Sit 6  Modified independent   Additional items Bedrails   Sit to Supine 6  Modified independent   Additional items Bedrails   Additional Comments pt on RA during session; SpO2 and HR WFL with no complaints of SOB   Transfers   Sit to Stand 7  Independent   Stand to Sit 7  Independent   Additional Comments pt with no device during functional transfers; no significant LOB or instability   Functional Mobility   Functional Mobility 7  Independent   Additional Comments pt performs ~200ft of mobility with no deivce; no LOB or instability   Additional items   (no device)   Balance   Static Sitting Good   Dynamic Sitting Good   Static Standing Good   Dynamic Standing Good   Ambulatory Good   Activity Tolerance   Activity Tolerance Patient tolerated treatment well   RUE Assessment   RUE Assessment WFL   LUE Assessment   LUE Assessment WFL   Hand Function   Gross Motor Coordination Functional   Fine Motor Coordination Functional   Sensation   Light Touch No apparent deficits   Sharp/Dull No apparent deficits   Psychosocial   Psychosocial (WDL) WDL   Cognition   Overall Cognitive Status WFL   Arousal/Participation Alert   Attention Within functional limits   Orientation Level Oriented X4   Memory Within functional limits   Following Commands Follows all commands and directions without difficulty   Assessment   Assessment Patient is a 75 y o  male admitted to [address] on 1/15/2023 due to Aphasia  Pt performed at (I) level with no OT needs identified at this time Comorbidities affecting pt's physical performance at time of assessment include obesity and arthritis, concussion, GERD  The patient's raw score on the AM-PAC Daily Activity inpatient short form is 24, standardized score is 57 54, greater than 39 4  Patients at this level are likely to benefit from DC to home  From OT standpoint recommend anticipate no needs upon D/C  No further acute OT needs identified at this time   Recommend continued mobilization with hospital staff and restorative services while in the hospital to increase pt’s endurance and strength upon D/C  From OT standpoint, recommend D/C to home with family support when medically cleared  D/C pt from OT caseload at this time  Pt benefited from co-evaluation of skilled OT and PT therapists in order to most appropriately address functional deficits d/t extensive assistance required for safe functional mobility, decreased activity tolerance, and regression from functioning level prior to admission and/or onset of present illness  OT/PT objectives were addressed separately; please see PT note for specific goal areas targeted     Goals   Patient Goals to go home   Recommendation   OT Discharge Recommendation No rehabilitation needs   AM-PAC Daily Activity Inpatient   Lower Body Dressing 4   Bathing 4   Toileting 4   Upper Body Dressing 4   Grooming 4   Eating 4   Daily Activity Raw Score 24   Daily Activity Standardized Score (Calc for Raw Score >=11) 57 54   AM-PAC Applied Cognition Inpatient   Following a Speech/Presentation 4   Understanding Ordinary Conversation 4   Taking Medications 4   Remembering Where Things Are Placed or Put Away 4   Remembering List of 4-5 Errands 4   Taking Care of Complicated Tasks 4   Applied Cognition Raw Score 24   Applied Cognition Standardized Score 62 21

## 2023-01-16 NOTE — ASSESSMENT & PLAN NOTE
Aphasia since 11am on 1/15/23  Previous history of on/off aphasia lasting only a few seconds  Resolved now in ED  Seen by neurology in ED  Start stroke protocol  Received loading dose of Plavix, continue Plavix 75 mg  Received loading dose of aspirin, continue aspirin 81 mg  Continue Lipitor 40 mg daily  Follow-up echo  Follow-up MRI  PT/OT  Speech therapy

## 2023-01-16 NOTE — ASSESSMENT & PLAN NOTE
/91 upon admission, associated with headache  BP 5/27/2022 was 130/68  Permissive hypertension per neurology completed  Headache resolved  1 dose of labetalol  Start lisinopril 10 mg  Discharge plan:  Continue lisinopril 10 mg daily  Continue Tylenol at home for headache  Follow-up with PCP

## 2023-01-16 NOTE — UTILIZATION REVIEW
Initial Clinical Review    Admission: Date/Time/Statement:   Admission Orders (From admission, onward)     Ordered        01/15/23 1552  Inpatient Admission  Once                      Orders Placed This Encounter   Procedures   • Inpatient Admission     Standing Status:   Standing     Number of Occurrences:   1     Order Specific Question:   Level of Care     Answer:   Med Surg [16]     Order Specific Question:   Estimated length of stay     Answer:   More than 2 Midnights     Order Specific Question:   Certification     Answer:   I certify that inpatient services are medically necessary for this patient for a duration of greater than two midnights  See H&P and MD Progress Notes for additional information about the patient's course of treatment  ED Arrival Information     Expected   -    Arrival   1/15/2023 13:25    Acuity   Emergent            Means of arrival   Walk-In    Escorted by   Spouse    Service   Hospitalist    Admission type   Emergency            Arrival complaint   stroke symptoms             Chief Complaint   Patient presents with   • Medical Problem     Per pt wife, he woke up around 11:30 and just didn't seem right  Per wife, patient was stating words and phrases that did "not make sense"  Patient only c/o at this time is a headache  Patient denies numbess/tingling  Initial Presentation: 75 y o  male presents to ed from home for evaluation and treatment of headache and nonsensical speech concerning for stroke  Wife tried to communicate by writing words which patient indicated he did not understand  Lasrt known normal 8:30 pm last night  Wife reports he has had several other similar episodes but they were brief and resolved spontaneously  NHISS =4   Clinical assessment significant for hypertension and pain 8/10  Pxex:  Slurring some words and mispronouncing headache  Difficulty describing headache     CT angiogram of neck shows compressive atelectasis due to large osteophyte formation, aneurysm  and opacity of right lower lobe,  possible aspiration or infection  EKG with incomplete right BBB  Initially treated with po plavix, aspirin, atorvastatin, sq lovenox  Admit to inpatient med surg for Aphasia  Neurology consult:  NHISS =2  Iv thombolysis not indicated due to onset outside time window  CTH shows subtle changes in L MCA territory which could be acute ischemia  CTA unremarkable for LVO or IR target  Plan permissive hypertension - SBP <200  Recommend plavix and aspirin  Date: 1- 16-23  Day 2: day 2   Plan for MRI and ECHO today, PT/OT / ST evaluations  Monitor on telemetry and continue neuro checks  Through  CT of lung shows opacity, procalcitonin, WBC and temp are normal       Addendum:  MRI and ECHO wnl  Discharged      ED Triage Vitals [01/15/23 1338]   99 5 °F (37 5 °C) 81 18 (!) 202/91 96 %      Tympanic Monitor         Pain Score       8          01/16/23 (!) 152 kg (335 lb 1 6 oz)     Additional Vital Signs:     Date/Time Temp Pulse Resp BP MAP (mmHg) SpO2 O2 Device   01/16/23 0800 -- 64 20 171/77   Abnormal  -- 97 % None (Room air)   01/16/23 0600 -- 64 20 175/77   Abnormal  -- 97 % None (Room air)   01/15/23 2000 -- 81 27   Abnormal  156/67 97 95 % None (Room air)   01/15/23 1900 -- 87 16 162/80 -- 96 % None (Room air)   01/15/23 1800 -- 81 -- 199/91   Abnormal  130 96 % None (Room air)   01/15/23 1700 -- 81 19 182/84   Abnormal  -- 95 % None (Room air)   01/15/23 1645 -- 81 16 179/78 A  bnormal  -- 97 % None (Room air)   01/15/23 1630 -- 82 20 194/84   Abnormal  -- 98 % None (Room air)   01/15/23 1615 -- 80 18 176/64   Abnormal  -- 97 % None (Room air)   01/15/23 1600 -- 77 18 182/80   Abnormal  -- 95 % None (Room air)   01/15/23 1515 -- 69 18 180/83   Abnormal  -- 97 % None (Room air)   01/15/23 1500 -- 85 16 184/82   Abnormal  -- 99 % None (Room air)   01/15/23 1445 -- 78 18 179/84   Abnormal  -- 97 % None (Room air)   01/15/23 14:30:57 -- 72 19 -- -- -- --   01/15/23 1430 -- 72 18 189/89   Abnormal  -- 96 % None (Room air)   01/15/23 1400 -- 75 18 186/84   Abnormal  126 98 % None (Room air)   01/15/23 1338 99 5 °F (37 5 °C) 81 18 202/91   Abnormal  -- 96 % None (Room air)       Row Name 01/15/23 1631 01/15/23 1613   Interval Baseline  -AM Baseline  -MC   Best Language (9 ) 1  -AM 1  -MC   Dysarthria (10 ) 1  -AM 1  -MC   Total 2  -AM 2  -MC       Date and Time Eye Opening Best Verbal Response Best Motor Response [place] Coma Scale Score   01/16/23 0800 4 5 6 15   01/16/23 0600 4 5 6 15   01/16/23 0400 4 5 6 15   01/16/23 0200 4 5 6 15   01/16/23 0000 4 5 6 15   01/15/23 2202 4 5 6 15   01/15/23 2100 4 5 6 15   01/15/23 2000 4 5 6 15   01/15/23 1900 4 5 6 15   01/15/23 1800 4 5 6 15   01/15/23 1700 4 5 6 15   01/15/23 1645 4 5 6 15   01/15/23 1630 4 5 6 15   01/15/23 1615 4 5 6 15   01/15/23 1600 4 5 6 15   01/15/23 1545 4 5 6 15   01/15/23 1530 4 5 6 15   01/15/23 1515 4 5 6 15   01/15/23 1500 4 5 6 15   01/15/23 1445 4 5 6 15   01/15/23 1430 4 5 6 15       Pertinent Labs/Diagnostic Test Results:     Collection Time Result Time Vent R Atrial R NH Int  QRSD Int  QT Int  QTC Int  P Axis QRS Axis T Wave Ax    01/15/23 14:20:45 01/16/23 08:54:45 70 70 168 94 386 416 61 -80 27     Narrative:    Normal sinus rhythm   Left axis deviation   Incomplete right bundle branch block   Septal infarct , age undetermined   Abnormal ECG   When compared with ECG of 11-JAN-2016 09:12,   Incomplete right bundle branch block is now Present   Septal infarct is now Present                 CT chest wo contrast   Final  (01/15 1752)      Finding on the CT angiogram of the neck corresponds to paravertebral compressive atelectasis secondary to large osteophyte formation  CTA stroke alert (head/neck)   Final  (01/15 1528)      1  No intracranial large vessel occlusion or critical stenosis        2   No hemodynamically significant stenosis or dissection of cervical carotid and vertebral arteries  3   Incidental 2 mm aneurysm or infundibulum arising from right supraclinoid ICA 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  Also suspected tiny aneurysm versus fenestration at distal A1 segment of right CECIL  Recommend nonurgent    neurovascular consultation  4   Incompletely imaged focal opacity in the medial superior segment of right lower lobe  Aspiration or infection is not excluded  Correlate with clinical assessment and/or chest CT  CT stroke alert brain   Final  (01/15 1529)      1  No acute intracranial CT abnormality  2   Stable calcification/mineralization of bilateral globus pallidus and to lesser degree bilateral thalami and dentate nuclei  Main differentials include endocrinopathy versus Fahr's disease  XR chest 1 view portable   Final  (01/16 0729)      No acute cardiopulmonary disease          Results from last 7 days   Lab Units 01/15/23  1421   SARS-COV-2  Negative     Results from last 7 days   Lab Units 01/16/23  0601 01/15/23  1421   WBC Thousand/uL 4 71 4 35   HEMOGLOBIN g/dL 12 2 13 6   HEMATOCRIT % 36 1* 40 9   PLATELETS Thousands/uL 191 212   NEUTROS ABS Thousands/µL 2 88 3 25         Results from last 7 days   Lab Units 01/16/23  0601 01/15/23  1421   SODIUM mmol/L 137 136   POTASSIUM mmol/L 3 6 4 2   CHLORIDE mmol/L 102 101   CO2 mmol/L 27 26   ANION GAP mmol/L 8 9   BUN mg/dL 14 20   CREATININE mg/dL 0 67 0 60   EGFR ml/min/1 73sq m 98 103   CALCIUM mg/dL 8 6 8 8   MAGNESIUM mg/dL 2 0 1 8   PHOSPHORUS mg/dL 3 1  --      Results from last 7 days   Lab Units 01/16/23  0601 01/15/23  1421   AST U/L 17 19   ALT U/L 25 32   ALK PHOS U/L 55 62   TOTAL PROTEIN g/dL 6 4 7 0   ALBUMIN g/dL 3 3* 3 7   TOTAL BILIRUBIN mg/dL 0 87 0 67     Results from last 7 days   Lab Units 01/15/23  1426   POC GLUCOSE mg/dl 106     Results from last 7 days   Lab Units 01/16/23  0601 01/15/23  1421   GLUCOSE RANDOM mg/dL 132 129       Results from last 7 days   Lab Units 01/16/23  0601   CK TOTAL U/L 35*     Results from last 7 days   Lab Units 01/15/23  1826 01/15/23  1639 01/15/23  1421   HS TNI 0HR ng/L  --   --  6   HS TNI 2HR ng/L  --  6  --    HSTNI D2 ng/L  --  0  --    HS TNI 4HR ng/L 6  --   --    HSTNI D4 ng/L 0  --   --              Results from last 7 days   Lab Units 01/15/23  1421   TSH 3RD GENERATON uIU/mL 1 385     Results from last 7 days   Lab Units 01/16/23  0601   PROCALCITONIN ng/ml <0 05       Results from last 7 days   Lab Units 01/15/23  1639   CLARITY UA  Clear   COLOR UA  Yellow   SPEC GRAV UA  1 020   PH UA  5 0   GLUCOSE UA mg/dl Negative   KETONES UA mg/dl Negative   BLOOD UA  Negative   PROTEIN UA mg/dl Negative   NITRITE UA  Negative   BILIRUBIN UA  Negative   UROBILINOGEN UA E U /dl 0 2   LEUKOCYTES UA  Negative   WBC UA /hpf 0-1*   RBC UA /hpf None Seen   BACTERIA UA /hpf Occasional   EPITHELIAL CELLS WET PREP /hpf Occasional     Results from last 7 days   Lab Units 01/15/23  1421   INFLUENZA A PCR  Negative   INFLUENZA B PCR  Negative   RSV PCR  Negative       ED Treatment:   Medication Administration from 01/15/2023 1325 to 01/16/2023 0912       Date/Time Order Dose Route     01/15/2023 1539 EST clopidogrel (PLAVIX) tablet 300 mg 300 mg Oral     01/15/2023 1539 EST aspirin tablet 325 mg 325 mg Oral     01/15/2023 1817 EST atorvastatin (LIPITOR) tablet 40 mg 40 mg Oral     01/16/2023 0859 EST aspirin chewable tablet 81 mg 81 mg Oral     01/16/2023 0900 EST enoxaparin (LOVENOX) subcutaneous injection 40 mg 40 mg Subcutaneous     01/15/2023 1818 EST enoxaparin (LOVENOX) subcutaneous injection 40 mg 40 mg Subcutaneous     01/16/2023 0859 EST clopidogrel (PLAVIX) tablet 75 mg 75 mg Oral     01/16/2023 0800 EST perflutren lipid microsphere (DEFINITY) injection 0 4 mL/min Intravenous        Past Medical History:   Diagnosis   • Arthritis   • Compression fracture of spine ([facility] [address] )    last assessed 09/15/2016   • Concussion   • Dislocation of wrist    right ,  last assessed 09/15/2016   • Esophageal reflux   • Fractured sternum   • Nasal fracture     Present on Admission:  **None**      Admitting Diagnosis: Slurred speech [R47 81]     Age/Sex: 75 y o  male    Scheduled Medications:  aspirin, 81 mg, Oral, Daily  atorvastatin, 40 mg, Oral, QPM  clopidogrel, 75 mg, Oral, Daily  enoxaparin, 40 mg, Subcutaneous, BID      Continuous IV Infusions:     PRN Meds:  acetaminophen, 650 mg, Oral, Q6H PRN  labetalol, 10 mg, Intravenous, Q4H PRN        IP CONSULT TO NEUROLOGY  IP CONSULT TO CASE MANAGEMENT  IP CONSULT TO NUTRITION SERVICES    Network Utilization Review Department  ATTENTION: Please call with any questions or concerns to [phone] and carefully listen to the prompts so that you are directed to the right person  All voicemails are confidential   Araceli Cavazos all requests for admission clinical reviews, approved or denied determinations and any other requests to dedicated fax number below belonging to the campus where the patient is receiving treatment   List of dedicated fax numbers for the Facilities:  [address] DENIALS (Administrative/Medical Necessity) [phone]   [address] (Maternity/NICU/Pediatrics) [phone]   918 Stephie Soliz [phone]   [place] [address] [phone]   [address] [place] [id number] [place] [address] 28 [phone]   [address] [facility] 134 815 [facility] [phone]

## 2023-01-16 NOTE — CASE MANAGEMENT
Case Management Discharge Planning Note    Patient name Carlotta Zhu  Location MI01/PH43 MRN [id number]  : 1955 Date 2023       Current Admission Date: 1/15/2023  Current Admission [address]   Patient Active Problem List    Diagnosis Date Noted   • Sleep apnea, obstructive 2023   • Aphasia 01/15/2023   • Intracranial aneurysm 01/15/2023   • Abnormal CT scan of lung 01/15/2023   • Accelerated hypertension 01/15/2023   • Atelectasis 01/15/2023   • Erythema migrans (Lyme disease) 2022   • Morbid obesity with BMI of 40 0-44 9, adult ([facility] [address] ) 2019   • Arthritis of left knee 2019   • Primary osteoarthritis of both knees 2018   • Primary osteoarthritis of one knee, left 2018   • Depression 2017   • Morbid obesity ([facility] [address] ) 2017   • Chronic back pain 09/15/2016   • Hypertriglyceridemia 09/15/2016   • Osteoarthritis 09/15/2016   • Vitamin D deficiency 09/15/2016   • Osteoarthritis of spine with radiculopathy, lumbar region 2016      LOS (days): 1  Geometric Mean LOS (GMLOS) (days):   Days to GMLOS:     OBJECTIVE:  Risk of Unplanned Readmission Score: 8 39         Current admission status: Inpatient   Preferred Pharmacy:   [facility]/pharmacy #[id number]- [place] [address]  Phone: [phone] Fax: [phone]    Primary Care Provider: LEIGHA Marinelli    Primary Insurance: [facility] REP  Secondary Insurance:     DISCHARGE DETAILS:  Pt is being dc'd home on this date with no CM needs

## 2023-01-16 NOTE — ASSESSMENT & PLAN NOTE
CT shows Incompletely imaged focal opacity in the medial superior segment of right lower lobe  Aspiration or infection is not excluded  Procalcitonin normal, WBC normal, Temp 99 5F  No cough, shortness of breath  Discharge plan:   Follow-up with PCP

## 2023-01-16 NOTE — ASSESSMENT & PLAN NOTE
Incidental 2 mm aneurysm or infundibulum arising from right supraclinoid ICA 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  Also suspected tiny aneurysm versus fenestration at distal A1 segment of right CECIL  Recommend nonurgent neurovascular consultation    Follow-up brain MRI  Follow-up with Neurovascular

## 2023-01-16 NOTE — UTILIZATION REVIEW
NOTIFICATION OF INPATIENT ADMISSION   AUTHORIZATION REQUEST   SERVICING FACILITY:   [address] [place] [address]  [facility] [address], [place], [address]  Tax ID:  [id number]  NPI: [id number] ATTENDING PROVIDER:  Attending Name and NPI#: Kenji Zimmerman [[id number]]  Address:  [address]  Phone: [phone]     ADMISSION INFORMATION:  Place of Service: Inpatient [address] [facility]  60  Place of Service Code: 21  Inpatient Admission Date/Time: 1/15/23  3:52 PM  Discharge Date/Time: 1/16/2023  2:19 PM  Admitting Diagnosis Code/Description:  Slurred speech [R47 81]     UTILIZATION REVIEW CONTACT:  Mario Szymanski Utilization   Network Utilization Review Department  Phone: [phone]  Fax [phone]  Email: [email or web address]  Contact for approvals/pending authorizations, clinical reviews, and discharge  PHYSICIAN ADVISORY SERVICES:  Medical Necessity Denial & [employer] Review  Phone: [phone]  Fax: [phone]  Email: [email or web address]

## 2023-01-17 ENCOUNTER — TRANSITIONAL CARE MANAGEMENT (OUTPATIENT)
Dept: INTERNAL MEDICINE CLINIC | Facility: CLINIC | Age: 68
End: 2023-01-17

## 2023-01-17 LAB — BACTERIA UR CULT: NORMAL

## 2023-01-19 ENCOUNTER — OFFICE VISIT (OUTPATIENT)
Dept: INTERNAL MEDICINE CLINIC | Facility: CLINIC | Age: 68
End: 2023-01-19

## 2023-01-19 VITALS
BODY MASS INDEX: 42.66 KG/M2 | SYSTOLIC BLOOD PRESSURE: 122 MMHG | DIASTOLIC BLOOD PRESSURE: 78 MMHG | TEMPERATURE: 96.8 F | HEIGHT: 72 IN | WEIGHT: 315 LBS

## 2023-01-19 DIAGNOSIS — I10 ACCELERATED HYPERTENSION: Primary | ICD-10-CM

## 2023-01-19 DIAGNOSIS — G47.33 SLEEP APNEA, OBSTRUCTIVE: ICD-10-CM

## 2023-01-19 DIAGNOSIS — I67.1 INTRACRANIAL ANEURYSM: ICD-10-CM

## 2023-01-19 DIAGNOSIS — R91.8 ABNORMAL CT SCAN OF LUNG: ICD-10-CM

## 2023-01-19 DIAGNOSIS — E55.9 VITAMIN D DEFICIENCY: ICD-10-CM

## 2023-01-19 NOTE — PROGRESS NOTES
Assessment & Plan    * Aphasia  Assessment & Plan  Aphasia since 11am on 1/15/23  Previous history of on/off aphasia lasting only a few seconds  Resolved now in ED  Seen by neurology in ED  Start stroke protocol  Echo completed  MRI of brain shows No acute ischemia   Minimal nonspecific white matter change suggesting microangiopathy  Redemonstrated coarse calcification of bilateral globus pallidus and mineralization of bilateral thalami and dentate nuclei  Aphasia most likely secondary to hypertension  Discharge plan:  Discontinue Plavix 75 mg  Discontinue aspirin 81 mg  Continue Lipitor 40 mg daily  Follow-up with neuro as outpatient     Sleep apnea, obstructive  Assessment & Plan  Possible sleep apnea causing hypertension  Patient endorses snoring  Discharge plan:  Sleep study as outpatient     Atelectasis  Assessment & Plan  Finding on the CT angiogram of the neck corresponds to paravertebral compressive atelectasis secondary to large osteophyte formation            Accelerated hypertension  Assessment & Plan  /91 upon admission, associated with headache  BP 5/27/2022 was 130/68  Permissive hypertension per neurology completed  Headache resolved  1 dose of labetalol  Start lisinopril 10 mg  Discharge plan:  Continue lisinopril 10 mg daily  Continue Tylenol at home for headache  Bps are stable     Abnormal CT scan of lung  Assessment & Plan  CT shows Incompletely imaged focal opacity in the medial superior segment of right lower lobe   Aspiration or infection is not excluded    Procalcitonin normal, WBC normal, Temp 99 5F  No cough, shortness of breath  Will follow up with Pulmonary in future        Intracranial aneurysm  Assessment & Plan  Incidental 2 mm aneurysm or infundibulum arising from right supraclinoid ICA 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   Also suspected tiny aneurysm versus fenestration at distal A1 segment of right CECIL   Recommend nonurgent neurovascular consultation      Follow-up with Neurovascular as outpatient     Vitamin D deficiency  Assessment & Plan  Vitamin D level 10 9  Take vitamin D at home     1  Accelerated hypertension    2  Intracranial aneurysm    3  Sleep apnea, obstructive    4  Abnormal CT scan of lung    5  Vitamin D deficiency         Subjective     Transitional Care Management Review:   Lianna Echols is a 75 y o  male here for TCM follow up  During the TCM phone call patient stated:  TCM Call     Date and time call was made  1/17/2023 10:00 AM    Hospital care reviewed  Discussed with Inpatient Physician    Patient was hospitial[facility] at  [facility]     Date of Admission  01/15/23    Date of discharge  01/16/23    Diagnosis  APHASIA    Disposition  Home    Were the patients medications reviewed and updated  Yes    Current Symptoms  None    Left side leg pain severity  Mild    Leg pain, left side, onset  Other (comment)      TCM Call     Post hospital issues  None    Should patient be enrolled in anticoag monitoring? No    Scheduled for follow up?   No    Patient refusal reason  Patient has not been released by his surgeon yet, does not want to pay out of pocket expense, does not feel its warranted to see us, and relies on wife for transportation    Patients specialists  Other (comment)    Other specialists names  Ortho    Did you obtain your prescribed medications  Yes    Do you need help managing your prescriptions or medications  No    Is transportation to your appointment needed  No    Specify why  unable to drive due to knee replacement    I have advised the patient to call PCP with any new or worsening symptoms  Christopher Nelson 99 or Significiant other    Support System  Spouse    The type of support provided  Emotional; Physical    Do you have social support  Yes, as much as I need    Are you recieving any outpatient services  No    What type of services  Physical therapy    Are you recieving home care services  No    Are you using any community resources  No    Current waiver services  No    Have you fallen in the last 12 months  No    Interperter language line needed  No    Counseling  Patient    Counseling topics  Diagnostic results    Comments  left a msg for pt to call back so we can schedule a tcm  Patient states he is doing very well and surgery went fantastic  he is receiving physical therapy- Outpatient and doing excerises at home  Patient has not been released by his surgeon yet, does not want to pay out of pocket expense, does not feel its warranted to see us, and relies on wife for transportatio        Jeane Hatch is a 75 y o  male patient who originally presented to the hospital on 1/15/2023 due to aphasia  Patient was seen by neuro in ED and started on stroke protocol  MRI ruled out stroke  Most likely secondary to hypertension /91 upon admission  Patient received lisinopril and metoprolol and blood pressure is normalized  He is doing well since coming home and is checking his Bps that are normal  He denies any chest pain, SOB, or palpitations  He is scheduled with Sleep medicine in February  He is doing well since coming home  He is taking his medications with no issues  He has not made a follow up with Cardiology  He offers no other issues        Below is the patient's most recent value for Albumin, ALT, AST, BUN, Calcium, Chloride, Cholesterol, CO2, Creatinine, GFR, Glucose, HDL, Hematocrit, Hemoglobin, Hemoglobin A1C, LDL, Magnesium, Phosphorus, Platelets, Potassium, PSA, Sodium, Triglycerides, and WBC     Lab Results   Component Value Date    ALT 25 01/16/2023    AST 17 01/16/2023    BUN 14 01/16/2023    CALCIUM 8 6 01/16/2023     01/16/2023    CHOL 206 04/04/2014    CO2 27 01/16/2023    CREATININE 0 67 01/16/2023    HDL 63 01/16/2023    HCT 36 1 (L) 01/16/2023    HGB 12 2 01/16/2023    HGBA1C 5 3 01/16/2023    MG 2 0 01/16/2023    PHOS 3 1 01/16/2023     01/16/2023    K 3 6 01/16/2023    PSA 0 5 04/02/2021     04/04/2014    TRIG 260 (H) 01/16/2023    WBC 4 71 01/16/2023     Note: for a comprehensive list of the patient's lab results, access the Results Review activity  Review of Systems   All other systems reviewed and are negative  Objective     /78   Temp (!) 96 8 °F (36 °C) (Temporal)   Ht 6' (1 829 m)   Wt (!) 152 kg (334 lb 14 4 oz)   BMI 45 42 kg/m²      Physical Exam  Vitals reviewed  Constitutional:       Appearance: Normal appearance  He is obese  HENT:      Head: Normocephalic and atraumatic  Right Ear: Tympanic membrane, ear canal and external ear normal       Left Ear: Ear canal and external ear normal       Nose: Nose normal       Mouth/Throat:      Mouth: Mucous membranes are moist       Pharynx: Oropharynx is clear  Eyes:      Extraocular Movements: Extraocular movements intact  Conjunctiva/sclera: Conjunctivae normal       Pupils: Pupils are equal, round, and reactive to light  Cardiovascular:      Rate and Rhythm: Normal rate and regular rhythm  Pulses: Normal pulses  Heart sounds: Normal heart sounds  Pulmonary:      Effort: Pulmonary effort is normal       Breath sounds: Normal breath sounds  Abdominal:      General: Abdomen is flat  Bowel sounds are normal       Palpations: Abdomen is soft  Musculoskeletal:         General: Normal range of motion  Cervical back: Normal range of motion and neck supple  Skin:     General: Skin is warm and dry  Capillary Refill: Capillary refill takes less than 2 seconds  Neurological:      General: No focal deficit present  Mental Status: He is alert and oriented to person, place, and time  Mental status is at baseline  Psychiatric:         Mood and Affect: Mood normal          Behavior: Behavior normal          Thought Content:  Thought content normal          Judgment: Judgment normal        Medications have been reviewed by provider in current encounter    Terrell Brown

## 2023-01-27 ENCOUNTER — CONSULT (OUTPATIENT)
Dept: NEUROLOGY | Facility: CLINIC | Age: 68
End: 2023-01-27

## 2023-01-27 VITALS
SYSTOLIC BLOOD PRESSURE: 144 MMHG | HEART RATE: 62 BPM | BODY MASS INDEX: 42.66 KG/M2 | WEIGHT: 315 LBS | TEMPERATURE: 97.8 F | DIASTOLIC BLOOD PRESSURE: 64 MMHG | HEIGHT: 72 IN

## 2023-01-27 DIAGNOSIS — I67.1 INTRACRANIAL ANEURYSM: ICD-10-CM

## 2023-01-27 DIAGNOSIS — I25.3 ATRIAL SEPTAL ANEURYSM: ICD-10-CM

## 2023-01-27 DIAGNOSIS — R40.4 EPISODIC ALTERED AWARENESS: Primary | ICD-10-CM

## 2023-01-27 DIAGNOSIS — R47.01 APHASIA: ICD-10-CM

## 2023-01-27 NOTE — PATIENT INSTRUCTIONS
- Please obtain routine EEG   - Keep track of the episodes  Would recommend filming the event if possible  - Recommend not to drive at this time  - Referral send to cardiology  - Follow with neurosurgery
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
## 2023-01-28 NOTE — ASSESSMENT & PLAN NOTE
-Most recent MRI during hospitalization in early January revealed an incidental 2 mm aneurysm   -He has an upcoming appointment with neurosurgery

## 2023-01-28 NOTE — ASSESSMENT & PLAN NOTE
-Episodes, as described below, sound concerning for possible seizure-like activity  Advised patient to obtain a routine EEG  Order placed today   -Discussed with him about concerns for driving  He was advised not to drive  Driving form completed  -Advised him to please keep track of these episodes and obtain a video recording if possible   -Discussed seizure safety

## 2023-02-07 ENCOUNTER — OFFICE VISIT (OUTPATIENT)
Dept: INTERNAL MEDICINE CLINIC | Facility: CLINIC | Age: 68
End: 2023-02-07

## 2023-02-07 ENCOUNTER — TELEPHONE (OUTPATIENT)
Dept: NEUROLOGY | Facility: CLINIC | Age: 68
End: 2023-02-07

## 2023-02-07 ENCOUNTER — HOSPITAL ENCOUNTER (OUTPATIENT)
Dept: RADIOLOGY | Facility: HOSPITAL | Age: 68
Discharge: HOME | End: 2023-02-07

## 2023-02-07 VITALS
TEMPERATURE: 97.4 F | SYSTOLIC BLOOD PRESSURE: 146 MMHG | HEIGHT: 72 IN | DIASTOLIC BLOOD PRESSURE: 82 MMHG | BODY MASS INDEX: 42.66 KG/M2 | OXYGEN SATURATION: 99 % | WEIGHT: 315 LBS | HEART RATE: 55 BPM

## 2023-02-07 DIAGNOSIS — E78.1 HYPERTRIGLYCERIDEMIA: ICD-10-CM

## 2023-02-07 DIAGNOSIS — I10 ACCELERATED HYPERTENSION: ICD-10-CM

## 2023-02-07 DIAGNOSIS — M25.511 ACUTE PAIN OF RIGHT SHOULDER: ICD-10-CM

## 2023-02-07 DIAGNOSIS — M25.511 ACUTE PAIN OF RIGHT SHOULDER: Primary | ICD-10-CM

## 2023-02-07 RX ORDER — VALSARTAN 160 MG/1
160 TABLET ORAL DAILY
Qty: 30 TABLET | Refills: 3 | Status: SHIPPED | OUTPATIENT
Start: 2023-02-07

## 2023-02-07 NOTE — PROGRESS NOTES
Name: rTey Sanders      : 1955      MRN: [id number]  Encounter Provider: LEIGHA Mathias  Encounter Date: 2023   Encounter department: [address] [place] [address],[address]    Assessment & Plan Will stop Zestril and will start on Valsartan  Will order an XR of the right shoulder did advise using heat  Will bring back in two weeks for a BP recheck  Did advise if any worsening of symptoms did advise to go to ER  1  Acute pain of right shoulder  -     XR shoulder 2+ vw right; Future; Expected date: 2023    2  Accelerated hypertension  -     valsartan (DIOVAN) 160 mg tablet; Take 1 tablet (160 mg total) by mouth daily    3  Hypertriglyceridemia  -     Lipid panel; Future           Subjective      Carmenallegra Dawn is for an acute visit  He is having issues he feel with his Zestril  He feels he is having pain in his shoulders and body from this  He is having right shoulder pain and is not sure if his pressures are up due to this  He is having some left shoulder pain as well  He denies any chest pain  He was having some headaches and did not let Neurology this as of yet  Review of Systems   Musculoskeletal: Positive for arthralgias and myalgias  Neurological: Positive for headaches  All other systems reviewed and are negative  Current Outpatient Medications on File Prior to Visit   Medication Sig   • acetaminophen (TYLENOL) 325 mg tablet Take 2 tablets (650 mg total) by mouth every 6 (six) hours as needed for mild pain, headaches or fever Do not exceed a total of 3 grams of tylenol/acetaminophen in a 24-hour period  • aspirin 81 mg chewable tablet Chew 1 tablet (81 mg total) daily To prevent stroke and heart attack Do not start before 2023     • cholecalciferol (VITAMIN D3) 1,000 units tablet Take 2 tablets (2,000 Units total) by mouth daily For vitamin D deficiency   • [DISCONTINUED] atorvastatin (LIPITOR) 20 mg tablet Take 1 tablet (20 mg total) by mouth every evening To prevent stroke and heart attack   • [DISCONTINUED] lisinopril (ZESTRIL) 10 mg tablet Take 1 tablet (10 mg total) by mouth daily To treat high blood pressure Do not start before January 17, 2023  Objective     /82   Pulse 55   Temp (!) 97 4 °F (36 3 °C) (Oral)   Ht 6' (1 829 m)   Wt (!) 151 kg (332 lb 9 6 oz)   SpO2 99%   BMI 45 11 kg/m²     Physical Exam  Vitals reviewed  Constitutional:       Appearance: Normal appearance  He is normal weight  Cardiovascular:      Rate and Rhythm: Normal rate and regular rhythm  Pulses: Normal pulses  Heart sounds: Normal heart sounds  Pulmonary:      Effort: Pulmonary effort is normal       Breath sounds: Normal breath sounds  Musculoskeletal:         General: Normal range of motion  Skin:     General: Skin is warm and dry  Capillary Refill: Capillary refill takes less than 2 seconds  Neurological:      General: No focal deficit present  Mental Status: He is alert and oriented to person, place, and time  Mental status is at baseline  Psychiatric:         Mood and Affect: Mood normal          Behavior: Behavior normal          Thought Content:  Thought content normal          Judgment: Judgment normal        [place] LEIGHA Sosa

## 2023-02-07 NOTE — TELEPHONE ENCOUNTER
Pt's wife left VM re: pt having several HAs per day (Sometimes as many as 5), and they are concerned. Requesting a call back at # [phone].    Called back to speak with pt. and left a  with call back #. Awaiting return call.    Transcribed VM:   Hi, my name is Radhika Rice. I'm calling for my , Darryl Rice. He was seen by Ms. Dagoberto AHUJA and Dr. Kirk was the doctor that he was referred too. He has an EEG scheduled in April, however, we were told to call if anything has changed. He's been having several headaches a day and we're just a bit concerned about that. He says, by the time he goes to take some Tylenol, the headache leaves, but he has had as many as 5 headaches in less than a 24 hour period. So if you could call me back, that would be great. [phone]. Thank you.

## 2023-02-08 NOTE — TELEPHONE ENCOUNTER
Hi are we able to get more information on his headaches? Such as, when did they start happening, how severe are they, how long are they lasting, any other symptoms with them like light and noise sensitivity, aggravated by activity, changes in vision. And how does the headache feel in term of characteristics. Please let me know. Thank you

## 2023-02-10 NOTE — TELEPHONE ENCOUNTER
Hi, my name is Radhika Rice, I'm calling in behalf of my , Darryl Rice. His date of birth is [birth year]. He saw Abigail Arenas and I called as a follow up on the 7th of this week. Well, I heard back on the 7th that the message was being given to Abigail or the team, but I haven't heard anything back. The reason I called originally was that my  has been having headaches. I don't know if it had to do with blood pressure. He is on medication. The medication was switched though. I don't know if it has to do with blood pressure or the aneurysm in his brain. So I was hoping that someone could at least have a conversation with me. Again, Darryl Rice. My phone number [phone]. Thank you.    Called [phone], spoke w/ pt's wife Radhika and advised of the below. States that she is unsure when it start happening but Tues, pt went to pcp  and had med changes. Simvastatin and lisinopril was d/c and valsartan was ordered. Pt also takes vit d and asa 81 mg. Tues is  when she found out that pt has been having HA. It is not severe. It is manageable cathryn when he takes tylenol prn. Less intense and does not lingered. Pt is able to function. The concern is the # of HA pt is experiencing daily. Pt gets 4-5 HA in 24 hrs on and off. She does not know if pt is experiencing other symptoms or how long it last. She will ask pt when he wakes up. Advised Radhika that I will send a [employer] message w/ additional question and she can either reply back via [employer] or call us back. She verbalized understanding.     Cb [phone], ok to leave a detailed message    Awaiting additional info

## 2023-02-10 NOTE — TELEPHONE ENCOUNTER
Hi, so I called Mr. Andrews and discussed his symptoms with him.  I discussed with him that sometimes wake up headaches could be an indicative of sleep apnea.  He reports he does have a follow-up appointment with sleep medicine later this month.  He also says that his headaches have been going on since 1988 but that he has just recently started tracking them.  We discussed treatment and he would like to hold off on taking medicine at this time.  We will discuss further at his next appointment.  Thank you

## 2023-02-10 NOTE — TELEPHONE ENCOUNTER
Called patient to discuss; reached vm left detailed message per this communication thread for further details in order to better assist.

## 2023-02-10 NOTE — TELEPHONE ENCOUNTER
Hello, I spoke with Mr. Andrews and I would like for him to have a sooner appointment than July.  Can we try to schedule something for end of June by this time he should have seen sleep medicine neurosurgery and completed his EEG.  He asked if we can call his wife to confirm this appointment.  Thank you

## 2023-02-13 NOTE — TELEPHONE ENCOUNTER
Called and spoke with Radhika. She wanted to scheduled in May so offered the 25th at 145pm and she accepted. I did check to make sure all appts were scheduled to be completed before the follow up with you.

## 2023-02-15 ENCOUNTER — TELEPHONE (OUTPATIENT)
Dept: NEUROLOGY | Facility: CLINIC | Age: 68
End: 2023-02-15

## 2023-02-15 NOTE — TELEPHONE ENCOUNTER
Recd vm:    Hi, this is Crecencio Meckel i'm calling in behalf of Ismael tovar  And we just got paperwork from the state about driving  If you could please have kellie choerera  Call us back  We need to speak with her [phone]  Thank you  Patient of Juan Luis Adolfo, last visit 1/27    I returned call and patient's wife, Mynor Mike only wants to speak to Cassy and seemed upset on the phone  Requesting cb ASAP  I let her know the office is not closed but will send Cassy the message

## 2023-02-21 ENCOUNTER — OFFICE VISIT (OUTPATIENT)
Dept: PULMONOLOGY | Facility: CLINIC | Age: 68
End: 2023-02-21

## 2023-02-21 VITALS
OXYGEN SATURATION: 98 % | WEIGHT: 315 LBS | RESPIRATION RATE: 18 BRPM | BODY MASS INDEX: 42.66 KG/M2 | SYSTOLIC BLOOD PRESSURE: 122 MMHG | HEART RATE: 66 BPM | TEMPERATURE: 96.8 F | HEIGHT: 72 IN | DIASTOLIC BLOOD PRESSURE: 60 MMHG

## 2023-02-21 DIAGNOSIS — G47.19 EXCESSIVE DAYTIME SLEEPINESS: ICD-10-CM

## 2023-02-21 DIAGNOSIS — I10 ACCELERATED HYPERTENSION: ICD-10-CM

## 2023-02-21 DIAGNOSIS — R06.83 SNORING: Primary | ICD-10-CM

## 2023-02-21 DIAGNOSIS — E66.01 MORBID OBESITY WITH BMI OF 40.0-44.9, ADULT (HCC): ICD-10-CM

## 2023-02-21 NOTE — PROGRESS NOTES
Sleep Consultation   Viktor Greenwood 75 y o  male MRN: [id number]      Reason for consultation: MARTIN    Requesting physician: Dr Jana Gonzalez    Assessment/Plan  75 y o  F with PMHx of HTN, hypertriglyceridemia who comes in for evaluation of snoring and excessive daytime sleepiness  1   Snoring/Excessive daytime sleepiness  -  Mallampati class 4, Body mass index is 45 03 kg/m² , Neck Circumference: 16 5  She is at risk for obstructive sleep apnea based on [employer] survey  -  Check a home study to assess for obstructive sleep apnea      -  I discussed in depth the diagnostic studies and treatment options involved with obstructive sleep apnea      -  I also discussed in depth the risk of leaving sleep apnea untreated including hypertension, heart failure, arrhythmia, MI and stroke  -  The patient is agreeable to undergo testing and treatment of obstructive sleep apnea  She understands that pitfalls she may encounter along the way and is willing to attempt CPAP treatment  2   Alcohol use - he quit using alcohol in January  He admitted to significant use previously  This likely contributed to worsening sleep deprivation, encephalopathy and potentially in withdrawal, some seizure activity  -  I discussed avoiding alcohol use moving forward      -  We also discussed the negative long term consequences of alcohol use as well  3   Insufficient sleep -  He rarely sleeps more that 3 hrs in a row  He will typically get 6-7 in a day but it will be fragmented  We discussed the importance of consistent sleep  He will work on trying to lengthen his sleep quality  History of Present Illness   HPI:  Viktor Greenwood is a 75 y o  male with PMHx as below who comes in for evaluation of snoring and daytime sleepiness  He was seen in the hospital for periods of encephealopathy, hallucinations and confusion  He states that he was previously a heavy alcohol drinker    It has been an extended period since he last used alcohol however  He quit in January  According to his wife, over the past 2 years his episodes of encephalopathy are lasting longer and are more frequent  She went to see neurology and had several tests with no significant improvement  Patient notes weight gain and symptoms of difficulty falling asleep, difficulty staying asleep, snoring  He does admit to some excessive daytime sleepiness despite an [place] score of 1  His wife also states that he has struggled with memory and concentration issues  He denies awakenings with gasping, witnessed apneas, morning headaches, awakenings with dry mouth  he note symptoms of restless legs  he denies symptoms of cataplexy, sleep paralysis, hypnopompic or hypnagogic hallucinations  Sleep History:  he goes to bed at approximately 9-10, will get to sleep in 30 min - 1 hr, will get out of bed at 9 am   he will get up 3-4 times at night to take the dog out or for other reasons  It will then take a few minutes to fall back asleep    he does/does not nap during he day  He does not typically nap  ROS:   Review of Systems   Constitutional: Positive for fatigue  Negative for appetite change  HENT: Negative  Eyes: Negative  Respiratory: Negative  Cardiovascular: Negative  Gastrointestinal: Negative  Endocrine: Negative  Genitourinary: Negative  Musculoskeletal: Negative  Skin: Negative  Allergic/Immunologic: Negative  Neurological: Negative  Hematological: Negative  Psychiatric/Behavioral: Positive for behavioral problems and decreased concentration  Negative for sleep disturbance  The patient is nervous/anxious            Historical Information   Past Medical History:   Diagnosis Date   • Arthritis    • Compression fracture of spine ([facility] [address] )     last assessed 09/15/2016   • Concussion    • Dislocation of wrist     right ,  last assessed 09/15/2016   • Esophageal reflux    • Fractured sternum    • Nasal fracture      Past Surgical History:   Procedure Laterality Date   • CHOLECYSTECTOMY OPEN     • COLONOSCOPY      2006   • FOOT SURGERY Right      Family History   Problem Relation Age of Onset   • Dementia Mother    • Hyperlipidemia Mother    • Cancer Father         bladder      Social History     Socioeconomic History   • Marital status: /Civil Union     Spouse name: Not on file   • Number of children: Not on file   • Years of education: Not on file   • Highest education level: Not on file   Occupational History     Comment: full time   Tobacco Use   • Smoking status: Former     Types: Cigarettes, Pipe, Cigars     Quit date: 2013     Years since quittin 8   • Smokeless tobacco: Former     Types: Chew     Quit date: 2013   • Tobacco comments:     Started when he was 24 years    Vaping Use   • Vaping Use: Never used   Substance and Sexual Activity   • Alcohol use: Not Currently     Alcohol/week: 6 0 standard drinks     Types: 6 Cans of beer per week     Comment: occassional    • Drug use: Never   • Sexual activity: Not on file   Other Topics Concern   • Not on file   Social History Narrative    Always wears seat belts     Daily coffee consumption    No living will         Social Determinants of Health     Financial Resource Strain: Not on file   Food Insecurity: Not on file   Transportation Needs: Not on file   Physical Activity: Not on file   Stress: Not on file   Social Connections: Not on file   Intimate Partner Violence: Not on file   Housing Stability: Not on file       Occupational History: chemical [employer]    Meds/Allergies   Allergies   Allergen Reactions   • Neomycin-Bacitracin Zn-Polymyx    • Neomycin-Polymyxin-Pramoxine Itching   • Other    • Adhesive [Medical Tape] Rash     Just J&J   • Cephalexin Rash       Home medications:  Prior to Admission medications    Medication Sig Start Date End Date Taking?  Authorizing Provider   acetaminophen (TYLENOL) 325 mg tablet Take 2 tablets (650 mg total) by mouth every 6 (six) hours as needed for mild pain, headaches or fever Do not exceed a total of 3 grams of tylenol/acetaminophen in a 24-hour period  1/16/23  Yes Teresa Yadav DO   aspirin 81 mg chewable tablet Chew 1 tablet (81 mg total) daily To prevent stroke and heart attack Do not start before January 17, 2023 1/17/23  Yes Teresa Ydaav DO   cholecalciferol (VITAMIN D3) 1,000 units tablet Take 2 tablets (2,000 Units total) by mouth daily For vitamin D deficiency 1/16/23  Yes Ivanna Gibson DO   valsartan (DIOVAN) 160 mg tablet Take 1 tablet (160 mg total) by mouth daily 2/7/23  Yes LEIGHA Marin       Vitals:   Blood pressure 122/60, pulse 66, temperature (!) 96 8 °F (36 °C), temperature source Tympanic, resp  rate 18, height 6' (1 829 m), weight (!) 151 kg (332 lb), SpO2 98 % , RA, Body mass index is 45 03 kg/m²  Neck Circumference: 16 5    Physical Exam  General: Pleasant, Awake alert and oriented x 3, conversant without conversational dyspnea, NAD, normal affect  HEENT:  PERRL, Sclera noninjected, nonicteric OU, Nares patent,  no craniofacial abnormalities, Mucous membranes, moist, no oral lesions, normal dentition, Mallampati class 4  NECK: Trachea midline, no accessory muscle use, no stridor, no cervical or supraclavicular adenopathy, JVP not elevated  CARDIAC: Reg, single s1/S2, no m/r/g  PULM: CTA bilaterally no wheezing, rhonchi or rales  ABD: Normoactive bowel sounds, soft nontender, nondistended, no rebound, no rigidity, no guarding  EXT: No cyanosis, no clubbing, no edema, normal capillary refill  NEURO: no focal neurologic deficits, AAOx3, moving all extremities appropriately    Labs: I have personally reviewed pertinent lab results    Lab Results   Component Value Date    WBC 4 71 01/16/2023    HGB 12 2 01/16/2023    HCT 36 1 (L) 01/16/2023    MCV 91 01/16/2023     01/16/2023      Lab Results   Component Value Date    GLUCOSE 117 04/04/2014    CALCIUM 8 6 01/16/2023     04/04/2014    K 3 6 01/16/2023    CO2 27 01/16/2023     01/16/2023    BUN 14 01/16/2023    CREATININE 0 67 01/16/2023     No results found for: IRON, TIBC, FERRITIN  Lab Results   Component Value Date    KCPHAMZP01 460 01/11/2016     Lab Results   Component Value Date    FOLATE >20 0 (H) 01/11/2016     PFT 3/28/18  IMPRESSION:  1  Normal spirometry, lung volumes and diffusion capacity without bronchodilator responsiveness     CT chest 1/15/23  IMPRESSION:   Finding on the CT angiogram of the neck corresponds to paravertebral compressive atelectasis secondary to large osteophyte formation  CT chest without constrast  LUNGS:  Lungs are clear  There is no tracheal or endobronchial lesion  PLEURA:  Unremarkable  HEART/GREAT VESSELS:  Unremarkable for patient's age  MEDIASTINUM AND KENA:  Unremarkable  CXR 1/15/23  IMPRESSION:   No acute cardiopulmonary disease      Sleep studies:  None    Meryl Ahr, DO [facility] Sleep Physician

## 2023-02-21 NOTE — LETTER
February 21, 2023     Loli Fowler, [address]  [facility] [address]    Patient: Avel Henderson   YOB: 1955   Date of Visit: 2/21/2023       Dear Dr Artis Fisher: Thank you for referring Avel Henderson to me for evaluation  Below are my notes for this consultation  If you have questions, please do not hesitate to call me  I look forward to following your patient along with you  Sincerely,        Shahram Haas DO        CC: Matilda Curtis DO  2/21/2023  3:04 PM  Sign when Signing Visit  Sleep Consultation   Avel Henderson 75 y o  male MRN: [id number]      Reason for consultation: MARTIN    Requesting physician: Dr Lauryn Ramirez    Assessment/Plan  75 y o  F with PMHx of HTN, hypertriglyceridemia who comes in for evaluation of snoring and excessive daytime sleepiness  1   Snoring/Excessive daytime sleepiness  -  Mallampati class 4, Body mass index is 45 03 kg/m² , Neck Circumference: 16 5  She is at risk for obstructive sleep apnea based on STOP BANG survey  -  Check a home study to assess for obstructive sleep apnea      -  I discussed in depth the diagnostic studies and treatment options involved with obstructive sleep apnea      -  I also discussed in depth the risk of leaving sleep apnea untreated including hypertension, heart failure, arrhythmia, MI and stroke  -  The patient is agreeable to undergo testing and treatment of obstructive sleep apnea  She understands that pitfalls she may encounter along the way and is willing to attempt CPAP treatment  2   Alcohol use - he quit using alcohol in January  He admitted to significant use previously  This likely contributed to worsening sleep deprivation, encephalopathy and potentially in withdrawal, some seizure activity  -  I discussed avoiding alcohol use moving forward      -  We also discussed the negative long term consequences of alcohol use as well  3   Insufficient sleep -  He rarely sleeps more that 3 hrs in a row  He will typically get 6-7 in a day but it will be fragmented  We discussed the importance of consistent sleep  He will work on trying to lengthen his sleep quality  History of Present Illness   HPI:  Duong Lozano is a 75 y o  male with PMHx as below who comes in for evaluation of snoring and daytime sleepiness  He was seen in the hospital for periods of encephealopathy, hallucinations and confusion  He states that he was previously a heavy alcohol drinker  It has been an extended period since he last used alcohol however  He quit in January  According to his wife, over the past 2 years his episodes of encephalopathy are lasting longer and are more frequent  She went to see neurology and had several tests with no significant improvement  Patient notes weight gain and symptoms of difficulty falling asleep, difficulty staying asleep, snoring  He does admit to some excessive daytime sleepiness despite an Oneco score of 1  His wife also states that he has struggled with memory and concentration issues  He denies awakenings with gasping, witnessed apneas, morning headaches, awakenings with dry mouth  he note symptoms of restless legs  he denies symptoms of cataplexy, sleep paralysis, hypnopompic or hypnagogic hallucinations  Sleep History:  he goes to bed at approximately 9-10, will get to sleep in 30 min - 1 hr, will get out of bed at 9 am   he will get up 3-4 times at night to take the dog out or for other reasons  It will then take a few minutes to fall back asleep    he does/does not nap during he day  He does not typically nap  ROS:   Review of Systems   Constitutional: Positive for fatigue  Negative for appetite change  HENT: Negative  Eyes: Negative  Respiratory: Negative  Cardiovascular: Negative  Gastrointestinal: Negative  Endocrine: Negative  Genitourinary: Negative  Musculoskeletal: Negative  Skin: Negative  Allergic/Immunologic: Negative  Neurological: Negative  Hematological: Negative  Psychiatric/Behavioral: Positive for behavioral problems and decreased concentration  Negative for sleep disturbance  The patient is nervous/anxious            Historical Information   Past Medical History:   Diagnosis Date   • Arthritis    • Compression fracture of spine ([address] )     last assessed 09/15/2016   • Concussion    • Dislocation of wrist     right ,  last assessed 09/15/2016   • Esophageal reflux    • Fractured sternum    • Nasal fracture      Past Surgical History:   Procedure Laterality Date   • CHOLECYSTECTOMY OPEN     • COLONOSCOPY      2006   • FOOT SURGERY Right      Family History   Problem Relation Age of Onset   • Dementia Mother    • Hyperlipidemia Mother    • Cancer Father         bladder      Social History     Socioeconomic History   • Marital status: /Civil Union     Spouse name: Not on file   • Number of children: Not on file   • Years of education: Not on file   • Highest education level: Not on file   Occupational History     Comment: full time   Tobacco Use   • Smoking status: Former     Types: Cigarettes, Pipe, Cigars     Quit date: 2013     Years since quittin 8   • Smokeless tobacco: Former     Types: Chew     Quit date: 2013   • Tobacco comments:     Started when he was 24 years    Vaping Use   • Vaping Use: Never used   Substance and Sexual Activity   • Alcohol use: Not Currently     Alcohol/week: 6 0 standard drinks     Types: 6 Cans of beer per week     Comment: occassional    • Drug use: Never   • Sexual activity: Not on file   Other Topics Concern   • Not on file   Social History Narrative    Always wears seat belts     Daily coffee consumption    No living will         Social Determinants of Health     Financial Resource Strain: Not on file   Food Insecurity: Not on file   Transportation Needs: Not on file   Physical Activity: Not on file   Stress: Not on file   Social Connections: Not on file   Intimate Partner Violence: Not on file   Housing Stability: Not on file       Occupational History: chemical [employer]    Meds/Allergies    Allergies   Allergen Reactions   • Neomycin-Bacitracin Zn-Polymyx    • Neomycin-Polymyxin-Pramoxine Itching   • Other    • Adhesive [Medical Tape] Rash     Just J&J   • Cephalexin Rash       Home medications:  Prior to Admission medications    Medication Sig Start Date End Date Taking? Authorizing Provider   acetaminophen (TYLENOL) 325 mg tablet Take 2 tablets (650 mg total) by mouth every 6 (six) hours as needed for mild pain, headaches or fever Do not exceed a total of 3 grams of tylenol/acetaminophen in a 24-hour period  1/16/23  Yes Susan Romero DO   aspirin 81 mg chewable tablet Chew 1 tablet (81 mg total) daily To prevent stroke and heart attack Do not start before January 17, 2023 1/17/23  Yes Susan Romero DO   cholecalciferol (VITAMIN D3) 1,000 units tablet Take 2 tablets (2,000 Units total) by mouth daily For vitamin D deficiency 1/16/23  Yes [facility],    valsartan (DIOVAN) 160 mg tablet Take 1 tablet (160 mg total) by mouth daily 2/7/23  Yes LEIGHA Vizcarra       Vitals:   Blood pressure 122/60, pulse 66, temperature (!) 96 8 °F (36 °C), temperature source Tympanic, resp  rate 18, height 6' (1 829 m), weight (!) 151 kg (332 lb), SpO2 98 % , RA, Body mass index is 45 03 kg/m²    Neck Circumference: 16 5    Physical Exam  General: Pleasant, Awake alert and oriented x 3, conversant without conversational dyspnea, NAD, normal affect  HEENT:  PERRL, Sclera noninjected, nonicteric OU, Nares patent,  no craniofacial abnormalities, Mucous membranes, moist, no oral lesions, normal dentition, Mallampati class 4  NECK: Trachea midline, no accessory muscle use, no stridor, no cervical or supraclavicular adenopathy, JVP not elevated  CARDIAC: Reg, single s1/S2, no m/r/g  PULM: CTA bilaterally no wheezing, rhonchi or rales  ABD: Normoactive bowel sounds, soft nontender, nondistended, no rebound, no rigidity, no guarding  EXT: No cyanosis, no clubbing, no edema, normal capillary refill  NEURO: no focal neurologic deficits, AAOx3, moving all extremities appropriately    Labs: I have personally reviewed pertinent lab results  Lab Results   Component Value Date    WBC 4 71 01/16/2023    HGB 12 2 01/16/2023    HCT 36 1 (L) 01/16/2023    MCV 91 01/16/2023     01/16/2023      Lab Results   Component Value Date    GLUCOSE 117 04/04/2014    CALCIUM 8 6 01/16/2023     04/04/2014    K 3 6 01/16/2023    CO2 27 01/16/2023     01/16/2023    BUN 14 01/16/2023    CREATININE 0 67 01/16/2023     No results found for: IRON, TIBC, FERRITIN  Lab Results   Component Value Date    JGWOTKVF04 460 01/11/2016     Lab Results   Component Value Date    FOLATE >20 0 (H) 01/11/2016     PFT 3/28/18  IMPRESSION:  1  Normal spirometry, lung volumes and diffusion capacity without bronchodilator responsiveness     CT chest 1/15/23  IMPRESSION:   Finding on the CT angiogram of the neck corresponds to paravertebral compressive atelectasis secondary to large osteophyte formation  CT chest without constrast  LUNGS:  Lungs are clear  There is no tracheal or endobronchial lesion  PLEURA:  Unremarkable  HEART/GREAT VESSELS:  Unremarkable for patient's age  MEDIASTINUM AND KENA:  Unremarkable  CXR 1/15/23  IMPRESSION:   No acute cardiopulmonary disease      Sleep studies:  None    Brandy Purple, DO  [address] [facility]

## 2023-02-22 ENCOUNTER — APPOINTMENT (EMERGENCY)
Dept: CT IMAGING | Facility: HOSPITAL | Age: 68
End: 2023-02-22

## 2023-02-22 ENCOUNTER — HOSPITAL ENCOUNTER (EMERGENCY)
Facility: HOSPITAL | Age: 68
End: 2023-02-23
Attending: EMERGENCY MEDICINE

## 2023-02-22 ENCOUNTER — APPOINTMENT (EMERGENCY)
Dept: RADIOLOGY | Facility: HOSPITAL | Age: 68
End: 2023-02-22

## 2023-02-22 DIAGNOSIS — R47.9 SPEECH DISTURBANCE: Primary | ICD-10-CM

## 2023-02-22 DIAGNOSIS — R56.9 SEIZURE-LIKE ACTIVITY (HCC): ICD-10-CM

## 2023-02-22 PROBLEM — R68.89 SPELLS OF DECREASED ATTENTIVENESS: Status: ACTIVE | Noted: 2023-02-22

## 2023-02-22 LAB
ANION GAP SERPL CALCULATED.3IONS-SCNC: 8 MMOL/L (ref 4–13)
APTT PPP: 25 SECONDS (ref 23–37)
BUN SERPL-MCNC: 15 MG/DL (ref 5–25)
CALCIUM SERPL-MCNC: 9 MG/DL (ref 8.4–10.2)
CARDIAC TROPONIN I PNL SERPL HS: 3 NG/L
CHLORIDE SERPL-SCNC: 100 MMOL/L (ref 96–108)
CO2 SERPL-SCNC: 26 MMOL/L (ref 21–32)
CREAT SERPL-MCNC: 0.57 MG/DL (ref 0.6–1.3)
ERYTHROCYTE [DISTWIDTH] IN BLOOD BY AUTOMATED COUNT: 12.2 % (ref 11.6–15.1)
FLUAV RNA RESP QL NAA+PROBE: NEGATIVE
FLUBV RNA RESP QL NAA+PROBE: NEGATIVE
GFR SERPL CREATININE-BSD FRML MDRD: 105 ML/MIN/1.73SQ M
GLUCOSE SERPL-MCNC: 127 MG/DL (ref 65–140)
HCT VFR BLD AUTO: 40.4 % (ref 36.5–49.3)
HGB BLD-MCNC: 13.1 G/DL (ref 12–17)
INR PPP: 0.94 (ref 0.84–1.19)
MCH RBC QN AUTO: 30.1 PG (ref 26.8–34.3)
MCHC RBC AUTO-ENTMCNC: 32.4 G/DL (ref 31.4–37.4)
MCV RBC AUTO: 93 FL (ref 82–98)
PLATELET # BLD AUTO: 191 THOUSANDS/UL (ref 149–390)
PMV BLD AUTO: 10.6 FL (ref 8.9–12.7)
POTASSIUM SERPL-SCNC: 4.3 MMOL/L (ref 3.5–5.3)
PROTHROMBIN TIME: 12.8 SECONDS (ref 11.6–14.5)
RBC # BLD AUTO: 4.35 MILLION/UL (ref 3.88–5.62)
RSV RNA RESP QL NAA+PROBE: NEGATIVE
SARS-COV-2 RNA RESP QL NAA+PROBE: NEGATIVE
SODIUM SERPL-SCNC: 134 MMOL/L (ref 135–147)
WBC # BLD AUTO: 4.57 THOUSAND/UL (ref 4.31–10.16)

## 2023-02-22 RX ORDER — MELATONIN
1000 DAILY
Status: DISCONTINUED | OUTPATIENT
Start: 2023-02-23 | End: 2023-02-23 | Stop reason: HOSPADM

## 2023-02-22 RX ORDER — LOSARTAN POTASSIUM 50 MG/1
100 TABLET ORAL DAILY
Status: DISCONTINUED | OUTPATIENT
Start: 2023-02-23 | End: 2023-02-23 | Stop reason: HOSPADM

## 2023-02-22 RX ORDER — ASPIRIN 81 MG/1
81 TABLET ORAL DAILY
Status: DISCONTINUED | OUTPATIENT
Start: 2023-02-23 | End: 2023-02-23 | Stop reason: HOSPADM

## 2023-02-22 RX ADMIN — IOHEXOL 85 ML: 350 INJECTION, SOLUTION INTRAVENOUS at 14:03

## 2023-02-22 NOTE — TELEMEDICINE
TeleConsultation - [address] Figner 75 y o  male MRN: [id number]  Unit/Bed#: RM01 Encounter: [id number]        REQUIRED DOCUMENTATION:     1  This service was provided via Telemedicine  2  Provider located at [address]   3  TeleMed provider: Roque Olmedo DO   4  Identify all parties in room with patient during tele consult:  Patient and wife  5  Patient was then informed that this was a Telemedicine visit and that the exam was being conducted confidentially over secure lines  My office door was closed  Other methods to assure confidentiality were taken   No one else was in the room  Patient acknowledged consent and understanding of privacy and security of the Telemedicine visit, and gave us permission to have the assistant stay in the room in order to assist with the history and to conduct the exam   I informed the patient that I have reviewed their record in Epic and presented the opportunity for them to ask any questions regarding the visit today  The patient agreed to participate  Assessment/Plan   Assessment: Suspect epileptic events    TPA Decision: Patient not a candidate  Nonvascular etiology    Plan: Suspect epileptic events  -Recommend transfer to [facility] under primary neurology service for video EEG and spell characterization   - Okay to hold off on AEDs at the moment   -Seizure precautions  -Ativan 1 mg IV for generalized tonic-clonic events only, okay to hold off on treatment for events described in note  History of Present Illness     Reason for Consult / Principal Problem: Altered mental status  Hx and PE limited by: Not applicable  Patient last known well: Unclear  Stroke alert called: 1:48 PM  Neurology time of arrival: Immediate  HPI: Char Dooley is a 75 y o  right handed male with hypertension and diabetes who presents with an episode of blank staring and word finding difficulty similar to an event in January    Since then he is been seen as the outpatient setting and being worked up for seizures however work-up is still pending   - In the ER patient is normotensive at 127/60   - There is no appendicular sensorimotor symptoms noted  - CT head is unremarkable for any acute contributory pathology   -   TA head and neck was similarly unremarkable for any clear hemodynamic significant stenosis, LVO or other IR target  The outpatient neurology notes mention patient has had episodes with confusion or not being able to understand what is being said to him for several years  Wife reported there was about 1 to 2 years between the first and second episode but then they started happening every couple months  The event in January was very different because it apparently lasted longer and he seemed to "cycle back into an episode"  Event was noted to be 3 to 4 years ago  There fairly stereotyped as per neurology note description  He starts talking slow and incomplete sentences, sometimes gibberish or no words at all  Comes unresponsive once the episode starts  Once he regains some sense of awareness he often wants to go take a nap   4 hours later upon waking up he is usually back to baseline  And he is amnestic for the entire event   -See neurology note from 1/27 for details  Patient's wife confirms the above story, reports that ever since the first event in 2016 these events have becoming more and more frequent, lasting longer in duration, and have begun to cluster  She also reports some lipsmacking and right hand grasping automatisms  - This event lasted the longest with patient waxing waning aphasia for more than 2 hours  - Wife reports at least 10 total events that she has witnessed, likely more considering patient is amnestic for these events and would not know if they occurred or not  Consult to Neurology  Consult performed by: Rosa Maria Billy DO  Consult ordered by: Nicanor Page,           Review of Systems   Constitutional: Negative    Negative for activity change, chills, fatigue and fever  HENT: Negative for hearing loss and tinnitus  Eyes: Negative for photophobia and visual disturbance  Respiratory: Negative for cough and shortness of breath  Cardiovascular: Negative for chest pain  Gastrointestinal: Negative for constipation and diarrhea  Genitourinary: Negative for dysuria and urgency  Skin: Negative for pallor and rash  Neurological: Negative for dizziness, tremors, facial asymmetry, speech difficulty, weakness, light-headedness, numbness and headaches  Psychiatric/Behavioral: Negative for agitation, confusion, decreased concentration and sleep disturbance  All other systems reviewed and are negative        Historical Information   Past Medical History:   Diagnosis Date   • Arthritis    • Compression fracture of spine ([facility] [address] )     last assessed 09/15/2016   • Concussion    • Dislocation of wrist     right ,  last assessed 09/15/2016   • Esophageal reflux    • Fractured sternum    • Nasal fracture      Past Surgical History:   Procedure Laterality Date   • CHOLECYSTECTOMY OPEN     • COLONOSCOPY      2006   • FOOT SURGERY Right      Social History   Social History     Substance and Sexual Activity   Alcohol Use Not Currently   • Alcohol/week: 6 0 standard drinks   • Types: 6 Cans of beer per week    Comment: occassional      Social History     Substance and Sexual Activity   Drug Use Never     E-Cigarette/Vaping   • E-Cigarette Use Never User      E-Cigarette/Vaping Substances   • Nicotine No    • THC No    • CBD No    • Flavoring No    • Other No    • Unknown No      Social History     Tobacco Use   Smoking Status Former   • Types: Cigarettes, Pipe, Cigars   • Quit date: 2013   • Years since quittin 8   Smokeless Tobacco Former   • Types: Chew   • Quit date: 2013   Tobacco Comments    Started when he was 24 years      Family History:   Family History   Problem Relation Age of Onset   • Dementia Mother    • Hyperlipidemia Mother    • Cancer Father         bladder        Review of previous medical records was  completed  Meds/Allergies   all current active meds have been reviewed    Allergies   Allergen Reactions   • Neomycin-Bacitracin Zn-Polymyx    • Neomycin-Polymyxin-Pramoxine Itching   • Other    • Adhesive [Medical Tape] Rash     Just J&J   • Cephalexin Rash       Objective   Vitals:Blood pressure 124/56, pulse 78, temperature 98 °F (36 7 °C), temperature source Tympanic, resp  rate 16, weight (!) 151 kg (332 lb), SpO2 99 %  ,Body mass index is 45 03 kg/m²  No intake or output data in the 24 hours ending 02/22/23 1436    Invasive Devices: Invasive Devices     Peripheral Intravenous Line  Duration           Peripheral IV 02/22/23 Distal;Right;Upper;Ventral (anterior) Arm <1 day                Physical Exam  Vitals reviewed  Constitutional:       General: He is not in acute distress  Appearance: Normal appearance  He is well-developed  He is not ill-appearing, toxic-appearing or diaphoretic  HENT:      Head: Normocephalic and atraumatic  Right Ear: External ear normal       Left Ear: External ear normal       Nose: Nose normal    Eyes:      General: No scleral icterus  Right eye: No discharge  Left eye: No discharge  Extraocular Movements: Extraocular movements intact  Conjunctiva/sclera: Conjunctivae normal    Pulmonary:      Effort: Pulmonary effort is normal  No respiratory distress  Breath sounds: No stridor  No wheezing or rhonchi  Musculoskeletal:         General: No swelling, deformity or signs of injury  Normal range of motion  Skin:     Coloration: Skin is not jaundiced or pale  Findings: No erythema or rash  Neurological:      General: No focal deficit present  Mental Status: He is alert and oriented to person, place, and time  Cranial Nerves: No cranial nerve deficit  Motor: No weakness        Coordination: Coordination normal    Psychiatric: Mood and Affect: Mood normal          Behavior: Behavior normal          Thought Content: Thought content normal          Judgment: Judgment normal        Neurologic Exam     Mental Status   Oriented to person, place, and time  Level of consciousness:  - awake and alert, AAO x3     Language:  -fluent but with occasional semantic errors  - comprehension is mildly impaired  ,    Visual-spatial:  - no clear signs of hemineglect  -follow commands equally on both sides  -     Cranial Nerves   Pupils are equal and round  Extraocular muscles intact, gaze conjugate  Face appears symmetric with respect to motor  Tongue is midline  Shoulder shrug is symmetric  Motor Exam Patient moves both upper extremities equally without difficulty or drift  - Only able to lift either lower extremity a few inches off the bed  Sensory Exam   Patient acknowledges sensation equally in all 4 extremities     Gait, Coordination, and Reflexes  no gross ataxia in any limb  Finger-to-nose was intact and symmetric  Gait was deferred       NIHSS:  1a Level of Consciousness: 0 = Alert   1b  LOC Questions: 0 = Answers both correctly   1c  LOC Commands: 1 = Obeys one correctly   2  Best Gaze: 0 = Normal   3  Visual: 0 = No visual field loss   4  Facial Palsy: 0=Normal symmetric movement   5a  Motor Right Arm: 0=No drift, limb holds 90 (or 45) degrees for full 10 seconds   5b  Motor Left Arm: 0=No drift, limb holds 90 (or 45) degrees for full 10 seconds   6a  Motor Right Le=Drift, limb holds 90 (or 45) degrees but drifts down before full 10 seconds: does not hit bed   6b  Motor Left Le=Drift, limb holds 90 (or 45) degrees but drifts down before full 10 seconds: does not hit bed   7  Limb Ataxia:  0=Absent   8  Sensory: 0=Normal; no sensory loss   9  Best Language:  1=Mild to moderate aphasia; some obvious loss of fluency or facility of comprehension without significant limitation on ideas expressed or form of expression  10  Dysarthria: 0=Normal articulation   11  Extinction and Inattention (formerly Neglect): 0=No abnormality   Total Score: 4     Time NIHSS was completed: 2 PM    Modified Ash[place]ham Score:  0 (No baseline symptoms/disability)    Lab Results: I have personally reviewed pertinent reports  Imaging Studies: I have personally reviewed pertinent reports   , I have personally reviewed pertinent films in PACS and I have personally reviewed pertinent films in PACS with a Radiologist   EKG, Pathology, and Other Studies: I have personally reviewed pertinent reports      VTE Prophylaxis: Sequential compression device Gregory Lynn)     Code Status: Prior  Advance Directive and Living Will:      Power of :    POLST:

## 2023-02-22 NOTE — EMTALA/ACUTE CARE TRANSFER
[facility] EMERGENCY DEPARTMENT  [address] [facility] [address]  Dept: Ish 53 CONSENT    NAME Bon Cook                                         1955                              MRN [id number]    I have been informed of my rights regarding examination, treatment, and transfer   by Dr Coni Fabry, DO    Benefits: Specialized equipment and/or services available at the receiving facility (Include comment)________________________    Risks: Potential for delay in receiving treatment, Potential deterioration of medical condition, Loss of IV, Increased discomfort during transfer, Possible worsening of condition or death during transfer      Consent for Transfer:  I acknowledge that my medical condition has been evaluated and explained to me by the emergency department physician or other qualified medical person and/or my attending physician, who has recommended that I be transferred to the service of  Accepting Physician: Dr Angelika Izaguirre at [address] [facility] Name, [address] : [place]  The above potential benefits of such transfer, the potential risks associated with such transfer, and the probable risks of not being transferred have been explained to me, and I fully understand them  The doctor has explained that, in my case, the benefits of transfer outweigh the risks  I agree to be transferred  I authorize the performance of emergency medical procedures and treatments upon me in both transit and upon arrival at the receiving facility  Additionally, I authorize the release of any and all medical records to the receiving facility and request they be transported with me, if possible  I understand that the safest mode of transportation during a medical emergency is an ambulance and that the Hospital advocates the use of this mode of transport   Risks of traveling to the receiving facility by car, including absence of medical control, life sustaining equipment, such as oxygen, and medical personnel has been explained to me and I fully understand them  (ZOEY CORRECT BOX BELOW)  [  ]  I consent to the stated transfer and to be transported by ambulance/helicopter  [  ]  I consent to the stated transfer, but refuse transportation by ambulance and accept full responsibility for my transportation by car  I understand the risks of non-ambulance transfers and I exonerate the Hospital and its staff from any deterioration in my condition that results from this refusal     X___________________________________________    DATE  23  TIME________  Signature of patient or legally responsible individual signing on patient behalf           RELATIONSHIP TO PATIENT_________________________          Provider Kasia  Willie [address]                                         1955                              MRN [id number]    A medical screening exam was performed on the above named patient  Based on the examination:    Condition Necessitating Transfer The primary encounter diagnosis was Speech disturbance  A diagnosis of Seizure-like activity ([facility] [address] ) was also pertinent to this visit      Patient Condition: The patient has been stabilized such that within reasonable medical probability, no material deterioration of the patient condition or the condition of the unborn child(phyllis) is likely to result from the transfer    Reason for Transfer: Level of Care needed not available at this facility    Transfer Requirements: Facility Hospitals in [place]   · Space available and qualified personnel available for treatment as acknowledged by    · Agreed to accept transfer and to provide appropriate medical treatment as acknowledged by       Dr Angelika Izaguirre  · Appropriate medical records of the examination and treatment of the patient are provided at the time of transfer   [address] _______  · Transfer will be performed by qualified personnel from    and appropriate transfer equipment as required, including the use of necessary and appropriate life support measures  Provider Certification: I have examined the patient and explained the following risks and benefits of being transferred/refusing transfer to the patient/family:  General risk, such as traffic hazards, adverse weather conditions, rough terrain or turbulence, possible failure of equipment (including vehicle or aircraft), or consequences of actions of persons outside the control of the transport personnel      Based on these reasonable risks and benefits to the patient and/or the unborn child(phyllis), and based upon the information available at the time of the patient’s examination, I certify that the medical benefits reasonably to be expected from the provision of appropriate medical treatments at another medical facility outweigh the increasing risks, if any, to the individual’s medical condition, and in the case of labor to the unborn child, from effecting the transfer      X____________________________________________ DATE 02/22/23        TIME_______      ORIGINAL - SEND TO MEDICAL RECORDS   COPY - SEND WITH PATIENT DURING TRANSFER

## 2023-02-22 NOTE — ED PROVIDER NOTES
History  Chief Complaint   Patient presents with   • Altered Mental Status     Patient presents to the ER for what his wife thinks is seizure like activity and intermittent periods of altered mental status, aphasia, and lip smacking  Patient is confused on arrival  Denies pain  79-year-old male presents for evaluation of speech disturbance  Wife reports at 11:00 this morning patient began having speech difficulties, this has persisted  Patient has seemingly no other neurological deficits at this time  Patient was evaluated for similar symptoms in January with negative imaging, he did follow-up with neurology and has an EEG ordered as an outpatient, he is not maintained on seizure medication at this time  His wife is at bedside, states this is the first flare since January that she has witnessed, she is uncertain if he has had other episodes  She notes these episodes have been ongoing over the last few years with increasing frequency  Patient is often unaware of what is happening when his speech is disturbed, he does often complain of headaches either prior to or during  Patient denies a headache presently  Patient has otherwise been in his usual state of health  Prior to Admission Medications   Prescriptions Last Dose Informant Patient Reported? Taking?   acetaminophen (TYLENOL) 325 mg tablet   No No   Sig: Take 2 tablets (650 mg total) by mouth every 6 (six) hours as needed for mild pain, headaches or fever Do not exceed a total of 3 grams of tylenol/acetaminophen in a 24-hour period  aspirin 81 mg chewable tablet   No No   Sig: Chew 1 tablet (81 mg total) daily To prevent stroke and heart attack Do not start before January 17, 2023     cholecalciferol (VITAMIN D3) 1,000 units tablet   No No   Sig: Take 2 tablets (2,000 Units total) by mouth daily For vitamin D deficiency   valsartan (DIOVAN) 160 mg tablet   No No   Sig: Take 1 tablet (160 mg total) by mouth daily      Facility-Administered Medications: None       Past Medical History:   Diagnosis Date   • Arthritis    • Compression fracture of spine ([address] )     last assessed 09/15/2016   • Concussion    • Dislocation of wrist     right ,  last assessed 09/15/2016   • Esophageal reflux    • Fractured sternum    • Nasal fracture        Past Surgical History:   Procedure Laterality Date   • CHOLECYSTECTOMY OPEN     • COLONOSCOPY      2006   • FOOT SURGERY Right        Family History   Problem Relation Age of Onset   • Dementia Mother    • Hyperlipidemia Mother    • Cancer Father         bladder      I have reviewed and agree with the history as documented  E-Cigarette/Vaping   • E-Cigarette Use Never User      E-Cigarette/Vaping Substances   • Nicotine No    • THC No    • CBD No    • Flavoring No    • Other No    • Unknown No      Social History     Tobacco Use   • Smoking status: Former     Types: Cigarettes, Pipe, Cigars     Quit date: 2013     Years since quittin 8   • Smokeless tobacco: Former     Types: Chew     Quit date: 2013   • Tobacco comments:     Started when he was 23 years    Vaping Use   • Vaping Use: Never used   Substance Use Topics   • Alcohol use: Not Currently     Alcohol/week: 6 0 standard drinks     Types: 6 Cans of beer per week     Comment: occassional    • Drug use: Never       Review of Systems   Constitutional: Negative for activity change, appetite change, chills and fever  Cardiovascular: Negative for chest pain  Gastrointestinal: Negative for nausea and vomiting  Neurological: Positive for speech difficulty and headaches  Negative for dizziness, facial asymmetry, weakness and numbness  All other systems reviewed and are negative  Physical Exam  Physical Exam  Vitals reviewed  Constitutional:       General: He is not in acute distress  Appearance: He is well-developed  He is not toxic-appearing or diaphoretic  HENT:      Head: Normocephalic and atraumatic        Right Ear: External ear normal  Left Ear: External ear normal       Nose: Nose normal       Mouth/Throat:      Mouth: Mucous membranes are moist    Eyes:      General:         Right eye: No discharge  Left eye: No discharge  Extraocular Movements: Extraocular movements intact  Right eye: No nystagmus  Left eye: No nystagmus  Pupils: Pupils are equal, round, and reactive to light  Cardiovascular:      Rate and Rhythm: Normal rate and regular rhythm  Pulmonary:      Effort: Pulmonary effort is normal  No respiratory distress  Breath sounds: Normal breath sounds  Abdominal:      General: There is no distension  Palpations: Abdomen is soft  Tenderness: There is no abdominal tenderness  Musculoskeletal:         General: No deformity or signs of injury  Skin:     General: Skin is warm  Coloration: Skin is not jaundiced or pale  Neurological:      Mental Status: He is alert  He is disoriented  Cranial Nerves: No cranial nerve deficit  Sensory: No sensory deficit  Motor: No weakness        Coordination: Coordination normal       Comments: States year as 2024, able to identify himself and wife, [facility] for location; unable to identify common objects in room; no CN deficits, UE and LE motor symmetric and intact, no drift in any extremity; expressive aphasic speech pattern         Vital Signs  ED Triage Vitals [02/22/23 1258]   Temperature Pulse Respirations Blood Pressure SpO2   98 °F (36 7 °C) 62 16 145/65 97 %      Temp Source Heart Rate Source Patient Position - Orthostatic VS BP Location FiO2 (%)   Tympanic Monitor Lying Left arm --      Pain Score       No Pain           Vitals:    02/22/23 1730 02/22/23 1800 02/22/23 1900 02/22/23 2000   BP: 124/69 127/63 143/58 146/73   Pulse: 63 59 62 61   Patient Position - Orthostatic VS:  Lying Lying Lying         Visual Acuity  Visual Acuity    Flowsheet Row Most Recent Value   L Pupil Size (mm) 3   R Pupil Size (mm) 3 ED Medications  Medications   losartan (COZAAR) tablet 100 mg (has no administration in time range)   aspirin (ECOTRIN LOW STRENGTH) EC tablet 81 mg (has no administration in time range)   cholecalciferol (VITAMIN D3) tablet 1,000 Units (has no administration in time range)   iohexol (OMNIPAQUE) 350 MG/ML injection (SINGLE-DOSE) 85 mL (85 mL Intravenous Given 2/22/23 1403)       Diagnostic Studies  Results Reviewed     Procedure Component Value Units Date/Time    FLU/RSV/COVID - if FLU/RSV clinically relevant [[id number]]  (Normal) Collected: 02/22/23 1438    Lab Status: Final result Specimen: Nares from Nose Updated: 02/22/23 1539     SARS-CoV-2 Negative     INFLUENZA A PCR Negative     INFLUENZA B PCR Negative     RSV PCR Negative    Narrative:      FOR PEDIATRIC PATIENTS - copy/paste COVID Guidelines URL to browser: [email or web address]    SARS-CoV-2 assay is a Nucleic Acid Amplification assay intended for the  qualitative detection of nucleic acid from SARS-CoV-2 in nasopharyngeal  swabs  Results are for the presumptive identification of SARS-CoV-2 RNA  Positive results are indicative of infection with SARS-CoV-2, the virus  causing COVID-19, but do not rule out bacterial infection or co-infection  with other viruses  Laboratories within the [place] and its  territories are required to report all positive results to the appropriate  public health authorities  Negative results do not preclude SARS-CoV-2  infection and should not be used as the sole basis for treatment or other  patient management decisions  Negative results must be combined with  clinical observations, patient history, and epidemiological information  This test has not been FDA cleared or approved  This test has been authorized by FDA under an Emergency Use Authorization  (EUA)   This test is only authorized for the duration of time the  declaration that circumstances exist justifying the authorization of the  emergency use of an in vitro diagnostic tests for detection of SARS-CoV-2  virus and/or diagnosis of COVID-19 infection under section 564(b)(1) of  the Act, 21 U  S C  229JKJ-5(I)(1), unless the authorization is terminated  or revoked sooner  The test has been validated but independent review by FDA  and CLIA is pending  Test performed using [employer] Gene[employer]pert: This RT-PCR assay targets N2,  a region unique to SARS-CoV-2  A conserved region in the E-gene was chosen  for pan-Sarbecovirus detection which includes SARS-CoV-2  According to CMS-2020-01-R, this platform meets the definition of high-throughput technology      HS Troponin 0hr (reflex protocol) [[id number]]  (Normal) Collected: 02/22/23 1356    Lab Status: Final result Specimen: Blood from Arm, Right Updated: 02/22/23 1427     hs TnI 0hr 3 ng/L     Protime-INR [[id number]]  (Normal) Collected: 02/22/23 1356    Lab Status: Final result Specimen: Blood from Arm, Right Updated: 02/22/23 1423     Protime 12 8 seconds      INR 0 94    APTT [[id number]]  (Normal) Collected: 02/22/23 1356    Lab Status: Final result Specimen: Blood from Arm, Right Updated: 02/22/23 1423     PTT 25 seconds     Basic metabolic panel [[id number]]  (Abnormal) Collected: 02/22/23 1356    Lab Status: Final result Specimen: Blood from Arm, Right Updated: 02/22/23 1421     Sodium 134 mmol/L      Potassium 4 3 mmol/L      Chloride 100 mmol/L      CO2 26 mmol/L      ANION GAP 8 mmol/L      BUN 15 mg/dL      Creatinine 0 57 mg/dL      Glucose 127 mg/dL      Calcium 9 0 mg/dL      eGFR 105 ml/min/1 73sq m     Narrative:      [place] guidelines for Chronic Kidney Disease (CKD):   •  Stage 1 with normal or high GFR (GFR > 90 mL/min/1 73 square meters)  •  Stage 2 Mild CKD (GFR = 60-89 mL/min/1 73 square meters)  •  Stage 3A Moderate CKD (GFR = 45-59 mL/min/1 73 square meters)  •  Stage 3B Moderate CKD (GFR = 30-44 mL/min/1 73 square meters)  •  Stage 4 Severe CKD (GFR = 15-29 mL/min/1 73 square meters)  •  Stage 5 End Stage CKD (GFR <15 mL/min/1 73 square meters)  Note: GFR calculation is accurate only with a steady state creatinine    CBC and Platelet [[id number]]  (Normal) Collected: 02/22/23 1356    Lab Status: Final result Specimen: Blood from Arm, Right Updated: 02/22/23 1408     WBC 4 57 Thousand/uL      RBC 4 35 Million/uL      Hemoglobin 13 1 g/dL      Hematocrit 40 4 %      MCV 93 fL      MCH 30 1 pg      MCHC 32 4 g/dL      RDW 12 2 %      Platelets 813 Thousands/uL      MPV 10 6 fL                  CTA stroke alert (head/neck)   Final Result by Sherrill Delgado MD (02/22 1437)   1  CTA head: Negative for large vessel intracranial occlusion or hemodynamically significant stenosis  2  CTA neck:  No extracranial carotid stenosis  The cervical vertebral arteries are patent  Findings were directly discussed with Dr Ankush Armijo at 2:35 PM                            Workstation performed: [id number]         CT stroke alert brain   Final Result by Sherrill Delgado MD (02/22 1437)      No acute intracranial abnormality  Findings were directly discussed with Dr Ankush Armijo at 2:35 PM       Workstation performed: [id number]         X-ray chest 1 view portable    (Results Pending)              Procedures  Procedures         ED Course  ED Course as of 02/22/23 2155 Wed Feb 22, 2023   1413 Talking to neurology presently via cart, will await Dr Kulwant Doan recs  Sx are currently resolved at [address] [place] [address] Dr Ankush Armijo wants to transfer pt to [facility] for vEEG under neuro service, he will be the accepting physician  He discussed this plan with pt and wife during video consult      1451 hs TnI 0hr: 3  negative   1451 Procedure Note: EKG  Date/Time: 02/22/23 2:52 PM   Interpreted by: Easton Hendricks  Indications / Diagnosis: stroke alert  ECG reviewed by me, the ED Provider: yes   The EKG demonstrates:  Rhythm: normal sinus  Intervals: normal intervals  Axis: left axis  QRS/Blocks: non-specific QRS  ST Changes: No STD/LÓPEZ        2047 Updated family, no bed assigned yet  It's possible may happen overnight however unlikely  Will order meds for AM and switch to hospital bed  Stroke Assessment     Row Name 02/22/23 1459             NIH Stroke Scale    Interval Baseline      Level of Consciousness (1a ) 0      LOC Questions (1b ) 1      LOC Commands (1c ) 0      Best Gaze (2 ) 0      Visual (3 ) 0      Facial Palsy (4 ) 0      Motor Arm, Left (5a ) 0      Motor Arm, Right (5b ) 0      Motor Leg, Left (6a ) 0      Motor Leg, Right (6b ) 0      Limb Ataxia (7 ) 0      Sensory (8 ) 0      Best Language (9 ) 1      Dysarthria (10 ) 0      Extinction and Inattention (11 ) (Formerly Neglect) 0      Total 2              Flowsheet Row Most Recent Value   Thrombolytic Decision Options    Thrombolytic Decision Patient not a candidate  Patient is not a candidate options Symptoms resolved/clearly non disabling  , comment  [Under evaluation for possible seizures, previous exacerbations similar to presentation today,  on return to room from CT, symptoms resolved, NIH 0]                    SBIRT 20yo+    Flowsheet Row Most Recent Value   SBIRT (23 yo +)    In order to provide better care to our patients, we are screening all of our patients for alcohol and drug use  Would it be okay to ask you these screening questions? Yes Filed at: 02/22/2023 1339   Initial Alcohol Screen: US AUDIT-C     1  How often do you have a drink containing alcohol? 0 Filed at: 02/22/2023 1729   2  How many drinks containing alcohol do you have on a typical day you are drinking? 0 Filed at: 02/22/2023 1339   3a  Male UNDER 65: How often do you have five or more drinks on one occasion? 0 Filed at: 02/22/2023 9329   3b  FEMALE Any Age, or MALE 65+: How often do you have 4 or more drinks on one occassion?  0 Filed at: 02/22/2023 1339   Audit-C Score 0 Filed at: 02/22/2023 4111 FREDERICK: How many times in the past year have you    Used an illegal drug or used a prescription medication for non-medical reasons? Never Filed at: 02/22/2023 0690                    Medical Decision Making  58-year-old male presents for speech disturbance  Patient actually has history of such, and is under evaluation with neurology he currently has an outpatient EEG scheduled upcoming  Symptoms today started at 11:00 AM, he is technically within a stroke alert window and 1 was called, however do not strongly believe this is a CVA  On return from CAT scan, symptoms resolved  Discussion with Dr Kris Watts of neurology, he would like to transfer the patient to [place] [address] for video EEG monitoring as these episodes possibly presenting seizures  Seizure-like activity ([facility] [address] ): complicated acute illness or injury  Speech disturbance: complicated acute illness or injury  Amount and/or Complexity of Data Reviewed  Labs: ordered  Decision-making details documented in ED Course  Radiology: ordered  Risk  OTC drugs  Prescription drug management  Disposition  Final diagnoses:   Speech disturbance   Seizure-like activity ([facility] [address] )     Time reflects when diagnosis was documented in both MDM as applicable and the Disposition within this note     Time User Action Codes Description Comment    2/22/2023  1:48 PM Albesa Cousins Add [R47 9] Speech disturbance     2/22/2023  2:46 PM Albesa Cousins Add [R56 9] Seizure-like activity [facility])       ED Disposition     ED Disposition   Transfer to Another Facility-In Network    Condition   --    Date/Time   Wed Feb 22, 2023  2:46 PM    [address] should be transferred out to B             MD Documentation    [place] Most Recent Value   Patient Condition The patient has been stabilized such that within reasonable medical probability, no material deterioration of the patient condition or the condition of the unborn child(phylils) is likely to result from the transfer   Reason for Transfer Level of Care needed not available at this facility   Benefits of Transfer Specialized equipment and/or services available at the receiving facility (Include comment)________________________   Risks of Transfer Potential for delay in receiving treatment, Potential deterioration of medical condition, Loss of IV, Increased discomfort during transfer, Possible worsening of condition or death during transfer   Accepting Physician Dr Bereket Pabon Name, Ivan Dalal   Provider Certification General risk, such as traffic hazards, adverse weather conditions, rough terrain or turbulence, possible failure of equipment (including vehicle or aircraft), or consequences of actions of persons outside the control of the transport personnel      RN Documentation    [address]  [facility]      Follow-up Information    None         Patient's Medications   Discharge Prescriptions    No medications on file       No discharge procedures on file      PDMP Review     None          ED Provider  Electronically Signed by           Brie Omalley DO  02/22/23 3525

## 2023-02-22 NOTE — ASSESSMENT & PLAN NOTE
Suspect epileptic events  -Recommend transfer to [facility] under primary neurology service for video EEG and spell characterization   - Okay to hold off on AEDs at the moment   -Seizure precautions  -Ativan 1 mg IV for generalized tonic-clonic events only, okay to hold off on treatment for events described in note

## 2023-02-23 ENCOUNTER — HOSPITAL ENCOUNTER (INPATIENT)
Facility: HOSPITAL | Age: 68
LOS: 3 days | Discharge: HOME | End: 2023-02-26
Attending: PSYCHIATRY & NEUROLOGY | Admitting: PSYCHIATRY & NEUROLOGY

## 2023-02-23 VITALS
RESPIRATION RATE: 16 BRPM | TEMPERATURE: 98.1 F | HEART RATE: 63 BPM | OXYGEN SATURATION: 95 % | SYSTOLIC BLOOD PRESSURE: 126 MMHG | DIASTOLIC BLOOD PRESSURE: 60 MMHG | BODY MASS INDEX: 45.03 KG/M2 | WEIGHT: 315 LBS

## 2023-02-23 DIAGNOSIS — M54.42 CHRONIC MIDLINE LOW BACK PAIN WITH LEFT-SIDED SCIATICA: ICD-10-CM

## 2023-02-23 DIAGNOSIS — G89.29 CHRONIC MIDLINE LOW BACK PAIN WITH LEFT-SIDED SCIATICA: ICD-10-CM

## 2023-02-23 DIAGNOSIS — E66.01 MORBID OBESITY WITH BMI OF 40.0-44.9, ADULT (HCC): ICD-10-CM

## 2023-02-23 DIAGNOSIS — R40.4 EPISODIC ALTERED AWARENESS: ICD-10-CM

## 2023-02-23 DIAGNOSIS — R68.89 SPELLS OF DECREASED ATTENTIVENESS: Primary | ICD-10-CM

## 2023-02-23 DIAGNOSIS — E78.1 HYPERTRIGLYCERIDEMIA: ICD-10-CM

## 2023-02-23 DIAGNOSIS — E66.01 MORBID OBESITY (HCC): ICD-10-CM

## 2023-02-23 PROBLEM — R47.89 SPELLS OF SPEECH ARREST: Status: ACTIVE | Noted: 2023-02-23

## 2023-02-23 LAB
ATRIAL RATE: 60 BPM
P AXIS: 76 DEGREES
PR INTERVAL: 172 MS
QRS AXIS: -43 DEGREES
QRSD INTERVAL: 96 MS
QT INTERVAL: 424 MS
QTC INTERVAL: 424 MS
T WAVE AXIS: 33 DEGREES
VENTRICULAR RATE: 60 BPM

## 2023-02-23 RX ORDER — LOSARTAN POTASSIUM 50 MG/1
100 TABLET ORAL DAILY
Status: DISCONTINUED | OUTPATIENT
Start: 2023-02-24 | End: 2023-02-26 | Stop reason: HOSPADM

## 2023-02-23 RX ORDER — ASPIRIN 81 MG/1
81 TABLET, CHEWABLE ORAL DAILY
Status: DISCONTINUED | OUTPATIENT
Start: 2023-02-24 | End: 2023-02-26 | Stop reason: HOSPADM

## 2023-02-23 RX ORDER — MELATONIN
2000 DAILY
Status: DISCONTINUED | OUTPATIENT
Start: 2023-02-24 | End: 2023-02-26 | Stop reason: HOSPADM

## 2023-02-23 RX ORDER — LORAZEPAM 2 MG/ML
2 INJECTION INTRAMUSCULAR EVERY 8 HOURS PRN
Status: DISCONTINUED | OUTPATIENT
Start: 2023-02-23 | End: 2023-02-26 | Stop reason: HOSPADM

## 2023-02-23 RX ORDER — ENOXAPARIN SODIUM 100 MG/ML
40 INJECTION SUBCUTANEOUS EVERY 12 HOURS SCHEDULED
Status: DISCONTINUED | OUTPATIENT
Start: 2023-02-23 | End: 2023-02-26 | Stop reason: HOSPADM

## 2023-02-23 RX ADMIN — ASPIRIN 81 MG: 81 TABLET, COATED ORAL at 08:16

## 2023-02-23 RX ADMIN — LOSARTAN POTASSIUM 100 MG: 50 TABLET, FILM COATED ORAL at 08:16

## 2023-02-23 RX ADMIN — CHOLECALCIFEROL TAB 25 MCG (1000 UNIT) 1000 UNITS: 25 TAB at 08:17

## 2023-02-23 RX ADMIN — ENOXAPARIN SODIUM 40 MG: 40 INJECTION SUBCUTANEOUS at 23:27

## 2023-02-23 NOTE — ED NOTES
Patient expressed that he was a vegan  Diet order changed and message left with dietary for a vegan breakfast mery Ferrari RN  02/23/23 8964

## 2023-02-23 NOTE — STROKE DOCUMENTATION
Patient got OOB and ambulated to the BR  Had a BM, denies all complaints  Awaiting  time for transfer

## 2023-02-24 ENCOUNTER — APPOINTMENT (OUTPATIENT)
Dept: NEUROLOGY | Facility: CLINIC | Age: 68
End: 2023-02-24

## 2023-02-24 RX ORDER — FOLIC ACID 1 MG/1
1 TABLET ORAL DAILY
Status: DISCONTINUED | OUTPATIENT
Start: 2023-02-24 | End: 2023-02-26 | Stop reason: HOSPADM

## 2023-02-24 RX ORDER — LANOLIN ALCOHOL/MO/W.PET/CERES
100 CREAM (GRAM) TOPICAL DAILY
Status: DISCONTINUED | OUTPATIENT
Start: 2023-02-24 | End: 2023-02-26 | Stop reason: HOSPADM

## 2023-02-24 RX ADMIN — LOSARTAN POTASSIUM 100 MG: 50 TABLET, FILM COATED ORAL at 08:43

## 2023-02-24 RX ADMIN — FOLIC ACID 1 MG: 1 TABLET ORAL at 11:51

## 2023-02-24 RX ADMIN — ASPIRIN 81 MG CHEWABLE TABLET 81 MG: 81 TABLET CHEWABLE at 08:43

## 2023-02-24 RX ADMIN — ENOXAPARIN SODIUM 40 MG: 40 INJECTION SUBCUTANEOUS at 21:51

## 2023-02-24 RX ADMIN — ENOXAPARIN SODIUM 40 MG: 40 INJECTION SUBCUTANEOUS at 08:42

## 2023-02-24 RX ADMIN — Medication 2000 UNITS: at 08:43

## 2023-02-24 RX ADMIN — THIAMINE HCL TAB 100 MG 100 MG: 100 TAB at 11:51

## 2023-02-24 NOTE — ASSESSMENT & PLAN NOTE
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

## 2023-02-24 NOTE — CASE MANAGEMENT
Case Management Assessment & Discharge Planning Note    Patient name Randolph Monique  Location [address] [facility] [address] [facility]N [id number]  : 1955 Date 2023       Current Admission Date: 2023  Current Admission Diagnosis:Spells of speech arrest   Patient Active Problem List    Diagnosis Date Noted   • Spells of speech arrest 2023   • Spells of decreased attentiveness 2023   • Acute pain of right shoulder 2023   • Episodic altered awareness 2023   • Atrial septal aneurysm 2023   • Sleep apnea, obstructive 2023   • Aphasia 01/15/2023   • Intracranial aneurysm 01/15/2023   • Abnormal CT scan of lung 01/15/2023   • Accelerated hypertension 01/15/2023   • Atelectasis 01/15/2023   • Erythema migrans (Lyme disease) 2022   • Morbid obesity with BMI of 40 0-44 9, adult ([facility] [address] ) 2019   • Arthritis of left knee 2019   • Primary osteoarthritis of both knees 2018   • Primary osteoarthritis of one knee, left 2018   • Depression 2017   • Morbid obesity ([facility] [address] ) 2017   • Chronic back pain 09/15/2016   • Hypertriglyceridemia 09/15/2016   • Osteoarthritis 09/15/2016   • Vitamin D deficiency 09/15/2016   • Osteoarthritis of spine with radiculopathy, lumbar region 2016      LOS (days): 1  Geometric Mean LOS (GMLOS) (days): 2 10  Days to GMLOS:1 3     OBJECTIVE:    Risk of Unplanned Readmission Score: 9 12         Current admission status: Inpatient  Referral Reason: Other (Care Team Meeting)    [id number]  [address] [place] [address]  Phone: [phone] Fax: [phone]    Primary Care Provider: LEIGHA Stout    Primary Insurance: [facility]  Secondary Insurance:     ASSESSMENT:  Stacey Aqq  [address], [address] - Spouse   Primary Phone: [phone] (Mobile)  Home Phone: [phone] advance Directives  Does patient have a [address]?: Yes  Does patient have Advance Directives?: Yes  Advance Directives: Power of  for health care  Primary Contact: HUNTER wife Raven Naik         Readmission Root Cause  30 Day Readmission: No    Patient Information  Admitted from[de-identified] Home  Mental Status: Alert  During Assessment patient was accompanied by: Not accompanied during assessment  Assessment information provided by[de-identified] Patient  Primary Caregiver: Self  Support Systems: Spouse/significant other  [place] of Residence: [facility] do you live in?: [place] [address] entry access options   Select all that apply : Stairs  Number of steps to enter home : 2  Do the steps have railings?: Yes  Type of Current Residence: 3 story home  Upon entering residence, is there a bedroom on the main floor (no further steps)?: No  A bedroom is located on the following floor levels of residence (select all that apply):: 2nd Floor  Upon entering residence, is there a bathroom on the main floor (no further steps)?: Yes (1/2 bath on first floor, Full bath on second floor)  Number of steps to 2nd floor from main floor: One Flight  In the last 12 months, was there a time when you were not able to pay the mortgage or rent on time?: No  In the last 12 months, how many places have you lived?: 1  In the last 12 months, was there a time when you did not have a steady place to sleep or slept in a shelter (including now)?: No  Homeless/housing insecurity resource given?: N/A  Living Arrangements: Lives w/ Spouse/significant other  Is patient a ?: No    Activities of Daily Living Prior to Admission  Functional Status: Independent  Completes ADLs independently?: Yes  Ambulates independently?: Yes  Does patient use assisted devices?: No  Does patient currently own DME?: Yes  What DME does the patient currently own?: Sita Ortega  Does patient have a history of Outpatient Therapy (PT/OT)?: Yes ([facility])  Does the patient have a history of Short-Term Rehab?: No  Does patient have a history of HHC?: No  Does patient currently have [address]?: No         Patient Information Continued  Income Source: Employed (works part time s )  Does patient have prescription coverage?: Yes  Within the past 12 months, you worried that your food would run out before you got the money to buy more : Never true  Within the past 12 months, the food you bought just didn't last and you didn't have money to get more : Never true  Food insecurity resource given?: N/A  Does patient receive dialysis treatments?: No  Does patient have a history of substance abuse?: No  Does patient have a history of Mental Health Diagnosis?: No         Means of Transportation  Means of Transport to Appts[de-identified] Family transport (pt no longer has a license but wife provides transport)  In the past 12 months, has lack of transportation kept you from medical appointments or from getting medications?: No  In the past 12 months, has lack of transportation kept you from meetings, work, or from getting things needed for daily living?: No  Was application for public transport provided?: N/A        DISCHARGE DETAILS:    Discharge planning discussed with[de-identified] Patient  Freedom of Choice: Yes     CM contacted family/caregiver?: No- see comments (pt alert and oriented and declined offer to call spouse)  Were Treatment Team discharge recommendations reviewed with patient/caregiver?: No  Did patient/caregiver verbalize understanding of patient care needs?: Yes  Were patient/caregiver advised of the risks associated with not following Treatment Team discharge recommendations?: Yes    Contacts  Patient Contacts:  Wife Americo Kaminski  Relationship to Patient[de-identified] Family  Reason/Outcome: Continuity of Care, Emergency [address]         Is the patient interested in [address] at discharge?: No    DME Referral Provided  Referral made for DME?: No    Other Referral/Resources/Interventions Provided:  Interventions: None Indicated         Treatment Team Recommendation: Home  Discharge Destination Plan[de-identified] Home  Transport at Discharge : Family                                      Additional Comments: Met with pt for CM interview  Pt denies any conerns with going home,  pt lives with his wife who provides trasnport to all appts as pt no longer drives   Pt reports being independent with ADL's and ambulation has a walker at home from knee surgery but does not require or use  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

## 2023-02-24 NOTE — H&P
NEUROLOGY RESIDENCY - ADMISSION H&P NOTE     Name: Lianna Echols   Age & Sex: 75 y o  male   MRN: [id number]  Unit/Bed#: [facility] 722-01   Encounter: [id number]    ASSESSMENT & PLAN     Intracranial aneurysm  Assessment & Plan  Noted on CTA in January 2023 - "Incidental 2 mm aneurysm or infundibulum arising from right supraclinoid ICA 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  Also suspected tiny aneurysm versus fenestration at distal A1 segment of right CECIL "  Patient has appointment with neurosurgery scheduled  * Spells of speech arrest  Assessment & Plan  - [facility] [address] [facility] spells that have been occurring for many years  - Patient had 2 episodes on 2/22 with the second episode lasting approximately 2 to 3 hours  - Patient had been evaluated as an outpatient by neurology and it was recommended that he have an EEG completed  Neurology note on 1/30/23 reviewed  - Given that the patient has continued to have these episodes will extend video EEG monitoring in hopes of capturing an episode  - We will hold off on starting the patient on a new AED at this time pending EEG  - Patient is not currently driving has had a [employer] form submitted  Recommendations for outpatient neurological follow up have yet to be determined  Pending for discharge: EEG work-up    VTE Prophylaxis: Enoxaparin (Lovenox)  / sequential compression device   Code Status: Full Code  POLST: There is no POLST form on file for this patient (pre-hospital)    Anticipated Length of Stay:  Patient will be admitted on an Inpatient basis with an anticipated length of stay of  > 2 midnights  Justification for Hospital Stay: [facility] [place] is a 75 y o  male  with pertinent history of hypertension, incidental intracranial aneurysm who presented initially to [address] [facility] for an episode of speech disturbance    The patients wife and the patient report that he has been having these episodes since at least 2016 and initially these episodes occurred approximately yearly but over time started to increase initially they then started happening every 6 months and then now they are happening about monthly  The patient was reported to have had an episode 2/22/2023 which caused his presentation to the hospital prior to that his last episode was 1/15/2023  The patient and his wife report that his episodes consist of him having word finding difficulties and confusion and not being able to understand what is being said to him  For example the patient's wife reports that he once said oceans when he was referring to a  he also will make up words and speak "gibberish "  The patient will also have episodes where he stares off and is unable to be communicated with  The patient has also had episodes where he has lipsmacking, and movement with his hand  The patient also complains of having a headache sometimes during these episodes and a pain in his right shoulders  He also reports that he gets cold after these episodes  The patient also will have issues remembering his name and at one point said that his name was "Ariadna Vallejo "when [facility]  On the day of presentation to the emergency room the patient experienced 2 episodes the first episode lasted approximately 5 minutes and subsequently he started recovering and then approximately 20 minutes after that he had his second episode and this lasted for approximately 2 to 3 hours  The patient's wife reports that during this episode he did have some periods of time where he responded  The patient was brought to the emergency room and he was initially a stroke alert however it was felt that his symptoms were due to a nonvascular cause and it was recommended that he be transferred to [facility] for video EEG and spell characterization      Pertinent Negatives include: numbness, weakness, visual changes, syncope, paralysis/weakness, numbness or tingling  Today, patient is doing well  According to him, he has not had any of these events since his last episode  He denies any confusion, headache, N/V/D, CP, and SOB  Patient has not been driving  He has a follow-up with sleep medicine outpatient already scheduled  He does endorses a behavioral history of anxiety, but has not been on any medications for it  He does endorse frequent alcohol use (5-6 beer cans a day) for the last 6 months  However, he has abstained from alcohol for the last 41 days  REVIEW OF SYSTEMS     Review of Systems   Constitutional: Negative for chills and fever  Respiratory: Negative for shortness of breath  Cardiovascular: Negative for chest pain  Neurological: Negative for weakness, numbness and headaches  Psychiatric/Behavioral: Negative for confusion  12 point review of systems was completed unless otherwise noted above was negative  PAST MEDICAL HISTORY     Past Medical History:   Diagnosis Date   • Arthritis    • Compression fracture of spine ([facility] [address] )     last assessed 09/15/2016   • Concussion    • Dislocation of wrist     right ,  last assessed 09/15/2016   • Esophageal reflux    • Fractured sternum    • Nasal fracture        Allergies:    Allergies   Allergen Reactions   • Neomycin-Bacitracin Zn-Polymyx    • Neomycin-Polymyxin-Pramoxine Itching   • Other    • Adhesive [Medical Tape] Rash     Just J&J   • Cephalexin Rash       PAST SURGICAL HISTORY     Past Surgical History:   Procedure Laterality Date   • CHOLECYSTECTOMY OPEN     • COLONOSCOPY      2006   • FOOT SURGERY Right    • JOINT REPLACEMENT Left     Pollice       SOCIAL & FAMILY HISTORY     Social History     Substance and Sexual Activity   Alcohol Use Not Currently   • Alcohol/week: 6 0 standard drinks   • Types: 6 Cans of beer per week    Comment: occassional  Quit 1/15/23     Substance and Sexual Activity   Alcohol Use Not Currently   • Alcohol/week: 6 0 standard drinks   • Types: 6 Cans of beer per week    Comment: occassional  Quit 1/15/23        Substance and Sexual Activity   Drug Use Not Currently   • Types: Marijuana    Comment: college in the 76s     Social History     Tobacco Use   Smoking Status Former   • Years: 35 00   • Types: Cigarettes, Pipe, Cigars   • Quit date: 2013   • Years since quittin 8   Smokeless Tobacco Former   • Types: Chew   • Quit date: 2013   Tobacco Comments    Started when he was 24 years        Marital Status: /Civil Union     Family History:  non-contributory        OBJECTIVE     Vitals:    23 2224   BP: 123/88        Temperature:   No data recorded  Intake & Output:  I/O     None          Weights: There is no height or weight on file to calculate BMI  Weight (last 2 days)     None          Physical Exam  Vitals and nursing note reviewed  Constitutional: Alert  Not in acute distress  Not ill-appearing, toxic-appearing or diaphoretic  HENT: Normocephalic and atraumatic  Nose and Ears normal     Eyes: No scleral icterus  No discharge  Neck: Neck Supple  ROM normal    Cardiovascular: Distal extremities warm without palpable edema or tenderness, no observed significant swelling  Pulmonary:  Pulmonary effort is normal  Not in respiratory distress   Abdominal: Abdomen is flat and not distended   Musculoskeletal: No swelling or deformity  Skin: Warm and dry   Psychiatric:  Normal behavior and appropriate affect      Neurological Exam  Mental Status  Awake and alert  Recent and remote memory are intact  Speech is normal  Language is fluent with no aphasia  Attention and concentration are normal     Cranial Nerves  CN II: Visual fields full to confrontation  CN III, IV, VI: Extraocular movements intact bilaterally  Normal lids and orbits bilaterally  Pupils equal round and reactive to light bilaterally    CN V: Facial sensation is normal   CN VII: Full and symmetric facial movement  CN VIII: Hearing is normal   CN IX, X: Palate elevates symmetrically  CN XI: Shoulder shrug strength is normal   CN XII: Tongue midline without atrophy or fasciculations  Motor  Normal muscle bulk throughout  No fasciculations present  Normal muscle tone  No abnormal involuntary movements  Strength is 5/5 in all four extremities except as noted  Sensory  Light touch is normal in upper and lower extremities  Reflexes  Deep tendon reflexes are 2+ and symmetric except as noted  Coordination  Right: Finger-to-nose normal Left: Finger-to-nose normal   Heel-to-shin normal b/l    Gait  Deferred      LABORATORY DATA     Labs: I have personally reviewed pertinent reports  Results from last 7 days   Lab Units 02/22/23  1356   WBC Thousand/uL 4 57   HEMOGLOBIN g/dL 13 1   HEMATOCRIT % 40 4   PLATELETS Thousands/uL 191      Results from last 7 days   Lab Units 02/22/23  1356   SODIUM mmol/L 134*   POTASSIUM mmol/L 4 3   CHLORIDE mmol/L 100   CO2 mmol/L 26   BUN mg/dL 15   CREATININE mg/dL 0 57*   CALCIUM mg/dL 9 0              Results from last 7 days   Lab Units 02/22/23  1356   INR  0 94   PTT seconds 25               Micro:  Lab Results   Component Value Date    URINECX No Growth <1000 cfu/mL 01/15/2023       IMAGING & DIAGNOSTIC TESTING     Radiology Results: I have personally reviewed pertinent reports  No orders to display       Other Diagnostic Testing: I have personally reviewed pertinent reports        ACTIVE MEDICATIONS     Current Facility-Administered Medications   Medication Dose Route Frequency   • aspirin chewable tablet 81 mg  81 mg Oral Daily   • cholecalciferol (VITAMIN D3) tablet 2,000 Units  2,000 Units Oral Daily   • enoxaparin (LOVENOX) subcutaneous injection 40 mg  40 mg Subcutaneous Q12H JALEEL   • LORazepam (ATIVAN) injection 2 mg  2 mg Intravenous Q8H PRN   • losartan (COZAAR) tablet 100 mg  100 mg Oral Daily         HOME MEDICATIONS     Prior to Admission medications Medication Sig Start Date End Date Taking? Authorizing Provider   acetaminophen (TYLENOL) 325 mg tablet Take 2 tablets (650 mg total) by mouth every 6 (six) hours as needed for mild pain, headaches or fever Do not exceed a total of 3 grams of tylenol/acetaminophen in a 24-hour period   1/16/23   David Gibson DO   aspirin 81 mg chewable tablet Chew 1 tablet (81 mg total) daily To prevent stroke and heart attack Do not start before January 17, 2023 1/17/23   Magaly Parrish DO   cholecalciferol (VITAMIN D3) 1,000 units tablet Take 2 tablets (2,000 Units total) by mouth daily For vitamin D deficiency 1/16/23   David Gibson DO   valsartan (DIOVAN) 160 mg tablet Take 1 tablet (160 mg total) by mouth daily 2/7/23   LEIGHA Lopez     ACTIVE MEDICATIONS

## 2023-02-24 NOTE — UTILIZATION REVIEW
Initial Clinical Review    Admission: Date/Time/Statement:   Admission Orders (From admission, onward)     Ordered        02/23/23 2127  Inpatient Admission  Once                      Orders Placed This Encounter   Procedures   • Inpatient Admission     Standing Status:   Standing     Number of Occurrences:   1     Order Specific Question:   Level of Care     Answer:   Level 2 Stepdown / HOT [14]     Order Specific Question:   Estimated length of stay     Answer:   More than 2 Midnights     Order Specific Question:   Certification     Answer:   I certify that inpatient services are medically necessary for this patient for a duration of greater than two midnights  See H&P and MD Progress Notes for additional information about the patient's course of treatment  Initial Presentation: 75 y o  male who initially presented to [facility] then transferred to [address] [place] [address]  Admitted Inpatient status to Level 2 stepdownunit  dt Suspect epileptic events  PMHx: HTN, DM  Presented with an episode of blank staring and word finding difficulty similar to an event in January  Since then he is been seen as the outpatient setting and being worked up for seizures however work-up is still pending  Presented for evaluation of speech difficulties on 2/22  On the day of presentation to the emergency room the patient experienced 2 episodes the first episode lasted approximately 5 minutes and subsequently he started recovering and then approximately 20 minutes after that he had his second episode and this lasted for approximately 2 to 3 hours  Patient is often unaware of what is happening when his speech is disturbed, he does often complain of headaches either prior to or during  Patient denies a headache presently  Plan: pt transferred for vEEG under neurology services  VEEG, telemetry, neuro checks  Date: 2/24   Day 2: Per Neurology:  Video EEG to date has not shown any seizures   The goal is to try and capture a spell prior to initiating antiepileptic therapy  Unclear if these episodes are epileptic and at this time I am not fully convinced that his sharps are enough of a reason to place him on medications for this, but it is something we can consider going forward  Continue vEEG, hold off on starting new AED at this time pending EEG  Continue losartan 100mg qd       Triage Vitals   Temperature Pulse Resp Blood Pressure SpO2   02/24/23 0839 02/24/23 0844 -- 02/23/23 2224 02/24/23 0844   98 2 °F (36 8 °C) 68  123/88 92 %      Temp src Heart Rate Source Patient Position - Orthostatic VS BP Location FiO2 (%)   -- -- -- -- --             Pain Score       02/23/23 2122       No Pain          Wt Readings from Last 1 Encounters:   02/22/23 (!) 151 kg (332 lb)     Additional Vital Signs:   Date/Time Temp Pulse BP MAP (mmHg) SpO2   02/24/23 08:44:06 98 2 °F (36 8 °C) 68 116/52 73 92 %   02/24/23 08:39:50 98 2 °F (36 8 °C) -- 146/116 Abnormal  126 --   02/23/23 22:24:49 -- -- 123/88 100 --     Pertinent Labs/Diagnostic Test Results:   2/23 EKG: NSR    Results from last 7 days   Lab Units 02/22/23  1438   SARS-COV-2  Negative     Results from last 7 days   Lab Units 02/22/23  1356   WBC Thousand/uL 4 57   HEMOGLOBIN g/dL 13 1   HEMATOCRIT % 40 4   PLATELETS Thousands/uL 191         Results from last 7 days   Lab Units 02/22/23  1356   SODIUM mmol/L 134*   POTASSIUM mmol/L 4 3   CHLORIDE mmol/L 100   CO2 mmol/L 26   ANION GAP mmol/L 8   BUN mg/dL 15   CREATININE mg/dL 0 57*   EGFR ml/min/1 73sq m 105   CALCIUM mg/dL 9 0             Results from last 7 days   Lab Units 02/22/23  1356   GLUCOSE RANDOM mg/dL 127     Results from last 7 days   Lab Units 02/22/23  1356   HS TNI 0HR ng/L 3         Results from last 7 days   Lab Units 02/22/23  1356   PROTIME seconds 12 8   INR  0 94   PTT seconds 25     Results from last 7 days   Lab Units 02/22/23  1438   INFLUENZA A PCR  Negative   INFLUENZA B PCR  Negative   RSV PCR Negative     Past Medical History:   Diagnosis Date   • Arthritis    • Compression fracture of spine ([address] )     last assessed 09/15/2016   • Concussion    • Dislocation of wrist     right ,  last assessed 09/15/2016   • Esophageal reflux    • Fractured sternum    • Nasal fracture      Present on Admission:  • Accelerated hypertension  • Intracranial aneurysm      Admitting Diagnosis: Speech disturbance [R47 9]  Age/Sex: 75 y o  male  Admission Orders:  Scheduled Medications:  aspirin, 81 mg, Oral, Daily  cholecalciferol, 2,000 Units, Oral, Daily  enoxaparin, 40 mg, Subcutaneous, A83D JALEEL  folic acid, 1 mg, Oral, Daily  losartan, 100 mg, Oral, Daily  thiamine, 100 mg, Oral, Daily      Continuous IV Infusions: none     PRN Meds:  LORazepam, 2 mg, Intravenous, Q8H PRN    regular diet  scd      IP CONSULT TO CASE MANAGEMENT    Network Utilization Review Department  ATTENTION: Please call with any questions or concerns to [phone] and carefully listen to the prompts so that you are directed to the right person  All voicemails are confidential   Veronika Darden all requests for admission clinical reviews, approved or denied determinations and any other requests to dedicated fax number below belonging to the campus where the patient is receiving treatment   List of dedicated fax numbers for the Facilities:  [address] DENIALS (Administrative/Medical Necessity) [phone]   [address] [place] [address] (Maternity/NICU/Pediatrics) [phone]   911 Stephie Soliz [phone]   [facility]  [phone]   [id number] [place] [address] [address] [place] [address] 009 [facility] [phone]

## 2023-02-24 NOTE — ASSESSMENT & PLAN NOTE
Assessment:  Patient is a 79-year-old male that presents to [place] [address] for the evaluation of spells that have been occurring for many years  The patient reports that he had 2 episodes on February 22 with a second episode lasting around 2 to 3 hours  The spells are described as movements  He will be episodes of the inability to speak, and being unaware or providing the incorrect answers to questions being asked  The patient was evaluated by outpatient neurology and was recommended that he has an EEG completed  Due to the patient having continued episodes the patient was admitted to the hospital for capturing and characterization of spells  vEEG (started around 2AM on 2/24)  - 02/26/2023 0800: Left anterior temporal sharp waves  No seizures  vEEG d/c     Plan:  - EEG d/c  - Start Keppra 1000 mg BID  - [employer] form has been completed and submitted  The patient is not currently driving   - Continue seizure precautions  - Administer 1 mg of Ativan only if patient has a general tonic-clonic seizure lasting for more than 3 minutes  Please alert neurology

## 2023-02-24 NOTE — ASSESSMENT & PLAN NOTE
- Maintained on valsartan 160 mg daily PTA, not on formulary therefore we will switch to losartan 100 mg daily

## 2023-02-24 NOTE — QUICK NOTE
Overnight neurology resident preliminary H&P    Spells of speech arrest  Assessment & Plan  - Patient presenting initially to [facility] for evaluation of spells that have been occurring for many years  - Patient had 2 episodes on 2/22 with the second episode lasting approximately 2 to 3 hours  - Patient had been evaluated as an outpatient by neurology and it was recommended that he have an EEG completed  - Given that the patient has continued to have these episodes will place the patient on video EEG monitoring in hopes of capturing an episode  - We will hold off on starting the patient on a new AED at this time pending EEG  - We will defer to day team regarding updated imaging as the patient did recently have an MRI of his brain completed in 1/2023  -Patient is not currently driving has had a [employer] form submitted  Accelerated hypertension  Assessment & Plan  - Maintained on valsartan 160 mg daily PTA, not on formulary therefore we will switch to losartan 100 mg daily    Intracranial aneurysm  Assessment & Plan  Noted on CTA in January 2023 - "Incidental 2 mm aneurysm or infundibulum arising from right supraclinoid ICA 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  Also suspected tiny aneurysm versus fenestration at distal A1 segment of right CECIL "    Carlotta Zhu is a 75 y o  male  with pertinent history of hypertension, incidental intracranial aneurysm who presented initially to [facility] for an episode of speech disturbance  The patients wife and the patient report that he has been having these episodes since at least 2016 and initially these episodes occurred approximately yearly but over time started to increase initially they then started happening every 6 months and then now they are happening about monthly  The patient was reported to have had an episode 2/22/2023 which caused his presentation to the hospital prior to that his last episode was 1/15/2023    The patient and his wife report that his episodes consist of him having word finding difficulties and confusion and not being able to understand what is being said to him  For example the patient's wife reports that he once said oceans when he was referring to a  he also will make up words and speak "gibberish "  The patient will also have episodes where he stares off and is unable to be communicated with  The patient has also had episodes where he has lipsmacking, and movement with his hand  The patient also complains of having a headache sometimes during these episodes and a pain in his right shoulders  He also reports that he gets cold after these episodes  The patient also will have issues remembering his name and at one point said that his name was "Yael Espinosa "when it is actually [facility]  On the day of presentation to the emergency room the patient experienced 2 episodes the first episode lasted approximately 5 minutes and subsequently he started recovering and then approximately 20 minutes after that he had his second episode and this lasted for approximately 2 to 3 hours  The patient's wife reports that during this episode he did have some periods of time where he responded  The patient was brought to the emergency room and he was initially a stroke alert however it was felt that his symptoms were due to a nonvascular cause and it was recommended that he be transferred to [facility] for video EEG and spell characterization

## 2023-02-24 NOTE — PLAN OF CARE
Problem: PAIN - ADULT  Goal: Verbalizes/displays adequate comfort level or baseline comfort level  Description: Interventions:  - Encourage patient to monitor pain and request assistance  - Assess pain using appropriate pain scale  - Administer analgesics based on type and severity of pain and evaluate response  - Implement non-pharmacological measures as appropriate and evaluate response  - Consider cultural and social influences on pain and pain management  - Notify physician/advanced practitioner if interventions unsuccessful or patient reports new pain  Outcome: Progressing     Problem: INFECTION - ADULT  Goal: Absence or prevention of progression during hospitalization  Description: INTERVENTIONS:  - Assess and monitor for signs and symptoms of infection  - Monitor lab/diagnostic results  - Monitor all insertion sites, i e  indwelling lines, tubes, and drains  - Monitor endotracheal if appropriate and nasal secretions for changes in amount and color  - [place]ylie appropriate cooling/warming therapies per order  - Administer medications as ordered  - Instruct and encourage patient and family to use good hand hygiene technique  - Identify and instruct in appropriate isolation precautions for identified infection/condition  Outcome: Progressing     Problem: SAFETY ADULT  Goal: Patient will remain free of falls  Description: INTERVENTIONS:  - Educate patient/family on patient safety including physical limitations  - Instruct patient to call for assistance with activity   - Consult OT/PT to assist with strengthening/mobility   - Keep Call bell within reach  - Keep bed low and locked with side rails adjusted as appropriate  - Keep care items and personal belongings within reach  - Initiate and maintain comfort rounds  - Make Fall Risk Sign visible to staff  - Apply yellow socks and bracelet for high fall risk patients  - Consider moving patient to room near nurses station  Outcome: Progressing  Goal: Maintain or return to baseline ADL function  Description: INTERVENTIONS:  -  Assess patient's ability to carry out ADLs; assess patient's baseline for ADL function and identify physical deficits which impact ability to perform ADLs (bathing, care of mouth/teeth, toileting, grooming, dressing, etc )  - Assess/evaluate cause of self-care deficits   - Assess range of motion  - Assess patient's mobility; develop plan if impaired  - Assess patient's need for assistive devices and provide as appropriate  - Encourage maximum independence but intervene and supervise when necessary  - Involve family in performance of ADLs  - Assess for home care needs following discharge   - Consider OT consult to assist with ADL evaluation and planning for discharge  - Provide patient education as appropriate  Outcome: Progressing  Goal: Maintains/Returns to pre admission functional level  Description: INTERVENTIONS:  - Perform BMAT or MOVE assessment daily    - Set and communicate daily mobility goal to care team and patient/family/caregiver     - Collaborate with rehabilitation services on mobility goals if consulted  - Out of bed for toileting  - Record patient progress and toleration of activity level   Outcome: Progressing     Problem: DISCHARGE PLANNING  Goal: Discharge to home or other facility with appropriate resources  Description: INTERVENTIONS:  - Identify barriers to discharge w/patient and caregiver  - Arrange for needed discharge resources and transportation as appropriate  - Identify discharge learning needs (meds, wound care, etc )  - Arrange for interpretive services to assist at discharge as needed  - Refer to Case Management Department for coordinating discharge planning if the patient needs post-hospital services based on physician/advanced practitioner order or complex needs related to functional status, cognitive ability, or social support system  Outcome: Progressing     Problem: Knowledge Deficit  Goal: Patient/family/caregiver demonstrates understanding of disease process, treatment plan, medications, and discharge instructions  Description: Complete learning assessment and assess knowledge base  Interventions:  - Provide teaching at level of understanding  - Provide teaching via preferred learning methods  Outcome: Progressing     Problem: NEUROSENSORY - ADULT  Goal: Achieves stable or improved neurological status  Description: INTERVENTIONS  - Monitor and report changes in neurological status  - Monitor vital signs such as temperature, blood pressure, glucose, and any other labs ordered   - Initiate measures to prevent increased intracranial pressure  - Monitor for seizure activity and implement precautions if appropriate      Outcome: Progressing  Goal: Remains free of injury related to seizures activity  Description: INTERVENTIONS  - Maintain airway, patient safety  and administer oxygen as ordered  - Monitor patient for seizure activity, document and report duration and description of seizure to physician/advanced practitioner  - If seizure occurs,  ensure patient safety during seizure  - Reorient patient post seizure  - Seizure pads on all 4 side rails  - Instruct patient/family to notify RN of any seizure activity including if an aura is experienced  - Instruct patient/family to call for assistance with activity based on nursing assessment  - Administer anti-seizure medications if ordered    Outcome: Progressing  Goal: Achieves maximal functionality and self care  Description: INTERVENTIONS  - Monitor swallowing and airway patency with patient fatigue and changes in neurological status  - Encourage and assist patient to increase activity and self care     - Encourage visually impaired, hearing impaired and aphasic patients to use assistive/communication devices  Outcome: Progressing

## 2023-02-25 ENCOUNTER — APPOINTMENT (OUTPATIENT)
Dept: NEUROLOGY | Facility: CLINIC | Age: 68
End: 2023-02-25

## 2023-02-25 PROBLEM — I67.1 INTRACRANIAL ANEURYSM: Chronic | Status: ACTIVE | Noted: 2023-01-15

## 2023-02-25 PROBLEM — I10 ACCELERATED HYPERTENSION: Chronic | Status: ACTIVE | Noted: 2023-01-15

## 2023-02-25 PROBLEM — R47.89 SPELLS OF SPEECH ARREST: Chronic | Status: ACTIVE | Noted: 2023-02-23

## 2023-02-25 LAB
ALBUMIN SERPL BCP-MCNC: 3.3 G/DL (ref 3.5–5)
ALP SERPL-CCNC: 54 U/L (ref 46–116)
ALT SERPL W P-5'-P-CCNC: 24 U/L (ref 12–78)
ANION GAP SERPL CALCULATED.3IONS-SCNC: 6 MMOL/L (ref 4–13)
AST SERPL W P-5'-P-CCNC: 15 U/L (ref 5–45)
BILIRUB SERPL-MCNC: 0.76 MG/DL (ref 0.2–1)
BUN SERPL-MCNC: 13 MG/DL (ref 5–25)
CALCIUM ALBUM COR SERPL-MCNC: 9.6 MG/DL (ref 8.3–10.1)
CALCIUM SERPL-MCNC: 9 MG/DL (ref 8.3–10.1)
CHLORIDE SERPL-SCNC: 110 MMOL/L (ref 96–108)
CO2 SERPL-SCNC: 26 MMOL/L (ref 21–32)
CREAT SERPL-MCNC: 0.56 MG/DL (ref 0.6–1.3)
ERYTHROCYTE [DISTWIDTH] IN BLOOD BY AUTOMATED COUNT: 12.3 % (ref 11.6–15.1)
GFR SERPL CREATININE-BSD FRML MDRD: 106 ML/MIN/1.73SQ M
GLUCOSE SERPL-MCNC: 108 MG/DL (ref 65–140)
HCT VFR BLD AUTO: 37.5 % (ref 36.5–49.3)
HGB BLD-MCNC: 13.2 G/DL (ref 12–17)
MCH RBC QN AUTO: 31.5 PG (ref 26.8–34.3)
MCHC RBC AUTO-ENTMCNC: 35.2 G/DL (ref 31.4–37.4)
MCV RBC AUTO: 90 FL (ref 82–98)
PLATELET # BLD AUTO: 204 THOUSANDS/UL (ref 149–390)
PMV BLD AUTO: 10.8 FL (ref 8.9–12.7)
POTASSIUM SERPL-SCNC: 3.4 MMOL/L (ref 3.5–5.3)
PROT SERPL-MCNC: 6.1 G/DL (ref 6.4–8.4)
RBC # BLD AUTO: 4.19 MILLION/UL (ref 3.88–5.62)
SODIUM SERPL-SCNC: 142 MMOL/L (ref 135–147)
WBC # BLD AUTO: 5.96 THOUSAND/UL (ref 4.31–10.16)

## 2023-02-25 RX ORDER — DIPHENHYDRAMINE HCL 25 MG
12.5 TABLET ORAL EVERY 6 HOURS PRN
Status: DISCONTINUED | OUTPATIENT
Start: 2023-02-25 | End: 2023-02-26 | Stop reason: HOSPADM

## 2023-02-25 RX ORDER — POTASSIUM CHLORIDE 20 MEQ/1
40 TABLET, EXTENDED RELEASE ORAL ONCE
Status: COMPLETED | OUTPATIENT
Start: 2023-02-25 | End: 2023-02-25

## 2023-02-25 RX ORDER — DIAPER,BRIEF,INFANT-TODD,DISP
EACH MISCELLANEOUS 4 TIMES DAILY PRN
Status: DISCONTINUED | OUTPATIENT
Start: 2023-02-25 | End: 2023-02-26 | Stop reason: HOSPADM

## 2023-02-25 RX ADMIN — FOLIC ACID 1 MG: 1 TABLET ORAL at 09:02

## 2023-02-25 RX ADMIN — ASPIRIN 81 MG CHEWABLE TABLET 81 MG: 81 TABLET CHEWABLE at 09:02

## 2023-02-25 RX ADMIN — Medication 2000 UNITS: at 09:02

## 2023-02-25 RX ADMIN — POTASSIUM CHLORIDE 40 MEQ: 1500 TABLET, EXTENDED RELEASE ORAL at 18:05

## 2023-02-25 RX ADMIN — HYDROCORTISONE: 1 CREAM TOPICAL at 06:45

## 2023-02-25 RX ADMIN — THIAMINE HCL TAB 100 MG 100 MG: 100 TAB at 09:02

## 2023-02-25 RX ADMIN — HYDROCORTISONE: 1 CREAM TOPICAL at 01:54

## 2023-02-25 RX ADMIN — ENOXAPARIN SODIUM 40 MG: 40 INJECTION SUBCUTANEOUS at 09:02

## 2023-02-25 RX ADMIN — LOSARTAN POTASSIUM 100 MG: 50 TABLET, FILM COATED ORAL at 09:02

## 2023-02-25 RX ADMIN — ENOXAPARIN SODIUM 40 MG: 40 INJECTION SUBCUTANEOUS at 22:25

## 2023-02-25 NOTE — PROGRESS NOTES
NEUROLOGY RESIDENCY PROGRESS NOTE     Name: Joyce Ahuja   Age & Sex: 75 y o  male   MRN: [id number]  [facility]   Encounter: [id number]    Joyce Ahuja will need follow up in in 4 weeks with epilepsy AP  He will not require outpatient neurological testing  Pending for discharge: vEEG spell capturing and characterization  ASSESSMENT & PLAN     * Spells of speech arrest  Assessment & Plan  Assessment:  Patient is a 70-year-old male that presents to [place] [address] for the evaluation of spells that have been occurring for many years  The patient reports that he had 2 episodes on February 22 with a second episode lasting around 2 to 3 hours  The spells are described as movements  He will be episodes of the inability to speak, and being unaware or providing the incorrect answers to questions being asked  The patient was evaluated by outpatient neurology and was recommended that he has an EEG completed  Due to the patient having continued episodes the patient was admitted to the hospital for capturing and characterization of spells  vEEG (started around 2AM on 2/24)  - 02/25/2023 0913: Left anterior temporal sharp waves  There was one non-specific event at 4:30PM yesterday  He was struggling with calculating the number of hospital beds here  His wife pressed the button because he seem to pause more and is usually good at math  speech is normal  No seizure at this time  Plan:  - Continue video EEG monitoring for capturing and characterization of spells for a total of 48 hours  If no events are captured video EEG may be discontinued  - We will currently hold off on starting a new AED whilst video EEG is being completed  - When video EEG is completed would start levetiracetam 1000 mg twice daily as there is currently enough abnormal activity on EEG to warrant an AED  - [employer] form has been completed and submitted    The patient is not currently driving   - Continue seizure precautions  - Administer 1 mg of Ativan only if patient has a general tonic-clonic seizure lasting for more than 3 minutes  Please alert neurology  Intracranial aneurysm  Assessment & Plan  Noted on CTA in January 2023 - "Incidental 2 mm aneurysm or infundibulum arising from right supraclinoid ICA 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  Also suspected tiny aneurysm versus fenestration at distal A1 segment of right CECIL  "    - Patient has appointment with neurosurgery scheduled  Accelerated hypertension  Assessment & Plan  Blood Pressure: 120/55    - Continue losartan 100 mg p o  daily  - Blood pressure goal: Normotension    Hypertriglyceridemia  Assessment & Plan  Lab Results   Component Value Date    CHOLESTEROL 192 01/16/2023    CHOLESTEROL 184 05/19/2022    CHOLESTEROL 194 04/02/2021     Lab Results   Component Value Date    HDL 63 01/16/2023    HDL 86 05/19/2022    HDL 73 04/02/2021     Lab Results   Component Value Date    TRIG 260 (H) 01/16/2023    TRIG 86 05/19/2022    TRIG 164 (H) 04/02/2021     Lab Results   Component Value Date    [place] 98 05/19/2022    [place] 121 04/02/2021    [place] 153 04/07/2018     - Continued management on outpatient with primary care  Vitamin D deficiency  Assessment & Plan  Patient has documented vitamin D deficiency patient currently taking cholecalciferol 2000 units daily  - Continue cholecalciferol 2000 units daily  - Continued management on outpatient with primary care    SUBJECTIVE     Patient was seen and examined with attending at bedside during rounds  The patient was alert and awake and oriented to person, place, time, and event  The patient was able to accurately report the date, perform basic calculations, perform comparative task, and was able to repeat phrases initial the with some mistakes in attention and articulation however on subsequent testing this improved    The patient was otherwise in good spirits and was excited for an event to hopefully be captured  The patient did not report any new complaints or concerns  Review of Systems   Constitutional: Negative for chills and fever  HENT: Negative for ear pain and sore throat  Eyes: Negative for pain and visual disturbance  Respiratory: Negative for cough and shortness of breath  Cardiovascular: Negative for chest pain and palpitations  Gastrointestinal: Negative for abdominal pain and vomiting  Genitourinary: Negative for dysuria and hematuria  Musculoskeletal: Negative for arthralgias and back pain  Skin: Negative for color change and rash  Neurological: Positive for speech difficulty  Negative for dizziness, tremors, seizures, syncope, facial asymmetry, weakness, light-headedness, numbness and headaches  Hematological: Negative for adenopathy  Psychiatric/Behavioral: Negative for agitation, behavioral problems, confusion, decreased concentration, dysphoric mood, hallucinations, self-injury, sleep disturbance and suicidal ideas  The patient is not nervous/anxious and is not hyperactive  All other systems reviewed and are negative  OBJECTIVE     Patient ID: Manfred White is a 75 y o  male  Vitals:    23 0249 23 0249 23 0717 23 1515   BP: 147/57  120/53 120/55   BP Location:       Pulse:  61 59 71   Resp: 16   15   Temp:   98 2 °F (36 8 °C) 98 6 °F (37 °C)   SpO2:  97% 94% 97%      Temperature:   Temp (24hrs), Av 4 °F (36 9 °C), Min:98 2 °F (36 8 °C), Max:98 6 °F (37 °C)    Temperature: 98 6 °F (37 °C)    Physical Exam  Vitals and nursing note reviewed  Constitutional:       General: He is not in acute distress  Appearance: He is well-developed  HENT:      Head: Normocephalic and atraumatic  Nose: Nose normal       Mouth/Throat:      Mouth: Mucous membranes are moist       Pharynx: Oropharynx is clear  No oropharyngeal exudate     Eyes:      Extraocular Movements: Extraocular movements intact and EOM normal  Conjunctiva/sclera: Conjunctivae normal       Pupils: Pupils are equal, round, and reactive to light  Cardiovascular:      Rate and Rhythm: Normal rate and regular rhythm  Heart sounds: No murmur heard  Pulmonary:      Effort: Pulmonary effort is normal  No respiratory distress  Breath sounds: Normal breath sounds  Abdominal:      Palpations: Abdomen is soft  Tenderness: There is no abdominal tenderness  Musculoskeletal:         General: No swelling  Cervical back: Neck supple  Skin:     General: Skin is warm and dry  Capillary Refill: Capillary refill takes less than 2 seconds  Neurological:      Mental Status: He is alert  Motor: Motor strength is normal       Coordination: Finger-Nose-Finger Test and Heel to [facility] Test normal       Deep Tendon Reflexes:      Reflex Scores:       Bicep reflexes are 1+ on the right side and 1+ on the left side  Brachioradialis reflexes are 1+ on the right side and 1+ on the left side  Patellar reflexes are 1+ on the right side and 1+ on the left side  Achilles reflexes are 1+ on the right side and 1+ on the left side  Psychiatric:         Mood and Affect: Mood normal         Neurologic Exam     Mental Status   Oriented to person  Oriented to place  Oriented to time  Oriented to year, month and date  Follows 3 step commands  Attention: normal  Concentration: normal    Level of consciousness: alert  Knowledge: good  Able to perform simple calculations  Able to name object  Able to repeat  Normal comprehension  Patient demonstrated difficulty repeating phrases during mental status examination  After repeated attempts the patient was able to complete repetition of phrases correctly  Cranial Nerves     CN II   Right visual field deficit: none  Left visual field deficit: none     CN III, IV, VI   Pupils are equal, round, and reactive to light  Extraocular motions are normal    Right pupil: Size: 3 mm   Shape: regular  Reactivity: brisk  Consensual response: intact  Accommodation: intact  Left pupil: Size: 3 mm  Shape: regular  Reactivity: brisk  Consensual response: intact  Accommodation: intact  CN III: no CN III palsy  CN VI: no CN VI palsy  Nystagmus: none   Diplopia: none    CN V   Right facial sensation deficit: none  Left facial sensation deficit: none    CN VII   Right facial weakness: none  Left facial weakness: none    CN VIII   Hearing: intact    CN IX, X   Palate: symmetric    CN XI   Right sternocleidomastoid strength: normal  Left sternocleidomastoid strength: normal  Right trapezius strength: normal  Left trapezius strength: normal    CN XII   Tongue: not atrophic  Fasciculations: absent  Tongue deviation: none    Motor Exam   Muscle bulk: normal  Overall muscle tone: normal  Right arm tone: normal  Left arm tone: normal  Right arm pronator drift: absent  Left arm pronator drift: absent  Right leg tone: normal  Left leg tone: normal    Strength   Strength 5/5 throughout  Sensory Exam   Light touch normal    Vibration normal    Proprioception normal      Gait, Coordination, and Reflexes     Coordination   Finger to nose coordination: normal  Heel to shin coordination: normal    Tremor   Resting tremor: absent  Intention tremor: absent  Action tremor: absent    Reflexes   Right brachioradialis: 1+  Left brachioradialis: 1+  Right biceps: 1+  Left biceps: 1+  Right patellar: 1+  Left patellar: 1+  Right achilles: 1+  Left achilles: 1+  Right plantar: normal  Left plantar: normal  Right Bradley: absent  Left Bradley: absent  Right ankle clonus: absent  Left ankle clonus: absent  - Gait examination deferred patient on video EEG  LABORATORY DATA     Labs: I have personally reviewed pertinent reports         Results from last 7 days   Lab Units 02/25/23  0442 02/22/23  1356   WBC Thousand/uL 5 96 4 57   HEMOGLOBIN g/dL 13 2 13 1   HEMATOCRIT % 37 5 40 4   PLATELETS Thousands/uL 204 191      Results from last 7 days   Lab Units 02/25/23  0442 02/22/23  1356   SODIUM mmol/L 142 134*   POTASSIUM mmol/L 3 4* 4 3   CHLORIDE mmol/L 110* 100   CO2 mmol/L 26 26   BUN mg/dL 13 15   CREATININE mg/dL 0 56* 0 57*   CALCIUM mg/dL 9 0 9 0   ALK PHOS U/L 54  --    ALT U/L 24  --    AST U/L 15  --               Results from last 7 days   Lab Units 02/22/23  1356   INR  0 94   PTT seconds 25               IMAGING & DIAGNOSTIC TESTING     Radiology Results: I have personally reviewed pertinent reports  No orders to display     Other Diagnostic Testing: I have personally reviewed pertinent reports  ACTIVE MEDICATIONS     Current Facility-Administered Medications   Medication Dose Route Frequency   • aspirin chewable tablet 81 mg  81 mg Oral Daily   • cholecalciferol (VITAMIN D3) tablet 2,000 Units  2,000 Units Oral Daily   • diphenhydrAMINE (BENADRYL) tablet 12 5 mg  12 5 mg Oral Q6H PRN   • enoxaparin (LOVENOX) subcutaneous injection 40 mg  40 mg Subcutaneous [id number] JALEEL   • folic acid (FOLVITE) tablet 1 mg  1 mg Oral Daily   • hydrocortisone 1 % cream   Topical 4x Daily PRN   • LORazepam (ATIVAN) injection 2 mg  2 mg Intravenous Q8H PRN   • losartan (COZAAR) tablet 100 mg  100 mg Oral Daily   • thiamine tablet 100 mg  100 mg Oral Daily     Prior to Admission medications    Medication Sig Start Date End Date Taking? Authorizing Provider   acetaminophen (TYLENOL) 325 mg tablet Take 2 tablets (650 mg total) by mouth every 6 (six) hours as needed for mild pain, headaches or fever Do not exceed a total of 3 grams of tylenol/acetaminophen in a 24-hour period   1/16/23   Sari Gibson DO   aspirin 81 mg chewable tablet Chew 1 tablet (81 mg total) daily To prevent stroke and heart attack Do not start before January 17, 2023 1/17/23   Tatum Montenegro DO   cholecalciferol (VITAMIN D3) 1,000 units tablet Take 2 tablets (2,000 Units total) by mouth daily For vitamin D deficiency 1/16/23   Tatum Montenegro DO valsartan (DIOVAN) 160 mg tablet Take 1 tablet (160 mg total) by mouth daily 2/7/23   LEIGHA Patel     VTE Pharmacologic Prophylaxis: Enoxaparin (Lovenox)  VTE Mechanical Prophylaxis: sequential compression device    ==  DO Anuel Caraballo [facility] Neurology Residency, PGY-2

## 2023-02-25 NOTE — ASSESSMENT & PLAN NOTE
Lab Results   Component Value Date    CHOLESTEROL 192 01/16/2023    CHOLESTEROL 184 05/19/2022    CHOLESTEROL 194 04/02/2021     Lab Results   Component Value Date    HDL 63 01/16/2023    HDL 86 05/19/2022    HDL 73 04/02/2021     Lab Results   Component Value Date    TRIG 260 (H) 01/16/2023    TRIG 86 05/19/2022    TRIG 164 (H) 04/02/2021     Lab Results   Component Value Date    [place] 98 05/19/2022    [place] 121 04/02/2021    [place] 153 04/07/2018     - Continued management on outpatient with primary care

## 2023-02-25 NOTE — PLAN OF CARE
Problem: SAFETY ADULT  Goal: Patient will remain free of falls  Description: INTERVENTIONS:  - Educate patient/family on patient safety including physical limitations  - Instruct patient to call for assistance with activity   - Consult OT/PT to assist with strengthening/mobility   - Keep Call bell within reach  - Keep bed low and locked with side rails adjusted as appropriate  - Keep care items and personal belongings within reach  - Initiate and maintain comfort rounds  - Make Fall Risk Sign visible to staff  - Apply yellow socks and bracelet for high fall risk patients  - Consider moving patient to room near nurses station  Outcome: Progressing  Goal: Maintain or return to baseline ADL function  Description: INTERVENTIONS:  -  Assess patient's ability to carry out ADLs; assess patient's baseline for ADL function and identify physical deficits which impact ability to perform ADLs (bathing, care of mouth/teeth, toileting, grooming, dressing, etc )  - Assess/evaluate cause of self-care deficits   - Assess range of motion  - Assess patient's mobility; develop plan if impaired  - Assess patient's need for assistive devices and provide as appropriate  - Encourage maximum independence but intervene and supervise when necessary  - Involve family in performance of ADLs  - Assess for home care needs following discharge   - Consider OT consult to assist with ADL evaluation and planning for discharge  - Provide patient education as appropriate  Outcome: Progressing  Goal: Maintains/Returns to pre admission functional level  Description: INTERVENTIONS:  - Perform BMAT or MOVE assessment daily    - Set and communicate daily mobility goal to care team and patient/family/caregiver     - Collaborate with rehabilitation services on mobility goals if consulted  - Out of bed for toileting  - Record patient progress and toleration of activity level   Outcome: Progressing     Problem: NEUROSENSORY - ADULT  Goal: Achieves stable or improved neurological status  Description: INTERVENTIONS  - Monitor and report changes in neurological status  - Monitor vital signs such as temperature, blood pressure, glucose, and any other labs ordered   - Initiate measures to prevent increased intracranial pressure  - Monitor for seizure activity and implement precautions if appropriate      Outcome: Progressing  Goal: Remains free of injury related to seizures activity  Description: INTERVENTIONS  - Maintain airway, patient safety  and administer oxygen as ordered  - Monitor patient for seizure activity, document and report duration and description of seizure to physician/advanced practitioner  - If seizure occurs,  ensure patient safety during seizure  - Reorient patient post seizure  - Seizure pads on all 4 side rails  - Instruct patient/family to notify RN of any seizure activity including if an aura is experienced  - Instruct patient/family to call for assistance with activity based on nursing assessment  - Administer anti-seizure medications if ordered    Outcome: Progressing  Goal: Achieves maximal functionality and self care  Description: INTERVENTIONS  - Monitor swallowing and airway patency with patient fatigue and changes in neurological status  - Encourage and assist patient to increase activity and self care     - Encourage visually impaired, hearing impaired and aphasic patients to use assistive/communication devices  Outcome: Progressing

## 2023-02-25 NOTE — ASSESSMENT & PLAN NOTE
Blood Pressure: 120/55    - Continue losartan 100 mg p o  daily  - Blood pressure goal: Normotension

## 2023-02-26 ENCOUNTER — APPOINTMENT (OUTPATIENT)
Dept: NEUROLOGY | Facility: CLINIC | Age: 68
End: 2023-02-26

## 2023-02-26 VITALS
DIASTOLIC BLOOD PRESSURE: 57 MMHG | TEMPERATURE: 97.9 F | OXYGEN SATURATION: 99 % | SYSTOLIC BLOOD PRESSURE: 104 MMHG | RESPIRATION RATE: 15 BRPM | HEART RATE: 69 BPM

## 2023-02-26 RX ORDER — LEVETIRACETAM 500 MG/1
1000 TABLET ORAL EVERY 12 HOURS SCHEDULED
Status: DISCONTINUED | OUTPATIENT
Start: 2023-02-26 | End: 2023-02-26 | Stop reason: HOSPADM

## 2023-02-26 RX ORDER — LEVETIRACETAM 1000 MG/1
1000 TABLET ORAL EVERY 12 HOURS SCHEDULED
Qty: 180 TABLET | Refills: 3 | Status: SHIPPED | OUTPATIENT
Start: 2023-02-26

## 2023-02-26 RX ADMIN — FOLIC ACID 1 MG: 1 TABLET ORAL at 08:44

## 2023-02-26 RX ADMIN — LEVETIRACETAM 1000 MG: 500 TABLET, FILM COATED ORAL at 08:42

## 2023-02-26 RX ADMIN — HYDROCORTISONE: 1 CREAM TOPICAL at 00:15

## 2023-02-26 RX ADMIN — Medication 2000 UNITS: at 08:42

## 2023-02-26 RX ADMIN — THIAMINE HCL TAB 100 MG 100 MG: 100 TAB at 08:44

## 2023-02-26 RX ADMIN — HYDROCORTISONE: 1 CREAM TOPICAL at 05:45

## 2023-02-26 RX ADMIN — ASPIRIN 81 MG CHEWABLE TABLET 81 MG: 81 TABLET CHEWABLE at 08:42

## 2023-02-26 RX ADMIN — ENOXAPARIN SODIUM 40 MG: 40 INJECTION SUBCUTANEOUS at 08:42

## 2023-02-26 NOTE — PLAN OF CARE
Problem: PAIN - ADULT  Goal: Verbalizes/displays adequate comfort level or baseline comfort level  Description: Interventions:  - Encourage patient to monitor pain and request assistance  - Assess pain using appropriate pain scale  - Administer analgesics based on type and severity of pain and evaluate response  - Implement non-pharmacological measures as appropriate and evaluate response  - Consider cultural and social influences on pain and pain management  - Notify physician/advanced practitioner if interventions unsuccessful or patient reports new pain  Outcome: Progressing     Problem: INFECTION - ADULT  Goal: Absence or prevention of progression during hospitalization  Description: INTERVENTIONS:  - Assess and monitor for signs and symptoms of infection  - Monitor lab/diagnostic results  - Monitor all insertion sites, i e  indwelling lines, tubes, and drains  - Monitor endotracheal if appropriate and nasal secretions for changes in amount and color  - Lapel appropriate cooling/warming therapies per order  - Administer medications as ordered  - Instruct and encourage patient and family to use good hand hygiene technique  - Identify and instruct in appropriate isolation precautions for identified infection/condition  Outcome: Progressing     Problem: SAFETY ADULT  Goal: Patient will remain free of falls  Description: INTERVENTIONS:  - Educate patient/family on patient safety including physical limitations  - Instruct patient to call for assistance with activity   - Consult OT/PT to assist with strengthening/mobility   - Keep Call bell within reach  - Keep bed low and locked with side rails adjusted as appropriate  - Keep care items and personal belongings within reach  - Initiate and maintain comfort rounds  - Make Fall Risk Sign visible to staff  - Apply yellow socks and bracelet for high fall risk patients  - Consider moving patient to room near nurses station  Outcome: Progressing  Goal: Maintain or return to baseline ADL function  Description: INTERVENTIONS:  -  Assess patient's ability to carry out ADLs; assess patient's baseline for ADL function and identify physical deficits which impact ability to perform ADLs (bathing, care of mouth/teeth, toileting, grooming, dressing, etc )  - Assess/evaluate cause of self-care deficits   - Assess range of motion  - Assess patient's mobility; develop plan if impaired  - Assess patient's need for assistive devices and provide as appropriate  - Encourage maximum independence but intervene and supervise when necessary  - Involve family in performance of ADLs  - Assess for home care needs following discharge   - Consider OT consult to assist with ADL evaluation and planning for discharge  - Provide patient education as appropriate  Outcome: Progressing  Goal: Maintains/Returns to pre admission functional level  Description: INTERVENTIONS:  - Perform BMAT or MOVE assessment daily    - Set and communicate daily mobility goal to care team and patient/family/caregiver     - Collaborate with rehabilitation services on mobility goals if consulted  - Out of bed for toileting  - Record patient progress and toleration of activity level   Outcome: Progressing     Problem: DISCHARGE PLANNING  Goal: Discharge to home or other facility with appropriate resources  Description: INTERVENTIONS:  - Identify barriers to discharge w/patient and caregiver  - Arrange for needed discharge resources and transportation as appropriate  - Identify discharge learning needs (meds, wound care, etc )  - Arrange for interpretive services to assist at discharge as needed  - Refer to Case Management Department for coordinating discharge planning if the patient needs post-hospital services based on physician/advanced practitioner order or complex needs related to functional status, cognitive ability, or social support system  Outcome: Progressing     Problem: Knowledge Deficit  Goal: Patient/family/caregiver demonstrates understanding of disease process, treatment plan, medications, and discharge instructions  Description: Complete learning assessment and assess knowledge base  Interventions:  - Provide teaching at level of understanding  - Provide teaching via preferred learning methods  Outcome: Progressing     Problem: NEUROSENSORY - ADULT  Goal: Achieves stable or improved neurological status  Description: INTERVENTIONS  - Monitor and report changes in neurological status  - Monitor vital signs such as temperature, blood pressure, glucose, and any other labs ordered   - Initiate measures to prevent increased intracranial pressure  - Monitor for seizure activity and implement precautions if appropriate      Outcome: Progressing  Goal: Remains free of injury related to seizures activity  Description: INTERVENTIONS  - Maintain airway, patient safety  and administer oxygen as ordered  - Monitor patient for seizure activity, document and report duration and description of seizure to physician/advanced practitioner  - If seizure occurs,  ensure patient safety during seizure  - Reorient patient post seizure  - Seizure pads on all 4 side rails  - Instruct patient/family to notify RN of any seizure activity including if an aura is experienced  - Instruct patient/family to call for assistance with activity based on nursing assessment  - Administer anti-seizure medications if ordered    Outcome: Progressing  Goal: Achieves maximal functionality and self care  Description: INTERVENTIONS  - Monitor swallowing and airway patency with patient fatigue and changes in neurological status  - Encourage and assist patient to increase activity and self care     - Encourage visually impaired, hearing impaired and aphasic patients to use assistive/communication devices  Outcome: Progressing     Problem: MOBILITY - ADULT  Goal: Maintain or return to baseline ADL function  Description: INTERVENTIONS:  -  Assess patient's ability to carry out ADLs; assess patient's baseline for ADL function and identify physical deficits which impact ability to perform ADLs (bathing, care of mouth/teeth, toileting, grooming, dressing, etc )  - Assess/evaluate cause of self-care deficits   - Assess range of motion  - Assess patient's mobility; develop plan if impaired  - Assess patient's need for assistive devices and provide as appropriate  - Encourage maximum independence but intervene and supervise when necessary  - Involve family in performance of ADLs  - Assess for home care needs following discharge   - Consider OT consult to assist with ADL evaluation and planning for discharge  - Provide patient education as appropriate  Outcome: Progressing  Goal: Maintains/Returns to pre admission functional level  Description: INTERVENTIONS:  - Perform BMAT or MOVE assessment daily    - Set and communicate daily mobility goal to care team and patient/family/caregiver     - Collaborate with rehabilitation services on mobility goals if consulted  - Out of bed for toileting  - Record patient progress and toleration of activity level   Outcome: Progressing

## 2023-02-26 NOTE — CASE MANAGEMENT
Case Management Assessment & Discharge Planning Note    Patient name Carvel Stall  [facility] [id number]  : 1955 Date 2023       Current Admission Date: 2023  Current Admission Diagnosis:Spells of speech arrest   Patient Active Problem List    Diagnosis Date Noted   • Spells of speech arrest 2023   • Spells of decreased attentiveness 2023   • Acute pain of right shoulder 2023   • Episodic altered awareness 2023   • Atrial septal aneurysm 2023   • Sleep apnea, obstructive 2023   • Aphasia 01/15/2023   • Intracranial aneurysm 01/15/2023   • Abnormal CT scan of lung 01/15/2023   • Accelerated hypertension 01/15/2023   • Atelectasis 01/15/2023   • Erythema migrans (Lyme disease) 2022   • Morbid obesity with BMI of 40 0-44 9, adult ([facility] ) 2019   • Arthritis of left knee 2019   • Primary osteoarthritis of both knees 2018   • Primary osteoarthritis of one knee, left 2018   • Depression 2017   • Morbid obesity ([facility] ) 2017   • Chronic back pain 09/15/2016   • Hypertriglyceridemia 09/15/2016   • Osteoarthritis 09/15/2016   • Vitamin D deficiency 09/15/2016   • Osteoarthritis of spine with radiculopathy, lumbar region 2016      LOS (days): 3  Geometric Mean LOS (GMLOS) (days): 2 10  Days to GMLOS:-0 5     OBJECTIVE:    Risk of Unplanned Readmission Score: 10 32         Current admission status: Inpatient       Preferred Pharmacy:   [facility]pharmacy [place][id number] [address]  Phone: [phone] Fax: [phone]    Primary Care Provider: LEIGHA Marinelli    Primary Insurance: Alexander Sandwich [address] REP  Secondary Insurance:     ASSESSMENT:  [address], [address] - Spouse   Primary Phone: [phone] (Mobile)  Home Phone: [phone] DISCHARGE DETAILS:    Discharge planning discussed with[de-identified] Pt chart reviewed  per medical team, pt is medically cleared for discharged  Pt to discharged home with no need and spouse to transport upon discharged

## 2023-02-26 NOTE — DISCHARGE SUMMARY
NEUROLOGY RESIDENCY - DISCHARGE SUMMARY     Name: Lali Granado   Age & Sex: 75 y o  male   MRN: [id number]  Unit/Bed#: [facility] 722-01   Encounter: [id number]    Discharging Resident Physician: Shawna Boykin MD  Attending: Roldan Longo DO  PCP: Dino Medina  Admission Date: 2/23/2023  Discharge Date: 02/26/23    Lali Granado will need follow up in in 4 weeks with epilepsy attending ap resident  He will not require outpatient neurological testing  He has EEG upcoming outpatient and does not need to proceed with this test as he underwent vEEG during his hospital stay  ASSESSMENT & PLAN     * Spells of speech arrest  Assessment & Plan  Assessment:  Patient is a 59-year-old male that presents to [place] [address] for the evaluation of spells that have been occurring for many years  The patient reports that he had 2 episodes on February 22 with a second episode lasting around 2 to 3 hours  The spells are described as movements  He will be episodes of the inability to speak, and being unaware or providing the incorrect answers to questions being asked  The patient was evaluated by outpatient neurology and was recommended that he has an EEG completed  Due to the patient having continued episodes the patient was admitted to the hospital for capturing and characterization of spells  vEEG (started around 2AM on 2/24)  - 02/26/2023 0800: Left anterior temporal sharp waves  No seizures  vEEG d/c     Plan:  - EEG d/c  - Start Keppra 1000 mg BID  - [employer] form has been completed and submitted  The patient is not currently driving   - Continue seizure precautions  - Administer 1 mg of Ativan only if patient has a general tonic-clonic seizure lasting for more than 3 minutes  Please alert neurology      Accelerated hypertension  Assessment & Plan  Blood Pressure: 120/55    - Continue losartan 100 mg p o  daily  - Blood pressure goal: Normotension    Intracranial aneurysm  Assessment & Plan  Noted on CTA in January 2023 - "Incidental 2 mm aneurysm or infundibulum arising from right supraclinoid ICA 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  Also suspected tiny aneurysm versus fenestration at distal A1 segment of right CECIL  "    - Patient has appointment with neurosurgery scheduled  Vitamin D deficiency  Assessment & Plan  Patient has documented vitamin D deficiency patient currently taking cholecalciferol 2000 units daily  - Continue cholecalciferol 2000 units daily  - Continued management on outpatient with primary care    Hypertriglyceridemia  Assessment & Plan  Lab Results   Component Value Date    CHOLESTEROL 192 01/16/2023    CHOLESTEROL 184 05/19/2022    CHOLESTEROL 194 04/02/2021     Lab Results   Component Value Date    HDL 63 01/16/2023    HDL 86 05/19/2022    HDL 73 04/02/2021     Lab Results   Component Value Date    TRIG 260 (H) 01/16/2023    TRIG 86 05/19/2022    TRIG 164 (H) 04/02/2021     Lab Results   Component Value Date    [place] 98 05/19/2022    [place] 121 04/02/2021    [place] 153 04/07/2018     - Continued management on outpatient with primary care  Disposition:     Home    Reason for Admission: spells of behavioral arrest    Consultations During Hospital Stay:  · None    Procedures Performed:     · Video EEG  · EKG    Significant Findings / Test Results:     · Video EEG demonstrated left anterior temporal sharps without overt seizure activity    Incidental Findings:   · None    Test Results Pending at Discharge (will require follow up): · None     Outpatient Tests Requested:  · None    Complications: None    Hospital Course:     Daniela Winkler is a 75 y o  male patient who originally presented to the hospital on 2/23/2023 due to episodes of altered mental status described as periods of aphasia and lipsmacking and he was noted to be confused on arrival   He was seen previously in January for similar symptoms with negative imaging and pending outpatient EEG  His wife reports that he is often unaware of what is happening when he speech is disturbed and then complains of headaches either prior or following these episodes  These episodes reportedly started in 2016 although were very infrequent until January 2023  He initially presented to [facility] as a stroke alert and was transferred to [place] for further characterization of his spells on video EEG  He spent 48 hours on EEG during which time he had numerous left anterior temporal sharps noted without overt epileptiform discharges  Given these electrographic findings as well as his clinical presentation it was decided to start him on an AED at this time  He was started on levetiracetam 1000 mg twice daily which he tolerated well  He does have a previous history of frequent alcohol use which had ramped up in the months prior to worsening of his symptoms in January  At this point however he has been abstinent for 41 days and plans to continue abstaining from alcohol  Discussed lifestyle modification at this time he has drastically changed his eating habits and is now vegan, and the aforementioned abstinence from alcohol  Discussed seizure first-aid safety he understood and was able to repeat back to me  He tolerated initiation of Keppra with no difficulties and is medically stable for discharge home  He will follow-up with epileptologist     Condition at Discharge: good     Discharge Day Visit / Exam:     Subjective: No concerns or complaints today  He feels he is back to his baseline  He was started on Keppra this morning and tolerated the medication initiation well with no concerns  Discussed side effects most commonly being mood dysregulation which he and his wife will look out for  She was on the phone during this discussion  Additionally he notes that he was started on a blood pressure medication back in January however his blood pressure has been well regulated    We discussed the utility in discussing this with his primary care provider who he has an appointment to see this week  Vitals: Blood Pressure: 104/57 (02/26/23 0851)  Pulse: 69 (02/26/23 0851)  Temperature: 97 9 °F (36 6 °C) (02/26/23 0728)  Respirations: 15 (02/25/23 1515)  SpO2: 99 % (02/26/23 0851)    Exam:     Physical Exam  Vitals and nursing note reviewed  Constitutional:       General: He is not in acute distress  HENT:      Head: Normocephalic and atraumatic  Mouth/Throat:      Mouth: Mucous membranes are dry  Eyes:      Extraocular Movements: Extraocular movements intact and EOM normal       Pupils: Pupils are equal, round, and reactive to light  Cardiovascular:      Rate and Rhythm: Normal rate  Pulmonary:      Effort: No respiratory distress  Musculoskeletal:         General: Normal range of motion  Cervical back: Normal range of motion  Neurological:      General: No focal deficit present  Mental Status: He is alert and oriented to person, place, and time  Motor: Motor strength is normal       Coordination: Finger-Nose-Finger Test and Heel to Shin Test normal    Psychiatric:         Mood and Affect: Mood normal          Speech: Speech normal          Neurologic Exam     Mental Status   Oriented to person, place, and time  Follows 3 step commands  Attention: normal  Concentration: normal    Speech: speech is normal   Level of consciousness: alert  Knowledge: good  Able to repeat  Normal comprehension  Resolution of previously noted aphasia     Cranial Nerves     CN II   Visual fields full to confrontation  CN III, IV, VI   Pupils are equal, round, and reactive to light  Extraocular motions are normal    Nystagmus: none   Diplopia: none  Ophthalmoparesis: none    CN V   Facial sensation intact  CN VII   Facial expression full, symmetric       CN VIII   CN VIII normal      CN IX, X   CN IX normal    CN X normal      CN XI   CN XI normal      CN XII   CN XII normal      Motor Exam Muscle bulk: normal  Overall muscle tone: normal  Right arm pronator drift: present    Strength   Strength 5/5 throughout  Sensory Exam   Light touch normal      Gait, Coordination, and Reflexes     Coordination   Finger to nose coordination: normal  Heel to shin coordination: normal    Tremor   Resting tremor: absent  Intention tremor: absent  Action tremor: absent    Reflexes   Right plantar: normal  Left plantar: normal  Right Bradley: absent  Left Bradley: absent  Right ankle clonus: absent  Left ankle clonus: absent      Discussion with Family: With his wife Korin Yanes over the phone    Discharge instructions/Information to patient and family:   See after visit summary for information provided to patient and family  Provisions for Follow-Up Care:  See after visit summary for information related to follow-up care and any pertinent home health orders  Planned Readmission: None    Discharge Statement:  I spent 35 minutes discharging the patient  This time was spent on the day of discharge  I had direct contact with the patient on the day of discharge  Greater than 50% of the total time was spent examining patient, answering all patient questions, arranging and discussing plan of care with patient as well as directly providing post-discharge instructions  Additional time then spent on discharge activities  Discharge Medications:  See after visit summary for reconciled discharge medications provided to patient and family        ** Please Note: This note has been constructed using a voice recognition system **      ==  MD Anuel Spear [facility] Neurology Residency, PGY-4

## 2023-02-27 ENCOUNTER — OFFICE VISIT (OUTPATIENT)
Dept: INTERNAL MEDICINE CLINIC | Facility: CLINIC | Age: 68
End: 2023-02-27

## 2023-02-27 VITALS
WEIGHT: 315 LBS | BODY MASS INDEX: 42.66 KG/M2 | SYSTOLIC BLOOD PRESSURE: 120 MMHG | DIASTOLIC BLOOD PRESSURE: 70 MMHG | HEIGHT: 72 IN | HEART RATE: 73 BPM | OXYGEN SATURATION: 94 % | TEMPERATURE: 97.8 F

## 2023-02-27 DIAGNOSIS — R21 RASH AND NONSPECIFIC SKIN ERUPTION: ICD-10-CM

## 2023-02-27 DIAGNOSIS — E55.9 VITAMIN D DEFICIENCY: Chronic | ICD-10-CM

## 2023-02-27 DIAGNOSIS — R47.01 APHASIA: ICD-10-CM

## 2023-02-27 DIAGNOSIS — E55.9 VITAMIN D DEFICIENCY: ICD-10-CM

## 2023-02-27 DIAGNOSIS — G93.89 INTRACRANIAL CALCIFICATION: ICD-10-CM

## 2023-02-27 DIAGNOSIS — G47.33 SLEEP APNEA, OBSTRUCTIVE: ICD-10-CM

## 2023-02-27 DIAGNOSIS — R47.89 SPELLS OF SPEECH ARREST: Primary | Chronic | ICD-10-CM

## 2023-02-27 DIAGNOSIS — I67.1 INTRACRANIAL ANEURYSM: Chronic | ICD-10-CM

## 2023-02-27 DIAGNOSIS — I10 ACCELERATED HYPERTENSION: Chronic | ICD-10-CM

## 2023-02-27 LAB — VIT B1 BLD-SCNC: 150.7 NMOL/L (ref 66.5–200)

## 2023-02-27 RX ORDER — ASPIRIN 81 MG/1
81 TABLET, CHEWABLE ORAL DAILY
Qty: 30 TABLET | Refills: 3 | Status: SHIPPED | OUTPATIENT
Start: 2023-02-27

## 2023-02-27 RX ORDER — PREDNISONE 10 MG/1
TABLET ORAL
Qty: 18 TABLET | Refills: 0 | Status: SHIPPED | OUTPATIENT
Start: 2023-02-27

## 2023-02-27 RX ORDER — MELATONIN
2000 DAILY
Qty: 60 TABLET | Refills: 3 | Status: SHIPPED | OUTPATIENT
Start: 2023-02-27

## 2023-02-27 RX ORDER — DEXAMETHASONE SODIUM PHOSPHATE 4 MG/ML
4 INJECTION, SOLUTION INTRA-ARTICULAR; INTRALESIONAL; INTRAMUSCULAR; INTRAVENOUS; SOFT TISSUE ONCE
Status: COMPLETED | OUTPATIENT
Start: 2023-02-27 | End: 2023-02-27

## 2023-02-27 RX ADMIN — DEXAMETHASONE SODIUM PHOSPHATE 4 MG: 4 INJECTION, SOLUTION INTRA-ARTICULAR; INTRALESIONAL; INTRAMUSCULAR; INTRAVENOUS; SOFT TISSUE at 12:14

## 2023-02-27 NOTE — PROGRESS NOTES
Assessment & Plan    * Spells of speech arrest  Assessment & Plan  Assessment:  Patient is a 59-year-old male that presents to [place] [address] for the evaluation of spells that have been occurring for many years  The patient reports that he had 2 episodes on February 22 with a second episode lasting around 2 to 3 hours  The spells are described as movements  He will be episodes of the inability to speak, and being unaware or providing the incorrect answers to questions being asked  The patient was evaluated by outpatient neurology and was recommended that he has an EEG completed  Due to the patient having continued episodes the patient was admitted to the hospital for capturing and characterization of spells      vEEG (started around 2AM on 2/24)  - 02/26/2023 0800: Left anterior temporal sharp waves  No seizures  vEEG d/c      Plan:  - EEG d/c  - Start Keppra 1000 mg BID  - [employer] form has been completed and submitted  The patient is not currently driving   - Continue seizure precautions  - Administer 1 mg of Ativan only if patient has a general tonic-clonic seizure lasting for more than 3 minutes  Please alert neurology      Accelerated hypertension  Assessment & Plan  Will hold BP medication an continue to monitor at home    Rash- will give Decadron IM now and will give prednisone tapered dose       Intracranial aneurysm  Assessment & Plan  Noted on CTA in January 2023 - "Incidental 2 mm aneurysm or infundibulum arising from right supraclinoid ICA 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   Also suspected tiny aneurysm versus fenestration at distal A1 segment of right CECIL  "     - Patient has appointment with neurosurgery scheduled      Vitamin D deficiency  Assessment & Plan  Patient has documented vitamin D deficiency patient currently taking cholecalciferol 2000 units daily      - Continue cholecalciferol 2000 units daily  - Continued management on outpatient with primary care     Hypertriglyceridemia  Assessment & Plan        Lab Results   Component Value Date     CHOLESTEROL 192 01/16/2023     CHOLESTEROL 184 05/19/2022     CHOLESTEROL 194 04/02/2021            Lab Results   Component Value Date     HDL 63 01/16/2023     HDL 86 05/19/2022     HDL 73 04/02/2021            Lab Results   Component Value Date     TRIG 260 (H) 01/16/2023     TRIG 86 05/19/2022     TRIG 164 (H) 04/02/2021            Lab Results   Component Value Date     NONHDLC 98 05/19/2022     NONHDLC 121 04/02/2021     [place] 153 04/07/2018      - Continued management on outpatient with primary care                1  Spells of speech arrest    2  Aphasia    3  Vitamin D deficiency    4  Accelerated hypertension    5  Intracranial aneurysm    6  Sleep apnea, obstructive    7  Rash and nonspecific skin eruption  -     predniSONE 10 mg tablet; 30 mg by mouth daily for 3 days, then 20 mg by mouth daily for 3 days, then 10 mg by mouth daily for 3 days, then stop  -     dexamethasone (DECADRON) injection 4 mg         Subjective     Transitional Care Management Review:   Lianna Echols is a 75 y o  male here for TCM follow up  During the TCM phone call patient stated:  TCM Call     Date and time call was made  2/27/2023 10:01 AM    Hospital care reviewed  Records reviewed    Patient was hospitialized at  [address]     Date of Admission  02/23/23    Date of discharge  02/26/23    Diagnosis  Spells of speech arrest    Disposition  Home    Were the patients medications reviewed and updated  Yes    Current Symptoms  Weakness; Headache;  Fatigue    Left side leg pain severity  Mild    Leg pain, left side, onset  Other (comment)    Weakness severity  Mild    Fatigue severity  Mild    Headache pain severity  Mild    Headache pain onset  Gradual    Headache location / laterality  Entire head    Headache Radition / Laterality  Not radiating    Episode pattern  Daytime    Headache pain level  8    Headache cause / trigger  Stressful event    Cause certainty  Probably      TCM Call     Post hospital issues  None    Should patient be enrolled in anticoag monitoring? No    Scheduled for follow up? Yes    Patient refusal reason  Patient has not been released by his surgeon yet, does not want to pay out of pocket expense, does not feel its warranted to see us, and relies on wife for transportation    Patients specialists  Neurologist    Neurologist name  Roman Jessica    Other specialists names  Ortho    Did you obtain your prescribed medications  Yes    Do you need help managing your prescriptions or medications  No    Is transportation to your appointment needed  No    Specify why  unable to drive due to knee replacement    I have advised the patient to call PCP with any new or worsening symptoms  [address] or [address] other    Support System  Spouse    The type of support provided  Emotional; Physical    Do you have social support  Yes, as much as I need    Are you recieving any outpatient services  No    What type of services  Physical therapy    Are you recieving home care services  No    Are you using any community resources  No    Current waiver services  No    Have you fallen in the last 12 months  No    Interperter language line needed  No    Counseling  Family    Counseling topics  Activities of daily living    Comments  Patient was giberish last night and during sleep then woke up with a headache which he still has  Carlotta Audra is a 75 y o  male patient who originally presented to the hospital on 2/23/2023 due to episodes of altered mental status described as periods of aphasia and lipsmacking and he was noted to be confused on arrival   He was seen previously in January for similar symptoms with negative imaging and pending outpatient EEG    His wife reports that he is often unaware of what is happening when he speech is disturbed and then complains of headaches either prior or following these episodes  These episodes reportedly started in 2016 although were very infrequent until January 2023  He initially presented to [facility] as a stroke alert and was transferred to [place] for further characterization of his spells on video EEG  He spent 48 hours on EEG during which time he had numerous left anterior temporal sharps noted without overt epileptiform discharges  Given these electrographic findings as well as his clinical presentation it was decided to start him on an AED at this time  He was started on levetiracetam 1000 mg twice daily which he tolerated well  He does have a previous history of frequent alcohol use which had ramped up in the months prior to worsening of his symptoms in January  At this point however he has been abstinent for 41 days and plans to continue abstaining from alcohol  Discussed lifestyle modification at this time he has drastically changed his eating habits and is now vegan, and the aforementioned abstinence from alcohol  Discussed seizure first-aid safety he understood and was able to repeat back to me  He tolerated initiation of Keppra with no difficulties and is medically stable for discharge home  He will follow-up with epileptologist  He doing well on his medications  He is no longer taking his BP medication due to his BP running low  His wife does monitor this at home  He denies any other episodes  He is taking the baby ASA  He also did start with a rash on his back and thinks he was allergic to the sheets in the hospital  He offers no other issues  Review of Systems   Skin: Positive for rash  All other systems reviewed and are negative        Current Outpatient Medications:   •  acetaminophen (TYLENOL) 325 mg tablet, Take 2 tablets (650 mg total) by mouth every 6 (six) hours as needed for mild pain, headaches or fever Do not exceed a total of 3 grams of tylenol/acetaminophen in a 24-hour period  (Patient taking differently: Take 650 mg by mouth if needed for mild pain, headaches or fever Do not exceed a total of 3 grams of tylenol/acetaminophen in a 24-hour period ), Disp: , Rfl: 0  •  cholecalciferol (VITAMIN D3) 1,000 units tablet, Take 2 tablets (2,000 Units total) by mouth daily For vitamin D deficiency, Disp: 60 tablet, Rfl: 0  •  levETIRAcetam (KEPPRA) 1000 MG tablet, Take 1 tablet (1,000 mg total) by mouth every 12 (twelve) hours, Disp: 180 tablet, Rfl: 3  •  predniSONE 10 mg tablet, 30 mg by mouth daily for 3 days, then 20 mg by mouth daily for 3 days, then 10 mg by mouth daily for 3 days, then stop, Disp: 18 tablet, Rfl: 0  •  aspirin 81 mg chewable tablet, Chew 1 tablet (81 mg total) daily To prevent stroke and heart attack Do not start before January 17, 2023  (Patient not taking: Reported on 2/27/2023), Disp: 30 tablet, Rfl: 0  •  valsartan (DIOVAN) 160 mg tablet, Take 1 tablet (160 mg total) by mouth daily (Patient not taking: Reported on 2/27/2023), Disp: 30 tablet, Rfl: 3  No current facility-administered medications for this visit  Below is the patient's most recent value for Albumin, ALT, AST, BUN, Calcium, Chloride, Cholesterol, CO2, Creatinine, GFR, Glucose, HDL, Hematocrit, Hemoglobin, Hemoglobin A1C, LDL, Magnesium, Phosphorus, Platelets, Potassium, PSA, Sodium, Triglycerides, and WBC     Lab Results   Component Value Date    ALT 24 02/25/2023    AST 15 02/25/2023    BUN 13 02/25/2023    CALCIUM 9 0 02/25/2023     (H) 02/25/2023    CHOL 206 04/04/2014    CO2 26 02/25/2023    CREATININE 0 56 (L) 02/25/2023    HDL 63 01/16/2023    HCT 37 5 02/25/2023    HGB 13 2 02/25/2023    HGBA1C 5 3 01/16/2023    MG 2 0 01/16/2023    PHOS 3 1 01/16/2023     02/25/2023    K 3 4 (L) 02/25/2023    PSA 0 5 04/02/2021     04/04/2014    TRIG 260 (H) 01/16/2023    WBC 5 96 02/25/2023     Note: for a comprehensive list of the patient's lab results, access the Results Review activity  Objective     /70 (BP Location: Left arm, Patient Position: Sitting, Cuff Size: Large)   Pulse 73   Temp 97 8 °F (36 6 °C)   Ht 6' (1 829 m)   Wt (!) 149 kg (328 lb 11 2 oz)   SpO2 94%   BMI 44 58 kg/m²      Physical Exam  Vitals reviewed  Constitutional:       Appearance: Normal appearance  He is obese  HENT:      Head: Normocephalic and atraumatic  Right Ear: Tympanic membrane, ear canal and external ear normal       Left Ear: Tympanic membrane, ear canal and external ear normal       Nose: Nose normal       Mouth/Throat:      Mouth: Mucous membranes are moist       Pharynx: Oropharynx is clear  Eyes:      Extraocular Movements: Extraocular movements intact  Conjunctiva/sclera: Conjunctivae normal       Pupils: Pupils are equal, round, and reactive to light  Cardiovascular:      Rate and Rhythm: Normal rate and regular rhythm  Pulses: Normal pulses  Heart sounds: Normal heart sounds  Pulmonary:      Effort: Pulmonary effort is normal       Breath sounds: Normal breath sounds  Abdominal:      General: Abdomen is flat  Bowel sounds are normal       Palpations: Abdomen is soft  Musculoskeletal:         General: Normal range of motion  Cervical back: Normal range of motion and neck supple  Skin:     General: Skin is warm and dry  Capillary Refill: Capillary refill takes less than 2 seconds  Findings: Erythema and rash present  Neurological:      General: No focal deficit present  Mental Status: He is alert and oriented to person, place, and time  Mental status is at baseline     Psychiatric:         Mood and Affect: Mood normal          Behavior: Behavior normal          Judgment: Judgment normal        Medications have been reviewed by provider in current encounter    Wille Skiff, [address]

## 2023-02-27 NOTE — UTILIZATION REVIEW
NOTIFICATION OF ADMISSION DISCHARGE   This is a Notification of Discharge from [facility]  Please be advised that this patient has been discharge from our facility  Below you will find the admission and discharge date and time including the patient’s disposition  UTILIZATION REVIEW CONTACT:  Ed Sorenson  Utilization   Network Utilization Review Department  Phone: [phone] x carefully listen to the prompts  All voicemails are confidential   Email: [email or web address]     ADMISSION INFORMATION  PRESENTATION DATE: 2/23/2023  9:18 PM  OBERVATION ADMISSION DATE:   INPATIENT ADMISSION DATE: 2/23/23  9:18 PM   DISCHARGE DATE: 2/26/2023  2:32 PM   DISPOSITION:Home/Self Care    IMPORTANT INFORMATION:  Send all requests for admission clinical reviews, approved or denied determinations and any other requests to dedicated fax number below belonging to the campus where the patient is receiving treatment   List of dedicated fax numbers:  [address] DENIALS (Administrative/Medical Necessity) [phone]   [address] (Maternity/NICU/Pediatrics) [phone]   [facility] [phone]   [place] [address] [phone]   [address] [phone]   [address] [facility] [phone] [address] [facility] [phone]   [address] [place] [address] [phone]   [facility]  [phone]   [address] [facility] [phone]   [address] [facility] [address] [facility] [phone]

## 2023-03-01 ENCOUNTER — OFFICE VISIT (OUTPATIENT)
Dept: NEUROSURGERY | Facility: CLINIC | Age: 68
End: 2023-03-01

## 2023-03-01 VITALS
HEART RATE: 54 BPM | DIASTOLIC BLOOD PRESSURE: 63 MMHG | TEMPERATURE: 98.4 F | RESPIRATION RATE: 16 BRPM | WEIGHT: 315 LBS | HEIGHT: 72 IN | SYSTOLIC BLOOD PRESSURE: 133 MMHG | BODY MASS INDEX: 42.66 KG/M2

## 2023-03-01 DIAGNOSIS — I67.1 INTRACRANIAL ANEURYSM: ICD-10-CM

## 2023-03-01 NOTE — ASSESSMENT & PLAN NOTE
Seen for new evaluation of a 2 mm aneurysm versus infundibulum of the right supraclinoid ICA and aneurysm versus fenestration of the right distal A1 CECIL  · Found on work-up for recurrent possible absence seizures vs TIA January/February 2023  [facility]  · No family history of aneurysm, sudden death  Former smoker  BP controlled  · Exam: GCS 15, nonfocal    Imaging:  · CTA head/neck 2/22/23: 1  CTA head: Negative for large vessel intracranial occlusion or hemodynamically significant stenosis  2  CTA neck:  No extracranial carotid stenosis  The cervical vertebral arteries are patent  · CTA head/neck 1/15/23: 1  No intracranial large vessel occlusion or critical stenosis  2   No hemodynamically significant stenosis or dissection of cervical carotid and vertebral arteries  3  Incidental 2 mm aneurysm or infundibulum arising from right supraclinoid ICA 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  Also suspected tiny aneurysm versus fenestration at distal A1 segment of right CECIL  Recommend nonurgent neurovascular consultation  4   Incompletely imaged focal opacity in the medial superior segment of right lower lobe  Aspiration or infection is not excluded  Correlate with clinical assessment and/or chest CT  Plan:  · Reviewed imaging with patient, wife, and Dr Yuli Chao  Suggestion of 2 mm right supraclinoid ICA aneurysm vs infundibulum and aneurysm vs fenestration of the right distal A1 CECIL on initial CTA 1/2023  Not clearly seen on subsequent CTA 2/2023, and upon further review by Dr Yuli Chao no aneurysm is noted  · Discussed natural history of aneurysm versus infundibulum  · No need for follow-up imaging or intervention  · Avoid tobacco  Control BP  · Continue to follow-up with neurology for seizures  · Follow-up as needed  Call with any questions or concerns

## 2023-03-01 NOTE — PROGRESS NOTES
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 bifurcation and cervical internal carotid artery  No stenosis or dissection  LEFT EXTRACRANIAL CAROTID SEGMENT:  Normal caliber common carotid artery  Normal bifurcation and cervical internal carotid artery  No stenosis or dissection  NASCET criteria was used to determine the degree of internal carotid artery diameter stenosis  INTRACRANIAL VASCULATURE INTERNAL CAROTID ARTERIES:  Normal enhancement of the intracranial portions of the internal carotid arteries  Normal ophthalmic artery origins  Normal ICA terminus  ANTERIOR CIRCULATION:  Symmetric A1 segments and anterior cerebral arteries with normal enhancement  Normal anterior communicating artery  MIDDLE CEREBRAL ARTERY CIRCULATION:  M1 segment and middle cerebral artery branches demonstrate normal enhancement bilaterally   DISTAL VERTEBRAL ARTERIES:  Normal distal vertebral arteries  Posterior inferior cerebellar artery origins are normal  Normal vertebral basilar junction  BASILAR ARTERY:  Basilar artery is normal in caliber  Normal superior cerebellar arteries  POSTERIOR CEREBRAL ARTERIES: Fetal supply of the left posterior cerebral artery with a hypoplastic left P1 segment  Both arteries demonstrate normal enhancement  Normal posterior communicating arteries  VENOUS STRUCTURES:  Normal  NON VASCULAR ANATOMY BONY STRUCTURES:  No acute osseous abnormality  SOFT TISSUES OF THE NECK:  Small bilateral jugular chain and posterior triangle lymph nodes which are below size criteria for pathologic adenopathy  THORACIC INLET:  Unremarkable  Impression: 1  CTA head: Negative for large vessel intracranial occlusion or hemodynamically significant stenosis  2  CTA neck:  No extracranial carotid stenosis  The cervical vertebral arteries are patent  Findings were directly discussed with Dr Wendy Mendiola at 2:35 PM  Workstation performed: [id number]       I have personally reviewed pertinent reports     and I have personally reviewed pertinent films in PACS

## 2023-03-02 ENCOUNTER — TELEPHONE (OUTPATIENT)
Dept: INTERNAL MEDICINE CLINIC | Facility: CLINIC | Age: 68
End: 2023-03-02

## 2023-03-02 NOTE — TELEPHONE ENCOUNTER
Carline from [facility] care management called to let us know she'll be [facility]    If we need any help from her we can reach her at    [phone] ext [phone]

## 2023-03-13 ENCOUNTER — OFFICE VISIT (OUTPATIENT)
Dept: CARDIOLOGY CLINIC | Facility: CLINIC | Age: 68
End: 2023-03-13

## 2023-03-13 VITALS
BODY MASS INDEX: 42.66 KG/M2 | DIASTOLIC BLOOD PRESSURE: 80 MMHG | SYSTOLIC BLOOD PRESSURE: 120 MMHG | HEIGHT: 72 IN | WEIGHT: 315 LBS | HEART RATE: 80 BPM

## 2023-03-13 DIAGNOSIS — I25.3 ATRIAL SEPTAL ANEURYSM: ICD-10-CM

## 2023-03-13 DIAGNOSIS — R40.4 EPISODIC ALTERED AWARENESS: Primary | ICD-10-CM

## 2023-03-13 NOTE — PROGRESS NOTES
Subjective:        Patient ID: Bhavna Andrade is a 75 y o  male  Chief Complaint:  [facility] is here for atrial septal aneurysm evaluation  I reviewed his 2018 and recent echocardiogram myself, I see no clear evidence for atrial septal aneurysm nor PFO/ASD  The importance of this evaluation is because he has had a recent mental status changes, though these sound to be dating back to 2016 and possibly due to remote traumatic injury  Under the care of neurology and neurosurgery  Recently placed on Keppra for presumed seizure activity  Description of the "episodes" certainly sound neurologic in origin where he becomes "disengaged" per his wife, then develops aphasia speaking gibberish, becomes confused, loses sense of time, and then slowly returned to normal     He has not been on valsartan for the last month due to borderline/soft blood pressure readings  Previously hypertension was not an issue for him  He has no history rheumatic fever  Denies any history of CVA  Recent CT scans of the brain and MRIs of the brain do not suggest strokes  He is not having any chest pains or alarming shortness of breath, no unusual edema orthopnea or PND  Occasional flutters but no sustained tachypalpitations  The following portions of the patient's history were reviewed and updated as appropriate: allergies, current medications, past family history, past medical history, past social history, past surgical history and problem list   Review of Systems   Constitutional: Negative for chills, diaphoresis, malaise/fatigue and weight gain  HENT: Negative for nosebleeds and stridor  Eyes: Negative for double vision, vision loss in left eye, vision loss in right eye and visual disturbance  Cardiovascular: Negative for chest pain, claudication, cyanosis, dyspnea on exertion, irregular heartbeat, leg swelling, near-syncope, orthopnea, palpitations, paroxysmal nocturnal dyspnea and syncope     Respiratory: Negative for cough, shortness of breath, snoring and wheezing  Endocrine: Negative for polydipsia, polyphagia and polyuria  Hematologic/Lymphatic: Negative for bleeding problem  Does not bruise/bleed easily  Skin: Negative for flushing and rash  Musculoskeletal: Negative for falls and myalgias  Gastrointestinal: Negative for abdominal pain, heartburn, hematemesis, hematochezia, melena and nausea  Genitourinary: Negative for hematuria  Neurological: Positive for seizures  Negative for brief paralysis, dizziness, focal weakness, headaches, light-headedness, loss of balance and vertigo  Psychiatric/Behavioral: Negative for altered mental status and substance abuse  Allergic/Immunologic: Negative for hives  Objective:      /80 (BP Location: Left arm, Patient Position: Sitting)   Pulse 80   Ht 6' (1 829 m)   Wt (!) 151 kg (332 lb)   BMI 45 03 kg/m²   Physical Exam  Constitutional:       General: He is not in acute distress  Appearance: He is well-developed  He is not diaphoretic  HENT:      Head: Normocephalic and atraumatic  Eyes:      General: No scleral icterus  Pupils: Pupils are equal, round, and reactive to light  Neck:      Thyroid: No thyromegaly  Vascular: No carotid bruit or JVD  Cardiovascular:      Rate and Rhythm: Normal rate and regular rhythm  Heart sounds: Normal heart sounds  No murmur heard  No friction rub  No gallop  Pulmonary:      Effort: Pulmonary effort is normal  No respiratory distress  Breath sounds: Normal breath sounds  No stridor  No wheezing or rales  Abdominal:      General: Bowel sounds are normal  There is no distension  Palpations: Abdomen is soft  There is no mass  Tenderness: There is no abdominal tenderness  Musculoskeletal:      Cervical back: Normal range of motion and neck supple  Right lower leg: No edema  Left lower leg: No edema  Skin:     General: Skin is warm and dry        Coloration: Skin is not pale  Findings: No erythema  Neurological:      Mental Status: He is alert and oriented to person, place, and time  Coordination: Coordination normal    Psychiatric:         Mood and Affect: Mood normal          Behavior: Behavior normal          Thought Content:  Thought content normal          Judgment: Judgment normal          Lab Review:   Admission on 02/23/2023, Discharged on 02/26/2023   Component Date Value   • Vitamin B1, Whole Blood 02/24/2023 150 7    • WBC 02/25/2023 5 96    • RBC 02/25/2023 4 19    • Hemoglobin 02/25/2023 13 2    • Hematocrit 02/25/2023 37 5    • MCV 02/25/2023 90    • MCH 02/25/2023 31 5    • MCHC 02/25/2023 35 2    • RDW 02/25/2023 12 3    • Platelets 24/63/5018 204    • MPV 02/25/2023 10 8    • Sodium 02/25/2023 142    • Potassium 02/25/2023 3 4 (L)    • Chloride 02/25/2023 110 (H)    • CO2 02/25/2023 26    • ANION GAP 02/25/2023 6    • BUN 02/25/2023 13    • Creatinine 02/25/2023 0 56 (L)    • Glucose 02/25/2023 108    • Calcium 02/25/2023 9 0    • Corrected Calcium 02/25/2023 9 6    • AST 02/25/2023 15    • ALT 02/25/2023 24    • Alkaline Phosphatase 02/25/2023 54    • Total Protein 02/25/2023 6 1 (L)    • Albumin 02/25/2023 3 3 (L)    • Total Bilirubin 02/25/2023 0 76    • eGFR 02/25/2023 106    Admission on 02/22/2023, Discharged on 02/23/2023   Component Date Value   • Sodium 02/22/2023 134 (L)    • Potassium 02/22/2023 4 3    • Chloride 02/22/2023 100    • CO2 02/22/2023 26    • ANION GAP 02/22/2023 8    • BUN 02/22/2023 15    • Creatinine 02/22/2023 0 57 (L)    • Glucose 02/22/2023 127    • Calcium 02/22/2023 9 0    • eGFR 02/22/2023 105    • WBC 02/22/2023 4 57    • RBC 02/22/2023 4 35    • Hemoglobin 02/22/2023 13 1    • Hematocrit 02/22/2023 40 4    • MCV 02/22/2023 93    • MCH 02/22/2023 30 1    • MCHC 02/22/2023 32 4    • RDW 02/22/2023 12 2    • Platelets 43/21/5239 191    • MPV 02/22/2023 10 6    • Protime 02/22/2023 12 8    • INR 02/22/2023 0 94 • PTT 02/22/2023 25    • hs TnI 0hr 02/22/2023 3    • SARS-CoV-2 02/22/2023 Negative    • INFLUENZA A PCR 02/22/2023 Negative    • INFLUENZA B PCR 02/22/2023 Negative    • RSV PCR 02/22/2023 Negative    • Ventricular Rate 02/22/2023 60    • Atrial Rate 02/22/2023 60    • OR Interval 02/22/2023 172    • QRSD Interval 02/22/2023 96    • QT Interval 02/22/2023 424    • QTC Interval 02/22/2023 424    • P Axis 02/22/2023 76    • QRS Axis 02/22/2023 -43    • T Wave [place] 02/22/2023 33    Admission on 01/15/2023, Discharged on 01/16/2023   Component Date Value   • WBC 01/15/2023 4 35    • RBC 01/15/2023 4 40    • Hemoglobin 01/15/2023 13 6    • Hematocrit 01/15/2023 40 9    • MCV 01/15/2023 93    • MCH 01/15/2023 30 9    • MCHC 01/15/2023 33 3    • RDW 01/15/2023 12 5    • MPV 01/15/2023 10 2    • Platelets 18/85/8776 212    • nRBC 01/15/2023 0    • Neutrophils Relative 01/15/2023 74    • Immat GRANS % 01/15/2023 1    • Lymphocytes Relative 01/15/2023 16    • Monocytes Relative 01/15/2023 5    • Eosinophils Relative 01/15/2023 3    • Basophils Relative 01/15/2023 1    • Neutrophils Absolute 01/15/2023 3 25    • Immature Grans Absolute 01/15/2023 0 03    • Lymphocytes Absolute 01/15/2023 0 69    • Monocytes Absolute 01/15/2023 0 22    • Eosinophils Absolute 01/15/2023 0 12    • Basophils Absolute 01/15/2023 0 04    • SARS-CoV-2 01/15/2023 Negative    • INFLUENZA A PCR 01/15/2023 Negative    • INFLUENZA B PCR 01/15/2023 Negative    • RSV PCR 01/15/2023 Negative    • Sodium 01/15/2023 136    • Potassium 01/15/2023 4 2    • Chloride 01/15/2023 101    • CO2 01/15/2023 26    • ANION GAP 01/15/2023 9    • BUN 01/15/2023 20    • Creatinine 01/15/2023 0 60    • Glucose 01/15/2023 129    • Calcium 01/15/2023 8 8    • AST 01/15/2023 19    • ALT 01/15/2023 32    • Alkaline Phosphatase 01/15/2023 62    • Total Protein 01/15/2023 7 0    • Albumin 01/15/2023 3 7    • Total Bilirubin 01/15/2023 0 67    • eGFR 01/15/2023 103    • hs TnI 0hr 01/15/2023 6    • TSH 3RD GENERATON 01/15/2023 1 385    • Magnesium 01/15/2023 1 8    • Ventricular Rate 01/15/2023 70    • Atrial Rate 01/15/2023 70    • LA Interval 01/15/2023 168    • QRSD Interval 01/15/2023 94    • QT Interval 01/15/2023 386    • QTC Interval 01/15/2023 416    • P Axis 01/15/2023 61    • QRS Axis 01/15/2023 -80    • T Wave Axis 01/15/2023 27    • POC Glucose 01/15/2023 106    • hs TnI 2hr 01/15/2023 6    • Delta 2hr hsTnI 01/15/2023 0    • Color, UA 01/15/2023 Yellow    • Clarity, UA 01/15/2023 Clear    • Specific Gravity, UA 01/15/2023 1 020    • pH, UA 01/15/2023 5 0    • Leukocytes, UA 01/15/2023 Negative    • Nitrite, UA 01/15/2023 Negative    • Protein, UA 01/15/2023 Negative    • Glucose, UA 01/15/2023 Negative    • Ketones, UA 01/15/2023 Negative    • Urobilinogen, UA 01/15/2023 0 2    • Bilirubin, UA 01/15/2023 Negative    • Occult Blood, UA 01/15/2023 Negative    • RBC, UA 01/15/2023 None Seen    • WBC, UA 01/15/2023 0-1 (A)    • Epithelial Cells 01/15/2023 Occasional    • Bacteria, UA 01/15/2023 Occasional    • Urine Culture 01/15/2023 No Growth <1000 cfu/mL    • LA size 01/16/2023 4 1    • Triscuspid Valve Regurgi* 01/16/2023 7 0    • Tricuspid valve peak reg* 01/16/2023 1 29    • LVPWd 01/16/2023 1 00    • TR Peak Stephan 01/16/2023 1 3    • IVSd 01/16/2023 8 90    • LV DIASTOLIC VOLUME (MOD* 47/78/3091 126    • LEFT VENTRICLE SYSTOLIC * 43/53/8615 51    • Left ventricular stroke * 01/16/2023 75 00    • A4C EF 01/16/2023 60    • LVIDd 01/16/2023 5 10    • IVS 01/16/2023 1    • LVIDS 01/16/2023 3 50    • FS 01/16/2023 31    • Ao root 01/16/2023 3 30    • MV valve area p 1/2 meth* 01/16/2023 4 58    • E wave deceleration time 01/16/2023 166    • MV Peak E Stephan 01/16/2023 79    • MV Peak A Stephan 01/16/2023 0 92    • MV stenosis pressure 1/2* 01/16/2023 48    • LVSV, 2D 01/16/2023 75    • LV EF 01/16/2023 60    • hs TnI 4hr 01/15/2023 6    • Delta 4hr hsTnI 01/15/2023 0    • WBC 01/16/2023 4 71    • RBC 01/16/2023 3 96    • Hemoglobin 01/16/2023 12 2    • Hematocrit 01/16/2023 36 1 (L)    • MCV 01/16/2023 91    • MCH 01/16/2023 30 8    • MCHC 01/16/2023 33 8    • RDW 01/16/2023 12 4    • MPV 01/16/2023 9 5    • Platelets 58/59/9941 191    • nRBC 01/16/2023 0    • Neutrophils Relative 01/16/2023 60    • Immat GRANS % 01/16/2023 1    • Lymphocytes Relative 01/16/2023 27    • Monocytes Relative 01/16/2023 9    • Eosinophils Relative 01/16/2023 2    • Basophils Relative 01/16/2023 1    • Neutrophils Absolute 01/16/2023 2 88    • Immature Grans Absolute 01/16/2023 0 03    • Lymphocytes Absolute 01/16/2023 1 27    • Monocytes Absolute 01/16/2023 0 40    • Eosinophils Absolute 01/16/2023 0 09    • Basophils Absolute 01/16/2023 0 04    • Total CK 01/16/2023 35 (L)    • Sodium 01/16/2023 137    • Potassium 01/16/2023 3 6    • Chloride 01/16/2023 102    • CO2 01/16/2023 27    • ANION GAP 01/16/2023 8    • BUN 01/16/2023 14    • Creatinine 01/16/2023 0 67    • Glucose 01/16/2023 132    • Calcium 01/16/2023 8 6    • Corrected Calcium 01/16/2023 9 2    • AST 01/16/2023 17    • ALT 01/16/2023 25    • Alkaline Phosphatase 01/16/2023 55    • Total Protein 01/16/2023 6 4    • Albumin 01/16/2023 3 3 (L)    • Total Bilirubin 01/16/2023 0 87    • eGFR 01/16/2023 98    • Magnesium 01/16/2023 2 0    • Phosphorus 01/16/2023 3 1    • Procalcitonin 01/16/2023 <0 05    • Hepatitis B Surface Ag 01/16/2023 Non-reactive    • Hep A IgM 01/16/2023 Non-reactive    • Hepatitis C Ab 01/16/2023 Non-reactive    • Hep B C IgM 01/16/2023 Non-reactive    • Cholesterol 01/16/2023 192    • Triglycerides 01/16/2023 260 (H)    • HDL, Direct 01/16/2023 63    • LDL Calculated 01/16/2023 77    • Hemoglobin A1C 01/16/2023 5 3    • EAG 01/16/2023 105    • Vit D, 25-Hydroxy 01/16/2023 10 9 (L)      X-ray chest 1 view portable    Result Date: 2/23/2023  Narrative: CHEST INDICATION:   aphasia   COMPARISON:  1/15/2023 EXAM PERFORMED/VIEWS:  XR CHEST PORTABLE FINDINGS:  Monitoring leads and clips project over the chest  Cardiomediastinal silhouette appears unremarkable  The lungs are clear  No pneumothorax or pleural effusion  Osseous structures appear within normal limits for patient age  Impression: No acute cardiopulmonary disease  Workstation performed: [id number]     CT stroke alert brain    Result Date: 2/22/2023  Narrative: CT BRAIN - STROKE ALERT PROTOCOL INDICATION:   [address]  CONFUSION COMPARISON:  None  TECHNIQUE:  CT examination of the brain was performed  In addition to axial images, coronal reformatted images were created and submitted for interpretation  Radiation dose length product (DLP) for this visit:  994 87 mGy-cm   This examination, like all CT scans performed in the [facility], was performed utilizing techniques to minimize radiation dose exposure, including the use of iterative  reconstruction and automated exposure control  IMAGE QUALITY:  Diagnostic  FINDINGS:  PARENCHYMA: Decreased attenuation is noted in periventricular and subcortical white matter demonstrating an appearance that is statistically most likely to represent mild microangiopathic change  Age-related bilateral basal ganglia and dentate nuclei calcifications  No CT signs of acute infarction  No intracranial mass, mass effect or midline shift  No acute parenchymal hemorrhage  Normal intracranial vasculature  VENTRICLES AND EXTRA-AXIAL SPACES:  Normal for the patient's age  VISUALIZED ORBITS: Normal visualized orbits  PARANASAL SINUSES: Normal visualized paranasal sinuses  CALVARIUM AND EXTRACRANIAL SOFT TISSUES:   Normal      Impression: No acute intracranial abnormality  Findings were directly discussed with Dr Ziggy Abarca at 2:35 PM  Workstation performed: [id number]     EEG Video Monitoring 24 Hour    Result Date: 2/27/2023  Narrative: Table formatting from the original result was not included   Continuous Video EEG Long Term Monitoring Patient Name:  Eli Ferrari  MRN: [id number] :  1955 File #: Wash Heaps  Date performed: 2023       Report date: 2023       Study type: Continuous video EEG, up to 24 hours ICD 10 diagnosis: Transient alteration of awareness R40 4 and Episodes of unresponsiveness R41 82 Start time: 2023 01:46 End time: 2023 08:17 Patient History: Patient is 75 y o  male on continuous video EEG monitoring for the assessment of seizures    Patient has been having episodes of staring and word finding difficulties with lip smacking behavior and right hand automatisms  Current AEDs: Levetiracetam Medications include: losartan, aspirin Description of Procedure: A 24 hours continuous video EEG was performed with electrodes applied using the International 10-20 System at least 16 channels are reviewed and formatted into longitudinal bipolar, transverse bipolar, and referential (to common reference or calculated common reference) montages  Additional electrodes used included T1, T2, and extraocular electrodes, and ECG, along with video recording  The EEG was recorded with the patient awake, drowsy, and asleep state  This study was intermittently monitored by a monitoring technologist   The physician interpreting the study had access to the data throughout the recording  The recording was technically satisfactory  Findings: Background Activity: The background is grossly symmetric with respect to voltages and activities  During wakefulness, the background is well-organized with anterior extremely low amplitude beta activity and posterior low amplitude alpha activity  There is a symmetric 8-8 5 Hz posterior dominant rhythm that attenuates with eye opening  Drowsiness is characterized by roving eye movements, attentuation of the posterior dominant rhythm, prominent anterior beta activity, central theta activity and vertex waves  Stage 2 sleep is characterized by symmetric sleep spindles and K-complexes   Slow wave sleep and REM sleep are captured  Abnormal findings: During sleep, there are frequent left anterior temporal sharp waves, maximal over T1-F7  Other findings: The single lead ECG shows a regular and sinus rhythm  Events: There are no patient push button events  Interpretation: This is an abnormal 6 5 hours continuous video EEG recording due to the presence of left anterior temporal sharp waves  This finding may indicate the presence of an epileptogenic focus in the left anterior temporal region  SUMMARY: This concludes 54 5 hours of continuous video EEG monitoring  The primary finding is the presence of left anterior temporal sharp waves  There were no clinical event of unresponsiveness or lips-smacking behavior  Sandi Joel MD [facility]     EEG Video Monitoring 24 Hour    Addendum Date: 2023 Addendum:   Addendum: The file number should state LTM     Result Date: 2023  Narrative: Table formatting from the original result was not included  Continuous Video EEG Long Term Monitoring Patient Name:  Carlotta Zhu  MRN: [id number] :  1955 File #: Steven Jett  Date performed: -2023        Report date: 2023       Study type: Continuous video EEG, up to 24 hours ICD 10 diagnosis: Localization related epilepsy, symptomatic,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 G40 209 Start time: 2023 01:46 End time: 2023 01:45 Patient History: Patient is 75 y o  male on continuous video EEG monitoring for the assessment of seizures and characterization of events  Patient has been having episodes of staring and word finding difficulties with lip smacking behavior and right hand automatisms  Current AEDs: Medications include:  Facility-Administered Medications Ordered in Other Visits Medication Dose Route Frequency Provider Last Rate • aspirin  81 mg Oral Daily Rissa Better, DO   • cholecalciferol  2,000 Units Oral Daily Rissa Better, DO   • diphenhydrAMINE  12 5 mg Oral Q6H PRN [address], DO   • enoxaparin  40 mg Subcutaneous Q12H [address] Lucas Wheat DO   • folic acid  1 mg Oral Daily Felix Frost MD   • hydrocortisone   Topical 4x Daily PRN [address], DO   • levETIRAcetam  1,000 mg Oral Q12H [address] Michael Bocanegra MD   • LORazepam  2 mg Intravenous Q8H PRN Lucas Wheat DO   • losartan  100 mg Oral Daily Roxie [address]s, DO   • thiamine  100 mg Oral Daily Felix Frost MD   Description of Procedure: A 24 hours continuous video EEG was performed with electrodes applied using the International 10-20 System at least 16 channels are reviewed and formatted into longitudinal bipolar, transverse bipolar, and referential (to common reference or calculated common reference) montages  Additional electrodes used included T1, T2, and extraocular electrodes, and ECG, along with video recording  The EEG was recorded with the patient awake, drowsy, and asleep state  This study was intermittently monitored by a monitoring technologist   The physician interpreting the study had access to the data throughout the recording  The recording was technically satisfactory  Findings: Background Activity: The background is grossly symmetric with respect to voltages and activities  During wakefulness, the background is well-organized with anterior extremely low amplitude beta activity and posterior low amplitude alpha activity  There is a symmetric 8-8 5 Hz posterior dominant rhythm that attenuates with eye opening  Drowsiness is characterized by roving eye movements, attentuation of the posterior dominant rhythm, prominent anterior beta activity, central theta activity, positive occipital sharp transients of sleep (POSTS) and vertex waves  Stage 2 sleep is characterized by symmetric sleep spindles and K-complexes  Slow wave sleep and REM sleep are captured  Abnormal findings: During sleep, there are abundant left anterior temporal sharp waves, maximal over F7-T1   Other findings: The single lead ECG shows a regular and sinus rhythm  Events: There are no patient push button events  Interpretation: This is an abnormal 24 hours continuous video EEG recording due to the presence of left anterior temporal sharp waves  This finding may indicate the presence of an epileptogenic focus in the left anterior temporal region  Sinan Enciso MD [facility]    EEG Video Monitoring 24 Hour    Result Date: 2023  Narrative: Table formatting from the original result was not included  Continuous Video EEG Long Term Monitoring Patient Name:  Lianna Echols  MRN: [id number] :  1955 File #: Susana Simms  Date performed: -2023  Referring Provider: [facility]        Report date: 2023       Study type: Continuous video EEG, up to 24 hours ICD 10 diagnosis: Localization related epilepsy, symptomatic,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 G40 209 Start time: 2023 01:46 End time: 2023 01:45 Patient History: Patient is 75 y o  male on continuous video EEG monitoring for the assessment of seizures and characterization of events  Patient has been having episodes of staring and word finding difficulties with lip smacking behavior and right hand automatisms  Current AEDs: Medications include:  Facility-Administered Medications Ordered in Other Visits Medication Dose Route Frequency Provider Last Rate • aspirin  81 mg Oral Daily [facility]   • cholecalciferol  2,000 Units Oral Daily Lucas Wheat DO   • diphenhydrAMINE  12 5 mg Oral Q6H PRN Lucas Wheat DO   • enoxaparin  40 mg Subcutaneous Q12H [address] Lucas Wheat DO   • folic acid  1 mg Oral Daily Felix Frost MD   • hydrocortisone   Topical 4x Daily PRN Lucas Wheat DO   • LORazepam  2 mg Intravenous Q8H PRN Lucas Wheat DO   • losartan  100 mg Oral Daily Lucas Wheat DO   • thiamine  100 mg Oral Daily Felix Frost MD   Description of Procedure: A 24 hours continuous video EEG was performed with electrodes applied using the International 10-20 System at least 16 channels are reviewed and formatted into longitudinal bipolar, transverse bipolar, and referential (to common reference or calculated common reference) montages  Additional electrodes used included T1, T2, and extraocular electrodes, and ECG, along with video recording  The EEG was recorded with the patient awake, drowsy, and asleep state  This study was intermittently monitored by a monitoring technologist   The physician interpreting the study had access to the data throughout the recording  The recording was technically satisfactory  Findings: Background Activity: The background is grossly symmetric with respect to voltages and activities  During wakefulness, the background is well-organized with anterior extremely low amplitude beta activity and posterior low amplitude alpha activity  There is a symmetric 8-8 5 Hz posterior dominant rhythm that attenuates with eye opening  Drowsiness is characterized by roving eye movements, attentuation of the posterior dominant rhythm, prominent anterior beta activity, central theta activity, positive occipital sharp transients of sleep (POSTS) and vertex waves  Stage 2 sleep is characterized by symmetric sleep spindles and K-complexes  Slow wave sleep and REM sleep are captured  Abnormal findings: During sleep, there are abundant left anterior temporal sharp waves, maximal over F7 and T1  Other findings: The single lead ECG shows a regular and sinus rhythm  Events: Non-specific event of math difficulty 17:25 - Patient and someone off camera were talking about the number of rooms in the hospital   He was trying to calculate the number of beds based on the number of floors and rooms in the unit  At one moment, he paused and seemed to give up  The person off camera pressed the event button  She mentions that he is normally very good in math    His speech is intact, follows command, and memory intact  There is no electrographic seizure during this episode  Interpretation: This is an abnormal 24 hours continuous video EEG recording due to the presence of left anterior temporal sharp waves  This finding may indicate the presence of an epileptogenic focus in the left anterior temporal region  The episode of math difficulty is not associated with a seizure and it is not consistent with the events of staring and word finding terence Abdullahi MD [facility]    CTA stroke alert (head/neck)    Result Date: 2/22/2023  Narrative: CTA NECK AND BRAIN WITH CONTRAST INDICATION: STROKE ALERT CONFUSION  COMPARISON:   None  TECHNIQUE:   Post contrast imaging was performed after administration of iodinated contrast through the neck and brain  Post contrast axial 0 625 mm images timed to opacify the arterial system  3D rendering was performed on an independent workstation  MIP reconstructions performed  Coronal reconstructions were performed of the noncontrast portion of the brain  Radiation dose length product (DLP) for this visit:  483 11 mGy-cm   This examination, like all CT scans performed in the [facility], was performed utilizing techniques to minimize radiation dose exposure, including the use of iterative  reconstruction and automated exposure control  IV Contrast:  85 mL of iohexol (OMNIPAQUE)  IMAGE QUALITY:   Diagnostic FINDINGS: CERVICAL VASCULATURE AORTIC ARCH AND GREAT VESSELS:  Normal aortic arch and great vessel origins  Normal visualized subclavian vessels  RIGHT VERTEBRAL ARTERY CERVICAL SEGMENT:  Normal origin  The vessel is normal in caliber throughout the neck  LEFT VERTEBRAL ARTERY CERVICAL SEGMENT:  Normal origin  The vessel is normal in caliber throughout the neck  RIGHT EXTRACRANIAL CAROTID SEGMENT:  Normal caliber common carotid artery  Normal bifurcation and cervical internal carotid artery    No stenosis or dissection  LEFT EXTRACRANIAL CAROTID SEGMENT:  Normal caliber common carotid artery  Normal bifurcation and cervical internal carotid artery  No stenosis or dissection  NASCET criteria was used to determine the degree of internal carotid artery diameter stenosis  INTRACRANIAL VASCULATURE INTERNAL CAROTID ARTERIES:  Normal enhancement of the intracranial portions of the internal carotid arteries  Normal ophthalmic artery origins  Normal ICA terminus  ANTERIOR CIRCULATION:  Symmetric A1 segments and anterior cerebral arteries with normal enhancement  Normal anterior communicating artery  MIDDLE CEREBRAL ARTERY CIRCULATION:  M1 segment and middle cerebral artery branches demonstrate normal enhancement bilaterally  DISTAL VERTEBRAL ARTERIES:  Normal distal vertebral arteries  Posterior inferior cerebellar artery origins are normal  Normal vertebral basilar junction  BASILAR ARTERY:  Basilar artery is normal in caliber  Normal superior cerebellar arteries  POSTERIOR CEREBRAL ARTERIES: Fetal supply of the left posterior cerebral artery with a hypoplastic left P1 segment  Both arteries demonstrate normal enhancement  Normal posterior communicating arteries  VENOUS STRUCTURES:  Normal  NON VASCULAR ANATOMY BONY STRUCTURES:  No acute osseous abnormality  SOFT TISSUES OF THE NECK:  Small bilateral jugular chain and posterior triangle lymph nodes which are below size criteria for pathologic adenopathy  THORACIC INLET:  Unremarkable  Impression: 1  CTA head: Negative for large vessel intracranial occlusion or hemodynamically significant stenosis  2  CTA neck:  No extracranial carotid stenosis  The cervical vertebral arteries are patent  Findings were directly discussed with Dr Melba Ac at 2:35 PM  Workstation performed: [id number]         Assessment:       1  Episodic altered awareness        2   Atrial septal aneurysm  Ambulatory Referral to Cardiology           Plan:       I do not think he has a significant atrial septal aneurysm, PFO, nor ASD, more importantly he has no indication to consider intervention/surgery on such anyway without clear CVA on brain CT or MRI  We discussed how his left ventricular systolic function is normal, he has no alarming valvulopathy, and cardiac rhythm is normal     Mild atherosclerosis was noted on his aorta and carotids, therefore baby aspirin daily is reasonable, I also told him there may be some benefit here if he does indeed have a small atrial septal aneurysm though data here is limited  Due to low risk of baby aspirin therapy, bleeding, I feel it should be continued  Unfortunately he says he is intolerant of statin therapy, indicated with the latest guidelines looking at the above findings  His hypertriglyceridemia flares do appear to coincide with periods of alcohol ingestion, he has mitigated this  I do not feel any further acute cardiac work-up would be fruitful at this time  Only should neurology feel his episodes are cardioembolic mediated, which I think would be extremely unlikely, would I advocate further testing such as a transesophageal echocardiogram for definitive diagnosis  I have returned him to your care, urged him to follow through with recommended sleep testing  Let me know if you need me to see him again for any reason

## 2023-03-13 NOTE — PATIENT INSTRUCTIONS
Cholesterol and Your Health   AMBULATORY CARE:   Cholesterol  is a waxy, fat-like substance  Your body uses cholesterol to make hormones and new cells, and to protect nerves  Cholesterol is made by your body  It also comes from certain foods you eat, such as meat and dairy products  Your healthcare provider can help you set goals for your cholesterol levels  He or she can help you create a plan to meet your goals  Cholesterol level goals: Your cholesterol level goals depend on your risk for heart disease, your age, and your other health conditions  The following are general guidelines: Total cholesterol  includes low-density lipoprotein (LDL), high-density lipoprotein (HDL), and triglyceride levels  The total cholesterol level should be lower than 200 mg/dL and is best at about 150 mg/dL  LDL cholesterol  is called bad cholesterol  because it forms plaque in your arteries  As plaque builds up, your arteries become narrow, and less blood flows through  When plaque decreases blood flow to your heart, you may have chest pain  If plaque completely blocks an artery that brings blood to your heart, you may have a heart attack  Plaque can break off and form blood clots  Blood clots may block arteries in your brain and cause a stroke  The level should be less than 130 mg/dL and is best at about 100 mg/dL  HDL cholesterol  is called good cholesterol  because it helps remove LDL cholesterol from your arteries  It does this by attaching to LDL cholesterol and carrying it to your liver  Your liver breaks down LDL cholesterol so your body can get rid of it  High levels of HDL cholesterol can help prevent a heart attack and stroke  Low levels of HDL cholesterol can increase your risk for heart disease, heart attack, and stroke  The level should be 60 mg/dL or higher  Triglycerides  are a type of fat that store energy from foods you eat  High levels of triglycerides also cause plaque buildup   This can increase your risk for a heart attack or stroke  If your triglyceride level is high, your LDL cholesterol level may also be high  The level should be less than 150 mg/dL  Any of the following can increase your risk for high cholesterol:   Smoking cigarettes    Being overweight or obese, or not getting enough exercise    Drinking large amounts of alcohol    A medical condition such as hypertension (high blood pressure) or diabetes    Certain genes passed from your parents to you    Age older than 65 years    What you need to know about having your cholesterol levels checked: Adults 21to 39years of age should have their cholesterol levels checked every 4 to 6 years  Adults 45 years or older should have their cholesterol checked every 1 to 2 years  You may need your cholesterol checked more often, or at a younger age, if you have risk factors for heart disease  You may also need to have your cholesterol checked more often if you have other health conditions, such as diabetes  Blood tests are used to check cholesterol levels  Blood tests measure your levels of triglycerides, LDL cholesterol, and HDL cholesterol  How healthy fats affect your cholesterol levels:  Healthy fats, also called unsaturated fats, help lower LDL cholesterol and triglyceride levels  Healthy fats include the following:  Monounsaturated fats  are found in foods such as olive oil, canola oil, avocado, nuts, and olives  Polyunsaturated fats,  such as omega 3 fats, are found in fish, such as salmon, trout, and tuna  They can also be found in plant foods such as flaxseed, walnuts, and soybeans  How unhealthy fats affect your cholesterol levels:  Unhealthy fats increase LDL cholesterol and triglyceride levels  They are found in foods high in cholesterol, saturated fat, and trans fat:  Cholesterol  is found in eggs, dairy, and meat  Saturated fat  is found in butter, cheese, ice cream, whole milk, and coconut oil   Saturated fat is also found in meat, such as sausage, hot dogs, and bologna  Trans fat  is found in liquid oils and is used in fried and baked foods  Foods that contain trans fats include chips, crackers, muffins, sweet rolls, microwave popcorn, and cookies  Treatment  for high cholesterol will also decrease your risk of heart disease, heart attack, and stroke  Treatment may include any of the following:  Lifestyle changes  may include food, exercise, weight loss, and quitting smoking  You may also need to decrease the amount of alcohol you drink  Your healthcare provider will want you to start with lifestyle changes  Other treatment may be added if lifestyle changes are not enough  Your healthcare provider may recommend you work with a team to manage hyperlipidemia  The team may include medical experts such as a dietitian, an exercise or physical therapist, and a behavior therapist  Your family members may be included in helping you create lifestyle changes  Medicines  may be given to lower your LDL cholesterol, triglyceride levels, or total cholesterol level  You may need medicines to lower your cholesterol if any of the following is true:    You have a history of stroke, TIA, unstable angina, or a heart attack  Your LDL cholesterol level is 190 mg/dL or higher  You are age 36 to 76 years, have diabetes or heart disease risk factors, and your LDL cholesterol is 70 mg/dL or higher  Supplements  include fish oil, red yeast rice, and garlic  Fish oil may help lower your triglyceride and LDL cholesterol levels  It may also increase your HDL cholesterol level  Red yeast rice may help decrease your total cholesterol level and LDL cholesterol level  Garlic may help lower your total cholesterol level  Do not take any supplements without talking to your healthcare provider  Food changes you can make to lower your cholesterol levels:  A dietitian can help you create a healthy eating plan   He or she can show you how to read food labels and choose foods low in saturated fat, trans fats, and cholesterol  Decrease the total amount of fat you eat  Choose lean meats, fat-free or 1% fat milk, and low-fat dairy products, such as yogurt and cheese  Try to limit or avoid red meats  Limit or do not eat fried foods or baked goods, such as cookies  Replace unhealthy fats with healthy fats  Cook foods in olive oil or canola oil  Choose soft margarines that are low in saturated fat and trans fat  Seeds, nuts, and avocados are other examples of healthy fats  Eat foods with omega-3 fats  Examples include salmon, tuna, mackerel, walnuts, and flaxseed  Eat fish 2 times per week  Pregnant women should not eat fish that have high levels of mercury, such as shark, swordfish, and aric mackerel  Increase the amount of high-fiber foods you eat  High-fiber foods can help lower your LDL cholesterol  Aim to get between 20 and 30 grams of fiber each day  Fruits and vegetables are high in fiber  Eat at least 5 servings each day  Other high-fiber foods are whole-grain or whole-wheat breads, pastas, or cereals, and brown rice  Eat 3 ounces of whole-grain foods each day  Increase fiber slowly  You may have abdominal discomfort, bloating, and gas if you add fiber to your diet too quickly  Eat healthy protein foods  Examples include low-fat dairy products, skinless chicken and turkey, fish, and nuts  Limit foods and drinks that are high in sugar  Your dietitian or healthcare provider can help you create daily limits for high-sugar foods and drinks  The limit may be lower if you have diabetes or another health condition  Limits can also help you lose weight if needed  Lifestyle changes you can make to lower your cholesterol levels:   Maintain a healthy weight  Ask your healthcare provider what a healthy weight is for you  Ask him or her to help you create a weight loss plan if needed   Weight loss can decrease your total cholesterol and triglyceride levels  Weight loss may also help keep your blood pressure at a healthy level  Be physically active throughout the day  Physical activity, such as exercise, can help lower your total cholesterol level and maintain a healthy weight  Physical activity can also help increase your HDL cholesterol level  Work with your healthcare provider to create an program that is right for you  Get at least 30 to 40 minutes of moderate physical activity most days of the week  Examples of exercise include brisk walking, swimming, or biking  Also include strength training at least 2 times each week  Your healthcare providers can help you create a physical activity plan  Do not smoke  Nicotine and other chemicals in cigarettes and cigars can raise your cholesterol levels  Ask your healthcare provider for information if you currently smoke and need help to quit  E-cigarettes or smokeless tobacco still contain nicotine  Talk to your healthcare provider before you use these products  Limit or do not drink alcohol  Alcohol can increase your triglyceride levels  Ask your healthcare provider before you drink alcohol  Ask how much is okay for you to drink in 24 hours or 1 week  Follow up with your doctor as directed:  Write down your questions so you remember to ask them during your visits  © Copyright [employer] 2022 Information is for End User's use only and may not be sold, redistributed or otherwise used for commercial purposes  The above information is an  only  It is not intended as medical advice for individual conditions or treatments  Talk to your doctor, nurse or pharmacist before following any medical regimen to see if it is safe and effective for you

## 2023-03-28 ENCOUNTER — HOSPITAL ENCOUNTER (OUTPATIENT)
Dept: SLEEP CENTER | Facility: HOSPITAL | Age: 68
Discharge: HOME | End: 2023-03-28
Attending: INTERNAL MEDICINE

## 2023-03-28 DIAGNOSIS — R06.83 SNORING: ICD-10-CM

## 2023-03-28 DIAGNOSIS — G47.19 EXCESSIVE DAYTIME SLEEPINESS: ICD-10-CM

## 2023-04-03 DIAGNOSIS — E55.9 VITAMIN D DEFICIENCY: ICD-10-CM

## 2023-04-03 RX ORDER — MELATONIN
2000 DAILY
Qty: 60 TABLET | Refills: 3 | Status: SHIPPED | OUTPATIENT
Start: 2023-04-03

## 2023-04-03 NOTE — TELEPHONE ENCOUNTER
"Patient left the following message  When he first got the vitamin D filled the pill was smaller and flatter, when the second dose was refilled it was a different pill  He had an allergic reaction to the 2nd pill  Did advise him he should call [facility] and see if they can give him the pill from the first time and stick with that brand/manufactorer  He will call [employer]     \"Hi, this is Active-Semi  It's FIGNER  Birthdate is [id number]  This is regards to the vitamin D3  The first prescription worked out real good  The second prescription they substituted a different brand or a different company and what happened was I broke out in a rash all over  So I can't have that prescription  So if possible, can you write a new prescription of the very first batch that I had, I guess the vitamin D 3000 milligrams  And we'll go from there  My phone number is [phone]  That's about it  If I don't answer, then I'm tied up with other stuff  But if I can answer, I will  OK  Thank you  Good day    \"  "

## 2023-04-04 NOTE — PROGRESS NOTES
Home Sleep Study Documentation    HOME STUDY DEVICE: Noxturnal no                                           Charis G3 yes      Pre-Sleep Home Study:    Set-up and instructions performed by: Rosa Isela Roldan performed demonstration for Patient: no    Return demonstration performed by Patient: yes    Written instructions provided to Patient: yes    Patient signed consent form: yes        Post-Sleep Home Study:    Additional comments by Patient:     Home Sleep Study Failed:no:    Failure reason: N/A    Reported or Detected: N/A    Scored by: pending

## 2023-04-06 ENCOUNTER — TELEPHONE (OUTPATIENT)
Dept: SLEEP CENTER | Facility: CLINIC | Age: 68
End: 2023-04-06

## 2023-04-06 DIAGNOSIS — G47.33 OSA (OBSTRUCTIVE SLEEP APNEA): Primary | ICD-10-CM

## 2023-04-06 NOTE — TELEPHONE ENCOUNTER
Sleep study resulted and shows mild MARTIN  Per study results, treatment with CPAP can be considered  Dr Delilah Browning did you want to place order for CPAP?

## 2023-04-07 NOTE — TELEPHONE ENCOUNTER
Patient previously seen in the [facility] pulmonary office by Dr Dennis Bhandari  Needs follow up in sleep center  Called patient and advised of sleep study results and order for APAP  Offered to schedule DME set up but patient declined  He asked if CPAP machines are made in [place] or [facility]  Advised patient I do not have that information but the medical equipment company may know  Patient declined to schedule set up and said he doesn't think he wants to use CPAP anyway and would rather talk to the doctor first         Explained to patient that sleep patient's will no longer be seen in the pulmonary office but instead will be seen in the sleep center  Patient verbalized understanding  Scheduled follow up with Dr Dennis Bhandari 10/16/23 in [facility] sleep center  Offered sooner appointment with Dr Gemma Mattson but patient declined and states he likes Dr Dennis Bhandari and is fine with waiting until October

## 2023-05-17 DIAGNOSIS — E55.9 VITAMIN D DEFICIENCY: ICD-10-CM

## 2023-05-18 RX ORDER — MELATONIN
2000 DAILY
Qty: 60 TABLET | Refills: 3 | Status: SHIPPED | OUTPATIENT
Start: 2023-05-18

## 2023-05-19 ENCOUNTER — HOSPITAL ENCOUNTER (OUTPATIENT)
Dept: MRI IMAGING | Facility: HOSPITAL | Age: 68
End: 2023-05-19

## 2023-05-19 DIAGNOSIS — M25.511 RIGHT SHOULDER PAIN, UNSPECIFIED CHRONICITY: ICD-10-CM

## 2023-06-02 ENCOUNTER — DOCUMENTATION (OUTPATIENT)
Dept: CARDIOLOGY CLINIC | Facility: CLINIC | Age: 68
End: 2023-06-02

## 2023-06-02 NOTE — PROGRESS NOTES
Received referral for patient to have cardiac clearance for right shoulder surgery (arthroscopy, rotator cuff repair, subacromial decompression, debridement, and possible biceps tenotomy) at [address] [place] [address], which is scheduled for 6/26/23 with Dr Sherly Lucas  Note scanned in chart  He was last seen on 3/13/23  Does he need another cardiac evaluation prior and/or testing or cleared to proceed with surgery? Please advise

## 2023-06-06 NOTE — PROGRESS NOTES
Patient scheduled to see Dr Savannah Velásquez on Thursday, 6/8/23 for cardiac clearance due to no availability with Dr Ulysses Burger

## 2023-06-08 ENCOUNTER — CONSULT (OUTPATIENT)
Dept: CARDIOLOGY CLINIC | Facility: CLINIC | Age: 68
End: 2023-06-08
Payer: COMMERCIAL

## 2023-06-08 VITALS
WEIGHT: 315 LBS | HEIGHT: 72 IN | SYSTOLIC BLOOD PRESSURE: 130 MMHG | HEART RATE: 57 BPM | BODY MASS INDEX: 42.66 KG/M2 | DIASTOLIC BLOOD PRESSURE: 80 MMHG

## 2023-06-08 DIAGNOSIS — Z91.89 SEDENTARY LIFESTYLE: ICD-10-CM

## 2023-06-08 DIAGNOSIS — I10 PRIMARY HYPERTENSION: ICD-10-CM

## 2023-06-08 DIAGNOSIS — R06.09 DYSPNEA ON EXERTION: ICD-10-CM

## 2023-06-08 DIAGNOSIS — Z01.810 PREPROCEDURAL CARDIOVASCULAR EXAMINATION: Primary | ICD-10-CM

## 2023-06-08 DIAGNOSIS — R40.4 EPISODIC ALTERED AWARENESS: ICD-10-CM

## 2023-06-08 PROCEDURE — 99214 OFFICE O/P EST MOD 30 MIN: CPT | Performed by: INTERNAL MEDICINE

## 2023-06-08 PROCEDURE — 93000 ELECTROCARDIOGRAM COMPLETE: CPT | Performed by: INTERNAL MEDICINE

## 2023-06-08 RX ORDER — MELOXICAM 15 MG/1
TABLET ORAL
COMMUNITY
Start: 2023-05-10

## 2023-06-08 NOTE — PATIENT INSTRUCTIONS
Cholesterol and Your Health   AMBULATORY CARE:   Cholesterol  is a waxy, fat-like substance  Your body uses cholesterol to make hormones and new cells, and to protect nerves  Cholesterol is made by your body  It also comes from certain foods you eat, such as meat and dairy products  Your healthcare provider can help you set goals for your cholesterol levels  He or she can help you create a plan to meet your goals  Cholesterol level goals: Your cholesterol level goals depend on your risk for heart disease, your age, and your other health conditions  The following are general guidelines: Total cholesterol  includes low-density lipoprotein (LDL), high-density lipoprotein (HDL), and triglyceride levels  The total cholesterol level should be lower than 200 mg/dL and is best at about 150 mg/dL  LDL cholesterol  is called bad cholesterol  because it forms plaque in your arteries  As plaque builds up, your arteries become narrow, and less blood flows through  When plaque decreases blood flow to your heart, you may have chest pain  If plaque completely blocks an artery that brings blood to your heart, you may have a heart attack  Plaque can break off and form blood clots  Blood clots may block arteries in your brain and cause a stroke  The level should be less than 130 mg/dL and is best at about 100 mg/dL  HDL cholesterol  is called good cholesterol  because it helps remove LDL cholesterol from your arteries  It does this by attaching to LDL cholesterol and carrying it to your liver  Your liver breaks down LDL cholesterol so your body can get rid of it  High levels of HDL cholesterol can help prevent a heart attack and stroke  Low levels of HDL cholesterol can increase your risk for heart disease, heart attack, and stroke  The level should be 60 mg/dL or higher  Triglycerides  are a type of fat that store energy from foods you eat  High levels of triglycerides also cause plaque buildup   This can increase your risk for a heart attack or stroke  If your triglyceride level is high, your LDL cholesterol level may also be high  The level should be less than 150 mg/dL  Any of the following can increase your risk for high cholesterol:   Smoking cigarettes    Being overweight or obese, or not getting enough exercise    Drinking large amounts of alcohol    A medical condition such as hypertension (high blood pressure) or diabetes    Certain genes passed from your parents to you    Age older than 65 years    What you need to know about having your cholesterol levels checked: Adults 21to 39years of age should have their cholesterol levels checked every 4 to 6 years  Adults 45 years or older should have their cholesterol checked every 1 to 2 years  You may need your cholesterol checked more often, or at a younger age, if you have risk factors for heart disease  You may also need to have your cholesterol checked more often if you have other health conditions, such as diabetes  Blood tests are used to check cholesterol levels  Blood tests measure your levels of triglycerides, LDL cholesterol, and HDL cholesterol  How healthy fats affect your cholesterol levels:  Healthy fats, also called unsaturated fats, help lower LDL cholesterol and triglyceride levels  Healthy fats include the following:  Monounsaturated fats  are found in foods such as olive oil, canola oil, avocado, nuts, and olives  Polyunsaturated fats,  such as omega 3 fats, are found in fish, such as salmon, trout, and tuna  They can also be found in plant foods such as flaxseed, walnuts, and soybeans  How unhealthy fats affect your cholesterol levels:  Unhealthy fats increase LDL cholesterol and triglyceride levels  They are found in foods high in cholesterol, saturated fat, and trans fat:  Cholesterol  is found in eggs, dairy, and meat  Saturated fat  is found in butter, cheese, ice cream, whole milk, and coconut oil   Saturated fat is also found in meat, such as sausage, hot dogs, and bologna  Trans fat  is found in liquid oils and is used in fried and baked foods  Foods that contain trans fats include chips, crackers, muffins, sweet rolls, microwave popcorn, and cookies  Treatment  for high cholesterol will also decrease your risk of heart disease, heart attack, and stroke  Treatment may include any of the following:  Lifestyle changes  may include food, exercise, weight loss, and quitting smoking  You may also need to decrease the amount of alcohol you drink  Your healthcare provider will want you to start with lifestyle changes  Other treatment may be added if lifestyle changes are not enough  Your healthcare provider may recommend you work with a team to manage hyperlipidemia  The team may include medical experts such as a dietitian, an exercise or physical therapist, and a behavior therapist  Your family members may be included in helping you create lifestyle changes  Medicines  may be given to lower your LDL cholesterol, triglyceride levels, or total cholesterol level  You may need medicines to lower your cholesterol if any of the following is true:    You have a history of stroke, TIA, unstable angina, or a heart attack  Your LDL cholesterol level is 190 mg/dL or higher  You are age 36 to 76 years, have diabetes or heart disease risk factors, and your LDL cholesterol is 70 mg/dL or higher  Supplements  include fish oil, red yeast rice, and garlic  Fish oil may help lower your triglyceride and LDL cholesterol levels  It may also increase your HDL cholesterol level  Red yeast rice may help decrease your total cholesterol level and LDL cholesterol level  Garlic may help lower your total cholesterol level  Do not take any supplements without talking to your healthcare provider  Food changes you can make to lower your cholesterol levels:  A dietitian can help you create a healthy eating plan   He or she can show you how to read food labels and choose foods low in saturated fat, trans fats, and cholesterol  Decrease the total amount of fat you eat  Choose lean meats, fat-free or 1% fat milk, and low-fat dairy products, such as yogurt and cheese  Try to limit or avoid red meats  Limit or do not eat fried foods or baked goods, such as cookies  Replace unhealthy fats with healthy fats  Cook foods in olive oil or canola oil  Choose soft margarines that are low in saturated fat and trans fat  Seeds, nuts, and avocados are other examples of healthy fats  Eat foods with omega-3 fats  Examples include salmon, tuna, mackerel, walnuts, and flaxseed  Eat fish 2 times per week  Pregnant women should not eat fish that have high levels of mercury, such as shark, swordfish, and aric mackerel  Increase the amount of high-fiber foods you eat  High-fiber foods can help lower your LDL cholesterol  Aim to get between 20 and 30 grams of fiber each day  Fruits and vegetables are high in fiber  Eat at least 5 servings each day  Other high-fiber foods are whole-grain or whole-wheat breads, pastas, or cereals, and brown rice  Eat 3 ounces of whole-grain foods each day  Increase fiber slowly  You may have abdominal discomfort, bloating, and gas if you add fiber to your diet too quickly  Eat healthy protein foods  Examples include low-fat dairy products, skinless chicken and turkey, fish, and nuts  Limit foods and drinks that are high in sugar  Your dietitian or healthcare provider can help you create daily limits for high-sugar foods and drinks  The limit may be lower if you have diabetes or another health condition  Limits can also help you lose weight if needed  Lifestyle changes you can make to lower your cholesterol levels:   Maintain a healthy weight  Ask your healthcare provider what a healthy weight is for you  Ask him or her to help you create a weight loss plan if needed   Weight loss can decrease your total cholesterol and triglyceride levels  Weight loss may also help keep your blood pressure at a healthy level  Be physically active throughout the day  Physical activity, such as exercise, can help lower your total cholesterol level and maintain a healthy weight  Physical activity can also help increase your HDL cholesterol level  Work with your healthcare provider to create an program that is right for you  Get at least 30 to 40 minutes of moderate physical activity most days of the week  Examples of exercise include brisk walking, swimming, or biking  Also include strength training at least 2 times each week  Your healthcare providers can help you create a physical activity plan  Do not smoke  Nicotine and other chemicals in cigarettes and cigars can raise your cholesterol levels  Ask your healthcare provider for information if you currently smoke and need help to quit  E-cigarettes or smokeless tobacco still contain nicotine  Talk to your healthcare provider before you use these products  Limit or do not drink alcohol  Alcohol can increase your triglyceride levels  Ask your healthcare provider before you drink alcohol  Ask how much is okay for you to drink in 24 hours or 1 week  Follow up with your doctor as directed:  Write down your questions so you remember to ask them during your visits  © Copyright [employer] 2022 Information is for End User's use only and may not be sold, redistributed or otherwise used for commercial purposes  The above information is an  only  It is not intended as medical advice for individual conditions or treatments  Talk to your doctor, nurse or pharmacist before following any medical regimen to see if it is safe and effective for you

## 2023-06-08 NOTE — PROGRESS NOTES
Subjective:        Patient ID: Christianne Talavera is a 70 y o  male  Chief Complaint:  Thi Rosa is here for preop clearance for rotator cuff surgery  He has a little dyspnea on exertion with moderate inclines but he blames this on his weight  He lives a relatively an active lifestyle not achieving over 4 METS of late  Denies any chest pains  No palpitations presyncope or syncope  EKG normal but for a mildly bradycardic heart rate of 57 bpm   Recent echo unremarkable  Ex smoker and treated hypertension are his other CAD risk factors  Strong premature family history of CAD is absent  The following portions of the patient's history were reviewed and updated as appropriate: allergies, current medications, past family history, past medical history, past social history, past surgical history and problem list   Review of Systems   Constitutional: Negative for chills, diaphoresis, malaise/fatigue and weight gain  HENT: Negative for nosebleeds and stridor  Eyes: Negative for double vision, vision loss in left eye, vision loss in right eye and visual disturbance  Cardiovascular: Positive for dyspnea on exertion  Negative for chest pain, claudication, cyanosis, irregular heartbeat, leg swelling, near-syncope, orthopnea, palpitations, paroxysmal nocturnal dyspnea and syncope  Respiratory: Negative for cough, shortness of breath, snoring and wheezing  Endocrine: Negative for polydipsia, polyphagia and polyuria  Hematologic/Lymphatic: Negative for bleeding problem  Does not bruise/bleed easily  Skin: Negative for flushing and rash  Musculoskeletal: Negative for falls and myalgias  Gastrointestinal: Negative for abdominal pain, heartburn, hematemesis, hematochezia, melena and nausea  Genitourinary: Negative for hematuria  Neurological: Negative for brief paralysis, dizziness, focal weakness, headaches, light-headedness, loss of balance and vertigo     Psychiatric/Behavioral: Negative for altered mental status and substance abuse  Allergic/Immunologic: Negative for hives  Objective:      /80 (BP Location: Left arm, Patient Position: Sitting)   Pulse 57   Ht 6' (1 829 m)   Wt (!) 158 kg (348 lb)   BMI 47 20 kg/m²   Physical Exam  Constitutional:       General: He is not in acute distress  Appearance: He is well-developed  He is not diaphoretic  HENT:      Head: Normocephalic and atraumatic  Eyes:      General: No scleral icterus  Pupils: Pupils are equal, round, and reactive to light  Neck:      Thyroid: No thyromegaly  Vascular: No carotid bruit or JVD  Cardiovascular:      Rate and Rhythm: Normal rate and regular rhythm  Heart sounds: Normal heart sounds  No murmur heard  No friction rub  No gallop  Pulmonary:      Effort: Pulmonary effort is normal  No respiratory distress  Breath sounds: Normal breath sounds  No stridor  No wheezing or rales  Abdominal:      General: Bowel sounds are normal  There is no distension  Palpations: Abdomen is soft  There is no mass  Tenderness: There is no abdominal tenderness  Musculoskeletal:      Cervical back: Normal range of motion and neck supple  Right lower leg: No edema  Left lower leg: No edema  Skin:     General: Skin is warm and dry  Coloration: Skin is not pale  Findings: No erythema  Neurological:      Mental Status: He is alert and oriented to person, place, and time  Coordination: Coordination normal    Psychiatric:         Mood and Affect: Mood normal          Behavior: Behavior normal          Thought Content: Thought content normal          Judgment: Judgment normal          Lab Review:   No visits with results within 2 Month(s) from this visit     Latest known visit with results is:   Admission on 02/23/2023, Discharged on 02/26/2023   Component Date Value   • Vitamin B1, Whole Blood 02/24/2023 150 7    • WBC 02/25/2023 5 96    • RBC 02/25/2023 4 19    • Hemoglobin 02/25/2023 13 2    • Hematocrit 02/25/2023 37 5    • MCV 02/25/2023 90    • MCH 02/25/2023 31 5    • MCHC 02/25/2023 35 2    • RDW 02/25/2023 12 3    • Platelets 47/24/3660 204    • MPV 02/25/2023 10 8    • Sodium 02/25/2023 142    • Potassium 02/25/2023 3 4 (L)    • Chloride 02/25/2023 110 (H)    • CO2 02/25/2023 26    • ANION GAP 02/25/2023 6    • BUN 02/25/2023 13    • Creatinine 02/25/2023 0 56 (L)    • Glucose 02/25/2023 108    • Calcium 02/25/2023 9 0    • Corrected Calcium 02/25/2023 9 6    • AST 02/25/2023 15    • ALT 02/25/2023 24    • Alkaline Phosphatase 02/25/2023 54    • Total Protein 02/25/2023 6 1 (L)    • Albumin 02/25/2023 3 3 (L)    • Total Bilirubin 02/25/2023 0 76    • eGFR 02/25/2023 106      MRI shoulder right wo contrast    Result Date: 5/23/2023  Narrative: MRI SHOULDER RIGHT WO CONTRAST INDICATION:   [id number]: Pain in right shoulder  COMPARISON: Correlation is made with the prior radiograph dated 2/7/2023  TECHNIQUE:  Multiplanar/multisequence MR of the right shoulder was performed  Imaging performed on 1 5T MRI FINDINGS: There is motion artifact  SUBCUTANEOUS TISSUES: Normal JOINT EFFUSION: There is a small glenohumeral joint effusion  ACROMION PROCESS: Normal  ROTATOR CUFF: Mild supraspinatus and infraspinatus tendinosis  There is a 7 mm in width and 1 cm in AP dimension full-thickness tear of the distal supraspinatus tendon at its attachment  No significant muscular fatty atrophy  SUBACROMIAL/SUBDELTOID BURSA: Small amount of fluid communicating with the glenohumeral joint  LONG HEAD OF BICEPS TENDON: Normal  GLENOID LABRUM: There is diffuse labral degeneration with a degenerative tear of the posterior and inferior labrum  There is a 5 mm paralabral cyst adjacent to the posterior superior labrum  GLENOHUMERAL JOINT: Mild glenohumeral joint articular cartilage thinning  ACROMIOCLAVICULAR JOINT: There is moderate osteoarthritis  BONES: Intraosseous ganglia seen along the greater tuberosity  Impression: Mild supraspinatus and infraspinatus tendinosis with a small full-thickness tear of the distal supraspinatus tendon at its attachment  No muscular fatty atrophy  Mild glenohumeral joint articular cartilage thinning with labral degeneration and degenerative tear of the posterior and inferior labrum with a small paralabral cyst adjacent to the posterior superior labrum  Small glenohumeral joint effusion communicating with the subacromial/subdeltoid bursa  Moderate acromioclavicular joint osteoarthrosis  Workstation performed: [id number]         Assessment:       1  Episodic altered awareness  POCT ECG      2  Preprocedural cardiovascular examination  Stress test only, exercise      3  Primary hypertension  Stress test only, exercise      4  Dyspnea on exertion  Stress test only, exercise           Plan:       Intermediate risk surgery planned in an ex-smoker, obese patient, with a generally sedentary lifestyle, with treated hypertension, and admitted mild dyspnea on exertion, further testing prudent  Regular treadmill stress test ordered in light of recent normal echocardiograms  As long as the stress test is not highly suspicious for ischemic heart disease/coronary insufficiency I will clear him for proposed surgery without further ado  He can follow-up here as needed  I told him if the stress test is abnormal we will contact him directly for further recommendations

## 2023-06-12 ENCOUNTER — APPOINTMENT (OUTPATIENT)
Dept: LAB | Facility: HOSPITAL | Age: 68
End: 2023-06-12
Payer: COMMERCIAL

## 2023-06-12 DIAGNOSIS — E78.1 HYPERTRIGLYCERIDEMIA: ICD-10-CM

## 2023-06-12 LAB
CHOLEST SERPL-MCNC: 188 MG/DL
HDLC SERPL-MCNC: 63 MG/DL
LDLC SERPL CALC-MCNC: 109 MG/DL (ref 0–100)
NONHDLC SERPL-MCNC: 125 MG/DL
TRIGL SERPL-MCNC: 80 MG/DL

## 2023-06-12 PROCEDURE — 80061 LIPID PANEL: CPT

## 2023-06-12 PROCEDURE — 36415 COLL VENOUS BLD VENIPUNCTURE: CPT

## 2023-06-14 ENCOUNTER — HOSPITAL ENCOUNTER (OUTPATIENT)
Dept: NON INVASIVE DIAGNOSTICS | Facility: HOSPITAL | Age: 68
Discharge: HOME | End: 2023-06-14
Attending: INTERNAL MEDICINE
Payer: COMMERCIAL

## 2023-06-14 ENCOUNTER — TELEPHONE (OUTPATIENT)
Dept: CARDIOLOGY CLINIC | Facility: CLINIC | Age: 68
End: 2023-06-14

## 2023-06-14 VITALS
SYSTOLIC BLOOD PRESSURE: 110 MMHG | BODY MASS INDEX: 42.66 KG/M2 | HEART RATE: 67 BPM | HEIGHT: 72 IN | WEIGHT: 315 LBS | DIASTOLIC BLOOD PRESSURE: 62 MMHG

## 2023-06-14 DIAGNOSIS — I10 PRIMARY HYPERTENSION: ICD-10-CM

## 2023-06-14 DIAGNOSIS — R06.09 DYSPNEA ON EXERTION: ICD-10-CM

## 2023-06-14 DIAGNOSIS — Z01.810 PREPROCEDURAL CARDIOVASCULAR EXAMINATION: ICD-10-CM

## 2023-06-14 LAB
CHEST PAIN STATEMENT: NORMAL
MAX DIASTOLIC BP: 70 MMHG
MAX HEART RATE: 139 BPM
MAX HR PERCENT: 92 %
MAX HR: 139 BPM
MAX PREDICTED HEART RATE: 151 BPM
MAX. SYSTOLIC BP: 160 MMHG
PROTOCOL NAME: NORMAL
RATE PRESSURE PRODUCT: NORMAL
REASON FOR TERMINATION: NORMAL
SL CV STRESS RECOVERY BP: NORMAL MMHG
SL CV STRESS RECOVERY HR: 70 BPM
SL CV STRESS RECOVERY O2 SAT: 97 %
SL CV STRESS STAGE REACHED: 2
STRESS BASELINE BP: NORMAL MMHG
STRESS BASELINE HR: 67 BPM
STRESS O2 SAT REST: 98 %
STRESS PEAK HR: 139 BPM
STRESS POST ESTIMATED WORKLOAD: 5.6 METS
STRESS POST EXERCISE DUR MIN: 3 MIN
STRESS POST EXERCISE DUR SEC: 53 SEC
STRESS POST O2 SAT PEAK: 97 %
STRESS POST PEAK BP: 160 MMHG
TARGET HR FORMULA: NORMAL
TEST INDICATION: NORMAL
TIME IN EXERCISE PHASE: NORMAL

## 2023-06-14 PROCEDURE — 93018 CV STRESS TEST I&R ONLY: CPT | Performed by: INTERNAL MEDICINE

## 2023-06-14 PROCEDURE — 93016 CV STRESS TEST SUPVJ ONLY: CPT | Performed by: INTERNAL MEDICINE

## 2023-06-14 PROCEDURE — 93017 CV STRESS TEST TRACING ONLY: CPT

## 2023-06-14 NOTE — TELEPHONE ENCOUNTER
----- Message from Juwan Tinajero DO sent at 6/14/2023 11:32 AM EDT -----  Please call the patient regarding his normal result  [id number] [address]iones for surgery  I will addend note

## 2023-06-19 DIAGNOSIS — R47.01 APHASIA: ICD-10-CM

## 2023-06-19 DIAGNOSIS — Z01.818 PREOP TESTING: Primary | ICD-10-CM

## 2023-06-19 DIAGNOSIS — I25.3 ATRIAL SEPTAL ANEURYSM: ICD-10-CM

## 2023-06-19 DIAGNOSIS — M47.26 OSTEOARTHRITIS OF SPINE WITH RADICULOPATHY, LUMBAR REGION: ICD-10-CM

## 2023-06-19 DIAGNOSIS — M17.12 ARTHRITIS OF LEFT KNEE: ICD-10-CM

## 2023-06-20 ENCOUNTER — APPOINTMENT (OUTPATIENT)
Dept: LAB | Facility: HOSPITAL | Age: 68
End: 2023-06-20
Payer: COMMERCIAL

## 2023-06-20 DIAGNOSIS — M47.26 OSTEOARTHRITIS OF SPINE WITH RADICULOPATHY, LUMBAR REGION: ICD-10-CM

## 2023-06-20 DIAGNOSIS — Z01.818 PREOP TESTING: ICD-10-CM

## 2023-06-20 LAB
ALBUMIN SERPL BCP-MCNC: 4.3 G/DL (ref 3.5–5)
ALP SERPL-CCNC: 60 U/L (ref 34–104)
ALT SERPL W P-5'-P-CCNC: 16 U/L (ref 7–52)
ANION GAP SERPL CALCULATED.3IONS-SCNC: 11 MMOL/L
AST SERPL W P-5'-P-CCNC: 15 U/L (ref 13–39)
BASOPHILS # BLD AUTO: 0.04 THOUSANDS/ÂΜL (ref 0–0.1)
BASOPHILS NFR BLD AUTO: 1 % (ref 0–1)
BILIRUB SERPL-MCNC: 1.07 MG/DL (ref 0.2–1)
BUN SERPL-MCNC: 15 MG/DL (ref 5–25)
CALCIUM SERPL-MCNC: 9.3 MG/DL (ref 8.4–10.2)
CHLORIDE SERPL-SCNC: 104 MMOL/L (ref 96–108)
CO2 SERPL-SCNC: 23 MMOL/L (ref 21–32)
CREAT SERPL-MCNC: 0.64 MG/DL (ref 0.6–1.3)
EOSINOPHIL # BLD AUTO: 0.2 THOUSAND/ÂΜL (ref 0–0.61)
EOSINOPHIL NFR BLD AUTO: 4 % (ref 0–6)
ERYTHROCYTE [DISTWIDTH] IN BLOOD BY AUTOMATED COUNT: 12.7 % (ref 11.6–15.1)
GFR SERPL CREATININE-BSD FRML MDRD: 99 ML/MIN/1.73SQ M
GLUCOSE SERPL-MCNC: 109 MG/DL (ref 65–140)
HCT VFR BLD AUTO: 41.6 % (ref 36.5–49.3)
HGB BLD-MCNC: 14 G/DL (ref 12–17)
IMM GRANULOCYTES # BLD AUTO: 0.02 THOUSAND/UL (ref 0–0.2)
IMM GRANULOCYTES NFR BLD AUTO: 0 % (ref 0–2)
LYMPHOCYTES # BLD AUTO: 1.16 THOUSANDS/ÂΜL (ref 0.6–4.47)
LYMPHOCYTES NFR BLD AUTO: 22 % (ref 14–44)
MCH RBC QN AUTO: 29.3 PG (ref 26.8–34.3)
MCHC RBC AUTO-ENTMCNC: 33.7 G/DL (ref 31.4–37.4)
MCV RBC AUTO: 87 FL (ref 82–98)
MONOCYTES # BLD AUTO: 0.32 THOUSAND/ÂΜL (ref 0.17–1.22)
MONOCYTES NFR BLD AUTO: 6 % (ref 4–12)
NEUTROPHILS # BLD AUTO: 3.51 THOUSANDS/ÂΜL (ref 1.85–7.62)
NEUTS SEG NFR BLD AUTO: 67 % (ref 43–75)
NRBC BLD AUTO-RTO: 0 /100 WBCS
PLATELET # BLD AUTO: 212 THOUSANDS/UL (ref 149–390)
PMV BLD AUTO: 9.6 FL (ref 8.9–12.7)
POTASSIUM SERPL-SCNC: 4.3 MMOL/L (ref 3.5–5.3)
PROT SERPL-MCNC: 7.2 G/DL (ref 6.4–8.4)
RBC # BLD AUTO: 4.78 MILLION/UL (ref 3.88–5.62)
SODIUM SERPL-SCNC: 138 MMOL/L (ref 135–147)
WBC # BLD AUTO: 5.25 THOUSAND/UL (ref 4.31–10.16)

## 2023-06-20 PROCEDURE — 85025 COMPLETE CBC W/AUTO DIFF WBC: CPT

## 2023-06-20 PROCEDURE — 36415 COLL VENOUS BLD VENIPUNCTURE: CPT

## 2023-06-20 PROCEDURE — 80053 COMPREHEN METABOLIC PANEL: CPT

## 2023-06-21 ENCOUNTER — OFFICE VISIT (OUTPATIENT)
Dept: NEUROLOGY | Facility: CLINIC | Age: 68
End: 2023-06-21
Payer: COMMERCIAL

## 2023-06-21 VITALS
HEART RATE: 63 BPM | HEIGHT: 72 IN | DIASTOLIC BLOOD PRESSURE: 82 MMHG | OXYGEN SATURATION: 97 % | WEIGHT: 315 LBS | BODY MASS INDEX: 42.66 KG/M2 | SYSTOLIC BLOOD PRESSURE: 148 MMHG | TEMPERATURE: 97.5 F

## 2023-06-21 DIAGNOSIS — G40.109 FOCAL EPILEPSY ORIGINATING IN TEMPORAL LOBE (HCC): Primary | ICD-10-CM

## 2023-06-21 PROCEDURE — 99215 OFFICE O/P EST HI 40 MIN: CPT | Performed by: NURSE PRACTITIONER

## 2023-06-21 RX ORDER — LEVETIRACETAM 1000 MG/1
1000 TABLET ORAL EVERY 12 HOURS SCHEDULED
Qty: 180 TABLET | Refills: 3 | Status: SHIPPED | OUTPATIENT
Start: 2023-06-21
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
## 2023-06-21 NOTE — PATIENT INSTRUCTIONS
- Continue current dose of levetiracetam 1000 mg twice per day - have blood work done Friday  - 6 month seizure free date of 8/23/23 - call the office that day to report you have been seizure free so we can submit paperwork to [facility] [employer] to support your return to driving  [phone] - or send a [place] [employer]-chart message  No operating heavy machinery until then as well  - Call the office with possible seizures, your dose would likely be increased  - Have MRI brain  - You are cleared for surgery from a neurological perspective  You need to make sure not to miss any doses of medication leading up to or after surgery to decrease risk of breakthrough seizure with and after anesthesia  - Follow up in 3 months with epileptologist    SEIZURE SAFETY    Don’t let fear of seizures keep you at home  Be smart about your activities to make sure you are safe  The guidelines below can help you be as safe as possible  Make sure the people around you are aware of: What happens when you have a seizure  Correct seizure first aid  First aid for choking (consider taking a basic life support class)  When they should know to call 911 or for medical help    Avoid common triggers of seizures:  Missing your medications  Not getting enough sleep  Drinking alcohol  Using illegal drugs    Safety measures for at home:  In the bathroom:   Do not take baths in the bathtub  Instead, take only showers  Try to have a family member available while you are in the shower  Make sure the drain in the bathtub/shower is working properly to avoid pooling of water  You can consider a fitted shower seat  Recessed soap trays can minimize injury if you would happen to fall in the shower  Bathroom doors can be hung to open outwards, so that the door can still be opened if you fall against the door  Do not lock the bathroom door  Use an “Occupied” sign on the door or other signal to let others know you are in the bathroom    Safety locks can be obtained from the [address]  On your water heater, set your water temperature to a warm temperature that is not scalding to avoid burns from very hot water  Put non-skid strips/ in the bathtub  Use an electric shaver  Avoid any electrical appliances (including electric shaver) in the bathroom or near water  Use shatterproof glass for mirrors  In the kitchen:   When possible, cook using a microwave  Only cook or use electrical appliances when someone else is in the house and available  As much as possible, grill food and avoid fryers or frying food  Use the back burners of the stove and turn the pot handles toward the back of the stove  Avoid carrying hot pans, pots of boiling water, or very hot food  Serve food or liquids directly from the stove  At the least, minimize the distance hot food is carried  Use precut foods or food processers to limit the need to use knives  Use plastic or durable cups, dishes, and containers instead of breakable glass items  In the bedroom  Low level and wide beds (like a futon) can reduce risk of injury of falling out of bed  If there is a high risk of falling out of bed, you can consider simply putting the mattress on the floor  Avoid sleeping on top bunks of bunk beds  Place a soft carpet on the floor  Around the house  Pad sharp corners of tables, chairs, etc  Round tables and furniture can be considered to avoid sharp corners  Avoid open flames  Place a screen in front of fireplaces and don’t build a fire alone  Allow for open spaces with furniture  Avoid loose throw rugs or slippery floors  Non-slip dago or cushioned dago can help reduce injury from a fall  Fireproof fabrics and furniture are best, and especially important if you smoke  Doors and windows with safety glass are safer if someone falls against them  Avoid lights and heaters that could easily be knocked over    Use curling irons or clothing irons that have automatic shut off switches  Make sure motor driven equipment or lawn mowers have “dead man’s” handles or seats so they will turn off if you release pressure  Safety measures when away from home  [facility]  law mandates that you cannot drive for 6 months after your most recent seizure  [place] law mandates that you cannot drive for 6 months after your most recent seizure  Work/Travel  Wear a medical alert bracelet or necklace at all times  Wear a helmet and use protective clothing/equipment when appropriate  Consider telling co-workers and travel companions that you have epilepsy and what to do if you have a seizure  Avoid irregular shifts or disruptions of a regular sleep pattern  Take your medications on time and keep an updated list of medications in your purse or wallet  Do not climb to heights or operate heavy machinery  Stand back from the edges of roads or bus/train platforms when traveling  If you wander when you have a seizure, take a friend along for the trip  Recreation  Swimming can be dangerous  Do not swim alone, in open water, or in murky water that you cannot see the bottom  Caregivers should be with you in the pool at all times and must be physically able to get you out of the water  Use a flotation device  Scuba diving is not recommended since during a seizure people are unaware of their surroundings  Having a seizure underwater can be deadly and can endanger the lives of others  Kayaking and canoeing can be especially dangerous  People with epilepsy are at a higher risk of becoming trapped underneath a canoe or kayak  Wear a helmet when playing contact sports, biking, or if there is a risk of falling  Patients with epilepsy are at a higher risk of head injury when playing contact sports  Avoid riding a bike, running, or other activities around busy roads, steep hills, or secluded areas  Exercise on soft surfaces    Theme [place]: many people with epilepsy can go on rides depending on their type of seizures  For some people, stress or excitement can trigger a seizure  Rollercoasters (especially if you are upside-down) are more dangerous for people with epilepsy  Medications  Take your medications on time  If you have trouble remembering, set alarms on your phone  You can visit www  [employer]  com to set up reminders through text message to help you remember to take your medications  Use a pillbox to help you keep track of your medications  When out of the house, take any needed medications with you  Consider keeping one or two extra doses with you in case you are unexpectedly away from home longer than planned  If you realize you missed a dose of your medications and it is less than 2 hours until your next dose, skip the missed dose  Do not double up your medication dose  If it is more than 2 hours until your next dose, you can take the missed dose  Avoid drinking alcohol, since this can enhance effects of your seizure medications  Be aware of common and major side effects of your medications  Keep your medications out of the reach of children  Parenting:  Childproof your home as much as possible  It is possible that you could drop your baby if you have a seizure while holding or feeding them  If you are nursing a baby, sit on the floor or bed with your back supported so the baby will not fall far if you should lose consciousness  Feed the baby while he or she is seated in an infant seat  Dress, change, and sponge bathe the baby on the floor  Move the baby around in a stroller or small crib  Keep a young baby in a playpen when you are alone, and a toddler in an indoor play yard, or childproof one room and use safety su at the doors  When out of the house, use a bungee-type cord or restraint harness so your child cannot wander away if you have a seizure that affects your awareness      FIRST AID FOR SEIZURES    What to Do If You Witness a Seizure: Generalized convulsive (called a generalized tonic-clonic or grand mal seizure)  During this seizure, the person may cry out, suddenly stiffen up, make jerking movements, and fall  The person may turn pale or blue from difficulty breathing  Actions:  Stay calm  Talk in a soothing voice and if possible keep onlookers away  Prevent injury  Move objects away that the person might hit while jerking uncontrollably  Time when the seizure starts and ends  Most seizures stop after only a few minutes  Turn him or her gently onto one side  This will help keep the airway clear  Never place anything in his/her mouth or give him/her anything by mouth during a seizure  -- Do not give the person water, pills, or food until fully alert  Loosen tight clothing or jewelry around his/her neck  Make the person as comfortable as possible  Place something soft under their head  Do not hold the person down  If the person having a seizure thrashes around there is no need for you to restrain them  They are more likely to be combative if restrained  Remember to consider your safety as well  Keep onlookers away  Be sensitive and supportive, and ask others to do the same  Stay with the person until he/she is fully alert  Complex partial seizure (confusional spells)  During this kind of seizure, the person may have a glassy stare; give no response or inappropriate responses when questioned; sit, stand, or walk about aimlessly; make lip smacking or chewing motions; fidget with clothes; appear to be drunk, drugged, or confused  Actions:  Make sure the person is safe and won’t harm themselves  Try to remove harmful objects from around the person (tools, utensils, glasses)  Do NOT be aggressive or attempt to restrain the person  They are more likely to be combative if restrained  Remember to consider your safety as well    Help prevent the person from wandering, and direct the person to chair or safe position  Never place anything in his/her mouth or give him/her anything by mouth during a seizure  -- Do not give the person water, pills, or food until fully alert  Keep onlookers away  Be sensitive and supportive, and ask others to do the same  Stay with the person until he/she is fully alert  CALL 911 if:  A convulsive seizure lasts more than 5 minutes  The person turns blue during the seizure  The person does not start breathing after the seizure  Begin mouth to mouth resuscitation if this would occur  The person has one seizure right after the other without coming back to normal consciousness between the seizures  The person has not regained consciousness or is still confused after 30 minutes  You know the person does not have epilepsy  You know the person has diabetes or low blood sugar  The person is pregnant, ill, or injured  The seizure occurred in water, because the person may have inhaled water  The person requests an ambulance or medical help  Rescue medication  Your doctor may prescribe a rescue medication such as lorazepam (Ativan), diazepam (Valium / Diastat), or clonazepam (Klonopin) to terminate a seizure or if you have a history of cluster of seizures  Follow the instructions given by your doctor for these medications    Recognizing Common Seizures (examples)   Simple partial seizures: Isolated twitching, numbness, sweating, dizziness, nausea/vomiting, disturbances to hearing, vision, smell or taste  No loss of consciousness occurs, and the person remains aware of his/her environment  Complex partial seizures: Staring, motionless, picking at clothes, smacking lips, swallowing repeatedly or wandering around  The person is not aware of their surroundings and is not fully responsive  Atonic seizures: “Drop attacks” or sudden, rapid fall to ground with rapid recovery  Myoclonic seizures: Brief forceful jerks which can affect the whole body or just part of it     Absence seizures: May appear to be daydreaming or “spacing out ” The person is momentarily unresponsive and unaware of what is happening around him/her  Tonic seizures: Stiffening of part or of the entire body  Generalized Tonic-Clonic Seizures  “Grand-mal seizure ” Sudden loss of consciousness with body stiffening followed by continuous jerking movements  A blue tinge around the mouth is likely but lack of oxygen is rare  Loss of bladder and/or bowel control may occur

## 2023-06-21 NOTE — PROGRESS NOTES
Review of Systems   Constitutional: Positive for fatigue  Negative for appetite change and fever  HENT: Negative  Negative for hearing loss, tinnitus, trouble swallowing and voice change  Eyes: Negative  Negative for photophobia, pain and visual disturbance  Respiratory: Negative  Negative for shortness of breath  Cardiovascular: Negative  Negative for palpitations  Gastrointestinal: Negative  Negative for nausea and vomiting  Endocrine: Negative  Negative for cold intolerance  Genitourinary: Negative  Negative for dysuria, frequency and urgency  Musculoskeletal: Positive for back pain  Negative for gait problem, myalgias and neck pain  Skin: Negative  Negative for rash  Allergic/Immunologic: Negative  Neurological: Positive for seizures (last sz 17 weeks ago)  Negative for dizziness, tremors, syncope, facial asymmetry, speech difficulty, weakness, light-headedness, numbness and headaches  Hematological: Negative  Does not bruise/bleed easily  Psychiatric/Behavioral: Negative  Negative for confusion, hallucinations and sleep disturbance  All other systems reviewed and are negative

## 2023-06-22 ENCOUNTER — TELEPHONE (OUTPATIENT)
Dept: NEUROLOGY | Facility: CLINIC | Age: 68
End: 2023-06-22

## 2023-06-22 ENCOUNTER — OFFICE VISIT (OUTPATIENT)
Dept: INTERNAL MEDICINE CLINIC | Facility: CLINIC | Age: 68
End: 2023-06-22
Payer: COMMERCIAL

## 2023-06-22 VITALS
TEMPERATURE: 98.3 F | WEIGHT: 315 LBS | HEIGHT: 72 IN | BODY MASS INDEX: 42.66 KG/M2 | OXYGEN SATURATION: 96 % | HEART RATE: 58 BPM | DIASTOLIC BLOOD PRESSURE: 68 MMHG | SYSTOLIC BLOOD PRESSURE: 150 MMHG

## 2023-06-22 DIAGNOSIS — M75.121 NONTRAUMATIC COMPLETE TEAR OF RIGHT ROTATOR CUFF: ICD-10-CM

## 2023-06-22 DIAGNOSIS — Z01.818 PREOPERATIVE CLEARANCE: Primary | ICD-10-CM

## 2023-06-22 PROBLEM — R40.4 EPISODIC ALTERED AWARENESS: Status: RESOLVED | Noted: 2023-01-27 | Resolved: 2023-06-22

## 2023-06-22 PROBLEM — R47.89 SPELLS OF SPEECH ARREST: Chronic | Status: RESOLVED | Noted: 2023-02-23 | Resolved: 2023-06-22

## 2023-06-22 PROBLEM — G40.109: Status: ACTIVE | Noted: 2023-06-22

## 2023-06-22 PROBLEM — R68.89 SPELLS OF DECREASED ATTENTIVENESS: Status: RESOLVED | Noted: 2023-02-22 | Resolved: 2023-06-22

## 2023-06-22 PROBLEM — R47.01 APHASIA: Status: RESOLVED | Noted: 2023-01-15 | Resolved: 2023-06-22

## 2023-06-22 PROCEDURE — 99213 OFFICE O/P EST LOW 20 MIN: CPT | Performed by: NURSE PRACTITIONER

## 2023-06-22 NOTE — TELEPHONE ENCOUNTER
Greg Lopez from [employer] called in requesting medical clearance from Neurology for patient due patient having surgery on 06/26/23    Please fax to [phone]

## 2023-06-22 NOTE — TELEPHONE ENCOUNTER
Patient is scheduled for surgery on 6/26/2023 - right shoulder arthroscopy and rotator cuff repair  Clear from a neuro standpoint for procedure? Okay to write letter stating such?

## 2023-06-22 NOTE — PROGRESS NOTES
Name: Rachelle Garcia      : 1955      MRN: [id number]  Encounter Provider: LEIGHA Barker  Encounter Date: 2023   Encounter department: [address] [place] [address] [address]    Assessment & Plan Patient is cleared for surgery  Will follow up in 3 months for his medicare wellness  1  Preoperative clearance    2  Nontraumatic complete tear of right rotator cuff           Subjective      [facility] is for a preop clearance  He is having shoulder surgery with [facility] on Monday  He did have his preop testing done and was cleared by Cardiology and Neurology  He is having some pain but has been doing well  He is taking Keppra for focal epilepsy and is doing well on this  He offers no other issues  Review of Systems   All other systems reviewed and are negative  Current Outpatient Medications on File Prior to Visit   Medication Sig   • cholecalciferol (VITAMIN D3) 1,000 units tablet Take 2 tablets (2,000 Units total) by mouth daily For vitamin D deficiency   • levETIRAcetam (KEPPRA) 1000 MG tablet Take 1 tablet (1,000 mg total) by mouth every 12 (twelve) hours   • meloxicam (MOBIC) 15 mg tablet TAKE 1 TABLET BY MOUTH EVERY DAY FOR 30 DAYS   • acetaminophen (TYLENOL) 325 mg tablet Take 2 tablets (650 mg total) by mouth every 6 (six) hours as needed for mild pain, headaches or fever Do not exceed a total of 3 grams of tylenol/acetaminophen in a 24-hour period  (Patient taking differently: Take 650 mg by mouth if needed for mild pain, headaches or fever Do not exceed a total of 3 grams of tylenol/acetaminophen in a 24-hour period )   • aspirin 81 mg chewable tablet Chew 1 tablet (81 mg total) daily To prevent stroke and heart attack (Patient not taking: Reported on 2023)       Objective     /68   Pulse 58   Temp 98 3 °F (36 8 °C)   Ht 6' (1 829 m)   Wt (!) 156 kg (344 lb 1 6 oz)   SpO2 96%   BMI 46 67 kg/m²     Physical Exam  Vitals reviewed     Constitutional:       Appearance: Normal appearance  He is normal weight  HENT:      Head: Normocephalic and atraumatic  Right Ear: Tympanic membrane, ear canal and external ear normal       Left Ear: Tympanic membrane and ear canal normal       Nose: Nose normal       Mouth/Throat:      Mouth: Mucous membranes are moist       Pharynx: Oropharynx is clear  Eyes:      Extraocular Movements: Extraocular movements intact  Conjunctiva/sclera: Conjunctivae normal       Pupils: Pupils are equal, round, and reactive to light  Cardiovascular:      Rate and Rhythm: Normal rate and regular rhythm  Pulses: Normal pulses  Heart sounds: Normal heart sounds  Pulmonary:      Effort: Pulmonary effort is normal       Breath sounds: Normal breath sounds  Abdominal:      General: Abdomen is flat  Bowel sounds are normal       Palpations: Abdomen is soft  Musculoskeletal:         General: Normal range of motion  Cervical back: Normal range of motion and neck supple  Skin:     General: Skin is warm and dry  Capillary Refill: Capillary refill takes less than 2 seconds  Neurological:      General: No focal deficit present  Mental Status: He is alert and oriented to person, place, and time  Mental status is at baseline  Psychiatric:         Mood and Affect: Mood normal          Behavior: Behavior normal          Thought Content:  Thought content normal          Judgment: Judgment normal        LEIGHA Rodriguez

## 2023-06-22 NOTE — TELEPHONE ENCOUNTER
Yes, I did write it in my office note as well  You can probably copy and paste from my note to make a formal letter for him     Thanks

## 2023-06-22 NOTE — ASSESSMENT & PLAN NOTE
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  There was a prior MRI brain without contrast with coarse calcification of bilateral globus pallidus and mineralization of bilateral thalami and dentate nuclei  Recommended MRI brain with seizure protocol to assess for temporal lobe pathology that may have been missed on typical MRI brain without contrast  His neurologic exam is non-focal at today's visit  We reviewed his diagnosis, seizure safety and first aid during his visit today  Written information also provided with AVS for review at home  He is aware that he is unable to drive until he has been seizure free for at least 6 months  As long as there are no recurrent seizures, his 6 month seizure free date will be August 23, 2023  Discussed calling office to report seizure freedom so paperwork can be submitted to [employer] to support his return to driving  Recommended patient have levetiracetam level checked after his visit today  Reviewed if there are breakthrough seizures or concerns, he needs to call the office or send a my-chart message  If breakthrough seizures were to occur, would likely increase levetiracetam to 1500 mg BID   Patient will follow up in 3 months or sooner if needed with epileptologist      Of note, patient does have upcoming shoulder surgery  Reviewed that he is cleared from a neurologic perspective for surgery  Our recommendation is to ensure that he does not miss any doses of medication leading up to or after surgery to decrease risk of breakthrough seizures in the setting of anesthesia use  If there were seizures while hospitalized or if unable to take PO medication, levetiracetam can be given IV

## 2023-06-22 NOTE — LETTER
June 23, 2023     Ubaldo Nunez    Patient: Ubaldo Nunez   YOB: 1955     To Whom It May Concern:    Ubaldo Nunez follows with [facility] for his diagnosis of epilepsy  He is cleared from a neurologic perspective for surgery  Our recommendation is to ensure that he does not miss any doses of medication leading up to or after surgery to decrease risk of breakthrough seizures in the setting of anesthesia use  If there were seizures while hospitalized or if unable to take PO medication, levetiracetam can be given IV  Please feel free to contact our office if any further questions or concerns      Sincerely,    LEIGHA Bee

## 2023-06-24 ENCOUNTER — APPOINTMENT (OUTPATIENT)
Dept: LAB | Facility: HOSPITAL | Age: 68
End: 2023-06-24
Payer: COMMERCIAL

## 2023-06-24 DIAGNOSIS — G40.109 FOCAL EPILEPSY ORIGINATING IN TEMPORAL LOBE (HCC): ICD-10-CM

## 2023-06-24 PROCEDURE — 80177 DRUG SCRN QUAN LEVETIRACETAM: CPT

## 2023-06-24 PROCEDURE — 36415 COLL VENOUS BLD VENIPUNCTURE: CPT

## 2023-06-28 LAB — LEVETIRACETAM SERPL-MCNC: 14.5 UG/ML (ref 10–40)

## 2023-06-30 ENCOUNTER — EVALUATION (OUTPATIENT)
Dept: PHYSICAL THERAPY | Facility: CLINIC | Age: 68
End: 2023-06-30
Payer: COMMERCIAL

## 2023-06-30 DIAGNOSIS — Z48.89 AFTERCARE FOLLOWING SURGERY: ICD-10-CM

## 2023-06-30 DIAGNOSIS — M75.101 TEAR OF RIGHT SUPRASPINATUS TENDON: ICD-10-CM

## 2023-06-30 DIAGNOSIS — M75.121 COMPLETE TEAR OF RIGHT ROTATOR CUFF, UNSPECIFIED WHETHER TRAUMATIC: Primary | ICD-10-CM

## 2023-06-30 PROCEDURE — 97161 PT EVAL LOW COMPLEX 20 MIN: CPT | Performed by: PHYSICAL THERAPIST

## 2023-06-30 PROCEDURE — 97535 SELF CARE MNGMENT TRAINING: CPT | Performed by: PHYSICAL THERAPIST

## 2023-06-30 PROCEDURE — 97110 THERAPEUTIC EXERCISES: CPT | Performed by: PHYSICAL THERAPIST

## 2023-06-30 PROCEDURE — 97112 NEUROMUSCULAR REEDUCATION: CPT | Performed by: PHYSICAL THERAPIST

## 2023-06-30 NOTE — LETTER
2023    Cynthia Griffiths MD  [address] [place] [id number]    Patient: Juan Alberto   YOB: 1955   Date of Visit: 2023     Encounter Diagnosis     ICD-10-CM    1  Complete tear of right rotator cuff, unspecified whether traumatic  M75 121       2  Tear of right supraspinatus tendon  M75 101       3  Aftercare following surgery  Z48 89           Dear Dr Piotr Aguirre:    Thank you for your recent referral of Juan Alberto  Please review the attached evaluation summary from [facility] recent visit  Please verify that you agree with the plan of care by signing the attached order  If you have any questions or concerns, please do not hesitate to call  I sincerely appreciate the opportunity to share in the care of one of your patients and hope to have another opportunity to work with you in the near future  Sincerely,    Binta Martins, PT, DPT, ATC      Referring Provider:      I certify that I have read the below Plan of Care and certify the need for these services furnished under this plan of treatment while under my care  Cynthia Griffiths MD  [address] [place] [address] Rd  Via Fax: [phone]          PT Evaluation     Today's date: 2023  Patient name: Juan Alberto  : 1955  MRN: [id number]  Referring provider: Vivienne Hodge*  Dx:   Encounter Diagnosis     ICD-10-CM    1  Complete tear of right rotator cuff, unspecified whether traumatic  M75 121       2  Tear of right supraspinatus tendon  M75 101       3   Aftercare following surgery  Z48 89                      Assessment  Understanding of Dx/Px/POC: good   Prognosis: good    Goals  STGs: To be complete within 6-8 weeks  - Decrease pain to < 2/10 at worst  - Increase AROM to WNL  - Increase strength to > 4+/5  - Improve postural awareness capacity to > 60min before deficit    LTGs: To be complete within 12-16 weeks  - Able to repetitively complete all overhead activity without pain or limitation for increased safety and functional capacity with ADLs and work-related duty  - Able to repetitively complete all reaching activity without pain or limitation for increased safety and functional capacity with ADLs and work-related duty  - Able to repetitively complete all pushing/pulling activity without pain or limitation for increased safety and functional capacity with ADLs and work-related duty  - Able to complete all lifting/carrying activity without pain or limitation for increased safety and functional capacity with ADLs and work-related duty    Plan  Planned therapy interventions: manual therapy, neuromuscular re-education, patient education, self care, postural training, therapeutic activities, therapeutic exercise and home exercise program  Frequency: 2x week  Duration in weeks: 6       Pt is a 70 y o  male with R shoulder pain who presents with functional deficits including decreased capacity with overhead activity, pushing/pulling, lifting/carrying, and behind the back/cross body reaching activity  Upon completion of today's initial evaluation, Darryl's sx remain consistent with being s/p R Shoulder RTC repair 23  Patient will benefit from skilled physical therapy, as per MD protocol to address current deficits  Subjective Evaluation    Pain  Current pain ratin  At best pain ratin  At worst pain ratin  Location: R Shoulder    Patient Goals  Patient goals for therapy: increased strength, decreased pain and increased motion         Pt reports he is s/p R Shoulder RTC repair 23  Pt reports he has a good understanding of MD protocol and HEP          Objective Pain level ranges 2-8/10  AROM: R Shoulder n/a; L Shoulder WNL  PROM: R Shoulder Flexion 40 degrees, Abduction 40 degrees, ER 0 degrees, IR 10 degrees; L Shoulder WNL  Strength: R Shoulder n/a; L Shoulder 5/5 t/o  Postural Awareness: Poor (rounded shoulders, forward head)  Incisions: Clean and healing well  FOTO: 4; GOAL: 56  Unable to complete overhead activity without pain and limitation  Unable to complete pushing/pulling activity without pain and limitation  Unable to complete lifting/carrying activity without pain and limitation  Unable to complete cross body/behind the back reaching activity without pain and limitation            Precautions: MD protocol for being s/p R Shoulder RTC repair 6/26/23    Daily Treatment Diary    HEP: Handout provided and discussed      Manuals 6/30/23       ART        PROM/Stretch        IASTM        STM/Triggerpoint        JM                Neuro Re-Ed        Pendulums 4x20''       Putty Play x5'                                               Ther Ex        Scap retract 2x10       Table Slides     This session   Pulleys     This session   Seated Table ER PROM     This session   Wand Flexion     Next session   Wand ER     Next session                           Ther Activity                        Gait Training                        Modalities        MHP  Start with this session      CP x10'       US/Stim

## 2023-06-30 NOTE — PROGRESS NOTES
PT Evaluation     Today's date: 2023  Patient name: Berta Pena  : 1955  MRN: [id number]  Referring provider: Baltazar Oliveros*  Dx:   Encounter Diagnosis     ICD-10-CM    1  Complete tear of right rotator cuff, unspecified whether traumatic  M75 121       2  Tear of right supraspinatus tendon  M75 101       3  Aftercare following surgery  Z48 89                      Assessment  Understanding of Dx/Px/POC: good   Prognosis: good    Goals  STGs: To be complete within 6-8 weeks  - Decrease pain to < 2/10 at worst  - Increase AROM to WNL  - Increase strength to > 4+/5  - Improve postural awareness capacity to > 60min before deficit    LTGs: To be complete within 12-16 weeks  - Able to repetitively complete all overhead activity without pain or limitation for increased safety and functional capacity with ADLs and work-related duty  - Able to repetitively complete all reaching activity without pain or limitation for increased safety and functional capacity with ADLs and work-related duty  - Able to repetitively complete all pushing/pulling activity without pain or limitation for increased safety and functional capacity with ADLs and work-related duty  - Able to complete all lifting/carrying activity without pain or limitation for increased safety and functional capacity with ADLs and work-related duty    Plan  Planned therapy interventions: manual therapy, neuromuscular re-education, patient education, self care, postural training, therapeutic activities, therapeutic exercise and home exercise program  Frequency: 2x week  Duration in weeks: 6       Pt is a 70 y o  male with R shoulder pain who presents with functional deficits including decreased capacity with overhead activity, pushing/pulling, lifting/carrying, and behind the back/cross body reaching activity  Upon completion of today's initial evaluation, Darryl's sx remain consistent with being s/p R Shoulder RTC repair 23   Patient will benefit from skilled physical therapy, as per MD protocol to address current deficits  Subjective Evaluation    Pain  Current pain ratin  At best pain ratin  At worst pain ratin  Location: R Shoulder    Patient Goals  Patient goals for therapy: increased strength, decreased pain and increased motion         Pt reports he is s/p R Shoulder RTC repair 23  Pt reports he has a good understanding of MD protocol and HEP          Objective Pain level ranges 2-8/10  AROM: R Shoulder n/a; L Shoulder WNL  PROM: R Shoulder Flexion 40 degrees, Abduction 40 degrees, ER 0 degrees, IR 10 degrees; L Shoulder WNL  Strength: R Shoulder n/a; L Shoulder 5/5 t/o  Postural Awareness: Poor (rounded shoulders, forward head)  Incisions: Clean and healing well  FOTO: 4; GOAL: 56  Unable to complete overhead activity without pain and limitation  Unable to complete pushing/pulling activity without pain and limitation  Unable to complete lifting/carrying activity without pain and limitation  Unable to complete cross body/behind the back reaching activity without pain and limitation             Precautions: MD protocol for being s/p R Shoulder RTC repair 23    Daily Treatment Diary    HEP: Handout provided and discussed      Manuals 23       ART        PROM/Stretch        IASTM        STM/Triggerpoint        JM                Neuro Re-Ed        Pendulums 4x20''       Putty Play x5'                                               Ther Ex        Scap retract 2x10       Table Slides     This session   Pulleys     This session   Seated Table ER PROM     This session   Wand Flexion     Next session   Wand ER     Next session                           Ther Activity                        Gait Training                        Modalities        MHP  Start with this session      CP x10'       US/Stim

## 2023-07-06 ENCOUNTER — VBI (OUTPATIENT)
Dept: ADMINISTRATIVE | Facility: OTHER | Age: 68
End: 2023-07-06

## 2023-07-06 ENCOUNTER — OFFICE VISIT (OUTPATIENT)
Dept: PHYSICAL THERAPY | Facility: CLINIC | Age: 68
End: 2023-07-06
Payer: COMMERCIAL

## 2023-07-06 DIAGNOSIS — M75.121 COMPLETE TEAR OF RIGHT ROTATOR CUFF, UNSPECIFIED WHETHER TRAUMATIC: Primary | ICD-10-CM

## 2023-07-06 DIAGNOSIS — Z48.89 AFTERCARE FOLLOWING SURGERY: ICD-10-CM

## 2023-07-06 DIAGNOSIS — M75.101 TEAR OF RIGHT SUPRASPINATUS TENDON: ICD-10-CM

## 2023-07-06 PROCEDURE — 97110 THERAPEUTIC EXERCISES: CPT | Performed by: PHYSICAL THERAPIST

## 2023-07-06 PROCEDURE — 97112 NEUROMUSCULAR REEDUCATION: CPT | Performed by: PHYSICAL THERAPIST

## 2023-07-06 NOTE — PROGRESS NOTES
Daily Note     Today's date: 2023  Patient name: Austen Waterman  : 1955  MRN: [id number]  Referring provider: Briseida Chang*  Dx:   Encounter Diagnosis     ICD-10-CM    1. Complete tear of right rotator cuff, unspecified whether traumatic  M75.121       2. Tear of right supraspinatus tendon  M75.101       3. Aftercare following surgery  Z48.89                      Subjective: Pt reports HEP going well. No setbacks. Anxious to continue making progress. Objective: See treatment diary below      Assessment: Tolerated treatment well. Patient demonstrated fatigue post treatment, exhibited good technique with therapeutic exercises and would benefit from continued PT      Plan: Continue per plan of care. Progress treatment as tolerated.          Precautions: MD protocol for being s/p R Shoulder RTC repair 23    Daily Treatment Diary    HEP: Handout provided and discussed      Manuals 23      ART        PROM/Stretch        IASTM        STM/Triggerpoint        JM                Neuro Re-Ed        Pendulums 4x20'' 4x20''      Putty Play x5' x5'                                              Ther Ex        Scap retract 2x10 2x10      Table Slides     This session   Pulleys     This session   Seated Table ER PROM     This session   Wand Flexion     Next session   Wand ER     Next session                           Ther Activity                        Gait Training                        Modalities        MHP  x10'      CP x10'       US/Stim

## 2023-07-11 ENCOUNTER — TELEPHONE (OUTPATIENT)
Dept: INTERNAL MEDICINE CLINIC | Facility: CLINIC | Age: 68
End: 2023-07-11

## 2023-07-11 ENCOUNTER — HOSPITAL ENCOUNTER (EMERGENCY)
Facility: HOSPITAL | Age: 68
Discharge: HOME | End: 2023-07-11
Attending: EMERGENCY MEDICINE | Admitting: EMERGENCY MEDICINE
Payer: COMMERCIAL

## 2023-07-11 ENCOUNTER — APPOINTMENT (EMERGENCY)
Dept: NON INVASIVE DIAGNOSTICS | Facility: HOSPITAL | Age: 68
End: 2023-07-11
Payer: COMMERCIAL

## 2023-07-11 VITALS
TEMPERATURE: 97.4 F | OXYGEN SATURATION: 99 % | HEART RATE: 64 BPM | DIASTOLIC BLOOD PRESSURE: 62 MMHG | WEIGHT: 315 LBS | RESPIRATION RATE: 18 BRPM | BODY MASS INDEX: 46.14 KG/M2 | SYSTOLIC BLOOD PRESSURE: 132 MMHG

## 2023-07-11 DIAGNOSIS — R60.0 BILATERAL LOWER EXTREMITY EDEMA: Primary | ICD-10-CM

## 2023-07-11 LAB
ANION GAP SERPL CALCULATED.3IONS-SCNC: 9 MMOL/L
APTT PPP: 27 SECONDS (ref 23–37)
BASOPHILS # BLD AUTO: 0.03 THOUSANDS/ÂΜL (ref 0–0.1)
BASOPHILS NFR BLD AUTO: 1 % (ref 0–1)
BNP SERPL-MCNC: 14 PG/ML (ref 0–100)
BUN SERPL-MCNC: 13 MG/DL (ref 5–25)
CALCIUM SERPL-MCNC: 9.5 MG/DL (ref 8.4–10.2)
CHLORIDE SERPL-SCNC: 102 MMOL/L (ref 96–108)
CO2 SERPL-SCNC: 27 MMOL/L (ref 21–32)
CREAT SERPL-MCNC: 0.64 MG/DL (ref 0.6–1.3)
D DIMER PPP FEU-MCNC: 1.01 UG/ML FEU
EOSINOPHIL # BLD AUTO: 0.3 THOUSAND/ÂΜL (ref 0–0.61)
EOSINOPHIL NFR BLD AUTO: 6 % (ref 0–6)
ERYTHROCYTE [DISTWIDTH] IN BLOOD BY AUTOMATED COUNT: 13.2 % (ref 11.6–15.1)
GFR SERPL CREATININE-BSD FRML MDRD: 99 ML/MIN/1.73SQ M
GLUCOSE SERPL-MCNC: 113 MG/DL (ref 65–140)
HCT VFR BLD AUTO: 40.8 % (ref 36.5–49.3)
HGB BLD-MCNC: 13.4 G/DL (ref 12–17)
IMM GRANULOCYTES # BLD AUTO: 0.02 THOUSAND/UL (ref 0–0.2)
IMM GRANULOCYTES NFR BLD AUTO: 0 % (ref 0–2)
INR PPP: 0.99 (ref 0.84–1.19)
LYMPHOCYTES # BLD AUTO: 1.15 THOUSANDS/ÂΜL (ref 0.6–4.47)
LYMPHOCYTES NFR BLD AUTO: 24 % (ref 14–44)
MCH RBC QN AUTO: 29.3 PG (ref 26.8–34.3)
MCHC RBC AUTO-ENTMCNC: 32.8 G/DL (ref 31.4–37.4)
MCV RBC AUTO: 89 FL (ref 82–98)
MONOCYTES # BLD AUTO: 0.3 THOUSAND/ÂΜL (ref 0.17–1.22)
MONOCYTES NFR BLD AUTO: 6 % (ref 4–12)
NEUTROPHILS # BLD AUTO: 2.95 THOUSANDS/ÂΜL (ref 1.85–7.62)
NEUTS SEG NFR BLD AUTO: 63 % (ref 43–75)
NRBC BLD AUTO-RTO: 0 /100 WBCS
PLATELET # BLD AUTO: 220 THOUSANDS/UL (ref 149–390)
PMV BLD AUTO: 9.6 FL (ref 8.9–12.7)
POTASSIUM SERPL-SCNC: 3.8 MMOL/L (ref 3.5–5.3)
PROTHROMBIN TIME: 13.3 SECONDS (ref 11.6–14.5)
RBC # BLD AUTO: 4.57 MILLION/UL (ref 3.88–5.62)
SODIUM SERPL-SCNC: 138 MMOL/L (ref 135–147)
WBC # BLD AUTO: 4.75 THOUSAND/UL (ref 4.31–10.16)

## 2023-07-11 PROCEDURE — 93970 EXTREMITY STUDY: CPT | Performed by: SURGERY

## 2023-07-11 PROCEDURE — 80048 BASIC METABOLIC PNL TOTAL CA: CPT | Performed by: EMERGENCY MEDICINE

## 2023-07-11 PROCEDURE — 85730 THROMBOPLASTIN TIME PARTIAL: CPT | Performed by: EMERGENCY MEDICINE

## 2023-07-11 PROCEDURE — 85610 PROTHROMBIN TIME: CPT | Performed by: EMERGENCY MEDICINE

## 2023-07-11 PROCEDURE — 93970 EXTREMITY STUDY: CPT

## 2023-07-11 PROCEDURE — 36415 COLL VENOUS BLD VENIPUNCTURE: CPT | Performed by: EMERGENCY MEDICINE

## 2023-07-11 PROCEDURE — 83880 ASSAY OF NATRIURETIC PEPTIDE: CPT | Performed by: EMERGENCY MEDICINE

## 2023-07-11 PROCEDURE — 85025 COMPLETE CBC W/AUTO DIFF WBC: CPT | Performed by: EMERGENCY MEDICINE

## 2023-07-11 PROCEDURE — 85379 FIBRIN DEGRADATION QUANT: CPT | Performed by: EMERGENCY MEDICINE

## 2023-07-11 RX ORDER — FUROSEMIDE 20 MG/1
20 TABLET ORAL DAILY
Qty: 3 TABLET | Refills: 0 | Status: SHIPPED | OUTPATIENT
Start: 2023-07-11 | End: 2023-07-14

## 2023-07-11 NOTE — ED PROVIDER NOTES
EMERGENCY DEPARTMENT ENCOUNTER NOTE    This note has been generated using a voice recognition software. There may be typographic, grammatic, or word substitution errors that have escaped editorial review. Emergency Department Note- Sophia Brian 70 y.o. male MRN: [id number]    Unit/Bed#: RM18 Encounter: [id number]  ? CHIEF COMPLAINT  Chief Complaint   Patient presents with   • Leg Swelling     Pt c/o bilateral leg swelling starting Thursday. HPI  Sophia Brian is a 70 y.o. male with recent right shoulder arthroplasty with Dr. Lisa Arechiga ([address] [place] [address]) on 6/26/2023 presenting with bilateral lower extremity edema that started on Thursday and which persists. No calf pain. No difficulty breathing. No chest pain. Patient has no history of DVT. He reports that he has been recovering uneventfully after the right shoulder arthroscopy and has been doing physical therapy. He is wondering whether the bilateral lower leg edema related in any way to him titrating down and stopping Percocet that he was prescribed immediately after the surgery. The patient has no underlying cardiac issues and underwent preoperative clearance with Dr. Dina Palma in cardiology.     REVIEW OF SYSTEMS    Constitutional: No fevers  Cardiac: no chest pain  Respiratory: no shortness of breath, no cough  GI: no abdominal pain  Endocrine: no diabetes  Neuro: no new focal weakness or numbness    PAST MEDICAL HISTORY  Past Medical History:   Diagnosis Date   • Arthritis    • Compression fracture of spine ([address])     last assessed 09/15/2016   • Concussion    • Dislocation of wrist     right ,  last assessed 09/15/2016   • Esophageal reflux    • Fractured sternum    • Nasal fracture        SURGICAL HISTORY  Past Surgical History:   Procedure Laterality Date   • CHOLECYSTECTOMY OPEN     • COLONOSCOPY      2006   • FOOT SURGERY Right    • JOINT REPLACEMENT Left     [facility]       FAMILY HISTORY  Family History   Problem Relation Age of Onset   • Dementia Mother • Hyperlipidemia Mother    • Cancer Father         bladder         CURRENT MEDICATIONS  No current facility-administered medications on file prior to encounter. Current Outpatient Medications on File Prior to Encounter   Medication Sig   • acetaminophen (TYLENOL) 325 mg tablet Take 2 tablets (650 mg total) by mouth every 6 (six) hours as needed for mild pain, headaches or fever Do not exceed a total of 3 grams of tylenol/acetaminophen in a 24-hour period.  (Patient taking differently: Take 650 mg by mouth if needed for mild pain, headaches or fever Do not exceed a total of 3 grams of tylenol/acetaminophen in a 24-hour period.)   • aspirin 81 mg chewable tablet Chew 1 tablet (81 mg total) daily To prevent stroke and heart attack (Patient not taking: Reported on 6/8/2023)   • cholecalciferol (VITAMIN D3) 1,000 units tablet Take 2 tablets (2,000 Units total) by mouth daily For vitamin D deficiency   • levETIRAcetam (KEPPRA) 1000 MG tablet Take 1 tablet (1,000 mg total) by mouth every 12 (twelve) hours   • meloxicam (MOBIC) 15 mg tablet TAKE 1 TABLET BY MOUTH EVERY DAY FOR 30 DAYS       ALLERGIES  Allergies   Allergen Reactions   • Neomycin-Bacitracin Zn-Polymyx    • Neomycin-Polymyxin-Pramoxine Itching   • Other    • Adhesive [Medical Tape] Rash     Just J&J   • Cephalexin Rash       SOCIAL HISTORY  Social History     Socioeconomic History   • Marital status: /Civil Union     Spouse name: None   • Number of children: None   • Years of education: None   • Highest education level: None   Occupational History     Comment: full time   Tobacco Use   • Smoking status: Former     Years: 35.00     Types: Cigarettes, Pipe, Cigars     Quit date: 5/1/2013     Years since quitting: 10.2   • Smokeless tobacco: Former     Types: Chew     Quit date: 5/1/2013   • Tobacco comments:     Started when he was 23 years    Vaping Use   • Vaping Use: Never used   Substance and Sexual Activity   • Alcohol use: Not Currently Alcohol/week: 6.0 standard drinks of alcohol     Types: 6 Cans of beer per week     Comment: occassional. Quit 1/15/23   • Drug use: Not Currently     Types: Marijuana     Comment: college in the 70s   • Sexual activity: None   Other Topics Concern   • None   Social History Narrative    Always wears seat belts     Daily coffee consumption    No living will         Social Determinants of Health     Financial Resource Strain: Not on file   Food Insecurity: No Food Insecurity (2/24/2023)    Hunger Vital Sign    • Worried About Running Out of Food in the Last Year: Never true    • Ran Out of Food in the Last Year: Never true   Transportation Needs: No Transportation Needs (2/24/2023)    PRAPARE - Transportation    • Lack of Transportation (Medical): No    • Lack of Transportation (Non-Medical): No   Physical Activity: Not on file   Stress: Not on file   Social Connections: Not on file   Intimate Partner Violence: Not on file   Housing Stability: Low Risk  (2/24/2023)    Housing Stability Vital Sign    • Unable to Pay for Housing in the Last Year: No    • Number of Places Lived in the Last Year: 1    • Unstable Housing in the Last Year: No       PHYSICAL EXAM    /62   Pulse 59   Temp (!) 97.4 °F (36.3 °C) (Temporal)   Resp 18   Wt (!) 154 kg (340 lb 2.7 oz)   SpO2 96%   BMI 46.14 kg/m²   Vital signs and nursing notes reviewed    CONSTITUTIONAL: male appearing stated age resting in bed, in no acute distress  HEENT: atraumatic, normocephalic. Sclera anicteric, conjunctiva are not injected. Moist oral mucosa  CARDIOVASCULAR/CHEST: RRR, no M/R/G. 2+ radial pulses  PULMONARY: Breathing comfortably on RA. Breath sounds are equal and clear to auscultation  ABDOMEN: non-distended. BS present, normoactive. Non-tender  MSK: right shoulder in a shoulder immobilizer. Arthroscopic incision to right shoulder is clean, intact and dry with steristrips in place.  Patient moves all extremities, no deformities, 2+ pitting peripheral edema, no calf asymmetry  NEURO: Awake, alert, and oriented x 3. Face symmetric. Moves all extremities spontaneously. No focal neurologic deficits  SKIN: Warm, appears well-perfused  MENTAL STATUS: Normal affect      ? LABS AND TESTS    Results Reviewed     Procedure Component Value Units Date/Time    D-Dimer [[id number]]  (Abnormal) Collected: 07/11/23 1309    Lab Status: Final result Specimen: Blood from Arm, Left Updated: 07/11/23 1339     D-Dimer, Quant 1.01 ug/ml FEU     Narrative: In the evaluation for possible pulmonary embolism, in the appropriate (Well's Score of 4 or less) patient, the age adjusted d-dimer cutoff for this patient can be calculated as:    Age x 0.01 (in ug/mL) for Age-adjusted D-dimer exclusion threshold for a patient over 50 years.     B-Type Natriuretic Peptide(BNP) [[id number]]  (Normal) Collected: 07/11/23 1309    Lab Status: Final result Specimen: Blood from Arm, Left Updated: 07/11/23 1338     BNP 14 pg/mL     Protime-INR [[id number]]  (Normal) Collected: 07/11/23 1309    Lab Status: Final result Specimen: Blood from Arm, Left Updated: 07/11/23 1335     Protime 13.3 seconds      INR 0.99    APTT [[id number]]  (Normal) Collected: 07/11/23 1309    Lab Status: Final result Specimen: Blood from Arm, Left Updated: 07/11/23 1335     PTT 27 seconds     Basic metabolic panel [[id number]] Collected: 07/11/23 1309    Lab Status: Final result Specimen: Blood from Arm, Left Updated: 07/11/23 1332     Sodium 138 mmol/L      Potassium 3.8 mmol/L      Chloride 102 mmol/L      CO2 27 mmol/L      ANION GAP 9 mmol/L      BUN 13 mg/dL      Creatinine 0.64 mg/dL      Glucose 113 mg/dL      Calcium 9.5 mg/dL      eGFR 99 ml/min/1.73sq m     Narrative:      [place] guidelines for Chronic Kidney Disease (CKD):   •  Stage 1 with normal or high GFR (GFR > 90 mL/min/1.73 square meters)  •  Stage 2 Mild CKD (GFR = 60-89 mL/min/1.73 square meters)  •  Stage 3A Moderate CKD (GFR = 45-59 mL/min/1.73 square meters)  •  Stage 3B Moderate CKD (GFR = 30-44 mL/min/1.73 square meters)  •  Stage 4 Severe CKD (GFR = 15-29 mL/min/1.73 square meters)  •  Stage 5 End Stage CKD (GFR <15 mL/min/1.73 square meters)  Note: GFR calculation is accurate only with a steady state creatinine    CBC and differential [[id number]] Collected: 07/11/23 1309    Lab Status: Final result Specimen: Blood from Arm, Left Updated: 07/11/23 1317     WBC 4.75 Thousand/uL      RBC 4.57 Million/uL      Hemoglobin 13.4 g/dL      Hematocrit 40.8 %      MCV 89 fL      MCH 29.3 pg      MCHC 32.8 g/dL      RDW 13.2 %      MPV 9.6 fL      Platelets 027 Thousands/uL      nRBC 0 /100 WBCs      Neutrophils Relative 63 %      Immat GRANS % 0 %      Lymphocytes Relative 24 %      Monocytes Relative 6 %      Eosinophils Relative 6 %      Basophils Relative 1 %      Neutrophils Absolute 2.95 Thousands/µL      Immature Grans Absolute 0.02 Thousand/uL      Lymphocytes Absolute 1.15 Thousands/µL      Monocytes Absolute 0.30 Thousand/µL      Eosinophils Absolute 0.30 Thousand/µL      Basophils Absolute 0.03 Thousands/µL           VAS lower limb venous duplex study, complete bilateral   Final Result by Francisca Martinez MD (07/11 2103)          ED COURSE & MEDICAL DECISION MAKING  Procedures     Medications - No data to display           Medications - No data to display    ED Course as of 07/11/23 1620   Tue Jul 11, 2023   1442 Patient updated regarding his lab findings. He has no further questions at this time. We are awaiting lower extremity DVT study. 70 y.o. male presenting with BLE edema in setting of recent orthopedic surgery. VS reviewed. DDx includes volume overload in setting of recent surgery, new onset CHF, DVT (somewhat unusual given bilateral edema, but possible given recent orthopedic surgery), versus another pathology. Nothing to suggest cellulitis.  Labs are reassuring, though d-dimer is elevated, patient will need venous duplex to rule out clot. Unfortunately, due to busyness of the vascular tech schedule, study delayed, patient is very kindly understanding of the delays. Bilateral lower extremity duplex is negative for deep vein thrombosis. At this time, recommend elevation of lower extremities to help relieve edema, recommend compression stockings, avoid salty foods, hydrate well with water and take diuretic for three days. Do not take diuretic if feeling light-headed. Follow up with orthopedic surgeon as well as PCP. Patient discharged to home with recommendations for symptom control, return precautions, and plan for follow up. Medical Decision Making  Bilateral lower extremity edema: acute illness or injury  Amount and/or Complexity of Data Reviewed  Independent Historian: spouse  External Data Reviewed: notes. Details: Recent cardiology notes by Dr. Kemar Leal and orthopedic notes reviewed  Labs: ordered. Decision-making details documented in ED Course. Radiology: ordered. Decision-making details documented in ED Course. Risk  Prescription drug management. CLINICAL IMPRESSION  Final diagnoses:   Bilateral lower extremity edema       DISPOSITION  Time reflects when diagnosis was documented in both MDM as applicable and the Disposition within this note     Time User Action Codes Description Comment    7/11/2023  4:57 PM Yahir Barry Add [R60.0] Bilateral lower extremity edema       ED Disposition     ED Disposition   Discharge    Condition   Stable    Date/Time   Tue Jul 11, 2023  4:57 PM    [address] discharge to home/self care.                Follow-up Information     Follow up With Specialties Details Why Contact Info Additional Information    Maynor Ariza MD Orthopedic Surgery  ER follow up 7 [address], [address] [place] [address], Nurse Practitioner  Emergency Room Follow-up [address] [facility] [address] [facility] Emergency Department Emergency Medicine Go to  As needed, If symptoms worsen [address] [facility] [id number] [address] [facility] Emergency Department, [address] [place] [address], [address] [facility]  Discharge Medication List as of 7/11/2023  5:10 PM      START taking these medications    Details   furosemide (LASIX) 20 mg tablet Take 1 tablet (20 mg total) by mouth daily for 3 days, Starting Tue 7/11/2023, Until Fri 7/14/2023, Normal         CONTINUE these medications which have NOT CHANGED    Details   acetaminophen (TYLENOL) 325 mg tablet Take 2 tablets (650 mg total) by mouth every 6 (six) hours as needed for mild pain, headaches or fever Do not exceed a total of 3 grams of tylenol/acetaminophen in a 24-hour period. , Starting Mon 1/16/2023, No Print      aspirin 81 mg chewable tablet Chew 1 tablet (81 mg total) daily To prevent stroke and heart attack, Starting Mon 2/27/2023, Normal      cholecalciferol (VITAMIN D3) 1,000 units tablet Take 2 tablets (2,000 Units total) by mouth daily For vitamin D deficiency, Starting Thu 5/18/2023, Normal      levETIRAcetam (KEPPRA) 1000 MG tablet Take 1 tablet (1,000 mg total) by mouth every 12 (twelve) hours, Starting Wed 6/21/2023, Normal      meloxicam (MOBIC) 15 mg tablet TAKE 1 TABLET BY MOUTH EVERY DAY FOR 30 DAYS, Historical Med              Melani Sierra MD  07/13/23 3801

## 2023-07-11 NOTE — DISCHARGE INSTRUCTIONS
Your evaluation today reveals no evidence of a heart problem, cellulitis, or deep vein thrombosis. Most likely, your lower extremity swelling is due to excess fluid. Minimize intake of any salty foods as they will cause worsening swelling. Elevate your legs whenever possible to allow gravity to help with the swelling. Obtain compression stockings. Try to put these on earlier in the morning when your legs are less swollen. Lastly, take Lasix for the next 3 days to help get rid of the excess fluid. Please hydrate well with water while taking Lasix. While taking Lasix, eat potassium rich foods such as bananas or sweet potatoes to help minimize chance of low potassium due to of the water pill. Follow-up with your orthopedic surgeon as previously arranged. Follow-up with your primary care physician. Return to emergency department if your swelling is worsening, you are getting worsening shortness of breath, or new symptoms.

## 2023-07-11 NOTE — TELEPHONE ENCOUNTER
Hi, this is Vic Ponce. I'm calling for Mariaa. He had surgery on his right shoulder and one of his prescriptions was oxycodone acetaminophen , take one tablet by mouth every six hours. He was not doing that. He was taking them like, you know, 8 hours or 20 hours because he just didn't need it as much. So the last time he took it was last Thursday, but he contacted [address] in off hours and they said go to an urgent care and he has swelling in his feet and he's itchy and the swelling's not going down and his calves are swollen too So is there any possible way you guys could call in a prescription for this? We think he's having a reaction to the Oxy. Give me a call back if necessary my or let me know if you can send it to [facility] and I appreciate it. Thank you very much.  Alfie

## 2023-07-13 ENCOUNTER — OFFICE VISIT (OUTPATIENT)
Dept: PHYSICAL THERAPY | Facility: CLINIC | Age: 68
End: 2023-07-13
Payer: COMMERCIAL

## 2023-07-13 DIAGNOSIS — Z48.89 AFTERCARE FOLLOWING SURGERY: ICD-10-CM

## 2023-07-13 DIAGNOSIS — M75.121 COMPLETE TEAR OF RIGHT ROTATOR CUFF, UNSPECIFIED WHETHER TRAUMATIC: Primary | ICD-10-CM

## 2023-07-13 DIAGNOSIS — M75.101 TEAR OF RIGHT SUPRASPINATUS TENDON: ICD-10-CM

## 2023-07-13 PROCEDURE — 97112 NEUROMUSCULAR REEDUCATION: CPT | Performed by: PHYSICAL THERAPIST

## 2023-07-13 PROCEDURE — 97110 THERAPEUTIC EXERCISES: CPT | Performed by: PHYSICAL THERAPIST

## 2023-07-13 NOTE — PROGRESS NOTES
Daily Note     Today's date: 2023  Patient name: Lan Click  : 1955  MRN: [id number]  Referring provider: Rosa Jhaveri*  Dx:   Encounter Diagnosis     ICD-10-CM    1. Complete tear of right rotator cuff, unspecified whether traumatic  M75.121       2. Tear of right supraspinatus tendon  M75.101       3. Aftercare following surgery  Z48.89                      Subjective: Pt reports HEP going well. No setbacks. Anxious to continue making progress. Objective: See treatment diary below      Assessment: Tolerated treatment well. Patient demonstrated fatigue post treatment, exhibited good technique with therapeutic exercises and would benefit from continued PT      Plan: Continue per plan of care. Progress treatment as tolerated.          Precautions: MD protocol for being s/p R Shoulder RTC repair 23    Daily Treatment Diary    HEP: Handout provided and discussed      Manuals 23     ART        PROM/Stretch        IASTM        STM/Triggerpoint        JM                Neuro Re-Ed        Pendulums 4x20'' 4x20'' 4x20''     Putty Play x5' x5' x5'                                             Ther Ex        Scap retract 2x10 2x10 2x10     Table Slides     This session   Pulleys     This session   Seated Table ER PROM     This session   Wand Flexion     Next session   Wand ER     Next session                           Ther Activity                        Gait Training                        Modalities        MHP  x10' x10'     CP x10'       US/Stim

## 2023-07-14 ENCOUNTER — RA CDI HCC (OUTPATIENT)
Dept: OTHER | Facility: HOSPITAL | Age: 68
End: 2023-07-14

## 2023-07-14 NOTE — PROGRESS NOTES
[facility] [address] [facility] coding opportunities       Chart reviewed, no opportunity found: CHART REVIEWED, NO OPPORTUNITY FOUND        Patients Insurance     Medicare Insurance: [employer]

## 2023-07-17 ENCOUNTER — TELEPHONE (OUTPATIENT)
Dept: INTERNAL MEDICINE CLINIC | Facility: CLINIC | Age: 68
End: 2023-07-17

## 2023-07-17 NOTE — TELEPHONE ENCOUNTER
See patient message     "Hi, this is Mariaa. Signer's phone number is [phone]. Birth date is [birth year]. I we called a while back and everybody sent me down to the emergency room because my feet swelled, my calf, my calf swollen. They did ultrasound. There's no blood clots, everything's cleaned. They gave me water pills for one tablet a day for three days It was a FUROSEMITE, 20 milligram tablets. They didn't work. I need something different I guess because swelling went down a little bit, but it didn't go down all the way and I'm having a hard time walking with the right foot swelling and the left foot a little bit. But I need something different. Different prescription of water pills, but different than what I have because it they really didn't work. OK. Thank you.  Have a good day.  "

## 2023-07-17 NOTE — TELEPHONE ENCOUNTER
I returned call and had to leave a message.   Also message on [address] [facility] phone

## 2023-07-20 ENCOUNTER — OFFICE VISIT (OUTPATIENT)
Dept: PHYSICAL THERAPY | Facility: CLINIC | Age: 68
End: 2023-07-20
Payer: COMMERCIAL

## 2023-07-20 DIAGNOSIS — M75.101 TEAR OF RIGHT SUPRASPINATUS TENDON: ICD-10-CM

## 2023-07-20 DIAGNOSIS — Z48.89 AFTERCARE FOLLOWING SURGERY: ICD-10-CM

## 2023-07-20 DIAGNOSIS — M75.121 COMPLETE TEAR OF RIGHT ROTATOR CUFF, UNSPECIFIED WHETHER TRAUMATIC: Primary | ICD-10-CM

## 2023-07-20 PROCEDURE — 97110 THERAPEUTIC EXERCISES: CPT | Performed by: PHYSICAL THERAPIST

## 2023-07-20 PROCEDURE — 97112 NEUROMUSCULAR REEDUCATION: CPT | Performed by: PHYSICAL THERAPIST

## 2023-07-20 NOTE — PROGRESS NOTES
Daily Note     Today's date: 2023  Patient name: Yasmin Garcia  : 1955  MRN: [id number]  Referring provider: Polly Becerra*  Dx:   Encounter Diagnosis     ICD-10-CM    1. Complete tear of right rotator cuff, unspecified whether traumatic  M75.121       2. Tear of right supraspinatus tendon  M75.101       3. Aftercare following surgery  Z48.89                      Subjective: Pt reports HEP going well. No setbacks. Anxious to continue making progress. Objective: See treatment diary below      Assessment: Tolerated treatment well. Patient demonstrated fatigue post treatment, exhibited good technique with therapeutic exercises and would benefit from continued PT      Plan: Continue per plan of care. Progress treatment as tolerated.          Precautions: MD protocol for being s/p R Shoulder RTC repair 23    Daily Treatment Diary    HEP: Handout provided and discussed      Manuals 23    ART        PROM/Stretch        IASTM        STM/Triggerpoint        JM                Neuro Re-Ed        Pendulums 4x20'' 4x20'' 4x20'' 6x30''    Putty Play x5' x5' x5' x5'                                            Ther Ex        Scap retract 2x10 2x10 2x10 3x10    Table Slides    Scap 2x10    Pulleys     This session   Seated Table ER PROM     This session   Wand Flexion     Next session   Wand ER     Next session                           Ther Activity                        Gait Training                        Modalities        MHP  x10' x10' x10'    CP x10'       US/Stim

## 2023-07-25 ENCOUNTER — OFFICE VISIT (OUTPATIENT)
Dept: PHYSICAL THERAPY | Facility: CLINIC | Age: 68
End: 2023-07-25
Payer: COMMERCIAL

## 2023-07-25 DIAGNOSIS — Z48.89 AFTERCARE FOLLOWING SURGERY: ICD-10-CM

## 2023-07-25 DIAGNOSIS — M75.101 TEAR OF RIGHT SUPRASPINATUS TENDON: ICD-10-CM

## 2023-07-25 DIAGNOSIS — M75.121 COMPLETE TEAR OF RIGHT ROTATOR CUFF, UNSPECIFIED WHETHER TRAUMATIC: Primary | ICD-10-CM

## 2023-07-25 PROCEDURE — 97112 NEUROMUSCULAR REEDUCATION: CPT | Performed by: PHYSICAL THERAPIST

## 2023-07-25 PROCEDURE — 97161 PT EVAL LOW COMPLEX 20 MIN: CPT | Performed by: PHYSICAL THERAPIST

## 2023-07-25 PROCEDURE — 97110 THERAPEUTIC EXERCISES: CPT | Performed by: PHYSICAL THERAPIST

## 2023-07-25 NOTE — PROGRESS NOTES
Daily Note     Today's date: 2023  Patient name: Mariel Newell  : 1955  MRN: [id number]  Referring provider: Parisa Jalloh*  Dx:   Encounter Diagnosis     ICD-10-CM    1. Complete tear of right rotator cuff, unspecified whether traumatic  M75.121       2. Tear of right supraspinatus tendon  M75.101       3. Aftercare following surgery  Z48.89                      Subjective: Pt reports HEP going well. No setbacks. Anxious to continue making progress. Objective: See treatment diary below      Assessment: Tolerated treatment well. Patient demonstrated fatigue post treatment, exhibited good technique with therapeutic exercises and would benefit from continued PT      Plan: Continue per plan of care. Progress treatment as tolerated.          Precautions: MD protocol for being s/p R Shoulder RTC repair 23    Daily Treatment Diary    HEP: Handout provided and discussed      Manuals 23   ART        PROM/Stretch        IASTM        STM/Triggerpoint        JM                Neuro Re-Ed        Pendulums 4x20'' 4x20'' 4x20'' 6x30'' 6x30''   Putty Play x5' x5' x5' x5' x5'                                           Ther Ex        Scap retract 2x10 2x10 2x10 3x10 3x10   Table Slides    Scap 2x10 Scap 2x10   Pulleys     2x10   Seated Table ER PROM     2x10   Wand Flexion  This session      Wand ER  This session                              Ther Activity                        Gait Training                        Modalities        MHP  x10' x10' x10' x10'   CP x10'       US/Stim

## 2023-07-26 ENCOUNTER — RA CDI HCC (OUTPATIENT)
Dept: OTHER | Facility: HOSPITAL | Age: 68
End: 2023-07-26

## 2023-07-26 NOTE — PROGRESS NOTES
F32.A Depression- found in active problem list - Can Depression be further classified using more specific code   [address] coding opportunities          Chart Reviewed number of suggestions sent to Provider: 1     Patients Insurance     Medicare Insurance: [employer]

## 2023-07-27 ENCOUNTER — OFFICE VISIT (OUTPATIENT)
Dept: PHYSICAL THERAPY | Facility: CLINIC | Age: 68
End: 2023-07-27
Payer: COMMERCIAL

## 2023-07-27 DIAGNOSIS — Z48.89 AFTERCARE FOLLOWING SURGERY: ICD-10-CM

## 2023-07-27 DIAGNOSIS — M75.121 COMPLETE TEAR OF RIGHT ROTATOR CUFF, UNSPECIFIED WHETHER TRAUMATIC: Primary | ICD-10-CM

## 2023-07-27 DIAGNOSIS — M75.101 TEAR OF RIGHT SUPRASPINATUS TENDON: ICD-10-CM

## 2023-07-27 PROCEDURE — 97110 THERAPEUTIC EXERCISES: CPT | Performed by: PHYSICAL THERAPIST

## 2023-07-27 PROCEDURE — 97140 MANUAL THERAPY 1/> REGIONS: CPT | Performed by: PHYSICAL THERAPIST

## 2023-07-27 PROCEDURE — 97112 NEUROMUSCULAR REEDUCATION: CPT | Performed by: PHYSICAL THERAPIST

## 2023-07-27 NOTE — PROGRESS NOTES
Daily Note     Today's date: 2023  Patient name: Last Marr  : 1955  MRN: [id number]  Referring provider: Isaías Stephens*  Dx:   Encounter Diagnosis     ICD-10-CM    1. Complete tear of right rotator cuff, unspecified whether traumatic  M75.121       2. Tear of right supraspinatus tendon  M75.101       3. Aftercare following surgery  Z48.89                      Subjective: Pt reports HEP going well. No setbacks. Anxious to continue making progress. Objective: See treatment diary below      Assessment: Tolerated treatment well. Patient demonstrated fatigue post treatment, exhibited good technique with therapeutic exercises and would benefit from continued PT      Plan: Continue per plan of care. Progress treatment as tolerated.          Precautions: MD protocol for being s/p R Shoulder RTC repair 23    Daily Treatment Diary    HEP: Handout provided and discussed      Manuals 23   ART        PROM/Stretch x15'       IASTM        STM/Triggerpoint        JM                Neuro Re-Ed        Pendulums 6x30'' 4x20'' 4x20'' 6x30'' 6x30''   Putty Play x5' x5' x5' x5' x5'                                           Ther Ex        Scap retract 3x10 2x10 2x10 3x10 3x10   Table Slides Scap 3x10   Scap 2x10 Scap 2x10   Pulleys 3x10    2x10   Seated Table ER PROM 3x10    2x10   Wand Flexion  This session      Wand ER  This session                              Ther Activity                        Gait Training                        Modalities        MHP  x10' x10' x10' x10'   CP x10'       US/Stim

## 2023-08-01 ENCOUNTER — OFFICE VISIT (OUTPATIENT)
Dept: PHYSICAL THERAPY | Facility: CLINIC | Age: 68
End: 2023-08-01
Payer: COMMERCIAL

## 2023-08-01 DIAGNOSIS — Z48.89 AFTERCARE FOLLOWING SURGERY: ICD-10-CM

## 2023-08-01 DIAGNOSIS — M75.121 COMPLETE TEAR OF RIGHT ROTATOR CUFF, UNSPECIFIED WHETHER TRAUMATIC: Primary | ICD-10-CM

## 2023-08-01 DIAGNOSIS — M75.101 TEAR OF RIGHT SUPRASPINATUS TENDON: ICD-10-CM

## 2023-08-01 PROCEDURE — 97110 THERAPEUTIC EXERCISES: CPT | Performed by: PHYSICAL THERAPIST

## 2023-08-01 PROCEDURE — 97140 MANUAL THERAPY 1/> REGIONS: CPT | Performed by: PHYSICAL THERAPIST

## 2023-08-01 NOTE — PROGRESS NOTES
Daily Note     Today's date: 2023  Patient name: Melody Weinstein  : 1955  MRN: [id number]  Referring provider: Sheilla Blizzard*  Dx:   Encounter Diagnosis     ICD-10-CM    1. Complete tear of right rotator cuff, unspecified whether traumatic  M75.121       2. Tear of right supraspinatus tendon  M75.101       3. Aftercare following surgery  Z48.89                      Subjective: Pt reports HEP going well. No setbacks. Anxious to continue making progress. Objective: See treatment diary below      Assessment: Tolerated treatment well. Patient demonstrated fatigue post treatment, exhibited good technique with therapeutic exercises and would benefit from continued PT      Plan: Continue per plan of care. Progress treatment as tolerated.          Precautions: MD protocol for being s/p R Shoulder RTC repair 23    Daily Treatment Diary    HEP: Handout provided and discussed      Manuals 23   ART        PROM/Stretch x15' x15'      IASTM        STM/Triggerpoint        JM                Neuro Re-Ed        Pendulums 6x30'' 4x20'' 4x20'' 6x30'' 6x30''   Putty Play x5' x5' x5' x5' x5'                                           Ther Ex        Scap retract 3x10 2x10 2x10 3x10 3x10   Table Slides Scap 3x10 Scap 3x10  Scap 2x10 Scap 2x10   Pulleys 3x10 3x10   2x10   Seated Table ER PROM 3x10 3x10   2x10   Wand Flexion  2x10      Wand ER  2x10                              Ther Activity                        Gait Training                        Modalities        MHP  x10' x10' x10' x10'   CP x10'       US/Stim

## 2023-08-03 ENCOUNTER — OFFICE VISIT (OUTPATIENT)
Dept: PHYSICAL THERAPY | Facility: CLINIC | Age: 68
End: 2023-08-03
Payer: COMMERCIAL

## 2023-08-03 DIAGNOSIS — Z48.89 AFTERCARE FOLLOWING SURGERY: ICD-10-CM

## 2023-08-03 DIAGNOSIS — M75.101 TEAR OF RIGHT SUPRASPINATUS TENDON: ICD-10-CM

## 2023-08-03 DIAGNOSIS — M75.121 COMPLETE TEAR OF RIGHT ROTATOR CUFF, UNSPECIFIED WHETHER TRAUMATIC: Primary | ICD-10-CM

## 2023-08-03 PROCEDURE — 97110 THERAPEUTIC EXERCISES: CPT | Performed by: PHYSICAL THERAPIST

## 2023-08-03 PROCEDURE — 97140 MANUAL THERAPY 1/> REGIONS: CPT | Performed by: PHYSICAL THERAPIST

## 2023-08-03 PROCEDURE — 97112 NEUROMUSCULAR REEDUCATION: CPT | Performed by: PHYSICAL THERAPIST

## 2023-08-03 NOTE — PROGRESS NOTES
Daily Note     Today's date: 8/3/2023  Patient name: Jaime Crabtree  : 1955  MRN: [id number]  Referring provider: Efrain Ricardo*  Dx:   Encounter Diagnosis     ICD-10-CM    1. Complete tear of right rotator cuff, unspecified whether traumatic  M75.121       2. Tear of right supraspinatus tendon  M75.101       3. Aftercare following surgery  Z48.89                      Subjective: Pt reports HEP going well. No setbacks. Anxious to continue making progress. Objective: See treatment diary below      Assessment: Tolerated treatment well. Patient demonstrated fatigue post treatment, exhibited good technique with therapeutic exercises and would benefit from continued PT      Plan: Continue per plan of care. Progress treatment as tolerated.          Precautions: MD protocol for being s/p R Shoulder RTC repair 23    Daily Treatment Diary    HEP: Handout provided and discussed      Manuals 7/27/23 8/1/23 8/3/23 7/20/23 7/25/23   ART        PROM/Stretch x15' x15' x15     IASTM        STM/Triggerpoint        JM                Neuro Re-Ed        Pendulums 6x30'' 4x20'' 4x20'' 6x30'' 6x30''   Putty Play x5' x5' x5' x5' x5'   No $ AROM with scap retract    This session    Isometrics Abd, ER    This session                            Ther Ex        Scap retract 3x10 2x10 2x10 3x10 3x10   Table Slides Scap 3x10 Scap 3x10 Scap 3x10 Scap 2x10 Scap 2x10   Pulleys 3x10 3x10 3x10  2x10   Seated Table ER PROM 3x10 3x10 3x10  2x10   Wand Flexion  2x10 3x10     Wand ER  2x10 2x10     Standing stick AAROM flexion, thumbs back with wall to finish    This session    SL ER     This session   SL Abd     This session                                   Ther Activity                        Gait Training                        Modalities        MHP  x10' x10' x10' x10'   CP x10'       US/Stim

## 2023-08-04 ENCOUNTER — TELEMEDICINE (OUTPATIENT)
Dept: INTERNAL MEDICINE CLINIC | Facility: CLINIC | Age: 68
End: 2023-08-04
Payer: COMMERCIAL

## 2023-08-04 DIAGNOSIS — Z00.00 MEDICARE ANNUAL WELLNESS VISIT, SUBSEQUENT: Primary | ICD-10-CM

## 2023-08-04 PROCEDURE — G0439 PPPS, SUBSEQ VISIT: HCPCS | Performed by: NURSE PRACTITIONER

## 2023-08-04 NOTE — PROGRESS NOTES
Assessment and Plan:     Problem List Items Addressed This Visit        Other    Medicare annual wellness visit, subsequent - Primary        Preventive health issues were discussed with patient, and age appropriate screening tests were ordered as noted in patient's After Visit Summary. Personalized health advice and appropriate referrals for health education or preventive services given if needed, as noted in patient's After Visit Summary.      History of Present Illness:     Patient presents for a Medicare Wellness Visit    HPI   Patient Care Team:  Lizett Jacques as PCP - General (Family Medicine)  Wilfred Helms MD as PCP [facility] (Neurology)     Review of Systems:     Review of Systems     Problem List:     Patient Active Problem List   Diagnosis   • Chronic back pain   • Depression   • Hypertriglyceridemia   • Morbid obesity ([facility])   • Osteoarthritis   • Vitamin D deficiency   • Primary osteoarthritis of one knee, left   • Osteoarthritis of spine with radiculopathy, lumbar region   • Primary osteoarthritis of both knees   • Arthritis of left knee   • Morbid obesity with BMI of 40.0-44.9, adult ([facility])   • Erythema migrans (Lyme disease)   • Intracranial aneurysm   • Abnormal CT scan of lung   • Accelerated hypertension   • Atelectasis   • Sleep apnea, obstructive   • Atrial septal aneurysm   • Acute pain of right shoulder   • Rash and nonspecific skin eruption   • Excessive daytime sleepiness   • Snoring   • MARTIN (obstructive sleep apnea)   • 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 [facility])   • Preoperative clearance   • Nontraumatic complete tear of right rotator cuff   • Bilateral lower extremity edema   • Medicare annual wellness visit, subsequent      Past Medical and Surgical History:     Past Medical History:   Diagnosis Date   • Arthritis    • Compression fracture of spine ([facility])     last assessed 09/15/2016   • Concussion    • Dislocation of wrist     right ,  last assessed 09/15/2016   • Esophageal reflux    • Fractured sternum    • Nasal fracture      Past Surgical History:   Procedure Laterality Date   • CHOLECYSTECTOMY OPEN     • COLONOSCOPY      2006   • FOOT SURGERY Right    • JOINT REPLACEMENT Left     Pollice      Family History:     Family History   Problem Relation Age of Onset   • Dementia Mother    • Hyperlipidemia Mother    • Cancer Father         bladder       Social History:     Social History     Socioeconomic History   • Marital status: /Civil Union     Spouse name: Not on file   • Number of children: Not on file   • Years of education: Not on file   • Highest education level: Not on file   Occupational History     Comment: full time   Tobacco Use   • Smoking status: Former     Years: 35.00     Types: Cigarettes, Pipe, Cigars     Quit date: 5/1/2013     Years since quitting: 10.2   • Smokeless tobacco: Former     Types: Chew     Quit date: 5/1/2013   • Tobacco comments:     Started when he was 23 years    Vaping Use   • Vaping Use: Never used   Substance and Sexual Activity   • Alcohol use: Not Currently     Alcohol/week: 6.0 standard drinks of alcohol     Types: 6 Cans of beer per week     Comment: occassional. Quit 1/15/23   • Drug use: Not Currently     Types: Marijuana     Comment: college in the 70s   • Sexual activity: Not on file   Other Topics Concern   • Not on file   Social History Narrative    Always wears seat belts     Daily coffee consumption    No living will         Social Determinants of Health     Financial Resource Strain: Low Risk  (8/4/2023)    Overall Financial Resource Strain (CARDIA)    • Difficulty of Paying Living Expenses: Not hard at all   Food Insecurity: No Food Insecurity (2/24/2023)    Hunger Vital Sign    • Worried About Running Out of Food in the Last Year: Never true    • Ran Out of Food in the Last Year: Never true   Transportation Needs: No Transportation Needs (8/4/2023)    PRAPARE - Transportation    • Lack of Transportation (Medical): No    • Lack of Transportation (Non-Medical): No   Physical Activity: Not on file   Stress: Not on file   Social Connections: Not on file   Intimate Partner Violence: Not on file   Housing Stability: Low Risk  (2/24/2023)    Housing Stability Vital Sign    • Unable to Pay for Housing in the Last Year: No    • Number of Places Lived in the Last Year: 1    • Unstable Housing in the Last Year: No      Medications and Allergies:     Current Outpatient Medications   Medication Sig Dispense Refill   • acetaminophen (TYLENOL) 325 mg tablet Take 2 tablets (650 mg total) by mouth every 6 (six) hours as needed for mild pain, headaches or fever Do not exceed a total of 3 grams of tylenol/acetaminophen in a 24-hour period. (Patient taking differently: Take 650 mg by mouth if needed for mild pain, headaches or fever Do not exceed a total of 3 grams of tylenol/acetaminophen in a 24-hour period.)  0   • aspirin 81 mg chewable tablet Chew 1 tablet (81 mg total) daily To prevent stroke and heart attack (Patient not taking: Reported on 6/8/2023) 30 tablet 3   • cholecalciferol (VITAMIN D3) 1,000 units tablet Take 2 tablets (2,000 Units total) by mouth daily For vitamin D deficiency 60 tablet 3   • furosemide (LASIX) 20 mg tablet Take 1 tablet (20 mg total) by mouth daily for 3 days 3 tablet 0   • levETIRAcetam (KEPPRA) 1000 MG tablet Take 1 tablet (1,000 mg total) by mouth every 12 (twelve) hours 180 tablet 3   • meloxicam (MOBIC) 15 mg tablet TAKE 1 TABLET BY MOUTH EVERY DAY FOR 30 DAYS       No current facility-administered medications for this visit.      Allergies   Allergen Reactions   • Neomycin-Bacitracin Zn-Polymyx    • Neomycin-Polymyxin-Pramoxine Itching   • Other    • Adhesive [Medical Tape] Rash     Just J&J   • Cephalexin Rash      Immunizations:     Immunization History   Administered Date(s) Administered   • COVID-19 J&J Lisa Deayady) vaccine 0.5 mL 05/07/2021   • COVID-19 MODERNA VACC 0.25 ML IM BOOSTER 11/16/2021   • COVID-19 MODERNA VACC 0.5 ML IM 11/16/2021, 04/04/2022   • COVID-19 Moderna Vac BIVALENT 12 Yr+ IM (BOOSTER ONLY) 0.5 ML 10/12/2022   • Tdap 08/28/2019      Health Maintenance:         Topic Date Due   • Colorectal Cancer Screening  Never done   • Hepatitis C Screening  Completed         Topic Date Due   • Pneumococcal Vaccine: 65+ Years (1 - PCV) Never done   • COVID-19 Vaccine (6 - Additional dose for Carey series) 02/12/2023   • Influenza Vaccine (1) 09/01/2023      Medicare Screening Tests and Risk Assessments:     Mike Dubose is here for his Subsequent Wellness visit. Health Risk Assessment:   Patient rates overall health as excellent. Patient feels that their physical health rating is much better. Patient is very satisfied with their life. Eyesight was rated as slightly worse. Hearing was rated as same. Patient feels that their emotional and mental health rating is same. Patients states they are sometimes angry. Patient states they are sometimes unusually tired/fatigued. Pain experienced in the last 7 days has been none. Patient states that he has experienced no weight loss or gain in last 6 months. Depression Screening:   PHQ-9 Score: 1      Fall Risk Screening: In the past year, patient has experienced: no history of falling in past year      Home Safety:  Patient does not have trouble with stairs inside or outside of their home. Patient has working smoke alarms and has working carbon monoxide detector. Home safety hazards include: none. Nutrition:   Current diet is Other (please comment). Plant based vegan    Medications:   Patient is not currently taking any over-the-counter supplements. Patient is able to manage medications.      Activities of Daily Living (ADLs)/Instrumental Activities of Daily Living (IADLs):   Walk and transfer into and out of bed and chair?: Yes  Dress and groom yourself?: Yes Bathe or shower yourself?: Yes    Feed yourself? Yes  Do your laundry/housekeeping?: Yes  Manage your money, pay your bills and track your expenses?: Yes  Make your own meals?: Yes    Do your own shopping?: Yes    Previous Hospitalizations:   Any hospitalizations or ED visits within the last 12 months?: Yes    How many hospitalizations have you had in the last year?: 3-4    Advance Care Planning:   Living will: Yes    Durable POA for healthcare: Yes    Advanced directive: Yes    Advanced directive counseling given: No    Five wishes given: No    Patient declined ACP directive: No    End of Life Decisions reviewed with patient: No    Provider agrees with end of life decisions: No      Cognitive Screening:   Provider or family/friend/caregiver concerned regarding cognition?: No    PREVENTIVE SCREENINGS      Cardiovascular Screening:    General: History Lipid Disorder, Risks and Benefits Discussed and Screening Current      Diabetes Screening:     General: Screening Current and Risks and Benefits Discussed      Colorectal Cancer Screening:     General: Risks and Benefits Discussed    Due for: Cologuard      Prostate Cancer Screening:    General: Risks and Benefits Discussed    Due for: PSA      Osteoporosis Screening:    General: Screening Not Indicated      Abdominal Aortic Aneurysm (AAA) Screening:    Risk factors include: age between 70-77 yo and tobacco use        Lung Cancer Screening:     General: Screening Not Indicated      Hepatitis C Screening:    General: Screening Current    Screening, Brief Intervention, and Referral to Treatment (SBIRT)    Screening  Typical number of drinks in a day: 0  Typical number of drinks in a week: 0  Interpretation: Low risk drinking behavior.     AUDIT-C Screenin) How often did you have a drink containing alcohol in the past year? never  2) How many drinks did you have on a typical day when you were drinking in the past year? 0  3) How often did you have 6 or more drinks on one occasion in the past year? never    AUDIT-C Score: 0  Interpretation: Score 0-3 (male): Negative screen for alcohol misuse    Single Item Drug Screening:  How often have you used an illegal drug (including marijuana) or a prescription medication for non-medical reasons in the past year? never    Single Item Drug Screen Score: 0  Interpretation: Negative screen for possible drug use disorder    No results found. Physical Exam:     There were no vitals taken for this visit. Physical Exam  Neurological:      Mental Status: He is alert. Psychiatric:         Mood and Affect: Mood normal.         Behavior: Behavior normal.         Thought Content: Thought content normal.         Judgment: Judgment normal.          LEIGHA Gutiérrez  Virtual AWV Consent    Verification of patient location:    Patient is located at Home in the following state in which I hold an active license [place]    The patient was identified by name and date of birth. Melody [place] was informed that this is a telemedicine visit and that the visit is being conducted through Telephone. My office door was closed. No one else was in the room. He acknowledged consent and understanding of privacy and security of the video platform. The patient has agreed to participate and understands they can discontinue the visit at any time. Patient is aware this is a billable service. Reason for visit is medicare wellness    Encounter provider Keny Santana, [address] [facility]    Provider located at [address]  [address] [facility] [id number]      Recent Visits  No visits were found meeting these conditions.   Showing recent visits within past 7 days and meeting all other requirements  Today's Visits  Date Type Provider Dept   08/04/23 Telemedicine LEIGHA Gutiérrez  Primary Care [place]   Showing today's visits and meeting all other requirements  Future Appointments  No visits were found meeting these conditions.   Showing future appointments within next 150 days and meeting all other requirements           Visit Time  Total Visit Duration: 15

## 2023-08-04 NOTE — PROGRESS NOTES
Assessment and Plan:     Problem List Items Addressed This Visit    None       Preventive health issues were discussed with patient, and age appropriate screening tests were ordered as noted in patient's After Visit Summary. Personalized health advice and appropriate referrals for health education or preventive services given if needed, as noted in patient's After Visit Summary.      History of Present Illness:     Patient presents for a Medicare Wellness Visit    HPI   Patient Care Team:  Carl Rodriguez as PCP - General (Family Medicine)  Glenda Peters MD as PCP - [facility]CP-[facility]  Jessica Gomes as Nurse Practitioner (Neurology)     Review of Systems:     Review of Systems     Problem List:     Patient Active Problem List   Diagnosis   • Chronic back pain   • Depression   • Hypertriglyceridemia   • Morbid obesity ([address])   • Osteoarthritis   • Vitamin D deficiency   • Primary osteoarthritis of one knee, left   • Osteoarthritis of spine with radiculopathy, lumbar region   • Primary osteoarthritis of both knees   • Arthritis of left knee   • Morbid obesity with BMI of 40.0-44.9, adult ([facility])   • Erythema migrans (Lyme disease)   • Intracranial aneurysm   • Abnormal CT scan of lung   • Accelerated hypertension   • Atelectasis   • Sleep apnea, obstructive   • Atrial septal aneurysm   • Acute pain of right shoulder   • Rash and nonspecific skin eruption   • Excessive daytime sleepiness   • Snoring   • MARTIN (obstructive sleep apnea)   • 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 [facility])   • Preoperative clearance   • Nontraumatic complete tear of right rotator cuff   • Bilateral lower extremity edema      Past Medical and Surgical History:     Past Medical History:   Diagnosis Date   • Arthritis    • Compression fracture of spine ([address])     last assessed 09/15/2016   • Concussion    • Dislocation of wrist     right ,  last assessed 09/15/2016   • Esophageal reflux    • Fractured sternum    • Nasal fracture      Past Surgical History:   Procedure Laterality Date   • CHOLECYSTECTOMY OPEN     • COLONOSCOPY      2006   • FOOT SURGERY Right    • JOINT REPLACEMENT Left     Pollice      Family History:     Family History   Problem Relation Age of Onset   • Dementia Mother    • Hyperlipidemia Mother    • Cancer Father         bladder       Social History:     Social History     Socioeconomic History   • Marital status: /Civil Union     Spouse name: Not on file   • Number of children: Not on file   • Years of education: Not on file   • Highest education level: Not on file   Occupational History     Comment: full time   Tobacco Use   • Smoking status: Former     Years: 35.00     Types: Cigarettes, Pipe, Cigars     Quit date: 5/1/2013     Years since quitting: 10.2   • Smokeless tobacco: Former     Types: Chew     Quit date: 5/1/2013   • Tobacco comments:     Started when he was 23 years    Vaping Use   • Vaping Use: Never used   Substance and Sexual Activity   • Alcohol use: Not Currently     Alcohol/week: 6.0 standard drinks of alcohol     Types: 6 Cans of beer per week     Comment: occassional. Quit 1/15/23   • Drug use: Not Currently     Types: Marijuana     Comment: college in the 70s   • Sexual activity: Not on file   Other Topics Concern   • Not on file   Social History Narrative    Always wears seat belts     Daily coffee consumption    No living will         Social Determinants of Health     Financial Resource Strain: Not on file   Food Insecurity: No Food Insecurity (2/24/2023)    Hunger Vital Sign    • Worried About Running Out of Food in the Last Year: Never true    • Ran Out of Food in the Last Year: Never true   Transportation Needs: No Transportation Needs (2/24/2023)    PRAPARE - Transportation    • Lack of Transportation (Medical): No    • Lack of Transportation (Non-Medical):  No   Physical Activity: Not on file   Stress: Not on file   Social Connections: Not on file Intimate Partner Violence: Not on file   Housing Stability: Low Risk  (2/24/2023)    Housing Stability Vital Sign    • Unable to Pay for Housing in the Last Year: No    • Number of Places Lived in the Last Year: 1    • Unstable Housing in the Last Year: No      Medications and Allergies:     Current Outpatient Medications   Medication Sig Dispense Refill   • acetaminophen (TYLENOL) 325 mg tablet Take 2 tablets (650 mg total) by mouth every 6 (six) hours as needed for mild pain, headaches or fever Do not exceed a total of 3 grams of tylenol/acetaminophen in a 24-hour period. (Patient taking differently: Take 650 mg by mouth if needed for mild pain, headaches or fever Do not exceed a total of 3 grams of tylenol/acetaminophen in a 24-hour period.)  0   • aspirin 81 mg chewable tablet Chew 1 tablet (81 mg total) daily To prevent stroke and heart attack (Patient not taking: Reported on 6/8/2023) 30 tablet 3   • cholecalciferol (VITAMIN D3) 1,000 units tablet Take 2 tablets (2,000 Units total) by mouth daily For vitamin D deficiency 60 tablet 3   • furosemide (LASIX) 20 mg tablet Take 1 tablet (20 mg total) by mouth daily for 3 days 3 tablet 0   • levETIRAcetam (KEPPRA) 1000 MG tablet Take 1 tablet (1,000 mg total) by mouth every 12 (twelve) hours 180 tablet 3   • meloxicam (MOBIC) 15 mg tablet TAKE 1 TABLET BY MOUTH EVERY DAY FOR 30 DAYS       No current facility-administered medications for this visit.      Allergies   Allergen Reactions   • Neomycin-Bacitracin Zn-Polymyx    • Neomycin-Polymyxin-Pramoxine Itching   • Other    • Adhesive [Medical Tape] Rash     Just J&J   • Cephalexin Rash      Immunizations:     Immunization History   Administered Date(s) Administered   • COVID-19 J&J (Carey) vaccine 0.5 mL 05/07/2021   • COVID-19 MODERNA VACC 0.25 ML IM BOOSTER 11/16/2021   • COVID-19 MODERNA VACC 0.5 ML IM 11/16/2021, 04/04/2022   • COVID-19 Moderna Vac BIVALENT 12 Yr+ IM (BOOSTER ONLY) 0.5 ML 10/12/2022   • Tdap 08/28/2019      Health Maintenance:         Topic Date Due   • Colorectal Cancer Screening  Never done   • Hepatitis C Screening  Completed         Topic Date Due   • Pneumococcal Vaccine: 65+ Years (1 - PCV) Never done   • COVID-19 Vaccine (6 - Additional dose for Carey series) 02/12/2023   • Influenza Vaccine (1) 09/01/2023      Medicare Screening Tests and Risk Assessments:     Lisa Carrasquillo is here for his Subsequent Wellness visit. Health Risk Assessment:   Patient rates overall health as very good. Patient feels that their physical health rating is much better. Patient is very satisfied with their life. Eyesight was rated as slightly worse. Hearing was rated as same. Patient feels that their emotional and mental health rating is same. Patients states they are sometimes angry. Patient states they are sometimes unusually tired/fatigued. Pain experienced in the last 7 days has been none. Patient states that he has experienced no weight loss or gain in last 6 months. Fall Risk Screening: In the past year, patient has experienced: no history of falling in past year      Home Safety:  Patient does not have trouble with stairs inside or outside of their home. Patient has working smoke alarms and has working carbon monoxide detector. Home safety hazards include: none. Nutrition:   Current diet is Other (please comment). Plant based     vegan    Medications:   Patient is not currently taking any over-the-counter supplements. Patient is able to manage medications. Activities of Daily Living (ADLs)/Instrumental Activities of Daily Living (IADLs):   Walk and transfer into and out of bed and chair?: Yes  Dress and groom yourself?: Yes    Bathe or shower yourself?: Yes    Feed yourself?  Yes  Do your laundry/housekeeping?: Yes  Manage your money, pay your bills and track your expenses?: Yes  Make your own meals?: Yes    Do your own shopping?: Yes    Previous Hospitalizations:   Any hospitalizations or ED visits within the last 12 months?: Yes    How many hospitalizations have you had in the last year?: 3-4    Advance Care Planning:   Living will: Yes    Durable POA for healthcare: Yes    Advanced directive: Yes      PREVENTIVE SCREENINGS      Cardiovascular Screening:    General: Screening Not Indicated and History Lipid Disorder      Diabetes Screening:     General: Screening Current      Abdominal Aortic Aneurysm (AAA) Screening:    Risk factors include: age between 70-77 yo and tobacco use        Lung Cancer Screening:     General: Screening Not Indicated      Hepatitis C Screening:    General: Screening Current    Screening, Brief Intervention, and Referral to Treatment (SBIRT)    Screening  Typical number of drinks in a day: 0  Typical number of drinks in a week: 0  Interpretation: Low risk drinking behavior. AUDIT-C Screenin) How often did you have a drink containing alcohol in the past year? never  2) How many drinks did you have on a typical day when you were drinking in the past year? 0  3) How often did you have 6 or more drinks on one occasion in the past year? never    AUDIT-C Score: 0  Interpretation: Score 0-3 (male): Negative screen for alcohol misuse    Single Item Drug Screening:  How often have you used an illegal drug (including marijuana) or a prescription medication for non-medical reasons in the past year? never    Single Item Drug Screen Score: 0  Interpretation: Negative screen for possible drug use disorder    No results found. Physical Exam:     There were no vitals taken for this visit.     Physical Exam      [facility] [place] [facility] Jose Hernandez

## 2023-08-04 NOTE — PATIENT INSTRUCTIONS
Medicare Preventive Visit Patient Instructions  Thank you for completing your Welcome to Medicare Visit or Medicare Annual Wellness Visit today. Your next wellness visit will be due in one year (8/4/2024). The screening/preventive services that you may require over the next 5-10 years are detailed below. Some tests may not apply to you based off risk factors and/or age. Screening tests ordered at today's visit but not completed yet may show as past due. Also, please note that scanned in results may not display below. Preventive Screenings:  Service Recommendations Previous Testing/Comments   Colorectal Cancer Screening  · Colonoscopy    · Fecal Occult Blood Test (FOBT)/Fecal Immunochemical Test (FIT)  · Fecal DNA/Cologuard Test  · Flexible Sigmoidoscopy Age: 43-73 years old   Colonoscopy: every 10 years (May be performed more frequently if at higher risk)  OR  FOBT/FIT: every 1 year  OR  Cologuard: every 3 years  OR  Sigmoidoscopy: every 5 years  Screening may be recommended earlier than age 39 if at higher risk for colorectal cancer. Also, an individualized decision between you and your healthcare provider will decide whether screening between the ages of 77-80 would be appropriate.  Colonoscopy: Not on file  FOBT/FIT: Not on file  Cologuard: Not on file  Sigmoidoscopy: Not on file          Prostate Cancer Screening Individualized decision between patient and health care provider in men between ages of 53-66   Medicare will cover every 12 months beginning on the day after your 50th birthday PSA: 0.5 ng/mL           Hepatitis C Screening Once for adults born between 1945 and 1965  More frequently in patients at high risk for Hepatitis C Hep C Antibody: 01/16/2023    Screening Current   Diabetes Screening 1-2 times per year if you're at risk for diabetes or have pre-diabetes Fasting glucose: No results in last 5 years (No results in last 5 years)  A1C: 5.3 % (1/16/2023)  Screening Current   Cholesterol Screening Once every 5 years if you don't have a lipid disorder. May order more often based on risk factors. Lipid panel: 06/12/2023  Screening Not Indicated  History Lipid Disorder      Other Preventive Screenings Covered by Medicare:  1. Abdominal Aortic Aneurysm (AAA) Screening: covered once if your at risk. You're considered to be at risk if you have a family history of AAA or a male between the age of 70-76 who smoking at least 100 cigarettes in your lifetime. 2. Lung Cancer Screening: covers low dose CT scan once per year if you meet all of the following conditions: (1) Age 48-67; (2) No signs or symptoms of lung cancer; (3) Current smoker or have quit smoking within the last 15 years; (4) You have a tobacco smoking history of at least 20 pack years (packs per day x number of years you smoked); (5) You get a written order from a healthcare provider. 3. Glaucoma Screening: covered annually if you're considered high risk: (1) You have diabetes OR (2) Family history of glaucoma OR (3)  aged 48 and older OR (3)  American aged 72 and older  3. Osteoporosis Screening: covered every 2 years if you meet one of the following conditions: (1) Have a vertebral abnormality; (2) On glucocorticoid therapy for more than 3 months; (3) Have primary hyperparathyroidism; (4) On osteoporosis medications and need to assess response to drug therapy. 5. HIV Screening: covered annually if you're between the age of 14-79. Also covered annually if you are younger than 13 and older than 72 with risk factors for HIV infection. For pregnant patients, it is covered up to 3 times per pregnancy.     Immunizations:  Immunization Recommendations   Influenza Vaccine Annual influenza vaccination during flu season is recommended for all persons aged >= 6 months who do not have contraindications   Pneumococcal Vaccine   * Pneumococcal conjugate vaccine = PCV13 (Prevnar 13), PCV15 (Vaxneuvance), PCV20 (Prevnar 20)  * Pneumococcal polysaccharide vaccine = PPSV23 (Pneumovax) Adults 2364 years old: 1-3 doses may be recommended based on certain risk factors  Adults 72 years old: 1-2 doses may be recommended based off what pneumonia vaccine you previously received   Hepatitis B Vaccine 3 dose series if at intermediate or high risk (ex: diabetes, end stage renal disease, liver disease)   Tetanus (Td) Vaccine - COST NOT COVERED BY MEDICARE PART B Following completion of primary series, a booster dose should be given every 10 years to maintain immunity against tetanus. Td may also be given as tetanus wound prophylaxis. Tdap Vaccine - COST NOT COVERED BY MEDICARE PART B Recommended at least once for all adults. For pregnant patients, recommended with each pregnancy. Shingles Vaccine (Shingrix) - COST NOT COVERED BY MEDICARE PART B  2 shot series recommended in those aged 48 and above     Health Maintenance Due:      Topic Date Due   • Colorectal Cancer Screening  Never done   • Hepatitis C Screening  Completed     Immunizations Due:      Topic Date Due   • Pneumococcal Vaccine: 65+ Years (1 - PCV) Never done   • COVID-19 Vaccine (6 - Additional dose for Carey series) 02/12/2023   • Influenza Vaccine (1) 09/01/2023     Advance Directives   What are advance directives? Advance directives are legal documents that state your wishes and plans for medical care. These plans are made ahead of time in case you lose your ability to make decisions for yourself. Advance directives can apply to any medical decision, such as the treatments you want, and if you want to donate organs. What are the types of advance directives? There are many types of advance directives, and each state has rules about how to use them. You may choose a combination of any of the following:  · Living will: This is a written record of the treatment you want. You can also choose which treatments you do not want, which to limit, and which to stop at a certain time.  This includes surgery, medicine, IV fluid, and tube feedings. · Durable power of  for healthcare [facility]): This is a written record that states who you want to make healthcare choices for you when you are unable to make them for yourself. This person, called a proxy, is usually a family member or a friend. You may choose more than 1 proxy. · Do not resuscitate (DNR) order:  A DNR order is used in case your heart stops beating or you stop breathing. It is a request not to have certain forms of treatment, such as CPR. A DNR order may be included in other types of advance directives. · Medical directive: This covers the care that you want if you are in a coma, near death, or unable to make decisions for yourself. You can list the treatments you want for each condition. Treatment may include pain medicine, surgery, blood transfusions, dialysis, IV or tube feedings, and a ventilator (breathing machine). · Values history: This document has questions about your views, beliefs, and how you feel and think about life. This information can help others choose the care that you would choose. Why are advance directives important? An advance directive helps you control your care. Although spoken wishes may be used, it is better to have your wishes written down. Spoken wishes can be misunderstood, or not followed. Treatments may be given even if you do not want them. An advance directive may make it easier for your family to make difficult choices about your care. Weight Management   Why it is important to manage your weight:  Being overweight increases your risk of health conditions such as heart disease, high blood pressure, type 2 diabetes, and certain types of cancer. It can also increase your risk for osteoarthritis, sleep apnea, and other respiratory problems. Aim for a slow, steady weight loss. Even a small amount of weight loss can lower your risk of health problems.   How to lose weight safely:  A safe and healthy way to lose weight is to eat fewer calories and get regular exercise. You can lose up about 1 pound a week by decreasing the number of calories you eat by 500 calories each day. Healthy meal plan for weight management:  A healthy meal plan includes a variety of foods, contains fewer calories, and helps you stay healthy. A healthy meal plan includes the following:  · Eat whole-grain foods more often. A healthy meal plan should contain fiber. Fiber is the part of grains, fruits, and vegetables that is not broken down by your body. Whole-grain foods are healthy and provide extra fiber in your diet. Some examples of whole-grain foods are whole-wheat breads and pastas, oatmeal, brown rice, and bulgur. · Eat a variety of vegetables every day. Include dark, leafy greens such as spinach, kale, kevin greens, and mustard greens. Eat yellow and orange vegetables such as carrots, sweet potatoes, and winter squash. · Eat a variety of fruits every day. Choose fresh or canned fruit (canned in its own juice or light syrup) instead of juice. Fruit juice has very little or no fiber. · Eat low-fat dairy foods. Drink fat-free (skim) milk or 1% milk. Eat fat-free yogurt and low-fat cottage cheese. Try low-fat cheeses such as mozzarella and other reduced-fat cheeses. · Choose meat and other protein foods that are low in fat. Choose beans or other legumes such as split peas or lentils. Choose fish, skinless poultry (chicken or turkey), or lean cuts of red meat (beef or pork). Before you cook meat or poultry, cut off any visible fat. · Use less fat and oil. Try baking foods instead of frying them. Add less fat, such as margarine, sour cream, regular salad dressing and mayonnaise to foods. Eat fewer high-fat foods. Some examples of high-fat foods include french fries, doughnuts, ice cream, and cakes. · Eat fewer sweets. Limit foods and drinks that are high in sugar.  This includes candy, cookies, regular soda, and sweetened drinks. Exercise:  Exercise at least 30 minutes per day on most days of the week. Some examples of exercise include walking, biking, dancing, and swimming. You can also fit in more physical activity by taking the stairs instead of the elevator or parking farther away from stores. Ask your healthcare provider about the best exercise plan for you. © Copyright [employer] 2018 Information is for End User's use only and may not be sold, redistributed or otherwise used for commercial purposes.  All illustrations and images included in CareNotes® are the copyrighted property of [employer].A.M., Inc. or  [address]

## 2023-08-08 ENCOUNTER — OFFICE VISIT (OUTPATIENT)
Dept: PHYSICAL THERAPY | Facility: CLINIC | Age: 68
End: 2023-08-08
Payer: COMMERCIAL

## 2023-08-08 DIAGNOSIS — M75.121 COMPLETE TEAR OF RIGHT ROTATOR CUFF, UNSPECIFIED WHETHER TRAUMATIC: Primary | ICD-10-CM

## 2023-08-08 DIAGNOSIS — M75.101 TEAR OF RIGHT SUPRASPINATUS TENDON: ICD-10-CM

## 2023-08-08 DIAGNOSIS — Z48.89 AFTERCARE FOLLOWING SURGERY: ICD-10-CM

## 2023-08-08 PROCEDURE — 97140 MANUAL THERAPY 1/> REGIONS: CPT | Performed by: PHYSICAL THERAPIST

## 2023-08-08 PROCEDURE — 97110 THERAPEUTIC EXERCISES: CPT | Performed by: PHYSICAL THERAPIST

## 2023-08-08 PROCEDURE — 97112 NEUROMUSCULAR REEDUCATION: CPT | Performed by: PHYSICAL THERAPIST

## 2023-08-08 NOTE — LETTER
2023    Corby Lackey MD  [address] [facility] [address]    Patient: Yessy Garcia   YOB: 1955   Date of Visit: 2023     Encounter Diagnosis     ICD-10-CM    1. Complete tear of right rotator cuff, unspecified whether traumatic  M75.121       2. Tear of right supraspinatus tendon  M75.101       3. Aftercare following surgery  Z48.89           Dear Dr. Smith Line:    Thank you for your recent referral of Yessy Garcia. Please review the attached evaluation summary from [facility] [place] recent visit. Please verify that you agree with the plan of care by signing the attached order. If you have any questions or concerns, please do not hesitate to call. I sincerely appreciate the opportunity to share in the care of one of your patients and hope to have another opportunity to work with you in the near future. Sincerely,    Miki Gomez, PT, DPT, ATC, ART      Referring Provider:      I certify that I have read the below Plan of Care and certify the need for these services furnished under this plan of treatment while under my care. Corby Lackey MD  [id number]  Via Fax: [phone]          PT Re-Evaluation     Today's date: 2023  Patient name: Yessy Garcia  : 1955  MRN: [id number]  Referring provider: Corby Lackey*  Dx:   Encounter Diagnosis     ICD-10-CM    1. Complete tear of right rotator cuff, unspecified whether traumatic  M75.121       2. Tear of right supraspinatus tendon  M75.101       3.  Aftercare following surgery  Z48.89                      Assessment  Understanding of Dx/Px/POC: good   Prognosis: good    Goals  STGs: To be complete within 4 weeks (partially met)  - Decrease pain to < 2/10 at worst  - Increase AROM to WNL  - Increase strength to > 4+/5  - Improve postural awareness capacity to > 60min before deficit    LTGs: To be complete within 8-12 weeks (partially met)  - Able to repetitively complete all overhead activity without pain or limitation for increased safety and functional capacity with ADLs and work-related duty  - Able to repetitively complete all reaching activity without pain or limitation for increased safety and functional capacity with ADLs and work-related duty  - Able to repetitively complete all pushing/pulling activity without pain or limitation for increased safety and functional capacity with ADLs and work-related duty  - Able to complete all lifting/carrying activity without pain or limitation for increased safety and functional capacity with ADLs and work-related duty    Plan  Planned therapy interventions: manual therapy, neuromuscular re-education, patient education, self care, postural training, therapeutic activities, therapeutic exercise and home exercise program  Frequency: 2x week  Duration in weeks: 6       Upon completion of today's re-evaluation, as evidenced by present objective and subjective measures, Darryl's sx remain consistent with continued, well-paced progress from being s/p R Shoulder RTC repair 23. Patient will benefit from continued skilled physical therapy, as per MD protocol to address current deficits. Subjective Evaluation    Patient Goals  Patient goals for therapy: increased strength, decreased pain and increased motion    Pain  Current pain rating: 3  At best pain ratin  At worst pain ratin  Location: R Shoulder         Pt reports continued progress from being s/p R Shoulder RTC repair 23. Saw MD Friday and pleased with progress. HEP continues to go well. No setbacks. Anxious to continue making progress.       Objective Pain level ranges 1-4/10  AROM: R Shoulder Flexion 90 degrees, Abd 90 degrees, ER 75 degrees, IR 40 degrees; L Shoulder WNL  PROM: R Shoulder Flexion 160 degrees, Abduction 145 degrees, ER 80 degrees, IR 45 degrees; L Shoulder WNL  Strength: R Shoulder Flexion 3-/5, Abd 3-5, ER 3/5, IR 4/5; L Shoulder 5/5 t/o  Postural Awareness: Fair (rounded shoulders, forward head)  Incisions: Clean and healing well  FOTO: next session; GOAL: 56  Unable to complete overhead activity without pain and limitation, but improving  Unable to complete pushing/pulling activity without pain and limitation, but improving  Unable to complete lifting/carrying activity without pain and limitation, but improving  Unable to complete cross body/behind the back reaching activity without pain and limitation, but improving            Precautions: MD protocol for being s/p R Shoulder RTC repair 6/26/23    Daily Treatment Diary    HEP: Handout provided and discussed      Manuals 7/27/23 8/1/23 8/3/23 8/8/23 7/25/23   ART        PROM/Stretch x15' x15' x15 x15'    IASTM        STM/Triggerpoint        JM                Neuro Re-Ed        Pendulums 6x30'' 4x20'' 4x20'' 6x20'' 6x30''   Putty Play x5' x5' x5' x5' x5'   No $ AROM with scap retract    3x10    Isometrics Abd, ER    3x10                            Ther Ex        Scap retract 3x10 2x10 2x10 3x10 3x10   Table Slides Scap 3x10 Scap 3x10 Scap 3x10 Scap 2x10 Scap 2x10   Pulleys 3x10 3x10 3x10 3x10 3 way 2x10   Seated Table ER PROM 3x10 3x10 3x10 3x10 2x10   Wand Flexion  2x10 3x10 3x10    Wand ER  2x10 2x10 3x10    Wall Slides    3x10    Standing stick AAROM flexion, thumbs back with wall to finish  This session      SL ER  This session      SL Abd  This session                                      Ther Activity            x5'            Gait Training                        Modalities        MHP  x10' x10' x10' x10'   CP x10'       US/Stim

## 2023-08-08 NOTE — PROGRESS NOTES
PT Re-Evaluation     Today's date: 2023  Patient name: Lan Justice  : 1955  MRN: [id number]  Referring provider: Rosa Jhaveri*  Dx:   Encounter Diagnosis     ICD-10-CM    1. Complete tear of right rotator cuff, unspecified whether traumatic  M75.121       2. Tear of right supraspinatus tendon  M75.101       3. Aftercare following surgery  Z48.89                      Assessment  Understanding of Dx/Px/POC: good   Prognosis: good    Goals  STGs: To be complete within 4 weeks (partially met)  - Decrease pain to < 2/10 at worst  - Increase AROM to WNL  - Increase strength to > 4+/5  - Improve postural awareness capacity to > 60min before deficit    LTGs: To be complete within 8-12 weeks (partially met)  - Able to repetitively complete all overhead activity without pain or limitation for increased safety and functional capacity with ADLs and work-related duty  - Able to repetitively complete all reaching activity without pain or limitation for increased safety and functional capacity with ADLs and work-related duty  - Able to repetitively complete all pushing/pulling activity without pain or limitation for increased safety and functional capacity with ADLs and work-related duty  - Able to complete all lifting/carrying activity without pain or limitation for increased safety and functional capacity with ADLs and work-related duty    Plan  Planned therapy interventions: manual therapy, neuromuscular re-education, patient education, self care, postural training, therapeutic activities, therapeutic exercise and home exercise program  Frequency: 2x week  Duration in weeks: 6       Upon completion of today's re-evaluation, as evidenced by present objective and subjective measures, Darryl's sx remain consistent with continued, well-paced progress from being s/p R Shoulder RTC repair 23.  Patient will benefit from continued skilled physical therapy, as per MD protocol to address current deficits. Subjective Evaluation    Patient Goals  Patient goals for therapy: increased strength, decreased pain and increased motion    Pain  Current pain rating: 3  At best pain ratin  At worst pain ratin  Location: R Shoulder         Pt reports continued progress from being s/p R Shoulder RTC repair 23. Saw MD Friday and pleased with progress. HEP continues to go well. No setbacks. Anxious to continue making progress.       Objective Pain level ranges 1-4/10  AROM: R Shoulder Flexion 90 degrees, Abd 90 degrees, ER 75 degrees, IR 40 degrees; L Shoulder WNL  PROM: R Shoulder Flexion 160 degrees, Abduction 145 degrees, ER 80 degrees, IR 45 degrees; L Shoulder WNL  Strength: R Shoulder Flexion 3-/5, Abd 3-5, ER 3/5, IR 4/5; L Shoulder 5/5 t/o  Postural Awareness: Fair (rounded shoulders, forward head)  Incisions: Clean and healing well  FOTO: next session; GOAL: 56  Unable to complete overhead activity without pain and limitation, but improving  Unable to complete pushing/pulling activity without pain and limitation, but improving  Unable to complete lifting/carrying activity without pain and limitation, but improving  Unable to complete cross body/behind the back reaching activity without pain and limitation, but improving             Precautions: MD protocol for being s/p R Shoulder RTC repair 23    Daily Treatment Diary    HEP: Handout provided and discussed      Manuals 7/27/23 8/1/23 8/3/23 8/8/23 7/25/23   ART        PROM/Stretch x15' x15' x15 x15'    IASTM        STM/Triggerpoint        JM                Neuro Re-Ed        Pendulums 6x30'' 4x20'' 4x20'' 6x20'' 6x30''   Putty Play x5' x5' x5' x5' x5'   No $ AROM with scap retract    3x10    Isometrics Abd, ER    3x10                            Ther Ex        Scap retract 3x10 2x10 2x10 3x10 3x10   Table Slides Scap 3x10 Scap 3x10 Scap 3x10 Scap 2x10 Scap 2x10   Pulleys 3x10 3x10 3x10 3x10 3 way 2x10   Seated Table ER PROM 3x10 3x10 3x10 3x10 2x10   Wand Flexion  2x10 3x10 3x10    Wand ER  2x10 2x10 3x10    Wall Slides    3x10    Standing stick AAROM flexion, thumbs back with wall to finish  This session      SL ER  This session      SL Abd  This session                                      Ther Activity            x5'            Gait Training                        Modalities        MHP  x10' x10' x10' x10'   CP x10'       US/Stim

## 2023-08-10 ENCOUNTER — OFFICE VISIT (OUTPATIENT)
Dept: PHYSICAL THERAPY | Facility: CLINIC | Age: 68
End: 2023-08-10
Payer: COMMERCIAL

## 2023-08-10 DIAGNOSIS — Z48.89 AFTERCARE FOLLOWING SURGERY: ICD-10-CM

## 2023-08-10 DIAGNOSIS — M75.101 TEAR OF RIGHT SUPRASPINATUS TENDON: ICD-10-CM

## 2023-08-10 DIAGNOSIS — M75.121 COMPLETE TEAR OF RIGHT ROTATOR CUFF, UNSPECIFIED WHETHER TRAUMATIC: Primary | ICD-10-CM

## 2023-08-10 PROCEDURE — 97110 THERAPEUTIC EXERCISES: CPT | Performed by: PHYSICAL THERAPIST

## 2023-08-10 PROCEDURE — 97112 NEUROMUSCULAR REEDUCATION: CPT | Performed by: PHYSICAL THERAPIST

## 2023-08-10 PROCEDURE — 97140 MANUAL THERAPY 1/> REGIONS: CPT | Performed by: PHYSICAL THERAPIST

## 2023-08-10 NOTE — PROGRESS NOTES
Daily Note     Today's date: 8/10/2023  Patient name: Jacquelin Hennessy  : 1955  MRN: [id number]  Referring provider: Amarilis Singh*  Dx:   Encounter Diagnosis     ICD-10-CM    1. Complete tear of right rotator cuff, unspecified whether traumatic  M75.121       2. Tear of right supraspinatus tendon  M75.101       3. Aftercare following surgery  Z48.89                      Subjective: Pt reports HEP going well. No setbacks. Anxious to continue making progress. Objective: See treatment diary below      Assessment: Tolerated treatment well. Patient demonstrated fatigue post treatment, exhibited good technique with therapeutic exercises and would benefit from continued PT      Plan: Continue per plan of care. Progress treatment as tolerated.          Precautions: MD protocol for being s/p R Shoulder RTC repair 23    Daily Treatment Diary    HEP: Handout provided and discussed      Manuals 7/27/23 8/1/23 8/3/23 8/8/23 8/10/23   ART        PROM/Stretch x15' x15' x15 x15' x15'   IASTM        STM/Triggerpoint        JM                Neuro Re-Ed        Pendulums 6x30'' 4x20'' 4x20'' 6x20'' 6x30''   Putty Play x5' x5' x5' x5' x5'   No $ AROM with scap retract    3x10 3x10   Isometrics Abd, ER    3x10 3x10                           Ther Ex        Scap retract 3x10 2x10 2x10 3x10 3x10   Table Slides Scap 3x10 Scap 3x10 Scap 3x10 Scap 2x10 Scap 3x10   Pulleys 3x10 3x10 3x10 3x10 3 way 3x10   Seated Table ER PROM 3x10 3x10 3x10 3x10 3x10   Wand Flexion  2x10 3x10 3x10 3x10   Wand ER  2x10 2x10 3x10 3x10   Wall Slides    2x10 2x10   Standing stick AAROM flexion, thumbs back with wall to finish  This session      SL ER  This session      SL Abd  This session                                      Ther Activity            x5' x5'           Gait Training                        Modalities        MHP  x10' x10' x10' x10'   CP x10'       US/Stim

## 2023-08-15 ENCOUNTER — OFFICE VISIT (OUTPATIENT)
Dept: PHYSICAL THERAPY | Facility: CLINIC | Age: 68
End: 2023-08-15
Payer: COMMERCIAL

## 2023-08-15 DIAGNOSIS — Z48.89 AFTERCARE FOLLOWING SURGERY: ICD-10-CM

## 2023-08-15 DIAGNOSIS — M75.101 TEAR OF RIGHT SUPRASPINATUS TENDON: ICD-10-CM

## 2023-08-15 DIAGNOSIS — M75.121 COMPLETE TEAR OF RIGHT ROTATOR CUFF, UNSPECIFIED WHETHER TRAUMATIC: Primary | ICD-10-CM

## 2023-08-15 PROCEDURE — 97112 NEUROMUSCULAR REEDUCATION: CPT | Performed by: PHYSICAL THERAPIST

## 2023-08-15 PROCEDURE — 97110 THERAPEUTIC EXERCISES: CPT | Performed by: PHYSICAL THERAPIST

## 2023-08-15 PROCEDURE — 97140 MANUAL THERAPY 1/> REGIONS: CPT | Performed by: PHYSICAL THERAPIST

## 2023-08-15 NOTE — PROGRESS NOTES
Daily Note     Today's date: 8/15/2023  Patient name: Tiffany Garcia  : 1955  MRN: [id number]  Referring provider: Mario Meza*  Dx:   Encounter Diagnosis     ICD-10-CM    1. Complete tear of right rotator cuff, unspecified whether traumatic  M75.121       2. Tear of right supraspinatus tendon  M75.101       3. Aftercare following surgery  Z48.89                      Subjective: Pt reports HEP going well. No setbacks. Anxious to continue making progress. Objective: See treatment diary below      Assessment: Tolerated treatment well. Patient demonstrated fatigue post treatment, exhibited good technique with therapeutic exercises and would benefit from continued PT      Plan: Continue per plan of care. Progress treatment as tolerated.          Precautions: MD protocol for being s/p R Shoulder RTC repair 23    Daily Treatment Diary    HEP: Handout provided and discussed      Manuals 8/15/23 8/1/23 8/3/23 8/8/23 8/10/23   ART        PROM/Stretch x15' x15' x15 x15' x15'   IASTM        STM/Triggerpoint        JM                Neuro Re-Ed        Pendulums 6x30'' 4x20'' 4x20'' 6x20'' 6x30''   Putty Play x5' x5' x5' x5' x5'   No $ AROM with scap retract 3x10   3x10 3x10   Isometrics Abd, ER 3x10   3x10 3x10                           Ther Ex        Scap retract 3x10 2x10 2x10 3x10 3x10   Table Slides Scap 3x10 Scap 3x10 Scap 3x10 Scap 2x10 Scap 3x10   Pulleys 3x10 3x10 3x10 3x10 3 way 3x10   Seated Table ER PROM 3x10 3x10 3x10 3x10 3x10   Wand Flexion 3x10 2x10 3x10 3x10 3x10   Wand ER 3x10 2x10 2x10 3x10 3x10   Wall Slides 3x10   2x10 2x10   Standing stick AAROM flexion, thumbs back with wall to finish  This session      SL ER  This session      SL Abd  This session                                      Ther Activity        Retro UBE x5'   x5' x5'           Gait Training                        Modalities        MHP  x10' x10' x10' x10'   CP x10'       US/Stim

## 2023-08-17 ENCOUNTER — OFFICE VISIT (OUTPATIENT)
Dept: PHYSICAL THERAPY | Facility: CLINIC | Age: 68
End: 2023-08-17
Payer: COMMERCIAL

## 2023-08-17 DIAGNOSIS — M75.101 TEAR OF RIGHT SUPRASPINATUS TENDON: ICD-10-CM

## 2023-08-17 DIAGNOSIS — Z48.89 AFTERCARE FOLLOWING SURGERY: ICD-10-CM

## 2023-08-17 DIAGNOSIS — M75.121 COMPLETE TEAR OF RIGHT ROTATOR CUFF, UNSPECIFIED WHETHER TRAUMATIC: Primary | ICD-10-CM

## 2023-08-17 PROCEDURE — 97140 MANUAL THERAPY 1/> REGIONS: CPT | Performed by: PHYSICAL THERAPIST

## 2023-08-17 PROCEDURE — 97112 NEUROMUSCULAR REEDUCATION: CPT | Performed by: PHYSICAL THERAPIST

## 2023-08-17 PROCEDURE — 97110 THERAPEUTIC EXERCISES: CPT | Performed by: PHYSICAL THERAPIST

## 2023-08-17 NOTE — PROGRESS NOTES
Daily Note     Today's date: 2023  Patient name: Ralph Pineda  : 1955  MRN: [id number]  Referring provider: Maddie Carreon*  Dx:   Encounter Diagnosis     ICD-10-CM    1. Complete tear of right rotator cuff, unspecified whether traumatic  M75.121       2. Tear of right supraspinatus tendon  M75.101       3. Aftercare following surgery  Z48.89                      Subjective: Pt reports HEP going well. No setbacks. Anxious to continue making progress. Objective: See treatment diary below      Assessment: Tolerated treatment well. Patient demonstrated fatigue post treatment, exhibited good technique with therapeutic exercises and would benefit from continued PT      Plan: Continue per plan of care. Progress treatment as tolerated.          Precautions: MD protocol for being s/p R Shoulder RTC repair 23    Daily Treatment Diary    HEP: Handout provided and discussed      Manuals 8/15/23 8/17/23 8/3/23 8/8/23 8/10/23   ART        PROM/Stretch x15' x15' x15 x15' x15'   IASTM        STM/Triggerpoint        JM                Neuro Re-Ed        Pendulums 6x30'' 4x20'' 4x20'' 6x20'' 6x30''   Putty Play x5' x5' x5' x5' x5'   No $ AROM with scap retract 3x10 3x10  3x10 3x10   Isometrics Abd, ER 3x10 3x10  3x10 3x10                           Ther Ex        Scap retract 3x10 2x10 2x10 3x10 3x10   Table Slides Scap 3x10 Scap 3x10 Scap 3x10 Scap 2x10 Scap 3x10   Pulleys 3x10 3x10 3x10 3x10 3 way 3x10   Seated Table ER PROM 3x10 3x10 3x10 3x10 3x10   Wand Flexion 3x10 2x10 3x10 3x10 3x10   Wand ER 3x10 2x10 2x10 3x10 3x10   Wall Slides 3x10 3x10  2x10 2x10   Standing stick AAROM flexion, thumbs back with wall to finish  10x      SL ER  2x10      SL Abd  2x10                                      Ther Activity        Retro UBE x5'   x5' x5'           Gait Training                        Modalities        MHP   x10'     CP x10' x10'      US/Stim

## 2023-08-22 ENCOUNTER — OFFICE VISIT (OUTPATIENT)
Dept: PHYSICAL THERAPY | Facility: CLINIC | Age: 68
End: 2023-08-22
Payer: COMMERCIAL

## 2023-08-22 DIAGNOSIS — Z48.89 AFTERCARE FOLLOWING SURGERY: ICD-10-CM

## 2023-08-22 DIAGNOSIS — M75.101 TEAR OF RIGHT SUPRASPINATUS TENDON: ICD-10-CM

## 2023-08-22 DIAGNOSIS — M75.121 COMPLETE TEAR OF RIGHT ROTATOR CUFF, UNSPECIFIED WHETHER TRAUMATIC: Primary | ICD-10-CM

## 2023-08-22 PROCEDURE — 97112 NEUROMUSCULAR REEDUCATION: CPT | Performed by: PHYSICAL THERAPIST

## 2023-08-22 PROCEDURE — 97110 THERAPEUTIC EXERCISES: CPT | Performed by: PHYSICAL THERAPIST

## 2023-08-22 PROCEDURE — 97140 MANUAL THERAPY 1/> REGIONS: CPT | Performed by: PHYSICAL THERAPIST

## 2023-08-22 NOTE — PROGRESS NOTES
Daily Note     Today's date: 2023  Patient name: Adriane Julian  : 1955  MRN: [id number]  Referring provider: William Barros*  Dx:   Encounter Diagnosis     ICD-10-CM    1. Complete tear of right rotator cuff, unspecified whether traumatic  M75.121       2. Tear of right supraspinatus tendon  M75.101       3. Aftercare following surgery  Z48.89                      Subjective: Pt reports HEP going well. No setbacks. Anxious to continue making progress. Objective: See treatment diary below      Assessment: Tolerated treatment well. Patient demonstrated fatigue post treatment, exhibited good technique with therapeutic exercises and would benefit from continued PT      Plan: Continue per plan of care. Progress treatment as tolerated.          Precautions: MD protocol for being s/p R Shoulder RTC repair 23    Daily Treatment Diary    HEP: Handout provided and discussed      Manuals 8/15/23 8/17/23 8/22/23 8/8/23 8/10/23   ART        PROM/Stretch x15' x15' x15 x15' x15'   IASTM        STM/Triggerpoint        JM                Neuro Re-Ed        Pendulums 6x30'' 4x20'' 4x20'' 6x20'' 6x30''   Putty Play x5' x5' x5' x5' x5'   No $ AROM with scap retract 3x10 3x10 3x10 3x10 3x10   Isometrics Abd, ER 3x10 3x10 3x10 3x10 3x10   Stool Roll backs   10x 5''                     Ther Ex        Scap retract 3x10 2x10 2x10 3x10 3x10   Table Slides Scap 3x10 Scap 3x10 Scap 3x10 Scap 2x10 Scap 3x10   Pulleys 3x10 3x10 3x10 3x10 3 way 3x10   Seated Table ER PROM 3x10 3x10 3x10 3x10 3x10   Wand Flexion 3x10 2x10 3x10 3x10 3x10   Wand ER 3x10 2x10 2x10 3x10 3x10   Wall Slides 3x10 3x10 3x10 2x10 2x10   Standing stick AAROM flexion, thumbs back with wall to finish  10x 10x     SL ER  2x10 2x10     SL Abd  2x10 2x10                                     Ther Activity        Retro UBE x5' x5' x5' x5' x5'           Gait Training                        Modalities        MHP   x10'     CP x10' x10'      US/Stim

## 2023-08-24 ENCOUNTER — OFFICE VISIT (OUTPATIENT)
Dept: PHYSICAL THERAPY | Facility: CLINIC | Age: 68
End: 2023-08-24
Payer: COMMERCIAL

## 2023-08-24 ENCOUNTER — TELEPHONE (OUTPATIENT)
Dept: NEUROLOGY | Facility: CLINIC | Age: 68
End: 2023-08-24

## 2023-08-24 DIAGNOSIS — Z48.89 AFTERCARE FOLLOWING SURGERY: ICD-10-CM

## 2023-08-24 DIAGNOSIS — M75.101 TEAR OF RIGHT SUPRASPINATUS TENDON: ICD-10-CM

## 2023-08-24 DIAGNOSIS — M75.121 COMPLETE TEAR OF RIGHT ROTATOR CUFF, UNSPECIFIED WHETHER TRAUMATIC: Primary | ICD-10-CM

## 2023-08-24 PROCEDURE — 97112 NEUROMUSCULAR REEDUCATION: CPT | Performed by: PHYSICAL THERAPIST

## 2023-08-24 PROCEDURE — 97110 THERAPEUTIC EXERCISES: CPT | Performed by: PHYSICAL THERAPIST

## 2023-08-24 PROCEDURE — 97140 MANUAL THERAPY 1/> REGIONS: CPT | Performed by: PHYSICAL THERAPIST

## 2023-08-24 NOTE — PROGRESS NOTES
Daily Note     Today's date: 2023  Patient name: Jessica Amezcua  : 1955  MRN: [id number]  Referring provider: Ruby Martinez*  Dx:   Encounter Diagnosis     ICD-10-CM    1. Complete tear of right rotator cuff, unspecified whether traumatic  M75.121       2. Tear of right supraspinatus tendon  M75.101       3. Aftercare following surgery  Z48.89                      Subjective: Pt reports HEP going well. No setbacks. Anxious to continue making progress. Objective: See treatment diary below      Assessment: Tolerated treatment well. Patient demonstrated fatigue post treatment, exhibited good technique with therapeutic exercises and would benefit from continued PT      Plan: Continue per plan of care. Progress treatment as tolerated.          Precautions: MD protocol for being s/p R Shoulder RTC repair 23    Daily Treatment Diary    HEP: Handout provided and discussed      Manuals 8/15/23 8/17/23 8/22/23 8/24/23 8/10/23   ART        PROM/Stretch x15' x15' x15 x15' x15'   IASTM        STM/Triggerpoint        JM                Neuro Re-Ed        Pendulums        Putty Play        No $ AROM with scap retract 3x10 3x10 3x10 3x10 3x10   Isometrics Abd, ER 3x10 3x10 3x10 3x10 3x10   Stool Roll backs   10x 5'' 10x5''                    Ther Ex        Scap retract 3x10 2x10 2x10 3x10 3x10   Table Slides Scap 3x10 Scap 3x10 Scap 3x10 Scap 2x10 Scap 3x10   Pulleys 3x10 3x10 3x10 3x10 3 way 3x10   Seated Table ER PROM 3x10 3x10 3x10 3x10 3x10   Wand Flexion 3x10 2x10 3x10 3x10 3x10   Wand ER 3x10 2x10 2x10 3x10 3x10   Wall Slides 3x10 3x10 3x10 2x10 2x10   Standing stick AAROM flexion, thumbs back with wall to finish  10x 10x 2x10    SL ER  2x10 2x10 2x10    SL Abd  2x10 2x10 2x10                                    Ther Activity        Retro UBE x5' x5' x5' x5' x5'           Gait Training                        Modalities        MHP   x10'     CP x10' x10'  x10'    US/Stim

## 2023-08-24 NOTE — TELEPHONE ENCOUNTER
received  from 12:42pm-   hi, this is irene tovar uh, I was supposed to call back a day or 2 after the 6 months of not having any seizures estephania I lost my license and I was supposed to call this number back. So you could go ahead, reinstate me to the states so I can get my license back. My date of birth is May, 5th, 1955. And the last time I had a seizure was february 22nd 2023. Um. So it's basically 6 months and 2 days. Um I see you have the right phone number, which is [phone]. And I am supposed to speak to oriana. I don't know. That was the name. I don't know if it's Dr. Darrick Wilkins, probably yes, but Darrick Wilkins, my name is Coretta Arredondo and the reason stated as, as being license. Thank you. Have a good day  -------------------------------------  Per office note-  As long as there are no recurrent seizures, his 6 month seizure free date will be August 23, 2023. Discussed calling office to report seizure freedom so paperwork can be submitted to [employer] to support his return to driving. Called pt.   Confirmed last seizure was 2/22/23  Advised on fee    -please enter charge and contact pt for payment

## 2023-08-25 ENCOUNTER — PATIENT MESSAGE (OUTPATIENT)
Dept: NEUROLOGY | Facility: CLINIC | Age: 68
End: 2023-08-25

## 2023-08-25 NOTE — TELEPHONE ENCOUNTER
8/24/23 at 1:53 pm    Hi, this is [employer]. Phone number is [phone]. Birthday is May. 5th. 1955. I would like Ra Pina to reinstate me for my license. I had silent seizures. I am free for the last 6 months and 2 days. And what else is there? It's the [employer] and this is for Ra Pina. Thank you. Have a good day.

## 2023-08-29 ENCOUNTER — OFFICE VISIT (OUTPATIENT)
Dept: PHYSICAL THERAPY | Facility: CLINIC | Age: 68
End: 2023-08-29
Payer: COMMERCIAL

## 2023-08-29 DIAGNOSIS — M75.101 TEAR OF RIGHT SUPRASPINATUS TENDON: ICD-10-CM

## 2023-08-29 DIAGNOSIS — M75.121 COMPLETE TEAR OF RIGHT ROTATOR CUFF, UNSPECIFIED WHETHER TRAUMATIC: Primary | ICD-10-CM

## 2023-08-29 DIAGNOSIS — Z48.89 AFTERCARE FOLLOWING SURGERY: ICD-10-CM

## 2023-08-29 PROCEDURE — 97140 MANUAL THERAPY 1/> REGIONS: CPT | Performed by: PHYSICAL THERAPIST

## 2023-08-29 PROCEDURE — 97112 NEUROMUSCULAR REEDUCATION: CPT | Performed by: PHYSICAL THERAPIST

## 2023-08-29 PROCEDURE — 97110 THERAPEUTIC EXERCISES: CPT | Performed by: PHYSICAL THERAPIST

## 2023-08-29 NOTE — PROGRESS NOTES
Daily Note     Today's date: 2023  Patient name:  Seen  : 1955  MRN: [id number]  Referring provider: Kimberly Giordano*  Dx:   Encounter Diagnosis     ICD-10-CM    1. Complete tear of right rotator cuff, unspecified whether traumatic  M75.121       2. Tear of right supraspinatus tendon  M75.101       3. Aftercare following surgery  Z48.89                      Subjective: Pt reports HEP going well. No setbacks. Anxious to continue making progress. Objective: See treatment diary below      Assessment: Tolerated treatment well. Patient demonstrated fatigue post treatment, exhibited good technique with therapeutic exercises and would benefit from continued PT      Plan: Continue per plan of care. Progress treatment as tolerated.          Precautions: MD protocol for being s/p R Shoulder RTC repair 23    Daily Treatment Diary    HEP: Handout provided and discussed      Manuals 8/15/23 8/17/23 8/22/23 8/24/23 8/29/23   ART        PROM/Stretch x15' x15' x15 x15' x15'   IASTM        STM/Triggerpoint        JM                Neuro Re-Ed        Pendulums        Putty Play        No $ AROM with scap retract 3x10 3x10 3x10 3x10 3x10   Isometrics Abd, ER 3x10 3x10 3x10 3x10 3x10   Stool Roll backs   10x 5'' 10x5'' 10x5''                   Ther Ex        Scap retract 3x10 2x10 2x10 3x10 3x10   Table Slides Scap 3x10 Scap 3x10 Scap 3x10 Scap 2x10 Scap 3x10   Pulleys 3x10 3x10 3x10 3x10 3 way 3x10   Seated Table ER PROM 3x10 3x10 3x10 3x10 3x10   Wand Flexion 3x10 2x10 3x10 3x10 3x10   Wand ER 3x10 2x10 2x10 3x10 3x10   Wall Slides 3x10 3x10 3x10 2x10 2x10   Standing stick AAROM flexion, thumbs back with wall to finish  10x 10x 2x10 3x10   SL ER  2x10 2x10 2x10 3x10   SL Abd  2x10 2x10 2x10 3x10                                   Ther Activity        Retro UBE x5' x5' x5' x5' x5'           Gait Training                        Modalities        MHP   x10'     CP x10' x10'  x10' x10'   US/Stim

## 2023-08-31 ENCOUNTER — OFFICE VISIT (OUTPATIENT)
Dept: PHYSICAL THERAPY | Facility: CLINIC | Age: 68
End: 2023-08-31
Payer: COMMERCIAL

## 2023-08-31 DIAGNOSIS — Z48.89 AFTERCARE FOLLOWING SURGERY: ICD-10-CM

## 2023-08-31 DIAGNOSIS — M75.121 COMPLETE TEAR OF RIGHT ROTATOR CUFF, UNSPECIFIED WHETHER TRAUMATIC: Primary | ICD-10-CM

## 2023-08-31 DIAGNOSIS — M75.101 TEAR OF RIGHT SUPRASPINATUS TENDON: ICD-10-CM

## 2023-08-31 PROCEDURE — 97140 MANUAL THERAPY 1/> REGIONS: CPT | Performed by: PHYSICAL THERAPIST

## 2023-08-31 PROCEDURE — 97112 NEUROMUSCULAR REEDUCATION: CPT | Performed by: PHYSICAL THERAPIST

## 2023-08-31 PROCEDURE — 97110 THERAPEUTIC EXERCISES: CPT | Performed by: PHYSICAL THERAPIST

## 2023-08-31 NOTE — PROGRESS NOTES
Daily Note     Today's date: 2023  Patient name: Fernanda Brown  : 1955  MRN: [id number]  Referring provider: Shandra Eller*  Dx:   Encounter Diagnosis     ICD-10-CM    1. Complete tear of right rotator cuff, unspecified whether traumatic  M75.121       2. Tear of right supraspinatus tendon  M75.101       3. Aftercare following surgery  Z48.89                      Subjective: Pt reports HEP going well. No setbacks. Anxious to continue making progress. Objective: See treatment diary below      Assessment: Tolerated treatment well. Patient demonstrated fatigue post treatment, exhibited good technique with therapeutic exercises and would benefit from continued PT      Plan: Continue per plan of care. Progress treatment as tolerated.          Precautions: MD protocol for being s/p R Shoulder RTC repair 23    Daily Treatment Diary    HEP: Handout provided and discussed      Manuals 23   ART        PROM/Stretch x15' x15' x15 x15' x15'   IASTM        STM/Triggerpoint        JM                Neuro Re-Ed        Pendulums        Putty Play        No $ AROM with scap retract 3x10 3x10 3x10 3x10 3x10   Isometrics Abd, ER 3x10 3x10 3x10 3x10 3x10   Stool Roll backs 10x5''  10x 5'' 10x5'' 10x5''                   Ther Ex        Scap retract 3x10 2x10 2x10 3x10 3x10   Table Slides Scap 3x10 Scap 3x10 Scap 3x10 Scap 2x10 Scap 3x10   Pulleys 3x10 3x10 3x10 3x10 3 way 3x10   Seated Table ER PROM 3x10 3x10 3x10 3x10 3x10   Wand Flexion 3x10 2x10 3x10 3x10 3x10   Wand ER 3x10 2x10 2x10 3x10 3x10   Wall Slides 3x10 3x10 3x10 2x10 2x10   Standing stick AAROM flexion, thumbs back with wall to finish 10x 10x 10x 2x10 3x10   SL ER 3x10 2x10 2x10 2x10 3x10   SL Abd 3x10 2x10 2x10 2x10 3x10   Standing Abduction 3x10       Standing Abduction 2x10                       Ther Activity        Retro UBE x5' x5' x5' x5' x5'           Gait Training                        Modalities MHP   x10'     CP x10' x10'  x10' x10'   US/Stim

## 2023-09-05 ENCOUNTER — OFFICE VISIT (OUTPATIENT)
Dept: PHYSICAL THERAPY | Facility: CLINIC | Age: 68
End: 2023-09-05
Payer: COMMERCIAL

## 2023-09-05 DIAGNOSIS — M75.101 TEAR OF RIGHT SUPRASPINATUS TENDON: ICD-10-CM

## 2023-09-05 DIAGNOSIS — Z48.89 AFTERCARE FOLLOWING SURGERY: ICD-10-CM

## 2023-09-05 DIAGNOSIS — M75.121 COMPLETE TEAR OF RIGHT ROTATOR CUFF, UNSPECIFIED WHETHER TRAUMATIC: Primary | ICD-10-CM

## 2023-09-05 PROCEDURE — 97110 THERAPEUTIC EXERCISES: CPT

## 2023-09-05 NOTE — PROGRESS NOTES
Daily Note     Today's date: 2023  Patient name: Salas Raymond  : 1955  MRN: [id number]  Referring provider: Easton Lei*  Dx:   Encounter Diagnosis     ICD-10-CM    1. Complete tear of right rotator cuff, unspecified whether traumatic  M75.121       2. Tear of right supraspinatus tendon  M75.101       3. Aftercare following surgery  Z48.89           Start Time: 1030  Stop Time: 1135  Total time in clinic (min): 65 minutes    Subjective: Patient reports that his shoulder feels better. Objective: See treatment diary below  Arrived 15 min late. Assessment: Tolerated treatment well. Patient participated in skilled PT session focused on strengthening, stretching, and ROM. Patient able to complete exercise program with no increase in sx. Patient demonstrates improved R shoulder ROM with AROM exercises. Patient would continue to benefit from skilled PT interventions to address strengthening, stretching, and ROM. Patient demonstrated fatigue post treatment      Plan: Continue per plan of care.       Precautions: MD protocol for being s/p R Shoulder RTC repair 23    Daily Treatment Diary    HEP: Handout provided and discussed      Manuals 23 9/23   ART        PROM/Stretch x15'  x15 x15' x15'   IASTM        STM/Triggerpoint        JM                Neuro Re-Ed        Pendulums        Putty Play        No $ AROM with scap retract 3x10 3x10 3x10 3x10 3x10   Isometrics Abd, ER 3x10 3x10 3x10 3x10 3x10   Stool Roll backs 10x5''  10x 5'' 10x5'' 10x5''                   Ther Ex        Scap retract 3x10 3x10 2x10 3x10 3x10   Table Slides Scap 3x10  Scap 3x10 Scap 2x10 Scap 3x10   Pulleys 3x10 3x10 ea 3x10 3x10 3 way 3x10   Seated Table ER PROM 3x10  3x10 3x10 3x10   Wand Flexion 3x10 3x10 w/orange Pball 3x10 3x10 3x10   Wand ER 3x10  2x10 3x10 3x10   Wall Slides 3x10 3x10 Flex/scap 3x10 2x10 2x10   Standing stick AAROM flexion, thumbs back with wall to finish 10x 3x10 10x 2x10 3x10   SL ER 3x10 3x10 2x10 2x10 3x10   SL Abd 3x10 3x10 2x10 2x10 3x10   Standing Abduction 3x10 3x10      Standing Abduction 2x10                       Ther Activity        Retro UBE x5' X 5' x5' x5' x5'           Gait Training                        Modalities        MHP   x10'     CP x10' 10'  x10' x10'   US/Stim

## 2023-09-06 NOTE — TELEPHONE ENCOUNTER
received vm from 9:33am-A hi, this is Ryan [facility] la. Again, I have to apologize estephania I don't know if I hit the star key for after recording. My name is Ryan [facility] Precious tovar. 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 And I called oriana, neurologist to reinstate me for my 's license. And I have given her the form, I guess a couple months ago. And I asked oriana from the last recording to , fill out the form and send it in for my drivers license. Apparently I received the exact same form um through the mail yesterday, stating that you need to fill out the form. So the question is, oh, forgot to give them my birth date [birth year] was um do I need to send this form back to you so you can fill it out because maybe you don't have the form no more. And what I do need is to find out if you did, then that's the states fault. And they made a mistake, or I need to send a form to you guys again, and I need the address. So, um, that's the only thing I could think of. Um let's see the name, reason that's it and birth date, i hit everything.  Okay, sorry I hope I record this.  --------------------------------------  Eliazar Ennis just spoke to pt    breezy-can you assist with form

## 2023-09-06 NOTE — TELEPHONE ENCOUNTER
Received VM transcription from 9:27 AM:    Hi, this is [employer]. Birth date is [birth year]. My phone number [phone]. I'm calling about let's see, I lost my license to the state because of silent seizures. I was suppose to call the neurologist just 2 days after the 6 months to say there hasn't been a silent seizure since I'm on medication. I need to talk to Susan Deutsch. She's my neurologist. Basically I had gave her forms to send in to the state. And I thought that was handled 2 and a half weeks ago or 2 weeks ago. Sorry about that. Now it's 6 months in 2 weeks. And I got a letter from the [facility] saying to fill out information that I can't really fill out: a loss of consciousness and awareness form. So I could get my license back. So basically they sent me the exact same form or a little bit less than what I gave Jade. So did you send it in or do I need to send this form to you guys so you can fill it out and send it in so I can get my license back? I don't know. So if you can, when you call me back, if you could give me that information. What I just filled out was the name, date, address and so forth. But everything else has to be filled out by you guparis. So either you need the form and if you do, I need your address to send the form to you or you Nelly Garcia let me know what's going on here. Okay, thank you. You have a good day, peter.  --------------------------------------------    Spoke with pt and advised him to send new form from [facility] so that it can be completed. Pt to send Loss of Consciousness and awareness form to [facility] office. Pt's wife will also try to send in as an attachment via [facility] [employer].

## 2023-09-07 ENCOUNTER — OFFICE VISIT (OUTPATIENT)
Dept: PHYSICAL THERAPY | Facility: CLINIC | Age: 68
End: 2023-09-07
Payer: COMMERCIAL

## 2023-09-07 DIAGNOSIS — Z48.89 AFTERCARE FOLLOWING SURGERY: ICD-10-CM

## 2023-09-07 DIAGNOSIS — M75.101 TEAR OF RIGHT SUPRASPINATUS TENDON: ICD-10-CM

## 2023-09-07 DIAGNOSIS — M75.121 COMPLETE TEAR OF RIGHT ROTATOR CUFF, UNSPECIFIED WHETHER TRAUMATIC: Primary | ICD-10-CM

## 2023-09-07 PROCEDURE — 97140 MANUAL THERAPY 1/> REGIONS: CPT | Performed by: PHYSICAL THERAPIST

## 2023-09-07 PROCEDURE — 97110 THERAPEUTIC EXERCISES: CPT | Performed by: PHYSICAL THERAPIST

## 2023-09-07 PROCEDURE — 97112 NEUROMUSCULAR REEDUCATION: CPT | Performed by: PHYSICAL THERAPIST

## 2023-09-07 NOTE — PROGRESS NOTES
Daily Note     Today's date: 2023  Patient name: Claritza Ferrari  : 1955  MRN: [id number]  Referring provider: Bernardo Mcclendon*  Dx:   Encounter Diagnosis     ICD-10-CM    1. Complete tear of right rotator cuff, unspecified whether traumatic  M75.121       2. Tear of right supraspinatus tendon  M75.101       3. Aftercare following surgery  Z48.89                      Subjective: Pt reports HEP going well. No setbacks. Anxious to continue making progress. Objective: See treatment diary below      Assessment: Tolerated treatment well. Patient demonstrated fatigue post treatment, exhibited good technique with therapeutic exercises and would benefit from continued PT      Plan: Continue per plan of care. Progress treatment as tolerated.          Precautions: MD protocol for being s/p R Shoulder RTC repair 23    Daily Treatment Diary    HEP: Handout provided and discussed      Manuals 23   ART        PROM/Stretch x15' x15' x15 x15' x15'   IASTM        STM/Triggerpoint        JM                Neuro Re-Ed        Pendulums        Putty Play        No $ AROM with scap retract 3x10 3x10 3x10 3x10 3x10   Isometrics Abd, ER 3x10 3x10 3x10 3x10 3x10   Stool Roll backs 10x5'' 10x5'' 10x 5'' 10x5'' 10x5''                   Ther Ex        Scap retract 3x10 2x10 2x10 3x10 3x10   Table Slides Scap 3x10 Scap 3x10 Scap 3x10 Scap 2x10 Scap 3x10   Pulleys 3x10 3x10 3x10 3x10 3 way 3x10   Seated Table ER PROM 3x10 3x10 3x10 3x10 3x10   Wand Flexion 3x10 2x10 3x10 3x10 3x10   Wand ER 3x10 2x10 2x10 3x10 3x10   Wall Slides 3x10 3x10 3x10 2x10 2x10   Standing stick AAROM flexion, thumbs back with wall to finish 10x 10x 10x 2x10 3x10   SL ER 3x10 2x10 2x10 2x10 3x10   SL Abd 3x10 2x10 2x10 2x10 3x10   Standing Abduction 3x10 3x10      Standing Abduction 2x10 2x10                      Ther Activity        Retro UBE x5' x5' x5' x5' x5'           Gait Training Modalities        MHP   x10'     CP x10' x10'  x10' x10'   US/Stim

## 2023-09-11 ENCOUNTER — TELEPHONE (OUTPATIENT)
Dept: OTHER | Facility: HOSPITAL | Age: 68
End: 2023-09-11

## 2023-09-12 ENCOUNTER — OFFICE VISIT (OUTPATIENT)
Dept: PHYSICAL THERAPY | Facility: CLINIC | Age: 68
End: 2023-09-12
Payer: COMMERCIAL

## 2023-09-12 DIAGNOSIS — Z48.89 AFTERCARE FOLLOWING SURGERY: ICD-10-CM

## 2023-09-12 DIAGNOSIS — M75.121 COMPLETE TEAR OF RIGHT ROTATOR CUFF, UNSPECIFIED WHETHER TRAUMATIC: Primary | ICD-10-CM

## 2023-09-12 DIAGNOSIS — M75.101 TEAR OF RIGHT SUPRASPINATUS TENDON: ICD-10-CM

## 2023-09-12 PROCEDURE — 97140 MANUAL THERAPY 1/> REGIONS: CPT | Performed by: PHYSICAL THERAPIST

## 2023-09-12 PROCEDURE — 97112 NEUROMUSCULAR REEDUCATION: CPT | Performed by: PHYSICAL THERAPIST

## 2023-09-12 PROCEDURE — 97110 THERAPEUTIC EXERCISES: CPT | Performed by: PHYSICAL THERAPIST

## 2023-09-12 NOTE — PROGRESS NOTES
PT Re-Evaluation     Today's date: 2023  Patient name: Memo Selby  : 1955  MRN: [id number]  Referring provider: Willian Larson*  Dx:   Encounter Diagnosis     ICD-10-CM    1. Complete tear of right rotator cuff, unspecified whether traumatic  M75.121       2. Tear of right supraspinatus tendon  M75.101       3. Aftercare following surgery  Z48.89                      Assessment  Understanding of Dx/Px/POC: good   Prognosis: good    Goals  STGs: To be complete within 4 weeks (partially met)  - Decrease pain to < 2/10 at worst  - Increase AROM to WNL  - Increase strength to > 4+/5  - Improve postural awareness capacity to > 60min before deficit    LTGs: To be complete within 8-12 weeks (partially met)  - Able to repetitively complete all overhead activity without pain or limitation for increased safety and functional capacity with ADLs and work-related duty  - Able to repetitively complete all reaching activity without pain or limitation for increased safety and functional capacity with ADLs and work-related duty  - Able to repetitively complete all pushing/pulling activity without pain or limitation for increased safety and functional capacity with ADLs and work-related duty  - Able to complete all lifting/carrying activity without pain or limitation for increased safety and functional capacity with ADLs and work-related duty    Plan  Planned therapy interventions: manual therapy, neuromuscular re-education, patient education, self care, postural training, therapeutic activities, therapeutic exercise and home exercise program  Frequency: 2x week  Duration in weeks: 6       Upon completion of today's re-evaluation, as evidenced by present objective and subjective measures, Darryl's sx remain consistent with continued, well-paced progress from being s/p R Shoulder RTC repair 23.  Patient will benefit from continued skilled physical therapy, as per MD protocol to address current deficits. Subjective Evaluation    Patient Goals  Patient goals for therapy: increased strength, decreased pain and increased motion    Pain  Current pain rating: 3  At best pain ratin  At worst pain ratin  Location: R Shoulder         Pt reports continued progress from being s/p R Shoulder RTC repair 23. MD follow up 23. HEP continues to go well. No setbacks. Anxious to continue making progress.       Objective Pain level ranges 0-3/10  AROM: R Shoulder Flexion 160 degrees, Abd 160 degrees, ER 90 degrees, IR 45 degrees; L Shoulder WNL  PROM: R Shoulder Flexion 165 degrees, Abduction 165 degrees, ER 95 degrees, IR 50 degrees; L Shoulder WNL  Strength: R Shoulder Flexion 3+/5, Abd 3+/5, ER 3+/5, IR 4+/5; L Shoulder 5/5 t/o  Postural Awareness: Fair and improving (rounded shoulders, forward head)  Incisions: Clean and healing well  FOTO: 62; GOAL: 56  Unable to complete overhead activity without pain and limitation, but improving  Unable to complete pushing/pulling activity without pain and limitation, but improving  Unable to complete lifting/carrying activity without pain and limitation, but improving  Unable to complete cross body/behind the back reaching activity without pain and limitation, but improving             Precautions: MD protocol for being s/p R Shoulder RTC repair 23    Daily Treatment Diary    HEP: Handout provided and discussed      Manuals 23   ART        PROM/Stretch x15' x15' x15 x15' x15'   IASTM        STM/Triggerpoint        JM                Neuro Re-Ed        Pendulums        Putty Play        No $ AROM with scap retract 3x10 3x10 3x10 3x10 3x10   Isometrics Abd, ER 3x10 3x10 3x10 3x10 3x10   Stool Roll backs 10x5'' 10x5'' 10x 5'' 10x5'' 10x5''   Supine [place]   10x5''             Ther Ex        Scap retract 3x10 2x10 2x10 3x10 3x10   Table Slides Scap 3x10 Scap 3x10 Scap 3x10 Scap 2x10 Scap 3x10   Pulleys 3x10 3x10 3x10 3x10 3 way 3x10   Seated Table ER PROM 3x10 3x10 3x10 3x10 3x10   Wand Flexion 3x10 2x10 3x10 3x10 3x10   Wand ER 3x10 2x10 2x10 3x10 3x10   Wall Slides 3x10 3x10 3x10 2x10 2x10   Standing stick AAROM flexion, thumbs back with wall to finish 10x 10x 10x 2x10 3x10   SL ER 3x10 2x10 2x10 2x10 3x10   SL Abd 3x10 2x10 2x10 2x10 3x10   Standing Abduction 3x10 3x10 3x10     Standing Abduction 2x10 2x10 3x10                     Ther Activity        Retro UBE x5' x5' x5' x5' x5'           Gait Training                        Modalities        MHP   x10'     CP x10' x10'  x10' x10'   US/Stim

## 2023-09-12 NOTE — LETTER
2023    Jocelyn Maya MD  [address] [place] [address]    Patient: Ti Winslow   YOB: 1955   Date of Visit: 2023     Encounter Diagnosis     ICD-10-CM    1. Complete tear of right rotator cuff, unspecified whether traumatic  M75.121       2. Tear of right supraspinatus tendon  M75.101       3. Aftercare following surgery  Z48.89           Dear Dr. Hugo Hansen:    Thank you for your recent referral of Ti Winslow. Please review the attached evaluation summary from [facility] recent visit. Please verify that you agree with the plan of care by signing the attached order. If you have any questions or concerns, please do not hesitate to call. I sincerely appreciate the opportunity to share in the care of one of your patients and hope to have another opportunity to work with you in the near future. Sincerely,    Dinorah Manjarrez, PT, DPT, ATC, ART      Referring Provider:      I certify that I have read the below Plan of Care and certify the need for these services furnished under this plan of treatment while under my care. Jocelyn Maya MD  [id number] [address]  Via Fax: [phone]          PT Re-Evaluation     Today's date: 2023  Patient name: Ti Winslow  : 1955  MRN: [id number]  Referring provider: Jocelyn Maya*  Dx:   Encounter Diagnosis     ICD-10-CM    1. Complete tear of right rotator cuff, unspecified whether traumatic  M75.121       2. Tear of right supraspinatus tendon  M75.101       3.  Aftercare following surgery  Z48.89                      Assessment  Understanding of Dx/Px/POC: good   Prognosis: good    Goals  STGs: To be complete within 4 weeks (partially met)  - Decrease pain to < 2/10 at worst  - Increase AROM to WNL  - Increase strength to > 4+/5  - Improve postural awareness capacity to > 60min before deficit    LTGs: To be complete within 8-12 weeks (partially met)  - Able to repetitively complete all overhead activity without pain or limitation for increased safety and functional capacity with ADLs and work-related duty  - Able to repetitively complete all reaching activity without pain or limitation for increased safety and functional capacity with ADLs and work-related duty  - Able to repetitively complete all pushing/pulling activity without pain or limitation for increased safety and functional capacity with ADLs and work-related duty  - Able to complete all lifting/carrying activity without pain or limitation for increased safety and functional capacity with ADLs and work-related duty    Plan  Planned therapy interventions: manual therapy, neuromuscular re-education, patient education, self care, postural training, therapeutic activities, therapeutic exercise and home exercise program  Frequency: 2x week  Duration in weeks: 6       Upon completion of today's re-evaluation, as evidenced by present objective and subjective measures, Darryl's sx remain consistent with continued, well-paced progress from being s/p R Shoulder RTC repair 23. Patient will benefit from continued skilled physical therapy, as per MD protocol to address current deficits. Subjective Evaluation    Patient Goals  Patient goals for therapy: increased strength, decreased pain and increased motion    Pain  Current pain rating: 3  At best pain ratin  At worst pain ratin  Location: R Shoulder         Pt reports continued progress from being s/p R Shoulder RTC repair 23. MD follow up 23. HEP continues to go well. No setbacks. Anxious to continue making progress.       Objective Pain level ranges 0-3/10  AROM: R Shoulder Flexion 160 degrees, Abd 160 degrees, ER 90 degrees, IR 45 degrees; L Shoulder WNL  PROM: R Shoulder Flexion 165 degrees, Abduction 165 degrees, ER 95 degrees, IR 50 degrees; L Shoulder WNL  Strength: R Shoulder Flexion 3+/5, Abd 3+/5, ER 3+/5, IR 4+/5; L Shoulder 5/5 t/o  Postural Awareness: Fair and improving (rounded shoulders, forward head)  Incisions: Clean and healing well  FOTO: 62; GOAL: 56  Unable to complete overhead activity without pain and limitation, but improving  Unable to complete pushing/pulling activity without pain and limitation, but improving  Unable to complete lifting/carrying activity without pain and limitation, but improving  Unable to complete cross body/behind the back reaching activity without pain and limitation, but improving            Precautions: MD protocol for being s/p R Shoulder RTC repair 6/26/23    Daily Treatment Diary    HEP: Handout provided and discussed      Manuals 8/31/23 9/7/23 9/12/23 8/24/23 8/29/23   ART        PROM/Stretch x15' x15' x15 x15' x15'   IASTM        STM/Triggerpoint        JM                Neuro Re-Ed        Pendulums        Putty Play        No $ AROM with scap retract 3x10 3x10 3x10 3x10 3x10   Isometrics Abd, ER 3x10 3x10 3x10 3x10 3x10   Stool Roll backs 10x5'' 10x5'' 10x 5'' 10x5'' 10x5''   Supine [place]   10x5''             Ther Ex        Scap retract 3x10 2x10 2x10 3x10 3x10   Table Slides Scap 3x10 Scap 3x10 Scap 3x10 Scap 2x10 Scap 3x10   Pulleys 3x10 3x10 3x10 3x10 3 way 3x10   Seated Table ER PROM 3x10 3x10 3x10 3x10 3x10   Wand Flexion 3x10 2x10 3x10 3x10 3x10   Wand ER 3x10 2x10 2x10 3x10 3x10   Wall Slides 3x10 3x10 3x10 2x10 2x10   Standing stick AAROM flexion, thumbs back with wall to finish 10x 10x 10x 2x10 3x10   SL ER 3x10 2x10 2x10 2x10 3x10   SL Abd 3x10 2x10 2x10 2x10 3x10   Standing Abduction 3x10 3x10 3x10     Standing Abduction 2x10 2x10 3x10                     Ther Activity        Retro UBE x5' x5' x5' x5' x5'           Gait Training                        Modalities        MHP   x10'     CP x10' x10'  x10' x10'   US/Stim

## 2023-09-14 ENCOUNTER — TELEPHONE (OUTPATIENT)
Dept: NEUROLOGY | Facility: CLINIC | Age: 68
End: 2023-09-14

## 2023-09-14 ENCOUNTER — OFFICE VISIT (OUTPATIENT)
Dept: PHYSICAL THERAPY | Facility: CLINIC | Age: 68
End: 2023-09-14
Payer: COMMERCIAL

## 2023-09-14 DIAGNOSIS — M75.101 TEAR OF RIGHT SUPRASPINATUS TENDON: ICD-10-CM

## 2023-09-14 DIAGNOSIS — Z48.89 AFTERCARE FOLLOWING SURGERY: ICD-10-CM

## 2023-09-14 DIAGNOSIS — M75.121 COMPLETE TEAR OF RIGHT ROTATOR CUFF, UNSPECIFIED WHETHER TRAUMATIC: Primary | ICD-10-CM

## 2023-09-14 PROCEDURE — 97112 NEUROMUSCULAR REEDUCATION: CPT | Performed by: PHYSICAL THERAPIST

## 2023-09-14 PROCEDURE — 97140 MANUAL THERAPY 1/> REGIONS: CPT | Performed by: PHYSICAL THERAPIST

## 2023-09-14 PROCEDURE — 97110 THERAPEUTIC EXERCISES: CPT | Performed by: PHYSICAL THERAPIST

## 2023-09-14 NOTE — PROGRESS NOTES
Daily Note     Today's date: 2023  Patient name: Ebonie Schwartz  : 1955  MRN: [id number]  Referring provider: Justo Whalen*  Dx:   Encounter Diagnosis     ICD-10-CM    1. Complete tear of right rotator cuff, unspecified whether traumatic  M75.121       2. Tear of right supraspinatus tendon  M75.101       3. Aftercare following surgery  Z48.89                      Subjective: Pt reports HEP going well. No setbacks. Anxious to continue making progress. Objective: See treatment diary below      Assessment: Tolerated treatment well. Patient demonstrated fatigue post treatment, exhibited good technique with therapeutic exercises and would benefit from continued PT      Plan: Continue per plan of care. Progress treatment as tolerated.          Precautions: MD protocol for being s/p R Shoulder RTC repair 23    Daily Treatment Diary    HEP: Handout provided and discussed      Manuals 23   ART        PROM/Stretch x15' x15' x15 x15' x15'   IASTM        STM/Triggerpoint        JM                Neuro Re-Ed        Pendulums        Putty Play        No $ AROM with scap retract 3x10 3x10 3x10 3x10 3x10   Isometrics Abd, ER 3x10 3x10 3x10 3x10 3x10   Stool Roll backs 10x5'' 10x5'' 10x 5'' 10x5'' 10x5''   Supine [place]   10x5'' 10x5''            Ther Ex        Scap retract 3x10 2x10 2x10 3x10 3x10   Table Slides Scap 3x10 Scap 3x10 Scap 3x10 Scap 3x10 Scap 3x10   Pulleys 3x10 3x10 3x10 3x10 3 way 3x10   Seated Table ER PROM 3x10 3x10 3x10 3x10 3x10   Wand Flexion 3x10 2x10 3x10 3x10 3x10   Wand ER 3x10 2x10 2x10 3x10 3x10   Wall Slides 3x10 3x10 3x10 3x10 2x10   Standing stick AAROM flexion, thumbs back with wall to finish 10x 10x 10x 10x 3x10   SL ER 3x10 2x10 2x10 2x10 3x10   SL Abd 3x10 2x10 2x10 2x10 3x10   Standing Abduction 3x10 3x10 3x10 3x10    Standing Abduction 2x10 2x10 3x10 3x10                    Ther Activity        Retro UBE x5' x5' x5' x5' x5' Gait Training                        Modalities        MHP   x10'     CP x10' x10'  x10' x10'   US/Stim

## 2023-09-14 NOTE — TELEPHONE ENCOUNTER
Hi, this is a Darryl Signor. My phone number is [phone]. My birthday is [birth year]. This is a pertaining to Jade about my license for the fill out a form. We've been playing, i guess phone tag or whatever. Well not really phone tag. You're not getting back to me. And there's a form that you have to fill out. There is a charge. Nobody telling me how much. Nobody is getting back to us either text or phone. So let us know how much it is and we'll send the money in or give you the card number or what attach it to my account. And just do it. Okay, fill out the forms, charge my account and send it in. Fax it whatever. Um I'm going to continue to call until I get this satisfied. It's already been 6 months and 3 weeks and most probably longer than 3 weeks, almost going on for 7 months without the license. Would you please do this courtesy for me? Thank you.

## 2023-09-18 ENCOUNTER — RA CDI HCC (OUTPATIENT)
Dept: OTHER | Facility: HOSPITAL | Age: 68
End: 2023-09-18

## 2023-09-19 ENCOUNTER — OFFICE VISIT (OUTPATIENT)
Dept: PHYSICAL THERAPY | Facility: CLINIC | Age: 68
End: 2023-09-19
Payer: COMMERCIAL

## 2023-09-19 DIAGNOSIS — Z48.89 AFTERCARE FOLLOWING SURGERY: ICD-10-CM

## 2023-09-19 DIAGNOSIS — M75.121 COMPLETE TEAR OF RIGHT ROTATOR CUFF, UNSPECIFIED WHETHER TRAUMATIC: Primary | ICD-10-CM

## 2023-09-19 DIAGNOSIS — M75.101 TEAR OF RIGHT SUPRASPINATUS TENDON: ICD-10-CM

## 2023-09-19 PROCEDURE — 97112 NEUROMUSCULAR REEDUCATION: CPT | Performed by: PHYSICAL THERAPIST

## 2023-09-19 PROCEDURE — 97140 MANUAL THERAPY 1/> REGIONS: CPT | Performed by: PHYSICAL THERAPIST

## 2023-09-19 PROCEDURE — 97110 THERAPEUTIC EXERCISES: CPT | Performed by: PHYSICAL THERAPIST

## 2023-09-19 NOTE — PROGRESS NOTES
Daily Note     Today's date: 2023  Patient name: Joesph Kayser  : 1955  MRN: [id number]  Referring provider: Timbo Jenkins*  Dx:   Encounter Diagnosis     ICD-10-CM    1. Complete tear of right rotator cuff, unspecified whether traumatic  M75.121       2. Tear of right supraspinatus tendon  M75.101       3. Aftercare following surgery  Z48.89                      Subjective: Pt reports HEP going well. No setbacks. Anxious to continue making progress. Objective: See treatment diary below      Assessment: Tolerated treatment well. Patient demonstrated fatigue post treatment, exhibited good technique with therapeutic exercises and would benefit from continued PT      Plan: Continue per plan of care. Progress treatment as tolerated.          Precautions: MD protocol for being s/p R Shoulder RTC repair 23    Daily Treatment Diary    HEP: Handout provided and discussed      Manuals 23   ART        PROM/Stretch x15' x15' x15 x15' x15'   IASTM        STM/Triggerpoint        JM                Neuro Re-Ed        Pendulums        Putty Play        No $ AROM with scap retract 3x10 3x10 3x10 3x10 3x10   Isometrics Abd, ER 3x10 3x10 3x10 3x10 3x10   Stool Roll backs 10x5'' 10x5'' 10x 5'' 10x5'' 10x5''   Supine [place]   10x5'' 10x5'' 10x5''           Ther Ex        Scap retract 3x10 2x10 2x10 3x10 3x10   Table Slides Scap 3x10 Scap 3x10 Scap 3x10 Scap 3x10 Scap 3x10   Pulleys 3x10 3x10 3x10 3x10 3 way 3x10   Seated Table ER PROM 3x10 3x10 3x10 3x10 3x10   Wand Flexion 3x10 2x10 3x10 3x10 3x10   Wand ER 3x10 2x10 2x10 3x10 3x10   Wall Slides 3x10 3x10 3x10 3x10 2x10   Standing stick AAROM flexion, thumbs back with wall to finish 10x 10x 10x 10x 3x10   SL ER 3x10 2x10 2x10 2x10 3x10   SL Abd 3x10 2x10 2x10 2x10 3x10   Standing Abduction 3x10 3x10 3x10 3x10 3x10   Standing Abduction 2x10 2x10 3x10 3x10 3x10                   Ther Activity        Retro UBE x5' x5' x5' x5' x5'           Gait Training                        Modalities        MHP   x10'     CP x10' x10'  x10' x10'   US/Stim

## 2023-09-21 ENCOUNTER — OFFICE VISIT (OUTPATIENT)
Dept: PHYSICAL THERAPY | Facility: CLINIC | Age: 68
End: 2023-09-21
Payer: COMMERCIAL

## 2023-09-21 DIAGNOSIS — Z48.89 AFTERCARE FOLLOWING SURGERY: ICD-10-CM

## 2023-09-21 DIAGNOSIS — M75.121 COMPLETE TEAR OF RIGHT ROTATOR CUFF, UNSPECIFIED WHETHER TRAUMATIC: Primary | ICD-10-CM

## 2023-09-21 DIAGNOSIS — M75.101 TEAR OF RIGHT SUPRASPINATUS TENDON: ICD-10-CM

## 2023-09-21 PROCEDURE — 97110 THERAPEUTIC EXERCISES: CPT | Performed by: PHYSICAL THERAPIST

## 2023-09-21 PROCEDURE — 97112 NEUROMUSCULAR REEDUCATION: CPT | Performed by: PHYSICAL THERAPIST

## 2023-09-21 PROCEDURE — 97140 MANUAL THERAPY 1/> REGIONS: CPT | Performed by: PHYSICAL THERAPIST

## 2023-09-21 NOTE — PROGRESS NOTES
Daily Note     Today's date: 2023  Patient name: Anna Garza  : 1955  MRN: [id number]  Referring provider: Mel Fernandez*  Dx:   Encounter Diagnosis     ICD-10-CM    1. Complete tear of right rotator cuff, unspecified whether traumatic  M75.121       2. Tear of right supraspinatus tendon  M75.101       3. Aftercare following surgery  Z48.89                      Subjective: Pt reports HEP going well. No setbacks. Anxious to continue making progress. Objective: See treatment diary below      Assessment: Tolerated treatment well. Patient demonstrated fatigue post treatment, exhibited good technique with therapeutic exercises and would benefit from continued PT      Plan: Continue per plan of care. Progress treatment as tolerated.          Precautions: MD protocol for being s/p R Shoulder RTC repair 23    Daily Treatment Diary    HEP: Handout provided and discussed      Manuals 23   ART        PROM/Stretch x15' x15' x15 x15' x15'   IASTM        STM/Triggerpoint        JM                Neuro Re-Ed        Pendulums        Putty Play        No $ AROM with scap retract 3x10 3x10 3x10 3x10 3x10   Isometrics Abd, ER 3x10 3x10 3x10 3x10 3x10   Stool Roll backs 10x5'' 10x5'' 10x 5'' 10x5'' 10x5''   Supine [place] 10x5''  10x5'' 10x5'' 10x5''           Ther Ex        Scap retract 3x10 2x10 2x10 3x10 3x10   Table Slides Scap 3x10 Scap 3x10 Scap 3x10 Scap 3x10 Scap 3x10   Pulleys 3x10 3x10 3x10 3x10 3 way 3x10   Seated Table ER PROM 3x10 3x10 3x10 3x10 3x10   Wand Flexion 3x10 2x10 3x10 3x10 3x10   Wand ER 3x10 2x10 2x10 3x10 3x10   Wall Slides 3x10 3x10 3x10 3x10 2x10   Standing stick AAROM flexion, thumbs back with wall to finish 10x 10x 10x 10x 3x10   SL ER 3x10 2x10 2x10 2x10 3x10   SL Abd 3x10 2x10 2x10 2x10 3x10   Standing Abduction 3x10 3x10 3x10 3x10 3x10   Standing Abduction 2x10 2x10 3x10 3x10 3x10                   Ther Activity        Retro UBE x5' x5' x5' x5' x5'           Gait Training                        Modalities        MHP   x10'     CP x10' x10'  x10' x10'   US/Stim

## 2023-09-23 ENCOUNTER — HOSPITAL ENCOUNTER (OUTPATIENT)
Dept: RADIOLOGY | Facility: HOSPITAL | Age: 68
Discharge: HOME | End: 2023-09-23
Payer: COMMERCIAL

## 2023-09-23 DIAGNOSIS — G40.109 FOCAL EPILEPSY ORIGINATING IN TEMPORAL LOBE (HCC): ICD-10-CM

## 2023-09-23 PROCEDURE — 70553 MRI BRAIN STEM W/O & W/DYE: CPT

## 2023-09-23 PROCEDURE — A9585 GADOBUTROL INJECTION: HCPCS | Performed by: NURSE PRACTITIONER

## 2023-09-23 PROCEDURE — G1004 CDSM NDSC: HCPCS

## 2023-09-23 RX ORDER — GADOBUTROL 604.72 MG/ML
15 INJECTION INTRAVENOUS
Status: COMPLETED | OUTPATIENT
Start: 2023-09-23 | End: 2023-09-23

## 2023-09-23 RX ADMIN — GADOBUTROL 15 ML: 604.72 INJECTION INTRAVENOUS at 15:29

## 2023-09-26 ENCOUNTER — OFFICE VISIT (OUTPATIENT)
Dept: PHYSICAL THERAPY | Facility: CLINIC | Age: 68
End: 2023-09-26
Payer: COMMERCIAL

## 2023-09-26 DIAGNOSIS — M75.121 COMPLETE TEAR OF RIGHT ROTATOR CUFF, UNSPECIFIED WHETHER TRAUMATIC: Primary | ICD-10-CM

## 2023-09-26 DIAGNOSIS — M75.101 TEAR OF RIGHT SUPRASPINATUS TENDON: ICD-10-CM

## 2023-09-26 DIAGNOSIS — Z48.89 AFTERCARE FOLLOWING SURGERY: ICD-10-CM

## 2023-09-26 PROCEDURE — 97110 THERAPEUTIC EXERCISES: CPT | Performed by: PHYSICAL THERAPIST

## 2023-09-26 PROCEDURE — 97140 MANUAL THERAPY 1/> REGIONS: CPT | Performed by: PHYSICAL THERAPIST

## 2023-09-26 PROCEDURE — 97112 NEUROMUSCULAR REEDUCATION: CPT | Performed by: PHYSICAL THERAPIST

## 2023-09-26 NOTE — PROGRESS NOTES
Daily Note     Today's date: 2023  Patient name: Joesph Kayser  : 1955  MRN: [id number]  Referring provider: Timbo Jenkins*  Dx:   Encounter Diagnosis     ICD-10-CM    1. Complete tear of right rotator cuff, unspecified whether traumatic  M75.121       2. Tear of right supraspinatus tendon  M75.101       3. Aftercare following surgery  Z48.89                      Subjective: Pt reports HEP going well. No setbacks. Anxious to continue making progress. Objective: See treatment diary below      Assessment: Tolerated treatment well. Patient demonstrated fatigue post treatment, exhibited good technique with therapeutic exercises and would benefit from continued PT      Plan: Continue per plan of care. Progress treatment as tolerated.          Precautions: MD protocol for being s/p R Shoulder RTC repair 23    Daily Treatment Diary    HEP: Handout provided and discussed      Manuals 23   ART        PROM/Stretch x15' x15' x15 x15' x15'   IASTM        STM/Triggerpoint        JM                Neuro Re-Ed        Pendulums        Putty Play        No $ AROM with scap retract 3x10 3x10 3x10 3x10 3x10   Isometrics Abd, ER 3x10 3x10 3x10 3x10 3x10   Stool Roll backs 10x5'' 10x5'' 10x 5'' 10x5'' 10x5''   Supine [place] 10x5'' 10x5'' 10x5'' 10x5'' 10x5''           Ther Ex        Pulleys 3x10 3x10 3x10 3x10 3 way 3x10   SL ER 3x10 3x10 1# 2x10 2x10 3x10   SL Abd 3x10 3x10 1# 2x10 2x10 3x10   Standing Abduction 3x10 3x10 3x10 3x10 3x10   Standing Scaption 3x10 3x10 3x10 3x10 3x10   Standing bent over wedge Ys, Ts 3x10 3x10 1# 3x10 3x10 3x10           Ther Activity        Retro UBE x5' x5' x5' x5' x5'           Gait Training                        Modalities        MHP   x10'     CP x10' x10'  x10' x10'   US/Stim

## 2023-09-28 ENCOUNTER — OFFICE VISIT (OUTPATIENT)
Dept: PHYSICAL THERAPY | Facility: CLINIC | Age: 68
End: 2023-09-28
Payer: COMMERCIAL

## 2023-09-28 DIAGNOSIS — Z48.89 AFTERCARE FOLLOWING SURGERY: ICD-10-CM

## 2023-09-28 DIAGNOSIS — M75.121 COMPLETE TEAR OF RIGHT ROTATOR CUFF, UNSPECIFIED WHETHER TRAUMATIC: Primary | ICD-10-CM

## 2023-09-28 DIAGNOSIS — M75.101 TEAR OF RIGHT SUPRASPINATUS TENDON: ICD-10-CM

## 2023-09-28 PROCEDURE — 97110 THERAPEUTIC EXERCISES: CPT

## 2023-09-28 PROCEDURE — 97112 NEUROMUSCULAR REEDUCATION: CPT

## 2023-09-28 NOTE — PROGRESS NOTES
Daily Note     Today's date: 2023  Patient name: Christina Galvez  : 1955  MRN: [id number]  Referring provider: Jed Newton*  Dx:   Encounter Diagnosis     ICD-10-CM    1. Complete tear of right rotator cuff, unspecified whether traumatic  M75.121       2. Tear of right supraspinatus tendon  M75.101       3. Aftercare following surgery  Z48.89                      Subjective: Pt reports he is doing well. Reports MD is pleased with progress. Objective: See treatment diary below      Assessment: Tolerated treatment well. Patient demonstrated fatigue post treatment and exhibited good technique with therapeutic exercises. Pt cont to make steady gains with program. Cont to arely well without c/o pain. Cont to demo overall improved AROM with program.       Plan: Continue per plan of care.       Precautions: MD protocol for being s/p R Shoulder RTC repair 23    Daily Treatment Diary    HEP: Handout provided and discussed      Manuals 23   ART        PROM/Stretch x15' x15'  x15' x15'   IASTM        STM/Triggerpoint        JM                Neuro Re-Ed        Pendulums        Putty Play        No $ AROM with scap retract 3x10 3x10 3x10 3x10 3x10   Isometrics Abd, ER 3x10 3x10 3x10 3x10 3x10   Stool Roll backs 10x5'' 10x5'' 10x 5'' 10x5'' 10x5''   Supine [place] 10x5'' 10x5'' 10x5'' 10x5'' 10x5''           Ther Ex        Pulleys 3x10 3x10 3x10 3x10 3 way 3x10   SL ER 3x10 3x10 1# 3x10 1# 2x10 3x10   SL Abd 3x10 3x10 1# 3/10 1# 2x10 3x10   Standing Abduction 3x10 3x10 3x10 3x10 3x10   Standing Scaption 3x10 3x10 3x10 3x10 3x10   Standing bent over wedge Ys, Ts 3x10 3x10 1# 3x10 3x10 3x10           Ther Activity        Retro UBE x5' x5' x5' x5' x5'           Gait Training                        Modalities        MHP        CP x10' x10' 10' x10' x10'   US/Stim

## 2023-09-29 ENCOUNTER — TELEPHONE (OUTPATIENT)
Dept: NEUROLOGY | Facility: CLINIC | Age: 68
End: 2023-09-29

## 2023-09-29 NOTE — TELEPHONE ENCOUNTER
----- Message from LEIGHA Clemente sent at 9/29/2023  3:01 PM EDT -----  Stable MRI. Can discuss further at upcoming follow up appointment.  ----- Message -----  From: Lida, Radiology Results In  Sent: 9/27/2023   7:42 PM EDT  To: LEIGHA Clemente

## 2023-10-03 ENCOUNTER — OFFICE VISIT (OUTPATIENT)
Dept: PHYSICAL THERAPY | Facility: CLINIC | Age: 68
End: 2023-10-03
Payer: COMMERCIAL

## 2023-10-03 DIAGNOSIS — M75.121 COMPLETE TEAR OF RIGHT ROTATOR CUFF, UNSPECIFIED WHETHER TRAUMATIC: Primary | ICD-10-CM

## 2023-10-03 DIAGNOSIS — Z48.89 AFTERCARE FOLLOWING SURGERY: ICD-10-CM

## 2023-10-03 DIAGNOSIS — M75.101 TEAR OF RIGHT SUPRASPINATUS TENDON: ICD-10-CM

## 2023-10-03 PROBLEM — Z00.00 MEDICARE ANNUAL WELLNESS VISIT, SUBSEQUENT: Status: RESOLVED | Noted: 2023-08-04 | Resolved: 2023-10-03

## 2023-10-03 PROCEDURE — 97112 NEUROMUSCULAR REEDUCATION: CPT | Performed by: PHYSICAL THERAPIST

## 2023-10-03 PROCEDURE — 97110 THERAPEUTIC EXERCISES: CPT | Performed by: PHYSICAL THERAPIST

## 2023-10-03 NOTE — PROGRESS NOTES
Daily Note     Today's date: 10/3/2023  Patient name: Jessica Sol  : 1955  MRN: [id number]  Referring provider: Fenranda Westfall*  Dx:   Encounter Diagnosis     ICD-10-CM    1. Complete tear of right rotator cuff, unspecified whether traumatic  M75.121       2. Tear of right supraspinatus tendon  M75.101       3. Aftercare following surgery  Z48.89                      Subjective: Patient has general shoulder soreness since using a 1 lb weight for some of his exercises. Objective: See treatment diary below      Assessment: Tolerated treatment well. Patient demonstrated fatigue post treatment and exhibited good technique with therapeutic exercises      Plan: Progress treament per protocol.       Precautions: MD protocol for being s/p R Shoulder RTC repair 23    Daily Treatment Diary    HEP: Handout provided and discussed      Manuals 9/21/23 9/26/23 9/28/23 10/3/23 9/19/23   ART        PROM/Stretch x15' x15'   x15'   IASTM        STM/Triggerpoint        JM                Neuro Re-Ed        Pendulums        Putty Play        No $ AROM with scap retract 3x10 3x10 3x10  3x10 3x10   Isometrics Abd, ER 3x10 3x10 3x10 3x10 3x10   Stool Roll backs 10x5'' 10x5'' 10x 5'' 10x5'' 10x5''   Supine [place] 10x5'' 10x5'' 10x5'' 10x5'' 10x5''           Ther Ex        Pulleys 3x10 3x10 3x10 3x10  3x10   SL ER 3x10 3x10 1# 3x10 1# 3x10 1# 3x10   SL Abd 3x10 3x10 1# 3/10 1# 3/10 1# 3x10   Standing Abduction 3x10 3x10 3x10 3x10 3x10   Standing Scaption 3x10 3x10 3x10 3x10 3x10   Standing bent over wedge Ys, Ts 3x10 3x10 1# 3x10 1# 3x10 3x10   TB ER No Money    L2 3/10    Ther Activity        Retro UBE x5' x5' x5' x5' x5'           Gait Training                        Modalities        MHP        CP x10' x10' 10' x10' x10'   US/Stim · Without acute exacerbation   · Tobacco cessation  · Continue home inhalers

## 2023-10-05 ENCOUNTER — OFFICE VISIT (OUTPATIENT)
Dept: PHYSICAL THERAPY | Facility: CLINIC | Age: 68
End: 2023-10-05
Payer: COMMERCIAL

## 2023-10-05 DIAGNOSIS — M75.101 TEAR OF RIGHT SUPRASPINATUS TENDON: ICD-10-CM

## 2023-10-05 DIAGNOSIS — M75.121 COMPLETE TEAR OF RIGHT ROTATOR CUFF, UNSPECIFIED WHETHER TRAUMATIC: Primary | ICD-10-CM

## 2023-10-05 DIAGNOSIS — Z48.89 AFTERCARE FOLLOWING SURGERY: ICD-10-CM

## 2023-10-05 PROCEDURE — 97110 THERAPEUTIC EXERCISES: CPT

## 2023-10-05 PROCEDURE — 97112 NEUROMUSCULAR REEDUCATION: CPT

## 2023-10-05 NOTE — PROGRESS NOTES
Daily Note     Today's date: 10/5/2023  Patient name:  Seen  : 1955  MRN: [id number]  Referring provider: Kimberly Giordano*  Dx:   Encounter Diagnosis     ICD-10-CM    1. Complete tear of right rotator cuff, unspecified whether traumatic  M75.121       2. Tear of right supraspinatus tendon  M75.101       3. Aftercare following surgery  Z48.89                      Subjective: Pt reports he is doing well. Reports cont to be pleased with ROM. Objective: See treatment diary below      Assessment: Tolerated treatment well. Patient exhibited good technique with therapeutic exercises. Pt with cont discomfort with ER Isometrics. Cont to demonstrate overall improved AROM. Cont to make gains. Plan: Continue per plan of care.       Precautions: MD protocol for being s/p R Shoulder RTC repair 23    Daily Treatment Diary    HEP: Handout provided and discussed      Manuals 9/21/23 9/26/23 9/28/23 10/3/23 10   ART        PROM/Stretch x15' x15'      IASTM        STM/Triggerpoint        JM                Neuro Re-Ed        Pendulums        Putty Play        No $ AROM with scap retract 3x10 3x10 3x10  3x10 3x10   Isometrics Abd, ER 3x10 3x10 3x10 3x10 3x10   Stool Roll backs 10x5'' 10x5'' 10x 5'' 10x5'' 10x5''   Supine [place] 10x5'' 10x5'' 10x5'' 10x5'' 10x5''           Ther Ex        Pulleys 3x10 3x10 3x10 3x10  3x10   SL ER 3x10 3x10 1# 3x10 1# 3x10 1# 3x10 1#   SL Abd 3x10 3x10 1# 3/10 1# 3/10 1# 3x10 1#   Standing Abduction 3x10 3x10 3x10 3x10 3x10   Standing Scaption 3x10 3x10 3x10 3x10 3x10   Standing bent over wedge Ys, Ts 3x10 3x10 1# 3x10 1# 3x10 3x10 1#   TB ER No Money    L2 3/10 L2 3/10   Ther Activity        Retro UBE x5' x5' x5' x5' x5'           Gait Training                        Modalities        MHP        CP x10' x10' 10' x10' x10'   US/Stim

## 2023-10-10 ENCOUNTER — RA CDI HCC (OUTPATIENT)
Dept: OTHER | Facility: HOSPITAL | Age: 68
End: 2023-10-10

## 2023-10-10 ENCOUNTER — OFFICE VISIT (OUTPATIENT)
Dept: PHYSICAL THERAPY | Facility: CLINIC | Age: 68
End: 2023-10-10
Payer: COMMERCIAL

## 2023-10-10 DIAGNOSIS — M75.101 TEAR OF RIGHT SUPRASPINATUS TENDON: ICD-10-CM

## 2023-10-10 DIAGNOSIS — Z48.89 AFTERCARE FOLLOWING SURGERY: ICD-10-CM

## 2023-10-10 DIAGNOSIS — M75.121 COMPLETE TEAR OF RIGHT ROTATOR CUFF, UNSPECIFIED WHETHER TRAUMATIC: Primary | ICD-10-CM

## 2023-10-10 PROCEDURE — 97140 MANUAL THERAPY 1/> REGIONS: CPT | Performed by: PHYSICAL THERAPIST

## 2023-10-10 PROCEDURE — 97110 THERAPEUTIC EXERCISES: CPT | Performed by: PHYSICAL THERAPIST

## 2023-10-10 PROCEDURE — 97112 NEUROMUSCULAR REEDUCATION: CPT | Performed by: PHYSICAL THERAPIST

## 2023-10-10 NOTE — PROGRESS NOTES
Daily Note     Today's date: 10/10/2023  Patient name: Suki Boyer  : 1955  MRN: [id number]  Referring provider: Cristiano Page*  Dx:   Encounter Diagnosis     ICD-10-CM    1. Complete tear of right rotator cuff, unspecified whether traumatic  M75.121       2. Tear of right supraspinatus tendon  M75.101       3. Aftercare following surgery  Z48.89                      Subjective: Pt reports he is doing well. Reports cont to be pleased with progress. Objective: See treatment diary below      Assessment: Tolerated treatment well. Patient demonstrated fatigue post treatment, exhibited good technique with therapeutic exercises and would benefit from continued PT. Plan: Continue per plan of care. Progress treatment as tolerated.          Precautions: MD protocol for being s/p R Shoulder RTC repair 23    Daily Treatment Diary    HEP: Handout provided and discussed      Manuals 10/10/23 9/26/23 9/28/23 10/3/23 10/   ART        PROM/Stretch x15' x15'      IASTM        STM/Triggerpoint        JM                Neuro Re-Ed        Pendulums        Putty Play        No $ AROM with scap retract 3x10 3x10 3x10  3x10 3x10   Isometrics Abd, ER 3x10 3x10 3x10 3x10 3x10   Stool Roll backs 10x5'' 10x5'' 10x 5'' 10x5'' 10x5''   Supine [place] 10x5'' 10x5'' 10x5'' 10x5'' 10x5''   [place] 2 way 4x20'' ea                               Ther Ex        Pulleys 3x10 3x10 3x10 3x10  3x10   SL ER 3x10 2# 3x10 1# 3x10 1# 3x10 1# 3x10 1#   SL Abd 3x10 2# 3x10 1# 3/10 1# 3/10 1# 3x10 1#   Standing Abduction 3x10 2# 3x10 3x10 3x10 3x10   Standing Scaption 3x10 2# 3x10 3x10 3x10 3x10   Standing bent over wedge Ys, Ts 3x10 2# 3x10 1# 3x10 1# 3x10 3x10 1#   TB ER No Money L3 3x10   L2 3/10 L2 3/10   Ther Activity        Retro UBE x5' x5' x5' x5' x5'           Gait Training                        Modalities        MHP        CP x10' x10' 10' x10' x10'   US/Stim

## 2023-10-10 NOTE — PROGRESS NOTES
Daily Note     Today's date: 10/10/2023  Patient name: Tony Herrmann  : 1955  MRN: [id number]  Referring provider: Alberto Burden*  Dx:   Encounter Diagnosis     ICD-10-CM    1. Complete tear of right rotator cuff, unspecified whether traumatic  M75.121       2. Tear of right supraspinatus tendon  M75.101       3. Aftercare following surgery  Z48.89                      Subjective: Pt reports HEP going well. No setbacks. Anxious to continue making progress. Objective: See treatment diary below      Assessment: Tolerated treatment well. Patient demonstrated fatigue post treatment, exhibited good technique with therapeutic exercises and would benefit from continued PT      Plan: Continue per plan of care. Progress treatment as tolerated.          Precautions: MD protocol for being s/p R Shoulder RTC repair 23    Daily Treatment Diary    HEP: Handout provided and discussed      Manuals 23   ART        PROM/Stretch x15' x15' x15 x15' x15'   IASTM        STM/Triggerpoint        JM                Neuro Re-Ed        Pendulums        Putty Play        No $ AROM with scap retract 3x10 3x10 3x10 3x10 3x10   Isometrics Abd, ER 3x10 3x10 3x10 3x10 3x10   Stool Roll backs 10x5'' 10x5'' 10x 5'' 10x5'' 10x5''   Supine [place] 10x5'' 10x5'' 10x5'' 10x5'' 10x5''           Ther Ex        Pulleys 3x10 3x10 3x10 3x10 3 way 3x10   SL ER 3x10 3x10 1# 2x10 2x10 3x10   SL Abd 3x10 3x10 1# 2x10 2x10 3x10   Standing Abduction 3x10 3x10 3x10 3x10 3x10   Standing Scaption 3x10 3x10 3x10 3x10 3x10   Standing bent over wedge Ys, Ts 3x10 3x10 1# 3x10 3x10 3x10           Ther Activity        Retro UBE x5' x5' x5' x5' x5'           Gait Training                        Modalities        MHP   x10'     CP x10' x10'  x10' x10'   US/Stim

## 2023-10-10 NOTE — PROGRESS NOTES
F32A    [address] [facility] coding opportunities          Chart Reviewed number of suggestions sent to Provider: 1     Patients Insurance     Medicare Insurance: Kinza Londono

## 2023-10-12 ENCOUNTER — OFFICE VISIT (OUTPATIENT)
Dept: PHYSICAL THERAPY | Facility: CLINIC | Age: 68
End: 2023-10-12
Payer: COMMERCIAL

## 2023-10-12 ENCOUNTER — OFFICE VISIT (OUTPATIENT)
Dept: NEUROLOGY | Facility: CLINIC | Age: 68
End: 2023-10-12
Payer: COMMERCIAL

## 2023-10-12 VITALS
WEIGHT: 315 LBS | TEMPERATURE: 98.1 F | BODY MASS INDEX: 42.66 KG/M2 | SYSTOLIC BLOOD PRESSURE: 130 MMHG | HEIGHT: 72 IN | DIASTOLIC BLOOD PRESSURE: 59 MMHG | OXYGEN SATURATION: 97 % | HEART RATE: 72 BPM

## 2023-10-12 DIAGNOSIS — G40.109 FOCAL EPILEPSY ORIGINATING IN TEMPORAL LOBE (HCC): Primary | ICD-10-CM

## 2023-10-12 DIAGNOSIS — M75.121 COMPLETE TEAR OF RIGHT ROTATOR CUFF, UNSPECIFIED WHETHER TRAUMATIC: Primary | ICD-10-CM

## 2023-10-12 DIAGNOSIS — Z48.89 AFTERCARE FOLLOWING SURGERY: ICD-10-CM

## 2023-10-12 DIAGNOSIS — M75.101 TEAR OF RIGHT SUPRASPINATUS TENDON: ICD-10-CM

## 2023-10-12 PROCEDURE — 99215 OFFICE O/P EST HI 40 MIN: CPT | Performed by: PSYCHIATRY & NEUROLOGY

## 2023-10-12 PROCEDURE — 97110 THERAPEUTIC EXERCISES: CPT | Performed by: PHYSICAL THERAPIST

## 2023-10-12 PROCEDURE — 97112 NEUROMUSCULAR REEDUCATION: CPT | Performed by: PHYSICAL THERAPIST

## 2023-10-12 PROCEDURE — 97140 MANUAL THERAPY 1/> REGIONS: CPT | Performed by: PHYSICAL THERAPIST

## 2023-10-12 NOTE — PROGRESS NOTES
Daily Note     Today's date: 10/12/2023  Patient name: Siomara Claire  : 1955  MRN: [id number]  Referring provider: Alberta Washburn*  Dx:   Encounter Diagnosis     ICD-10-CM    1. Complete tear of right rotator cuff, unspecified whether traumatic  M75.121       2. Tear of right supraspinatus tendon  M75.101       3. Aftercare following surgery  Z48.89                      Subjective: Pt reports he is doing well. Reports cont to be pleased with progress. Objective: See treatment diary below      Assessment: Tolerated treatment well. Patient demonstrated fatigue post treatment, exhibited good technique with therapeutic exercises and would benefit from continued PT. Plan: Continue per plan of care. Progress treatment as tolerated.          Precautions: MD protocol for being s/p R Shoulder RTC repair 23    Daily Treatment Diary    HEP: Handout provided and discussed      Manuals 10/12/23 9/26/23 9/28/23 10/3/23 10/   ART        PROM/Stretch x15' x15'      IASTM        STM/Triggerpoint        JM                Neuro Re-Ed        Pendulums        Putty Play        No $ AROM with scap retract 3x10 3x10 3x10  3x10 3x10   Isometrics Abd, ER 3x10 3x10 3x10 3x10 3x10   Stool Roll backs 10x5'' 10x5'' 10x 5'' 10x5'' 10x5''   Supine [place] 10x5'' 10x5'' 10x5'' 10x5'' 10x5''   Bodyblade 2 way 4x20'' ea                               Ther Ex        Pulleys 3x10 3x10 3x10 3x10  3x10   SL ER 3x10 2# 3x10 1# 3x10 1# 3x10 1# 3x10 1#   SL Abd 3x10 2# 3x10 1# 3/10 1# 3/10 1# 3x10 1#   Standing Abduction 3x10 2# 3x10 3x10 3x10 3x10   Standing Scaption 3x10 2# 3x10 3x10 3x10 3x10   Standing bent over wedge Ys, Ts 3x10 2# 3x10 1# 3x10 1# 3x10 3x10 1#   TB Ys, Ts 3x10 ea L1       TB ER 3x10 L3       TB 70 degree Abd 3x10 L2       TB ER No Money L3 3x10   L2 3/10 L2 3/10           Ther Activity        Retro UBE x5' x5' x5' x5' x5'           Gait Training                        Modalities        MHP        CP x10' x10' 10' x10' x10'   US/Stim

## 2023-10-16 ENCOUNTER — VBI (OUTPATIENT)
Dept: ADMINISTRATIVE | Facility: OTHER | Age: 68
End: 2023-10-16

## 2023-10-17 ENCOUNTER — OFFICE VISIT (OUTPATIENT)
Dept: PHYSICAL THERAPY | Facility: CLINIC | Age: 68
End: 2023-10-17
Payer: COMMERCIAL

## 2023-10-17 DIAGNOSIS — M75.101 TEAR OF RIGHT SUPRASPINATUS TENDON: ICD-10-CM

## 2023-10-17 DIAGNOSIS — Z48.89 AFTERCARE FOLLOWING SURGERY: ICD-10-CM

## 2023-10-17 DIAGNOSIS — M75.121 COMPLETE TEAR OF RIGHT ROTATOR CUFF, UNSPECIFIED WHETHER TRAUMATIC: Primary | ICD-10-CM

## 2023-10-17 PROCEDURE — 97110 THERAPEUTIC EXERCISES: CPT | Performed by: PHYSICAL THERAPIST

## 2023-10-17 PROCEDURE — 97112 NEUROMUSCULAR REEDUCATION: CPT | Performed by: PHYSICAL THERAPIST

## 2023-10-17 PROCEDURE — 97140 MANUAL THERAPY 1/> REGIONS: CPT | Performed by: PHYSICAL THERAPIST

## 2023-10-17 NOTE — PROGRESS NOTES
Daily Note     Today's date: 10/17/2023  Patient name: Jamia England  : 1955  MRN: [id number]  Referring provider: Flakita Cabrales*  Dx:   Encounter Diagnosis     ICD-10-CM    1. Complete tear of right rotator cuff, unspecified whether traumatic  M75.121       2. Tear of right supraspinatus tendon  M75.101       3. Aftercare following surgery  Z48.89                      Subjective: Pt reports he is doing well. Reports cont to be pleased with progress. Objective: See treatment diary below      Assessment: Tolerated treatment well. Patient demonstrated fatigue post treatment, exhibited good technique with therapeutic exercises and would benefit from continued PT. Plan: Continue per plan of care. Progress treatment as tolerated.          Precautions: MD protocol for being s/p R Shoulder RTC repair 23    Daily Treatment Diary    HEP: Handout provided and discussed      Manuals 10/12/23 10/17/23 9/28/23 10/3/23 10   ART  X10'      PROM/Stretch x15'       IASTM        STM/Triggerpoint        JM                Neuro Re-Ed        Pendulums        Putty Play        No $ AROM with scap retract 3x10 3x10 3x10  3x10 3x10   Isometrics Abd, ER 3x10 3x10 3x10 3x10 3x10   Stool Roll backs 10x5'' 10x5'' 10x 5'' 10x5'' 10x5''   Supine [place] 10x5'' 10x5'' 10x5'' 10x5'' 10x5''   Bodyblade 2 way 4x20'' ea 2 way 4x20'' ea                              Ther Ex        Pulleys 3x10 3x10 3x10 3x10  3x10   SL ER 3x10 2# 3x10 2# 3x10 1# 3x10 1# 3x10 1#   SL Abd 3x10 2# 3x10 2# 3/10 1# 3/10 1# 3x10 1#   Standing Abduction 3x10 2# 3x10 2# 3x10 3x10 3x10   Standing Scaption 3x10 2# 3x10 2# 3x10 3x10 3x10   Standing bent over wedge Ys, Ts 3x10 2# 3x10 2# 3x10 1# 3x10 3x10 1#   TB Ys, Ts 3x10 ea L1 3x10 ea L1, L2      TB ER 3x10 L3 3x10 L3      TB 70 degree Abd 3x10 L2 3x10 L3      TB ER No Money L3 3x10 3x10 L3  L2 3/10 L2 3/10           Ther Activity        Retro UBE x5' x5' x5' x5' x5'           Gait Training Modalities        MHP        CP x10' x10' 10' x10' x10'   US/Stim

## 2023-10-19 ENCOUNTER — APPOINTMENT (OUTPATIENT)
Dept: PHYSICAL THERAPY | Facility: CLINIC | Age: 68
End: 2023-10-19
Payer: COMMERCIAL

## 2023-10-19 ENCOUNTER — OFFICE VISIT (OUTPATIENT)
Dept: INTERNAL MEDICINE CLINIC | Facility: CLINIC | Age: 68
End: 2023-10-19
Payer: COMMERCIAL

## 2023-10-19 VITALS — HEART RATE: 72 BPM | OXYGEN SATURATION: 97 % | TEMPERATURE: 97.9 F

## 2023-10-19 DIAGNOSIS — J40 BRONCHITIS: Primary | ICD-10-CM

## 2023-10-19 LAB
SARS-COV-2 AG UPPER RESP QL IA: NEGATIVE
VALID CONTROL: NORMAL

## 2023-10-19 PROCEDURE — 87811 SARS-COV-2 COVID19 W/OPTIC: CPT | Performed by: NURSE PRACTITIONER

## 2023-10-19 PROCEDURE — 99214 OFFICE O/P EST MOD 30 MIN: CPT | Performed by: NURSE PRACTITIONER

## 2023-10-19 RX ORDER — DOXYCYCLINE HYCLATE 100 MG/1
100 CAPSULE ORAL EVERY 12 HOURS SCHEDULED
Qty: 20 CAPSULE | Refills: 0 | Status: SHIPPED | OUTPATIENT
Start: 2023-10-19 | End: 2023-10-29

## 2023-10-19 RX ORDER — METHYLPREDNISOLONE 4 MG/1
TABLET ORAL
Qty: 21 EACH | Refills: 0 | Status: SHIPPED | OUTPATIENT
Start: 2023-10-19

## 2023-10-19 RX ORDER — GUAIFENESIN AND CODEINE PHOSPHATE 100; 10 MG/5ML; MG/5ML
5 SOLUTION ORAL 3 TIMES DAILY PRN
Qty: 240 ML | Refills: 0 | Status: SHIPPED | OUTPATIENT
Start: 2023-10-19

## 2023-10-19 NOTE — PROGRESS NOTES
Name: Juan Francisco Cantu      : 1955      MRN: [id number]  Encounter Provider: LEIGHA Vo  Encounter Date: 10/19/2023   Encounter department: [facility] Will start on Doxy, medrol and cough elixir. Continue supportive care. Rapid covid is negative. Will follow up as needed. 1. Bronchitis  -     doxycycline hyclate (VIBRAMYCIN) 100 mg capsule; Take 1 capsule (100 mg total) by mouth every 12 (twelve) hours for 10 days  -     methylPREDNISolone 4 MG tablet therapy pack; Use as directed on package  -     guaifenesin-codeine (GUAIFENESIN AC) 100-10 MG/5ML liquid; Take 5 mL by mouth 3 (three) times a day as needed for cough  -     POCT Rapid Covid Ag           Kayla Gan is for an acute visit. He has been having cough, congestion and now sore throat. He states he is not sure if he has a fever. Review of Systems   Constitutional:  Positive for chills. HENT:  Positive for congestion, postnasal drip and sore throat. Respiratory:  Positive for cough. All other systems reviewed and are negative. Current Outpatient Medications on File Prior to Visit   Medication Sig    acetaminophen (TYLENOL) 325 mg tablet Take 2 tablets (650 mg total) by mouth every 6 (six) hours as needed for mild pain, headaches or fever Do not exceed a total of 3 grams of tylenol/acetaminophen in a 24-hour period.  (Patient taking differently: Take 650 mg by mouth if needed for mild pain, headaches or fever Do not exceed a total of 3 grams of tylenol/acetaminophen in a 24-hour period.)    aspirin 81 mg chewable tablet Chew 1 tablet (81 mg total) daily To prevent stroke and heart attack (Patient not taking: Reported on 2023)    cholecalciferol (VITAMIN D3) 1,000 units tablet Take 2 tablets (2,000 Units total) by mouth daily For vitamin D deficiency    furosemide (LASIX) 20 mg tablet Take 1 tablet (20 mg total) by mouth daily for 3 days (Patient not taking: Reported on 10/12/2023)    levETIRAcetam (KEPPRA) 1000 MG tablet Take 1 tablet (1,000 mg total) by mouth every 12 (twelve) hours    meloxicam (MOBIC) 15 mg tablet TAKE 1 TABLET BY MOUTH EVERY DAY FOR 30 DAYS (Patient not taking: Reported on 10/12/2023)       Objective     Pulse 72   Temp 97.9 °F (36.6 °C)   SpO2 97%     Physical Exam  Constitutional:       Appearance: Normal appearance. He is obese. HENT:      Right Ear: Tympanic membrane, ear canal and external ear normal.      Left Ear: Tympanic membrane, ear canal and external ear normal.      Nose: Congestion present. Mouth/Throat:      Mouth: Mucous membranes are moist.      Pharynx: Oropharynx is clear. Eyes:      General:         Left eye: Discharge present. Cardiovascular:      Rate and Rhythm: Normal rate and regular rhythm. Pulses: Normal pulses. Heart sounds: Normal heart sounds. Pulmonary:      Effort: Pulmonary effort is normal.      Breath sounds: Normal breath sounds. Skin:     General: Skin is warm and dry. Capillary Refill: Capillary refill takes less than 2 seconds. Neurological:      General: No focal deficit present. Mental Status: He is alert and oriented to person, place, and time. Mental status is at baseline. Psychiatric:         Mood and Affect: Mood normal.         Behavior: Behavior normal.         Thought Content:  Thought content normal.         Judgment: Judgment normal.       LEIGHA Kate

## 2023-10-24 ENCOUNTER — APPOINTMENT (OUTPATIENT)
Dept: PHYSICAL THERAPY | Facility: CLINIC | Age: 68
End: 2023-10-24
Payer: COMMERCIAL

## 2023-10-26 ENCOUNTER — OFFICE VISIT (OUTPATIENT)
Dept: PHYSICAL THERAPY | Facility: CLINIC | Age: 68
End: 2023-10-26
Payer: COMMERCIAL

## 2023-10-26 DIAGNOSIS — M75.101 TEAR OF RIGHT SUPRASPINATUS TENDON: ICD-10-CM

## 2023-10-26 DIAGNOSIS — M75.121 COMPLETE TEAR OF RIGHT ROTATOR CUFF, UNSPECIFIED WHETHER TRAUMATIC: Primary | ICD-10-CM

## 2023-10-26 DIAGNOSIS — Z48.89 AFTERCARE FOLLOWING SURGERY: ICD-10-CM

## 2023-10-26 PROCEDURE — 97112 NEUROMUSCULAR REEDUCATION: CPT | Performed by: PHYSICAL THERAPIST

## 2023-10-26 PROCEDURE — 97110 THERAPEUTIC EXERCISES: CPT | Performed by: PHYSICAL THERAPIST

## 2023-10-26 NOTE — PROGRESS NOTES
PT Re-Evaluation     Today's date: 10/26/2023  Patient name: Kemal Jaramillo  : 1955  MRN: [id number]  Referring provider: Zonia Juárez  Dx:   Encounter Diagnosis     ICD-10-CM    1. Complete tear of right rotator cuff, unspecified whether traumatic  M75.121       2. Tear of right supraspinatus tendon  M75.101       3. Aftercare following surgery  Z48.89                      Assessment  Understanding of Dx/Px/POC: good   Prognosis: good    Goals  STGs: To be complete within 2-3 weeks (partially met)  - Decrease pain to < 2/10 at worst  - Increase AROM to WNL  - Increase strength to > 4+/5  - Improve postural awareness capacity to > 60min before deficit    LTGs: To be complete within 4-6 weeks (partially met)  - Able to repetitively complete all overhead activity without pain or limitation for increased safety and functional capacity with ADLs and work-related duty  - Able to repetitively complete all reaching activity without pain or limitation for increased safety and functional capacity with ADLs and work-related duty  - Able to repetitively complete all pushing/pulling activity without pain or limitation for increased safety and functional capacity with ADLs and work-related duty  - Able to complete all lifting/carrying activity without pain or limitation for increased safety and functional capacity with ADLs and work-related duty    Plan  Planned therapy interventions: manual therapy, neuromuscular re-education, patient education, self care, postural training, therapeutic activities, therapeutic exercise and home exercise program  Frequency: 2x week  Duration in weeks: 6       Upon completion of today's re-evaluation, as evidenced by present objective and subjective measures, Darryl's sx remain consistent with continued, well-paced progress from being s/p R Shoulder RTC repair 23.  Patient will benefit from continued skilled physical therapy, as per MD protocol to address current deficits. Subjective Evaluation    Patient Goals  Patient goals for therapy: increased strength, decreased pain and increased motion    Pain  Current pain ratin  At best pain ratin  At worst pain rating: 3  Location: R Shoulder         Pt reports continued progress from being s/p R Shoulder RTC repair 23. Very pleased with progress thus far. HEP continues to go well. No setbacks. Continues to see benefit from physical therapy at this time. Anxious to continue making progress.       Objective Pain level ranges 0-3/10  AROM: R Shoulder Flexion 165 degrees, Abd 165 degrees, ER 95 degrees, IR 55 degrees; L Shoulder WNL  PROM: R Shoulder Flexion 165 degrees, Abduction 165 degrees, ER 95 degrees, IR 55 degrees; L Shoulder WNL  Strength: R Shoulder Flexion 4+/5, Abd 4+/5, ER 4+/5, IR 5/5; L Shoulder 5/5 t/o  Postural Awareness: Fair and improving (rounded shoulders, forward head)  Incisions: Clean and healing well  FOTO: 63; GOAL: 56  Unable to complete overhead activity without pain and limitation, but improving  Unable to complete pushing/pulling activity without pain and limitation, but improving  Unable to complete lifting/carrying activity without pain and limitation, but improving  Unable to complete cross body/behind the back reaching activity without pain and limitation, but improving             Precautions: MD protocol for being s/p R Shoulder RTC repair 23    Daily Treatment Diary    HEP: Handout provided and discussed      Manuals 10/12/23 10/17/23 10/26/23 10/3/23 10   ART  x15' x15'     PROM/Stretch x15'       IASTM        STM/Triggerpoint        JM                Neuro Re-Ed        Pendulums        Putty Play        No $ AROM with scap retract        Isometrics Abd, ER        Stool Roll backs 10x5'' 10x5'' 10x 5'' 10x5'' 10x5''   Supine [place] 10x5'' 10x5'' 10x5'' 10x5'' 10x5''   Bodyblade 2 way 4x20'' ea 2 way 4x20'' ea 3 way 4x20'' ea                             Ther Ex Pulleys 3x10 3x10 3x10 3x10  3x10   SL ER 3x10 2# 3x10 2# 3x10 3# 3x10 1# 3x10 1#   SL Abd 3x10 2# 3x10 2# 3/10 3# 3/10 1# 3x10 1#   Standing Abduction 3x10 2# 3x10 2# 3x10 3# 3x10 3x10   Standing Scaption 3x10 2# 3x10 2# 3x10 3# 3x10 3x10   Standing bent over wedge Ys, Ts 3x10 2# 3x10 2# 3x10 2# 1# 3x10 3x10 1#   TB Ys, Ts 3x10 ea L1 3x10 ea L1, L2 3x10 ea L2     TB ER 3x10 L3 3x10 L3 3x10 L4     TB 70 degree Abd 3x10 L2 3x10 L3 3x10 L4     TB ER No Money L3 3x10 3x10 L3 3x10 L4 L2 3/10 L2 3/10           Ther Activity        Retro UBE x5' x5' x5' x5' x5'           Gait Training                        Modalities        MHP        CP x10' x10' 10' x10' x10'   US/Stim

## 2023-10-26 NOTE — LETTER
2023    Rachel Darby MD  [address] [address]    Patient: Coretta Arredondo   YOB: 1955   Date of Visit: 10/26/2023     Encounter Diagnosis     ICD-10-CM    1. Complete tear of right rotator cuff, unspecified whether traumatic  M75.121       2. Tear of right supraspinatus tendon  M75.101       3. Aftercare following surgery  Z48.89           Dear Dr. Bell Colindres:    Thank you for your recent referral of Coretta Arredondo. Please review the attached evaluation summary from [facility] recent visit. Please verify that you agree with the plan of care by signing the attached order. If you have any questions or concerns, please do not hesitate to call. I sincerely appreciate the opportunity to share in the care of one of your patients and hope to have another opportunity to work with you in the near future. Sincerely,    Mare Sands, PT, DPT, ATC, ART      Referring Provider:      I certify that I have read the below Plan of Care and certify the need for these services furnished under this plan of treatment while under my care. Rachel Darby MD  [address]  Via Fax: [phone]          PT Re-Evaluation     Today's date: 10/26/2023  Patient name: Coretta Arredondo  : 1955  MRN: [id number]  Referring provider: Rachel Darby*  Dx:   Encounter Diagnosis     ICD-10-CM    1. Complete tear of right rotator cuff, unspecified whether traumatic  M75.121       2. Tear of right supraspinatus tendon  M75.101       3.  Aftercare following surgery  Z48.89                      Assessment  Understanding of Dx/Px/POC: good   Prognosis: good    Goals  STGs: To be complete within 2-3 weeks (partially met)  - Decrease pain to < 2/10 at worst  - Increase AROM to WNL  - Increase strength to > 4+/5  - Improve postural awareness capacity to > 60min before deficit    LTGs: To be complete within 4-6 weeks (partially met)  - Able to repetitively complete all overhead activity without pain or limitation for increased safety and functional capacity with ADLs and work-related duty  - Able to repetitively complete all reaching activity without pain or limitation for increased safety and functional capacity with ADLs and work-related duty  - Able to repetitively complete all pushing/pulling activity without pain or limitation for increased safety and functional capacity with ADLs and work-related duty  - Able to complete all lifting/carrying activity without pain or limitation for increased safety and functional capacity with ADLs and work-related duty    Plan  Planned therapy interventions: manual therapy, neuromuscular re-education, patient education, self care, postural training, therapeutic activities, therapeutic exercise and home exercise program  Frequency: 2x week  Duration in weeks: 6       Upon completion of today's re-evaluation, as evidenced by present objective and subjective measures, Darryl's sx remain consistent with continued, well-paced progress from being s/p R Shoulder RTC repair 23. Patient will benefit from continued skilled physical therapy, as per MD protocol to address current deficits. Subjective Evaluation    Patient Goals  Patient goals for therapy: increased strength, decreased pain and increased motion    Pain  Current pain ratin  At best pain ratin  At worst pain rating: 3  Location: R Shoulder         Pt reports continued progress from being s/p R Shoulder RTC repair 23. Very pleased with progress thus far. HEP continues to go well. No setbacks. Continues to see benefit from physical therapy at this time. Anxious to continue making progress.       Objective Pain level ranges 0-3/10  AROM: R Shoulder Flexion 165 degrees, Abd 165 degrees, ER 95 degrees, IR 55 degrees; L Shoulder WNL  PROM: R Shoulder Flexion 165 degrees, Abduction 165 degrees, ER 95 degrees, IR 55 degrees; L Shoulder WNL  Strength: R Shoulder Flexion 4+/5, Abd 4+/5, ER 4+/5, IR 5/5; L Shoulder 5/5 t/o  Postural Awareness: Fair and improving (rounded shoulders, forward head)  Incisions: Clean and healing well  FOTO: 63; GOAL: 56  Unable to complete overhead activity without pain and limitation, but improving  Unable to complete pushing/pulling activity without pain and limitation, but improving  Unable to complete lifting/carrying activity without pain and limitation, but improving  Unable to complete cross body/behind the back reaching activity without pain and limitation, but improving             Precautions: MD protocol for being s/p R Shoulder RTC repair 6/26/23    Daily Treatment Diary    HEP: Handout provided and discussed      Manuals 10/12/23 10/17/23 10/26/23 10/3/23 10/5   ART  x15' x15'     PROM/Stretch x15'       IASTM        STM/Triggerpoint        JM                Neuro Re-Ed        Pendulums        Putty Play        No $ AROM with scap retract        Isometrics Abd, ER        Stool Roll backs 10x5'' 10x5'' 10x 5'' 10x5'' 10x5''   Supine [place] 10x5'' 10x5'' 10x5'' 10x5'' 10x5''   Bodyblade 2 way 4x20'' ea 2 way 4x20'' ea 3 way 4x20'' ea                             Ther Ex        Pulleys 3x10 3x10 3x10 3x10  3x10   SL ER 3x10 2# 3x10 2# 3x10 3# 3x10 1# 3x10 1#   SL Abd 3x10 2# 3x10 2# 3/10 3# 3/10 1# 3x10 1#   Standing Abduction 3x10 2# 3x10 2# 3x10 3# 3x10 3x10   Standing Scaption 3x10 2# 3x10 2# 3x10 3# 3x10 3x10   Standing bent over wedge Ys, Ts 3x10 2# 3x10 2# 3x10 2# 1# 3x10 3x10 1#   TB Ys, Ts 3x10 ea L1 3x10 ea L1, L2 3x10 ea L2     TB ER 3x10 L3 3x10 L3 3x10 L4     TB 70 degree Abd 3x10 L2 3x10 L3 3x10 L4     TB ER No Money L3 3x10 3x10 L3 3x10 L4 L2 3/10 L2 3/10           Ther Activity        Retro UBE x5' x5' x5' x5' x5'           Gait Training                        Modalities        MHP        CP x10' x10' 10' x10' x10'   US/Stim

## 2023-10-27 ENCOUNTER — TELEPHONE (OUTPATIENT)
Dept: INTERNAL MEDICINE CLINIC | Facility: CLINIC | Age: 68
End: 2023-10-27

## 2023-10-27 DIAGNOSIS — J40 BRONCHITIS: ICD-10-CM

## 2023-10-27 RX ORDER — GUAIFENESIN AND CODEINE PHOSPHATE 100; 10 MG/5ML; MG/5ML
5 SOLUTION ORAL 3 TIMES DAILY PRN
Qty: 240 ML | Refills: 0 | Status: SHIPPED | OUTPATIENT
Start: 2023-10-27

## 2023-10-27 NOTE — TELEPHONE ENCOUNTER
Can you send the cough med with codeine to [facility]   Stated once he stopped using it the coughing came back

## 2023-10-31 ENCOUNTER — OFFICE VISIT (OUTPATIENT)
Dept: PHYSICAL THERAPY | Facility: CLINIC | Age: 68
End: 2023-10-31
Payer: COMMERCIAL

## 2023-10-31 DIAGNOSIS — M75.121 COMPLETE TEAR OF RIGHT ROTATOR CUFF, UNSPECIFIED WHETHER TRAUMATIC: Primary | ICD-10-CM

## 2023-10-31 DIAGNOSIS — Z48.89 AFTERCARE FOLLOWING SURGERY: ICD-10-CM

## 2023-10-31 DIAGNOSIS — M75.101 TEAR OF RIGHT SUPRASPINATUS TENDON: ICD-10-CM

## 2023-10-31 PROCEDURE — 97140 MANUAL THERAPY 1/> REGIONS: CPT | Performed by: PHYSICAL THERAPIST

## 2023-10-31 PROCEDURE — 97110 THERAPEUTIC EXERCISES: CPT | Performed by: PHYSICAL THERAPIST

## 2023-10-31 PROCEDURE — 97112 NEUROMUSCULAR REEDUCATION: CPT | Performed by: PHYSICAL THERAPIST

## 2023-10-31 NOTE — PROGRESS NOTES
Daily Note     Today's date: 10/31/2023  Patient name: Kemal Jaramillo  : 1955  MRN: [id number]  Referring provider: Zonia Juárez  Dx:   Encounter Diagnosis     ICD-10-CM    1. Complete tear of right rotator cuff, unspecified whether traumatic  M75.121       2. Tear of right supraspinatus tendon  M75.101       3. Aftercare following surgery  Z48.89                      Subjective: Pt reports he is doing well. Reports cont to be pleased with progress. Objective: See treatment diary below      Assessment: Tolerated treatment well. Patient demonstrated fatigue post treatment, exhibited good technique with therapeutic exercises and would benefit from continued PT. Plan: Continue per plan of care. Progress treatment as tolerated.          Precautions: MD protocol for being s/p R Shoulder RTC repair 23    Daily Treatment Diary    HEP: Handout provided and discussed      Manuals 10/12/23 10/17/23 10/26/23 10/31/23 10   ART  x15' x15' x15'    PROM/Stretch x15'       IASTM        STM/Triggerpoint        JM                Neuro Re-Ed        Pendulums        Putty Play        No $ AROM with scap retract        Isometrics Abd, ER        Stool Roll backs 10x5'' 10x5'' 10x 5'' 10x5'' 10x5''   Supine [place] 10x5'' 10x5'' 10x5'' 10x5'' 10x5''   Bodyblade 2 way 4x20'' ea 2 way 4x20'' ea 3 way 4x20'' ea 3 way 4x20'' ea 3 way 4x20'' ea                           Ther Ex        Pulleys 3x10 3x10 3x10 3x10  3x10   SL ER 3x10 2# 3x10 2# 3x10 3# 3x10 3# 3x10 1#   SL Abd 3x10 2# 3x10 2# 3/10 3# 3/10 3# 3x10 1#   Standing Abduction 3x10 2# 3x10 2# 3x10 3# 3x10 3# 3x10   Standing Scaption 3x10 2# 3x10 2# 3x10 3# 3x10 3# 3x10   Standing bent over wedge Ys, Ts 3x10 2# 3x10 2# 3x10 2# 3# 3x10 3x10 1#   TB Ys, Ts 3x10 ea L1 3x10 ea L1, L2 3x10 ea L2 3x10 L2    TB ER 3x10 L3 3x10 L3 3x10 L4 3x10 L4    TB 70 degree Abd 3x10 L2 3x10 L3 3x10 L4 3x10 L4    TB ER No Money L3 3x10 3x10 L3 3x10 L4 L4 3/10 L2 3/10 Ther Activity        Retro UBE x5' x5' x5' x5' x5'           Gait Training                        Modalities        MHP        CP x10' x10' 10' x10' x10'   US/Stim

## 2023-11-01 DIAGNOSIS — G40.109 FOCAL EPILEPSY ORIGINATING IN TEMPORAL LOBE (HCC): ICD-10-CM

## 2023-11-01 RX ORDER — LEVETIRACETAM 1000 MG/1
1000 TABLET ORAL EVERY 12 HOURS SCHEDULED
Qty: 180 TABLET | Refills: 0 | Status: CANCELLED | OUTPATIENT
Start: 2023-11-01

## 2023-11-02 ENCOUNTER — OFFICE VISIT (OUTPATIENT)
Dept: PHYSICAL THERAPY | Facility: CLINIC | Age: 68
End: 2023-11-02
Payer: COMMERCIAL

## 2023-11-02 DIAGNOSIS — M75.101 TEAR OF RIGHT SUPRASPINATUS TENDON: ICD-10-CM

## 2023-11-02 DIAGNOSIS — Z48.89 AFTERCARE FOLLOWING SURGERY: ICD-10-CM

## 2023-11-02 DIAGNOSIS — M75.121 COMPLETE TEAR OF RIGHT ROTATOR CUFF, UNSPECIFIED WHETHER TRAUMATIC: Primary | ICD-10-CM

## 2023-11-02 PROCEDURE — 97110 THERAPEUTIC EXERCISES: CPT | Performed by: PHYSICAL THERAPIST

## 2023-11-02 PROCEDURE — 97112 NEUROMUSCULAR REEDUCATION: CPT | Performed by: PHYSICAL THERAPIST

## 2023-11-02 NOTE — PROGRESS NOTES
Daily Note     Today's date: 2023  Patient name: Claritza Ferrari  : 1955  MRN: [id number]  Referring provider: Bernardo Mcclendon*  Dx:   Encounter Diagnosis     ICD-10-CM    1. Complete tear of right rotator cuff, unspecified whether traumatic  M75.121       2. Tear of right supraspinatus tendon  M75.101       3. Aftercare following surgery  Z48.89                      Subjective: Pt reports he is doing well. Reports cont to be pleased with progress. Objective: See treatment diary below      Assessment: Tolerated treatment well. Patient demonstrated fatigue post treatment, exhibited good technique with therapeutic exercises and would benefit from continued PT. Plan: Continue per plan of care. Progress treatment as tolerated.          Precautions: MD protocol for being s/p R Shoulder RTC repair 23    Daily Treatment Diary    HEP: Handout provided and discussed      Manuals 10/12/23 10/17/23 10/26/23 10/31/23 10   ART  x15' x15' x15' x15'   PROM/Stretch x15'       IASTM        STM/Triggerpoint        JM                Neuro Re-Ed        Pendulums        Putty Play        No $ AROM with scap retract        Isometrics Abd, ER        Stool Roll backs 10x5'' 10x5'' 10x 5'' 10x5'' 10x5''   Supine [place] 10x5'' 10x5'' 10x5'' 10x5'' 10x5''   Bodyblade 2 way 4x20'' ea 2 way 4x20'' ea 3 way 4x20'' ea 3 way 4x20'' ea 3 way 4x20'' ea                           Ther Ex        Pulleys 3x10 3x10 3x10 3x10  3x10   SL ER 3x10 2# 3x10 2# 3x10 3# 3x10 3# 3x10 3#   SL Abd 3x10 2# 3x10 2# 3/10 3# 3/10 3# 3x10 3#   Standing Abduction 3x10 2# 3x10 2# 3x10 3# 3x10 3# 3x10 3#   Standing Scaption 3x10 2# 3x10 2# 3x10 3# 3x10 3# 3x10 3#   Standing bent over wedge Ys, Ts 3x10 2# 3x10 2# 3x10 2# 3# 3x10 3x10 1#   TB Ys, Ts 3x10 ea L1 3x10 ea L1, L2 3x10 ea L2 3x10 L2 3x10 L2   TB ER 3x10 L3 3x10 L3 3x10 L4 3x10 L4 X10 L4   TB 70 degree Abd 3x10 L2 3x10 L3 3x10 L4 3x10 L4 3x10 L4   TB ER No Money L3 3x10 3x10 L3 3x10 L4 L4 3/10 L2 3/10           Ther Activity        Retro UBE x5' x5' x5' x5' x5'           Gait Training                        Modalities        MHP        CP x10' x10' 10' x10' x10'   US/Stim

## 2023-11-03 ENCOUNTER — TELEPHONE (OUTPATIENT)
Dept: INTERNAL MEDICINE CLINIC | Facility: CLINIC | Age: 68
End: 2023-11-03

## 2023-11-03 NOTE — TELEPHONE ENCOUNTER
Patient responded to call from two ago about lingering cough . Asked if he would like chest xray he said no cause when jerilyn listened to his lungs she said they were clear . Does not want and more codiene cough medicine .  So I recommeded he try Delsym or Mucinex and if gets any worse got to urgent care or ER details…

## 2023-11-07 ENCOUNTER — OFFICE VISIT (OUTPATIENT)
Dept: PHYSICAL THERAPY | Facility: CLINIC | Age: 68
End: 2023-11-07
Payer: COMMERCIAL

## 2023-11-07 DIAGNOSIS — Z48.89 AFTERCARE FOLLOWING SURGERY: ICD-10-CM

## 2023-11-07 DIAGNOSIS — M75.101 TEAR OF RIGHT SUPRASPINATUS TENDON: ICD-10-CM

## 2023-11-07 DIAGNOSIS — M75.121 COMPLETE TEAR OF RIGHT ROTATOR CUFF, UNSPECIFIED WHETHER TRAUMATIC: Primary | ICD-10-CM

## 2023-11-07 PROCEDURE — 97110 THERAPEUTIC EXERCISES: CPT | Performed by: PHYSICAL THERAPIST

## 2023-11-07 PROCEDURE — 97140 MANUAL THERAPY 1/> REGIONS: CPT | Performed by: PHYSICAL THERAPIST

## 2023-11-07 PROCEDURE — 97112 NEUROMUSCULAR REEDUCATION: CPT | Performed by: PHYSICAL THERAPIST

## 2023-11-07 NOTE — PROGRESS NOTES
Daily Note     Today's date: 2023  Patient name: Suki Boyer  : 1955  MRN: [id number]  Referring provider: Cristiano Page*  Dx:   Encounter Diagnosis     ICD-10-CM    1. Complete tear of right rotator cuff, unspecified whether traumatic  M75.121       2. Tear of right supraspinatus tendon  M75.101       3. Aftercare following surgery  Z48.89                      Subjective: Pt reports he is doing well. Reports cont to be pleased with progress. Objective: See treatment diary below      Assessment: Tolerated treatment well. Patient demonstrated fatigue post treatment, exhibited good technique with therapeutic exercises and would benefit from continued PT. Plan: Continue per plan of care. Progress treatment as tolerated.          Precautions: MD protocol for being s/p R Shoulder RTC repair 23    Daily Treatment Diary    HEP: Handout provided and discussed      Manuals 11/7/23 10/17/23 10/26/23 10/31/23 10   ART x15' x15' x15' x15' x15'   PROM/Stretch        IASTM        STM/Triggerpoint        JM                Neuro Re-Ed        Pendulums        Putty Play        No $ AROM with scap retract        Isometrics Abd, ER        Stool Roll backs 10x5'' 10x5'' 10x 5'' 10x5'' 10x5''   Supine [place] 10x5'' 10x5'' 10x5'' 10x5'' 10x5''   Bodyblade 2 way 4x20'' ea 2 way 4x20'' ea 3 way 4x20'' ea 3 way 4x20'' ea 3 way 4x20'' ea                           Ther Ex        Pulleys 3x10 3x10 3x10 3x10  3x10   SL ER 3x10 2# 3x10 2# 3x10 3# 3x10 3# 3x10 3#   SL Abd 3x10 2# 3x10 2# 3/10 3# 3/10 3# 3x10 3#   Standing Abduction 3x10 2# 3x10 2# 3x10 3# 3x10 3# 3x10 3#   Standing Scaption 3x10 2# 3x10 2# 3x10 3# 3x10 3# 3x10 3#   Standing bent over wedge Ys, Ts 3x10 2# 3x10 2# 3x10 2# 3# 3x10 3x10 1#   TB Ys, Ts 3x10 ea L1 3x10 ea L1, L2 3x10 ea L2 3x10 L2 3x10 L2   TB ER 3x10 L3 3x10 L3 3x10 L4 3x10 L4 X10 L4   TB 70 degree Abd 3x10 L2 3x10 L3 3x10 L4 3x10 L4 3x10 L4   TB ER No Money L3 3x10 3x10 L3 3x10 L4 L4 3/10 L2 3/10           Ther Activity        Retro UBE x5' x5' x5' x5' x5'           Gait Training                        Modalities        MHP        CP x10' x10' 10' x10' x10'   US/Stim

## 2023-11-09 ENCOUNTER — APPOINTMENT (OUTPATIENT)
Dept: PHYSICAL THERAPY | Facility: CLINIC | Age: 68
End: 2023-11-09
Payer: COMMERCIAL

## 2023-11-14 ENCOUNTER — OFFICE VISIT (OUTPATIENT)
Dept: PHYSICAL THERAPY | Facility: CLINIC | Age: 68
End: 2023-11-14
Payer: COMMERCIAL

## 2023-11-14 DIAGNOSIS — M75.101 TEAR OF RIGHT SUPRASPINATUS TENDON: ICD-10-CM

## 2023-11-14 DIAGNOSIS — M75.121 COMPLETE TEAR OF RIGHT ROTATOR CUFF, UNSPECIFIED WHETHER TRAUMATIC: Primary | ICD-10-CM

## 2023-11-14 DIAGNOSIS — Z48.89 AFTERCARE FOLLOWING SURGERY: ICD-10-CM

## 2023-11-14 PROCEDURE — 97112 NEUROMUSCULAR REEDUCATION: CPT | Performed by: PHYSICAL THERAPIST

## 2023-11-14 PROCEDURE — 97110 THERAPEUTIC EXERCISES: CPT | Performed by: PHYSICAL THERAPIST

## 2023-11-14 NOTE — PROGRESS NOTES
Daily Note     Today's date: 2023  Patient name: Jessica Amezcua  : 1955  MRN: [id number]  Referring provider: Ruby Martinez*  Dx:   Encounter Diagnosis     ICD-10-CM    1. Complete tear of right rotator cuff, unspecified whether traumatic  M75.121       2. Tear of right supraspinatus tendon  M75.101       3. Aftercare following surgery  Z48.89                      Subjective: Pt reports he is doing well. Reports cont to be pleased with progress. Objective: See treatment diary below      Assessment: Tolerated treatment well. Patient demonstrated fatigue post treatment, exhibited good technique with therapeutic exercises and would benefit from continued PT. Plan: Continue per plan of care. Progress treatment as tolerated.          Precautions: MD protocol for being s/p R Shoulder RTC repair 23    Daily Treatment Diary    HEP: Handout provided and discussed      Manuals 11/7/23 11/14/23 10/26/23 10/31/23 10   ART x15' x15' x15' x15' x15'   PROM/Stretch        IASTM        STM/Triggerpoint        JM                Neuro Re-Ed        Pendulums        Putty Play        No $ AROM with scap retract        Isometrics Abd, ER        Stool Roll backs 10x5'' 10x5'' 10x 5'' 10x5'' 10x5''   Supine [place] 10x5'' 10x5'' 10x5'' 10x5'' 10x5''   Bodyblade 2 way 4x20'' ea 3 way 4x20'' ea 3 way 4x20'' ea 3 way 4x20'' ea 3 way 4x20'' ea                           Ther Ex        Pulleys 3x10 3x10 3x10 3x10  3x10   SL ER 3x10 2# 3x10 3# 3x10 3# 3x10 3# 3x10 3#   SL Abd 3x10 2# 3x10 3# 3/10 3# 3/10 3# 3x10 3#   Standing Abduction 3x10 2# 3x10 3# 3x10 3# 3x10 3# 3x10 3#   Standing Scaption 3x10 2# 3x10 3# 3x10 3# 3x10 3# 3x10 3#   Standing bent over wedge Ys, Ts 3x10 2# 3x10 3# 3x10 2# 3# 3x10 3x10 1#   TB Ys, Ts 3x10 ea L1 3x10 ea L3, L3 3x10 ea L2 3x10 L2 3x10 L2   TB ER 3x10 L3 3x10 L4 3x10 L4 3x10 L4 X10 L4   TB 70 degree Abd 3x10 L2 3x10 L4 3x10 L4 3x10 L4 3x10 L4   TB ER No Money L3 3x10 3x10 L4 3x10 L4 L4 3/10 L2 3/10           Ther Activity        Retro UBE x5' x5' x5' x5' x5'           Gait Training                        Modalities        MHP        CP x10' x10' 10' x10' x10'   US/Stim

## 2023-11-16 ENCOUNTER — OFFICE VISIT (OUTPATIENT)
Dept: INTERNAL MEDICINE CLINIC | Facility: CLINIC | Age: 68
End: 2023-11-16
Payer: COMMERCIAL

## 2023-11-16 ENCOUNTER — APPOINTMENT (OUTPATIENT)
Dept: PHYSICAL THERAPY | Facility: CLINIC | Age: 68
End: 2023-11-16
Payer: COMMERCIAL

## 2023-11-16 VITALS — OXYGEN SATURATION: 99 % | HEART RATE: 82 BPM

## 2023-11-16 DIAGNOSIS — J40 BRONCHITIS: Primary | ICD-10-CM

## 2023-11-16 DIAGNOSIS — E55.9 VITAMIN D DEFICIENCY: ICD-10-CM

## 2023-11-16 LAB
SARS-COV-2 AG UPPER RESP QL IA: NEGATIVE
VALID CONTROL: NORMAL

## 2023-11-16 PROCEDURE — 99214 OFFICE O/P EST MOD 30 MIN: CPT | Performed by: NURSE PRACTITIONER

## 2023-11-16 PROCEDURE — 87811 SARS-COV-2 COVID19 W/OPTIC: CPT | Performed by: NURSE PRACTITIONER

## 2023-11-16 RX ORDER — ALBUTEROL SULFATE 90 UG/1
2 AEROSOL, METERED RESPIRATORY (INHALATION) EVERY 6 HOURS PRN
Qty: 18 G | Refills: 3 | Status: SHIPPED | OUTPATIENT
Start: 2023-11-16 | End: 2023-12-16

## 2023-11-16 RX ORDER — MELATONIN
2000 DAILY
Qty: 60 TABLET | Refills: 3 | Status: SHIPPED | OUTPATIENT
Start: 2023-11-16

## 2023-11-16 RX ORDER — AZITHROMYCIN 250 MG/1
TABLET, FILM COATED ORAL
Qty: 6 TABLET | Refills: 0 | Status: SHIPPED | OUTPATIENT
Start: 2023-11-16 | End: 2023-11-20

## 2023-11-16 NOTE — PROGRESS NOTES
Name: Marcelo Kirkpatrick      : 1955      MRN: [id number]  Encounter Provider: LEIGHA Serrano  Encounter Date: 2023   Encounter department: [facility] Rapid covid is negative. Will start on a Z pack take as directed and an albuterol inhaler as needed. Will follow up if any worsening of symptoms. 1. Bronchitis  -     azithromycin (ZITHROMAX) 250 mg tablet; Take 2 tablets today then 1 tablet daily x 4 days  -     albuterol (PROVENTIL HFA,VENTOLIN HFA) 90 mcg/act inhaler; Inhale 2 puffs every 6 (six) hours as needed for wheezing  -     POCT Rapid Covid Ag           Kayla Hurst is for an acute visit. He was sick back on the  and was doing much better but his symptoms are back but worse. He has chills, body aches, and congestion with cough. He did not take a home covid test. He states he is wheezing at times too. He offers no other issues. Review of Systems   Constitutional:  Positive for chills and fever. HENT:  Positive for congestion. Respiratory:  Positive for cough. All other systems reviewed and are negative. Current Outpatient Medications on File Prior to Visit   Medication Sig    acetaminophen (TYLENOL) 325 mg tablet Take 2 tablets (650 mg total) by mouth every 6 (six) hours as needed for mild pain, headaches or fever Do not exceed a total of 3 grams of tylenol/acetaminophen in a 24-hour period.  (Patient taking differently: Take 650 mg by mouth if needed for mild pain, headaches or fever Do not exceed a total of 3 grams of tylenol/acetaminophen in a 24-hour period.)    aspirin 81 mg chewable tablet Chew 1 tablet (81 mg total) daily To prevent stroke and heart attack (Patient not taking: Reported on 2023)    furosemide (LASIX) 20 mg tablet Take 1 tablet (20 mg total) by mouth daily for 3 days (Patient not taking: Reported on 10/12/2023)    guaifenesin-codeine (GUAIFENESIN AC) 100-10 MG/5ML liquid Take 5 mL by mouth 3 (three) times a day as needed for cough    levETIRAcetam (KEPPRA) 1000 MG tablet Take 1 tablet (1,000 mg total) by mouth every 12 (twelve) hours    meloxicam (MOBIC) 15 mg tablet TAKE 1 TABLET BY MOUTH EVERY DAY FOR 30 DAYS (Patient not taking: Reported on 10/12/2023)    methylPREDNISolone 4 MG tablet therapy pack Use as directed on package    [DISCONTINUED] cholecalciferol (VITAMIN D3) 1,000 units tablet Take 2 tablets (2,000 Units total) by mouth daily For vitamin D deficiency       Objective     Pulse 82   SpO2 99%     Physical Exam  Constitutional:       Appearance: Normal appearance. He is obese. HENT:      Right Ear: Tympanic membrane, ear canal and external ear normal.      Left Ear: Tympanic membrane, ear canal and external ear normal.      Nose: Congestion present. Mouth/Throat:      Mouth: Mucous membranes are moist.      Pharynx: Oropharynx is clear. Cardiovascular:      Rate and Rhythm: Normal rate and regular rhythm. Pulses: Normal pulses. Heart sounds: Normal heart sounds. Pulmonary:      Effort: Pulmonary effort is normal.      Breath sounds: Wheezing present. Skin:     General: Skin is warm and dry. Capillary Refill: Capillary refill takes less than 2 seconds. Neurological:      General: No focal deficit present. Mental Status: He is alert and oriented to person, place, and time. Mental status is at baseline. Psychiatric:         Mood and Affect: Mood normal.         Behavior: Behavior normal.         Thought Content:  Thought content normal.         Judgment: Judgment normal.       LEIGHA Winters

## 2023-11-21 ENCOUNTER — OFFICE VISIT (OUTPATIENT)
Dept: PHYSICAL THERAPY | Facility: CLINIC | Age: 68
End: 2023-11-21
Payer: COMMERCIAL

## 2023-11-21 DIAGNOSIS — M75.121 COMPLETE TEAR OF RIGHT ROTATOR CUFF, UNSPECIFIED WHETHER TRAUMATIC: Primary | ICD-10-CM

## 2023-11-21 DIAGNOSIS — M75.101 TEAR OF RIGHT SUPRASPINATUS TENDON: ICD-10-CM

## 2023-11-21 DIAGNOSIS — Z48.89 AFTERCARE FOLLOWING SURGERY: ICD-10-CM

## 2023-11-21 PROCEDURE — 97112 NEUROMUSCULAR REEDUCATION: CPT | Performed by: PHYSICAL THERAPIST

## 2023-11-21 PROCEDURE — 97110 THERAPEUTIC EXERCISES: CPT | Performed by: PHYSICAL THERAPIST

## 2023-11-21 NOTE — PROGRESS NOTES
Daily Note     Today's date: 2023  Patient name: Siomara Claire  : 1955  MRN: [id number]  Referring provider: Alberta Washburn*  Dx:   Encounter Diagnosis     ICD-10-CM    1. Complete tear of right rotator cuff, unspecified whether traumatic  M75.121       2. Tear of right supraspinatus tendon  M75.101       3. Aftercare following surgery  Z48.89                      Subjective: Pt reports he is doing well. Reports cont to be pleased with progress. Objective: See treatment diary below      Assessment: Tolerated treatment well. Patient demonstrated fatigue post treatment, exhibited good technique with therapeutic exercises and would benefit from continued PT. Plan: Continue per plan of care. Progress treatment as tolerated.          Precautions: MD protocol for being s/p R Shoulder RTC repair 23    Daily Treatment Diary    HEP: Handout provided and discussed      Manuals 11/7/23 11/14/23 11/21/23 10/31/23 10   ART x15' x15' x15' x15' x15'   PROM/Stretch        IASTM        STM/Triggerpoint        JM                Neuro Re-Ed        Pendulums        Putty Play        No $ AROM with scap retract        Isometrics Abd, ER        Stool Roll backs 10x5'' 10x5'' 10x 5'' 10x5'' 10x5''   Supine [place] 10x5'' 10x5'' 10x5'' 10x5'' 10x5''   Bodyblade 2 way 4x20'' ea 3 way 4x20'' ea 3 way 4x20'' ea 3 way 4x20'' ea 3 way 4x20'' ea                           Ther Ex        Pulleys 3x10 3x10 3x10 3x10  3x10   SL ER 3x10 2# 3x10 3# 3x10 3# 3x10 3# 3x10 3#   SL Abd 3x10 2# 3x10 3# 3/10 3# 3/10 3# 3x10 3#   Standing Abduction 3x10 2# 3x10 3# 3x10 3# 3x10 3# 3x10 3#   Standing Scaption 3x10 2# 3x10 3# 3x10 3# 3x10 3# 3x10 3#   Standing bent over wedge Ys, Ts 3x10 2# 3x10 3# 3x10 3# 3# 3x10 3x10 1#   TB Ys, Ts 3x10 ea L1 3x10 ea L3, L3 3x10 ea L3 3x10 L2 3x10 L2   TB ER 3x10 L3 3x10 L4 3x10 L4 3x10 L4 X10 L4   TB 70 degree Abd 3x10 L2 3x10 L4 3x10 L4 3x10 L4 3x10 L4   TB ER No Money L3 3x10 3x10 L4 3x10 L4 L4 3/10 L2 3/10           Ther Activity        Retro UBE x5' x5' x5' x5' x5'           Gait Training                        Modalities        MHP        CP x10' x10' 10' x10' x10'   US/Stim

## 2023-11-28 ENCOUNTER — APPOINTMENT (OUTPATIENT)
Dept: PHYSICAL THERAPY | Facility: CLINIC | Age: 68
End: 2023-11-28
Payer: COMMERCIAL

## 2023-11-30 ENCOUNTER — APPOINTMENT (OUTPATIENT)
Dept: PHYSICAL THERAPY | Facility: CLINIC | Age: 68
End: 2023-11-30
Payer: COMMERCIAL

## 2023-12-05 ENCOUNTER — OFFICE VISIT (OUTPATIENT)
Dept: PHYSICAL THERAPY | Facility: CLINIC | Age: 68
End: 2023-12-05
Payer: COMMERCIAL

## 2023-12-05 DIAGNOSIS — M75.121 COMPLETE TEAR OF RIGHT ROTATOR CUFF, UNSPECIFIED WHETHER TRAUMATIC: Primary | ICD-10-CM

## 2023-12-05 DIAGNOSIS — M75.101 TEAR OF RIGHT SUPRASPINATUS TENDON: ICD-10-CM

## 2023-12-05 DIAGNOSIS — Z48.89 AFTERCARE FOLLOWING SURGERY: ICD-10-CM

## 2023-12-05 PROCEDURE — 97110 THERAPEUTIC EXERCISES: CPT

## 2023-12-05 PROCEDURE — 97112 NEUROMUSCULAR REEDUCATION: CPT

## 2023-12-05 NOTE — PROGRESS NOTES
Daily Note     Today's date: 2023  Patient name: Estella Allen  : 1955  MRN: [id number]  Referring provider: Christopher Roque*  Dx:   Encounter Diagnosis     ICD-10-CM    1. Complete tear of right rotator cuff, unspecified whether traumatic  M75.121       2. Tear of right supraspinatus tendon  M75.101       3. Aftercare following surgery  Z48.89                      Subjective:  Pt reports strength cont to improve. Pleased with overall strength. Objective: See treatment diary below      Assessment: Tolerated treatment well. Patient exhibited good technique with therapeutic exercises. Pt demonstrates cont improvement in strength and stability in shoulder. Demonstrates improved overhead strength. Plan: Continue per plan of care.       Precautions: MD protocol for being s/p R Shoulder RTC repair 23    Daily Treatment Diary    HEP: Handout provided and discussed      Manuals 11/7/23 11/14/23 11/21/23 12/5 10/5   ART x15' x15' x15'  x15'   PROM/Stretch        IASTM        STM/Triggerpoint        JM                Neuro Re-Ed        Pendulums        Putty Play        No $ AROM with scap retract        Isometrics Abd, ER        Stool Roll backs 10x5'' 10x5'' 10x 5''  10x5''   Supine [place] 10x5'' 10x5'' 10x5'' 10x5'' 10x5''   Bodyblade 2 way 4x20'' ea 3 way 4x20'' ea 3 way 4x20'' ea 3 way 4x20'' ea 3 way 4x20'' ea                           Ther Ex        Pulleys 3x10 3x10 3x10  3x10   SL ER 3x10 2# 3x10 3# 3x10 3# 3x10 3# 3x10 3#   SL Abd 3x10 2# 3x10 3# 3/10 3# 3/10 3# 3x10 3#   Standing Abduction 3x10 2# 3x10 3# 3x10 3# 3x10 3# 3x10 3#   Standing Scaption 3x10 2# 3x10 3# 3x10 3# 3x10 3# 3x10 3#   Standing bent over wedge Ys, Ts 3x10 2# 3x10 3# 3x10 3# 3# 3x10 3x10 1#   TB Ys, Ts 3x10 ea L1 3x10 ea L3, L3 3x10 ea L3 3x10 L3 3x10 L2   TB ER 3x10 L3 3x10 L4 3x10 L4 3x10 L4 X10 L4   TB 70 degree Abd 3x10 L2 3x10 L4 3x10 L4 3x10 L4 3x10 L4   TB ER No Money L3 3x10 3x10 L4 3x10 L4 L4 3/10 L2 3/10           Ther Activity        Retro UBE x5' x5' x5' x5' x5'           Gait Training                        Modalities        MHP        CP x10' x10' 10'  x10'   US/Stim

## 2023-12-07 ENCOUNTER — APPOINTMENT (OUTPATIENT)
Dept: PHYSICAL THERAPY | Facility: CLINIC | Age: 68
End: 2023-12-07
Payer: COMMERCIAL

## 2023-12-14 ENCOUNTER — OFFICE VISIT (OUTPATIENT)
Dept: PHYSICAL THERAPY | Facility: CLINIC | Age: 68
End: 2023-12-14
Payer: COMMERCIAL

## 2023-12-14 DIAGNOSIS — M75.121 COMPLETE TEAR OF RIGHT ROTATOR CUFF, UNSPECIFIED WHETHER TRAUMATIC: Primary | ICD-10-CM

## 2023-12-14 DIAGNOSIS — Z48.89 AFTERCARE FOLLOWING SURGERY: ICD-10-CM

## 2023-12-14 DIAGNOSIS — M75.101 TEAR OF RIGHT SUPRASPINATUS TENDON: ICD-10-CM

## 2023-12-14 PROCEDURE — 97110 THERAPEUTIC EXERCISES: CPT

## 2023-12-14 PROCEDURE — 97112 NEUROMUSCULAR REEDUCATION: CPT

## 2023-12-14 NOTE — PROGRESS NOTES
Daily Note     Today's date: 2023  Patient name: Ladonna Simental  : 1955  MRN: [id number]  Referring provider: Teodoro June*  Dx:   Encounter Diagnosis     ICD-10-CM    1. Complete tear of right rotator cuff, unspecified whether traumatic  M75.121       2. Tear of right supraspinatus tendon  M75.101       3. Aftercare following surgery  Z48.89                      Subjective: Pt reports will see MD on . Reports overall improved. Lifting heavy bags of seed, etc.       Objective: See treatment diary below. Passed FOTO. Assessment: Tolerated treatment well. Patient exhibited good technique with therapeutic exercises. Pt making cont gains with progarm. Cont to arely well without pain. Pt has cont to make progress and will DC next visit. Plan: Continue per plan of care. DC next visit.       Precautions: MD protocol for being s/p R Shoulder RTC repair 23    Daily Treatment Diary    HEP: Handout provided and discussed      Manuals 23   ART x15' x15' x15'     PROM/Stretch        IASTM        STM/Triggerpoint        JM                Neuro Re-Ed        Pendulums        Putty Play        No $ AROM with scap retract        Isometrics Abd, ER        Stool Roll backs 10x5'' 10x5'' 10x 5''     Supine [place] 10x5'' 10x5'' 10x5'' 10x5'' 10x5''   Bodyblade 2 way 4x20'' ea 3 way 4x20'' ea 3 way 4x20'' ea 3 way 4x20'' ea 3 way 4x20'' ea                           Ther Ex        Pulleys 3x10 3x10 3x10  3x10   SL ER 3x10 2# 3x10 3# 3x10 3# 3x10 3# 3x10 4#   SL Abd 3x10 2# 3x10 3# 3/10 3# 3/10 3# 3x10 4#   Standing Abduction 3x10 2# 3x10 3# 3x10 3# 3x10 3# 3x10 3#   Standing Scaption 3x10 2# 3x10 3# 3x10 3# 3x10 3# 3x10 3#   Standing bent over wedge Ys, Ts 3x10 2# 3x10 3# 3x10 3# 3# 3x10 3x10 3#   TB Ys, Ts 3x10 ea L1 3x10 ea L3, L3 3x10 ea L3 3x10 L3 3x10 L4   TB ER 3x10 L3 3x10 L4 3x10 L4 3x10 L4 X10 L4   TB 70 degree Abd 3x10 L2 3x10 L4 3x10 L4 3x10 L4 3x10 L4 TB ER No Money L3 3x10 3x10 L4 3x10 L4 L4 3/10 L4 3/10           Ther Activity        Retro UBE x5' x5' x5' x5' x5'           Gait Training                        Modalities        MHP        CP x10' x10' 10'     US/Stim

## 2023-12-18 ENCOUNTER — VBI (OUTPATIENT)
Dept: ADMINISTRATIVE | Facility: OTHER | Age: 68
End: 2023-12-18

## 2023-12-28 ENCOUNTER — APPOINTMENT (OUTPATIENT)
Dept: PHYSICAL THERAPY | Facility: CLINIC | Age: 68
End: 2023-12-28
Payer: COMMERCIAL

## 2024-01-04 ENCOUNTER — VBI (OUTPATIENT)
Dept: ADMINISTRATIVE | Facility: OTHER | Age: 69
End: 2024-01-04

## 2024-04-11 ENCOUNTER — TELEPHONE (OUTPATIENT)
Dept: OTHER | Facility: OTHER | Age: 69
End: 2024-04-11

## 2024-04-11 NOTE — TELEPHONE ENCOUNTER
Patient is calling regarding cancelling an appointment.    Date/Time: 4/11/2024 / 9:30 am    Patient was rescheduled: YES [] NO [x]    Patient requesting call back to reschedule: YES [x] NO []

## 2024-05-22 DIAGNOSIS — E55.9 VITAMIN D DEFICIENCY: ICD-10-CM

## 2024-05-22 RX ORDER — MELATONIN
2000 DAILY
Qty: 60 TABLET | Refills: 5 | Status: SHIPPED | OUTPATIENT
Start: 2024-05-22

## 2024-06-10 ENCOUNTER — VBI (OUTPATIENT)
Dept: ADMINISTRATIVE | Facility: OTHER | Age: 69
End: 2024-06-10

## 2024-07-21 DIAGNOSIS — G40.109 FOCAL EPILEPSY ORIGINATING IN TEMPORAL LOBE (HCC): ICD-10-CM

## 2024-07-22 NOTE — TELEPHONE ENCOUNTER
Patient wanted to refill levETIRAcetam (KEPPRA) 1000 MG tablet   Advised too soon for refill; patient said will cb

## 2024-07-23 RX ORDER — LEVETIRACETAM 1000 MG/1
TABLET ORAL
Qty: 180 TABLET | Refills: 3 | Status: SHIPPED | OUTPATIENT
Start: 2024-07-23

## 2024-09-11 ENCOUNTER — VBI (OUTPATIENT)
Dept: ADMINISTRATIVE | Facility: OTHER | Age: 69
End: 2024-09-11

## 2024-09-11 NOTE — TELEPHONE ENCOUNTER
09/11/24 10:07 AM     Chart reviewed for Controlling High Blood Pressure was/were not submitted to the patient's insurance.     Nunu Collins MA   [facility]

## 2024-09-23 ENCOUNTER — OFFICE VISIT (OUTPATIENT)
Dept: INTERNAL MEDICINE CLINIC | Facility: CLINIC | Age: 69
End: 2024-09-23
Payer: COMMERCIAL

## 2024-09-23 ENCOUNTER — HOSPITAL ENCOUNTER (OUTPATIENT)
Dept: RADIOLOGY | Facility: HOSPITAL | Age: 69
Discharge: HOME | End: 2024-09-23
Payer: COMMERCIAL

## 2024-09-23 VITALS
TEMPERATURE: 98.1 F | HEART RATE: 56 BPM | OXYGEN SATURATION: 100 % | HEIGHT: 72 IN | DIASTOLIC BLOOD PRESSURE: 76 MMHG | BODY MASS INDEX: 42.66 KG/M2 | WEIGHT: 315 LBS | SYSTOLIC BLOOD PRESSURE: 128 MMHG

## 2024-09-23 DIAGNOSIS — S80.02XA CONTUSION OF LEFT KNEE, INITIAL ENCOUNTER: Primary | ICD-10-CM

## 2024-09-23 DIAGNOSIS — S80.02XA CONTUSION OF LEFT KNEE, INITIAL ENCOUNTER: ICD-10-CM

## 2024-09-23 PROCEDURE — 73610 X-RAY EXAM OF ANKLE: CPT

## 2024-09-23 PROCEDURE — G2211 COMPLEX E/M VISIT ADD ON: HCPCS | Performed by: FAMILY MEDICINE

## 2024-09-23 PROCEDURE — 73562 X-RAY EXAM OF KNEE 3: CPT

## 2024-09-23 PROCEDURE — 99213 OFFICE O/P EST LOW 20 MIN: CPT | Performed by: FAMILY MEDICINE

## 2024-09-23 NOTE — PROGRESS NOTES
Ambulatory Visit  Name: Darryl Rice      : 1955      MRN: [id number]  Encounter Provider: Love Clarke MD  Encounter Date: 2024   Encounter department: [facility]    Assessment & Plan  Contusion of left knee, initial encounter    Orders:    XR knee 3 vw left non injury; Future    XR ankle 3+ vw left; Future    Orders and recommendations as noted above.  Discussed with him that this appears to be significant contusion but given his functional status being good, unlikely any underlying bony abnormalities.  Will check an x-ray for further investigation especially with his history of the left knee replacement.  We will contact him with the results and arrange follow-up thereafter if needed.     History of Present Illness     He presents for problem visit.  He was in the process of restoring an old bed and had caught his foot falling and landing on cement and part of the bed.  He had bruising noted over his left distal thigh area as well as into the shin on the left.  Has now noticed some bruising also into the foot and ankle area although he does not recall injuring this area.  Does have a history of the left knee replacement so is more concerned because of this.        History obtained from : patient  Review of Systems   Constitutional:  Negative for activity change, chills and fever.   Musculoskeletal:  Positive for arthralgias.   Skin:  Positive for color change.     Medical History Reviewed by provider this encounter:       Past Medical History   Past Medical History:   Diagnosis Date    Arthritis     Compression fracture of spine (HCC)     last assessed 09/15/2016    Concussion     Dislocation of wrist     right ,  last assessed 09/15/2016    Esophageal reflux     Fractured sternum     Nasal fracture      Past Surgical History:   Procedure Laterality Date    CHOLECYSTECTOMY OPEN      COLONOSCOPY      2006    FOOT SURGERY Right     JOINT REPLACEMENT Left     [facility]     SHOULDER OPEN ROTATOR CUFF REPAIR Right 06/26/2023     Family History   Problem Relation Age of Onset    Dementia Mother     Hyperlipidemia Mother     Cancer Father         bladder      Current Outpatient Medications on File Prior to Visit   Medication Sig Dispense Refill    acetaminophen (TYLENOL) 325 mg tablet Take 2 tablets (650 mg total) by mouth every 6 (six) hours as needed for mild pain, headaches or fever Do not exceed a total of 3 grams of tylenol/acetaminophen in a 24-hour period. (Patient taking differently: Take 650 mg by mouth if needed for mild pain, headaches or fever Do not exceed a total of 3 grams of tylenol/acetaminophen in a 24-hour period.)  0    cholecalciferol (VITAMIN D3) 1,000 units tablet Take 2 tablets (2,000 Units total) by mouth daily For vitamin D deficiency 60 tablet 5    guaifenesin-codeine (GUAIFENESIN AC) 100-10 MG/5ML liquid Take 5 mL by mouth 3 (three) times a day as needed for cough 240 mL 0    levETIRAcetam (KEPPRA) 1000 MG tablet TAKE 1 TABLET (1,000 MG TOTAL) BY MOUTH EVERY 12 HOURS 180 tablet 3    aspirin 81 mg chewable tablet Chew 1 tablet (81 mg total) daily To prevent stroke and heart attack (Patient not taking: Reported on 6/8/2023) 30 tablet 3    furosemide (LASIX) 20 mg tablet Take 1 tablet (20 mg total) by mouth daily for 3 days (Patient not taking: Reported on 10/12/2023) 3 tablet 0    meloxicam (MOBIC) 15 mg tablet TAKE 1 TABLET BY MOUTH EVERY DAY FOR 30 DAYS (Patient not taking: Reported on 10/12/2023)      methylPREDNISolone 4 MG tablet therapy pack Use as directed on package (Patient not taking: Reported on 9/23/2024) 21 each 0     No current facility-administered medications on file prior to visit.     Allergies   Allergen Reactions    Neomycin-Bacitracin Zn-Polymyx     Neomycin-Polymyxin-Pramoxine Itching    Other     Adhesive [Medical Tape] Rash     Just J&J    Cephalexin Rash      Current Outpatient Medications on File Prior to Visit   Medication Sig Dispense  Refill    acetaminophen (TYLENOL) 325 mg tablet Take 2 tablets (650 mg total) by mouth every 6 (six) hours as needed for mild pain, headaches or fever Do not exceed a total of 3 grams of tylenol/acetaminophen in a 24-hour period. (Patient taking differently: Take 650 mg by mouth if needed for mild pain, headaches or fever Do not exceed a total of 3 grams of tylenol/acetaminophen in a 24-hour period.)  0    cholecalciferol (VITAMIN D3) 1,000 units tablet Take 2 tablets (2,000 Units total) by mouth daily For vitamin D deficiency 60 tablet 5    guaifenesin-codeine (GUAIFENESIN AC) 100-10 MG/5ML liquid Take 5 mL by mouth 3 (three) times a day as needed for cough 240 mL 0    levETIRAcetam (KEPPRA) 1000 MG tablet TAKE 1 TABLET (1,000 MG TOTAL) BY MOUTH EVERY 12 HOURS 180 tablet 3    aspirin 81 mg chewable tablet Chew 1 tablet (81 mg total) daily To prevent stroke and heart attack (Patient not taking: Reported on 2023) 30 tablet 3    furosemide (LASIX) 20 mg tablet Take 1 tablet (20 mg total) by mouth daily for 3 days (Patient not taking: Reported on 10/12/2023) 3 tablet 0    meloxicam (MOBIC) 15 mg tablet TAKE 1 TABLET BY MOUTH EVERY DAY FOR 30 DAYS (Patient not taking: Reported on 10/12/2023)      methylPREDNISolone 4 MG tablet therapy pack Use as directed on package (Patient not taking: Reported on 2024) 21 each 0     No current facility-administered medications on file prior to visit.      Social History     Tobacco Use    Smoking status: Former     Current packs/day: 0.00     Types: Cigarettes, Pipe, Cigars     Start date: 1978     Quit date: 2013     Years since quittin.4    Smokeless tobacco: Former     Types: Chew     Quit date: 2013    Tobacco comments:     Started when he was 23 years    Vaping Use    Vaping status: Never Used   Substance and Sexual Activity    Alcohol use: Not Currently     Alcohol/week: 6.0 standard drinks of alcohol     Types: 6 Cans of beer per week     Comment:  occassional. Quit 1/15/23    Drug use: Not Currently     Types: Marijuana     Comment: college in the 70s    Sexual activity: Not on file         Objective     /76 (BP Location: Left arm, Patient Position: Sitting, Cuff Size: Large)   Pulse 56   Temp 98.1 °F (36.7 °C) (Tympanic)   Ht 6' (1.829 m)   Wt (!) 158 kg (348 lb 11.2 oz)   SpO2 100%   BMI 47.29 kg/m²     Physical Exam  Vitals and nursing note reviewed.   Constitutional:       Appearance: He is well-developed and well-groomed. He is morbidly obese.   Musculoskeletal:      Comments: Left knee with ecchymotic area above the knee into the distal thigh; ecchymotic area mid shin with some tenderness on palpation; slight ecchymosis into the ankle and foot area on the left   Neurological:      Mental Status: He is alert.   Psychiatric:         Behavior: Behavior is cooperative.

## 2024-09-26 ENCOUNTER — RA CDI HCC (OUTPATIENT)
Dept: OTHER | Facility: HOSPITAL | Age: 69
End: 2024-09-26

## 2024-09-26 NOTE — PROGRESS NOTES
HCC coding opportunities          Chart Reviewed number of suggestions sent to Provider: 2     Patients Insurance     Medicare Insurance: [employer]

## 2024-10-07 ENCOUNTER — OFFICE VISIT (OUTPATIENT)
Dept: NEUROLOGY | Facility: CLINIC | Age: 69
End: 2024-10-07
Payer: COMMERCIAL

## 2024-10-07 VITALS
TEMPERATURE: 97.8 F | HEIGHT: 72 IN | HEART RATE: 56 BPM | SYSTOLIC BLOOD PRESSURE: 147 MMHG | BODY MASS INDEX: 42.66 KG/M2 | WEIGHT: 315 LBS | OXYGEN SATURATION: 100 % | DIASTOLIC BLOOD PRESSURE: 67 MMHG

## 2024-10-07 DIAGNOSIS — E66.01 MORBID OBESITY (HCC): ICD-10-CM

## 2024-10-07 DIAGNOSIS — G40.109 FOCAL EPILEPSY ORIGINATING IN TEMPORAL LOBE (HCC): Primary | ICD-10-CM

## 2024-10-07 PROCEDURE — 99213 OFFICE O/P EST LOW 20 MIN: CPT | Performed by: PSYCHIATRY & NEUROLOGY

## 2024-10-07 NOTE — PATIENT INSTRUCTIONS
Continue levetiracetam 1000 mg twice daily.   Let us know if there are seizures.   Return in one year (AP).
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
## 2024-11-05 ENCOUNTER — VBI (OUTPATIENT)
Dept: ADMINISTRATIVE | Facility: OTHER | Age: 69
End: 2024-11-05

## 2024-11-05 NOTE — TELEPHONE ENCOUNTER
11/05/24 9:16 AM     Chart reviewed for Hemoglobin A1c was/were not submitted to the patient's insurance.     Nunu Collins MA   [facility]

## 2024-12-05 ENCOUNTER — VBI (OUTPATIENT)
Dept: ADMINISTRATIVE | Facility: OTHER | Age: 69
End: 2024-12-05

## 2024-12-05 NOTE — TELEPHONE ENCOUNTER
12/05/24 1:55 PM     Chart reviewed for Hemoglobin A1c was/were not submitted to the patient's insurance.     Nunu Collins MA   [facility]

## 2024-12-13 ENCOUNTER — VBI (OUTPATIENT)
Dept: ADMINISTRATIVE | Facility: OTHER | Age: 69
End: 2024-12-13

## 2024-12-13 NOTE — TELEPHONE ENCOUNTER
12/13/24 10:00 AM     Chart reviewed for CRC: Colonoscopy was/were not submitted to the patient's insurance.     Nunu Collins MA   [facility]

## 2025-03-07 DIAGNOSIS — E55.9 VITAMIN D DEFICIENCY: ICD-10-CM

## 2025-03-07 RX ORDER — CHOLECALCIFEROL (VITAMIN D3) 25 MCG
2000 TABLET ORAL DAILY
Qty: 60 TABLET | Refills: 0 | Status: SHIPPED | OUTPATIENT
Start: 2025-03-07

## 2025-03-07 NOTE — TELEPHONE ENCOUNTER
Reason for call:   [x] Refill   [] Prior Auth  [] Other:     Office:   [x] PCP/Provider -   [] Specialty/Provider -     Medication: (VITAMIN D3) 1,000 units tablet     Dose/Frequency: : Take 2 tablets (2,000 Units total) by mouth daily     Quantity: 60 TABLET    Pharmacy: [facility] -     [facility]   Does the patient have enough for 3 days?   [x] Yes   [] No - Send as HP to POD    [facility] Pharmacy   Does the patient have enough for 10 days?   [] Yes   [] No - Send as HP to POD

## 2025-03-26 ENCOUNTER — VBI (OUTPATIENT)
Dept: ADMINISTRATIVE | Facility: OTHER | Age: 70
End: 2025-03-26

## 2025-03-26 NOTE — TELEPHONE ENCOUNTER
Patient contacted to schedule Annual Wellness Visit.   A message was left for the patient to return the call.    Thank you.  Kalpana Escalante  [facility]

## 2025-03-28 NOTE — TELEPHONE ENCOUNTER
Patient contacted to schedule Annual Wellness Visit.   A message was left for the patient to return the call. X2    Thank you.  Kalpana Escalante  [facility]

## 2025-03-28 NOTE — TELEPHONE ENCOUNTER
Patient contacted to schedule Annual Wellness Visit.   Patient agreed to schedule appointment.      Thank you.  Kalpana Escalante  [facility]

## 2025-04-01 ENCOUNTER — OFFICE VISIT (OUTPATIENT)
Dept: INTERNAL MEDICINE CLINIC | Facility: CLINIC | Age: 70
End: 2025-04-01
Payer: COMMERCIAL

## 2025-04-01 VITALS
SYSTOLIC BLOOD PRESSURE: 140 MMHG | HEIGHT: 72 IN | OXYGEN SATURATION: 97 % | BODY MASS INDEX: 42.66 KG/M2 | DIASTOLIC BLOOD PRESSURE: 72 MMHG | WEIGHT: 315 LBS | TEMPERATURE: 97.7 F | HEART RATE: 114 BPM

## 2025-04-01 DIAGNOSIS — G47.33 SLEEP APNEA, OBSTRUCTIVE: ICD-10-CM

## 2025-04-01 DIAGNOSIS — E55.9 VITAMIN D DEFICIENCY: Chronic | ICD-10-CM

## 2025-04-01 DIAGNOSIS — I10 ACCELERATED HYPERTENSION: Chronic | ICD-10-CM

## 2025-04-01 DIAGNOSIS — E78.1 HYPERTRIGLYCERIDEMIA: Primary | Chronic | ICD-10-CM

## 2025-04-01 PROBLEM — I67.1 INTRACRANIAL ANEURYSM: Chronic | Status: RESOLVED | Noted: 2023-01-15 | Resolved: 2025-04-01

## 2025-04-01 PROBLEM — I25.3 ATRIAL SEPTAL ANEURYSM: Status: RESOLVED | Noted: 2023-01-27 | Resolved: 2025-04-01

## 2025-04-01 PROBLEM — M17.0 PRIMARY OSTEOARTHRITIS OF BOTH KNEES: Status: RESOLVED | Noted: 2018-12-06 | Resolved: 2025-04-01

## 2025-04-01 PROBLEM — R21 RASH AND NONSPECIFIC SKIN ERUPTION: Status: RESOLVED | Noted: 2023-02-27 | Resolved: 2025-04-01

## 2025-04-01 PROBLEM — A69.20 ERYTHEMA MIGRANS (LYME DISEASE): Status: RESOLVED | Noted: 2022-04-05 | Resolved: 2025-04-01

## 2025-04-01 PROBLEM — Z01.818 PREOPERATIVE CLEARANCE: Status: RESOLVED | Noted: 2023-06-22 | Resolved: 2025-04-01

## 2025-04-01 PROBLEM — M17.12 ARTHRITIS OF LEFT KNEE: Status: RESOLVED | Noted: 2019-01-09 | Resolved: 2025-04-01

## 2025-04-01 PROBLEM — F32.A DEPRESSION: Status: RESOLVED | Noted: 2017-05-18 | Resolved: 2025-04-01

## 2025-04-01 PROBLEM — M17.12 PRIMARY OSTEOARTHRITIS OF ONE KNEE, LEFT: Status: RESOLVED | Noted: 2018-08-31 | Resolved: 2025-04-01

## 2025-04-01 PROBLEM — M25.511 ACUTE PAIN OF RIGHT SHOULDER: Status: RESOLVED | Noted: 2023-02-07 | Resolved: 2025-04-01

## 2025-04-01 PROCEDURE — G0439 PPPS, SUBSEQ VISIT: HCPCS | Performed by: NURSE PRACTITIONER

## 2025-04-01 NOTE — PROGRESS NOTES
Name: Darryl Rice      : 1955      MRN: [id number]  Encounter Provider: LEIGHA Blackwood  Encounter Date: 2025   Encounter department: [facility]    Assessment & Plan  Hypertriglyceridemia    Orders:    Comprehensive metabolic panel; Future    CBC and differential; Future    TSH, 3rd generation with Free T4 reflex; Future    Lipid panel; Future    Accelerated hypertension    Orders:    Comprehensive metabolic panel; Future    CBC and differential; Future    TSH, 3rd generation with Free T4 reflex; Future    Vitamin D deficiency         Sleep apnea, obstructive    Will repeat fasting labs. Deferring screenings. Will follow up in one year or sooner if need be          Preventive health issues were discussed with patient, and age appropriate screening tests were ordered as noted in patient's After Visit Summary. Personalized health advice and appropriate referrals for health education or preventive services given if needed, as noted in patient's After Visit Summary.    History of Present Illness     HPI   Patient Care Team:  LEIGHA Blackwood as PCP - General (Family Medicine)  Love Clarke MD as PCP - PCP-[facility] LEIGHA Granado as Nurse Practitioner (Neurology)    Review of Systems   All other systems reviewed and are negative.    Medical History Reviewed by provider this encounter:       Annual Wellness Visit Questionnaire   Darryl is here for his Subsequent Wellness visit.     Health Risk Assessment:   Patient rates overall health as very good. Patient feels that their physical health rating is much better. Patient is satisfied with their life. Eyesight was rated as same. Hearing was rated as same. Patient feels that their emotional and mental health rating is slightly better. Patients states they are sometimes angry. Patient states they are sometimes unusually tired/fatigued. Pain experienced in the last 7 days  has been a lot. Patient's pain rating has been 7/10. Patient states that he has experienced no weight loss or gain in last 6 months.     Depression Screening:   PHQ-2 Score: 0      Fall Risk Screening:   In the past year, patient has experienced: no history of falling in past year      Home Safety:  Patient does not have trouble with stairs inside or outside of their home. Patient has working smoke alarms and has working carbon monoxide detector. Home safety hazards include: none.     Medications:   Patient is not currently taking any over-the-counter supplements. Patient is able to manage medications.     Activities of Daily Living (ADLs)/Instrumental Activities of Daily Living (IADLs):   Walk and transfer into and out of bed and chair?: Yes  Dress and groom yourself?: Yes    Bathe or shower yourself?: Yes    Feed yourself? Yes  Do your laundry/housekeeping?: Yes  Manage your money, pay your bills and track your expenses?: Yes  Make your own meals?: Yes    Do your own shopping?: Yes    Previous Hospitalizations:   Any hospitalizations or ED visits within the last 12 months?: No      Advance Care Planning:   Living will: Yes    Durable POA for healthcare: No    Advanced directive: Yes    Advanced directive counseling given: No    ACP document given: Yes    Patient declined ACP directive: No    End of Life Decisions reviewed with patient: No    Provider agrees with end of life decisions: No      Cognitive Screening:   Provider or family/friend/caregiver concerned regarding cognition?: No    PREVENTIVE SCREENINGS      Cardiovascular Screening:    General: History Lipid Disorder and Risks and Benefits Discussed      Diabetes Screening:     General: Risks and Benefits Discussed and Screening Current      Colorectal Cancer Screening:     General: Risks and Benefits Discussed and Patient Declines      Prostate Cancer Screening:    General: Risks and Benefits Discussed and Screening Current      Osteoporosis Screening:     General: Screening Not Indicated      Abdominal Aortic Aneurysm (AAA) Screening:    Risk factors include: age between 65-74 yo and tobacco use        Lung Cancer Screening:     General: Screening Not Indicated      Hepatitis C Screening:    General: Screening Current    Screening, Brief Intervention, and Referral to Treatment (SBIRT)     Screening  Typical number of drinks in a day: 0  Typical number of drinks in a week: 0  Interpretation: Low risk drinking behavior.    Social Drivers of Health     Financial Resource Strain: Low Risk  (8/4/2023)    Overall Financial Resource Strain (CARDIA)     Difficulty of Paying Living Expenses: Not hard at all   Food Insecurity: No Food Insecurity (4/1/2025)    Hunger Vital Sign     Worried About Running Out of Food in the Last Year: Never true     Ran Out of Food in the Last Year: Never true   Transportation Needs: No Transportation Needs (4/1/2025)    PRAPARE - Transportation     Lack of Transportation (Medical): No     Lack of Transportation (Non-Medical): No   Housing Stability: Low Risk  (4/1/2025)    Housing Stability Vital Sign     Unable to Pay for Housing in the Last Year: No     Number of Times Moved in the Last Year: 0     Homeless in the Last Year: No   Utilities: Not At Risk (4/1/2025)    [facility] Utilities     Threatened with loss of utilities: No     No results found.    Objective   /72 (BP Location: Left arm, Patient Position: Sitting, Cuff Size: Standard)   Pulse (!) 114   Temp 97.7 °F (36.5 °C) (Temporal)   Ht 6' (1.829 m)   Wt (!) 157 kg (346 lb 12.8 oz)   SpO2 97%   BMI 47.03 kg/m²     Physical Exam  Vitals reviewed.   Constitutional:       Appearance: Normal appearance. He is obese.   HENT:      Head: Normocephalic and atraumatic.      Right Ear: Tympanic membrane, ear canal and external ear normal.      Left Ear: Tympanic membrane, ear canal and external ear normal.      Nose: Nose normal.      Mouth/Throat:      Mouth: Mucous membranes are moist.       Pharynx: Oropharynx is clear.   Eyes:      Extraocular Movements: Extraocular movements intact.      Conjunctiva/sclera: Conjunctivae normal.      Pupils: Pupils are equal, round, and reactive to light.   Cardiovascular:      Rate and Rhythm: Normal rate and regular rhythm.      Pulses: Normal pulses.      Heart sounds: Normal heart sounds.   Pulmonary:      Effort: Pulmonary effort is normal.      Breath sounds: Normal breath sounds.   Abdominal:      General: Abdomen is flat. Bowel sounds are normal.      Palpations: Abdomen is soft.   Musculoskeletal:         General: Normal range of motion.      Cervical back: Normal range of motion and neck supple.   Skin:     General: Skin is warm and dry.      Capillary Refill: Capillary refill takes less than 2 seconds.   Neurological:      General: No focal deficit present.      Mental Status: He is alert and oriented to person, place, and time. Mental status is at baseline.   Psychiatric:         Mood and Affect: Mood normal.         Behavior: Behavior normal.         Thought Content: Thought content normal.         Judgment: Judgment normal.

## 2025-04-01 NOTE — PATIENT INSTRUCTIONS
Medicare Preventive Visit Patient Instructions  Thank you for completing your Welcome to Medicare Visit or Medicare Annual Wellness Visit today. Your next wellness visit will be due in one year (4/2/2026).  The screening/preventive services that you may require over the next 5-10 years are detailed below. Some tests may not apply to you based off risk factors and/or age. Screening tests ordered at today's visit but not completed yet may show as past due. Also, please note that scanned in results may not display below.  Preventive Screenings:  Service Recommendations Previous Testing/Comments   Colorectal Cancer Screening  Colonoscopy    Fecal Occult Blood Test (FOBT)/Fecal Immunochemical Test (FIT)  Fecal DNA/Cologuard Test  Flexible Sigmoidoscopy Age: 45-75 years old   Colonoscopy: every 10 years (May be performed more frequently if at higher risk)  OR  FOBT/FIT: every 1 year  OR  Cologuard: every 3 years  OR  Sigmoidoscopy: every 5 years  Screening may be recommended earlier than age 45 if at higher risk for colorectal cancer. Also, an individualized decision between you and your healthcare provider will decide whether screening between the ages of 76-85 would be appropriate. Colonoscopy: Not on file  FOBT/FIT: Not on file  Cologuard: Not on file  Sigmoidoscopy: Not on file          Prostate Cancer Screening Individualized decision between patient and health care provider in men between ages of 55-69   Medicare will cover every 12 months beginning on the day after your 50th birthday PSA: 0.5 ng/mL           Hepatitis C Screening Once for adults born between 1945 and 1965  More frequently in patients at high risk for Hepatitis C Hep C Antibody: 01/16/2023    Screening Current   Diabetes Screening 1-2 times per year if you're at risk for diabetes or have pre-diabetes Fasting glucose: No results in last 5 years (No results in last 5 years)  A1C: 5.3 % (1/16/2023)      Cholesterol Screening Once every 5 years if you  don't have a lipid disorder. May order more often based on risk factors. Lipid panel: 06/12/2023  Screening Not Indicated  History Lipid Disorder      Other Preventive Screenings Covered by Medicare:  Abdominal Aortic Aneurysm (AAA) Screening: covered once if your at risk. You're considered to be at risk if you have a family history of AAA or a male between the age of 65-75 who smoking at least 100 cigarettes in your lifetime.  Lung Cancer Screening: covers low dose CT scan once per year if you meet all of the following conditions: (1) Age 55-77; (2) No signs or symptoms of lung cancer; (3) Current smoker or have quit smoking within the last 15 years; (4) You have a tobacco smoking history of at least 20 pack years (packs per day x number of years you smoked); (5) You get a written order from a healthcare provider.  Glaucoma Screening: covered annually if you're considered high risk: (1) You have diabetes OR (2) Family history of glaucoma OR (3)  aged 50 and older OR (4)  American aged 65 and older  Osteoporosis Screening: covered every 2 years if you meet one of the following conditions: (1) Have a vertebral abnormality; (2) On glucocorticoid therapy for more than 3 months; (3) Have primary hyperparathyroidism; (4) On osteoporosis medications and need to assess response to drug therapy.  HIV Screening: covered annually if you're between the age of 15-65. Also covered annually if you are younger than 15 and older than 65 with risk factors for HIV infection. For pregnant patients, it is covered up to 3 times per pregnancy.    Immunizations:  Immunization Recommendations   Influenza Vaccine Annual influenza vaccination during flu season is recommended for all persons aged >= 6 months who do not have contraindications   Pneumococcal Vaccine   * Pneumococcal conjugate vaccine = PCV13 (Prevnar 13), PCV15 (Vaxneuvance), PCV20 (Prevnar 20)  * Pneumococcal polysaccharide vaccine = PPSV23 (Pneumovax)  Adults 19-63 yo with certain risk factors or if 65+ yo  If never received any pneumonia vaccine: recommend Prevnar 20 (PCV20)  Give PCV20 if previously received 1 dose of PCV13 or PPSV23   Hepatitis B Vaccine 3 dose series if at intermediate or high risk (ex: diabetes, end stage renal disease, liver disease)   Respiratory syncytial virus (RSV) Vaccine - COVERED BY MEDICARE PART D  * RSVPreF3 (Arexvy) CDC recommends that adults 60 years of age and older may receive a single dose of RSV vaccine using shared clinical decision-making (SCDM)   Tetanus (Td) Vaccine - COST NOT COVERED BY MEDICARE PART B Following completion of primary series, a booster dose should be given every 10 years to maintain immunity against tetanus. Td may also be given as tetanus wound prophylaxis.   Tdap Vaccine - COST NOT COVERED BY MEDICARE PART B Recommended at least once for all adults. For pregnant patients, recommended with each pregnancy.   Shingles Vaccine (Shingrix) - COST NOT COVERED BY MEDICARE PART B  2 shot series recommended in those 19 years and older who have or will have weakened immune systems or those 50 years and older     Health Maintenance Due:      Topic Date Due   • Colorectal Cancer Screening  04/01/2026 (Originally 5/[birth year]/1999)   • Hepatitis C Screening  Completed     Immunizations Due:      Topic Date Due   • Influenza Vaccine (1) Never done   • COVID-19 Vaccine (6 - 2024-25 season) 09/01/2024     Advance Directives   What are advance directives?  Advance directives are legal documents that state your wishes and plans for medical care. These plans are made ahead of time in case you lose your ability to make decisions for yourself. Advance directives can apply to any medical decision, such as the treatments you want, and if you want to donate organs.   What are the types of advance directives?  There are many types of advance directives, and each state has rules about how to use them. You may choose a combination of any of  the following:  Living will:  This is a written record of the treatment you want. You can also choose which treatments you do not want, which to limit, and which to stop at a certain time. This includes surgery, medicine, IV fluid, and tube feedings.   Durable power of  for healthcare (DPAHC):  This is a written record that states who you want to make healthcare choices for you when you are unable to make them for yourself. This person, called a proxy, is usually a family member or a friend. You may choose more than 1 proxy.  Do not resuscitate (DNR) order:  A DNR order is used in case your heart stops beating or you stop breathing. It is a request not to have certain forms of treatment, such as CPR. A DNR order may be included in other types of advance directives.  Medical directive:  This covers the care that you want if you are in a coma, near death, or unable to make decisions for yourself. You can list the treatments you want for each condition. Treatment may include pain medicine, surgery, blood transfusions, dialysis, IV or tube feedings, and a ventilator (breathing machine).  Values history:  This document has questions about your views, beliefs, and how you feel and think about life. This information can help others choose the care that you would choose.  Why are advance directives important?  An advance directive helps you control your care. Although spoken wishes may be used, it is better to have your wishes written down. Spoken wishes can be misunderstood, or not followed. Treatments may be given even if you do not want them. An advance directive may make it easier for your family to make difficult choices about your care.   Weight Management   Why it is important to manage your weight:  Being overweight increases your risk of health conditions such as heart disease, high blood pressure, type 2 diabetes, and certain types of cancer. It can also increase your risk for osteoarthritis, sleep apnea,  and other respiratory problems. Aim for a slow, steady weight loss. Even a small amount of weight loss can lower your risk of health problems.  How to lose weight safely:  A safe and healthy way to lose weight is to eat fewer calories and get regular exercise. You can lose up about 1 pound a week by decreasing the number of calories you eat by 500 calories each day.   Healthy meal plan for weight management:  A healthy meal plan includes a variety of foods, contains fewer calories, and helps you stay healthy. A healthy meal plan includes the following:  Eat whole-grain foods more often.  A healthy meal plan should contain fiber. Fiber is the part of grains, fruits, and vegetables that is not broken down by your body. Whole-grain foods are healthy and provide extra fiber in your diet. Some examples of whole-grain foods are whole-wheat breads and pastas, oatmeal, brown rice, and bulgur.  Eat a variety of vegetables every day.  Include dark, leafy greens such as spinach, kale, kevin greens, and mustard greens. Eat yellow and orange vegetables such as carrots, sweet potatoes, and winter squash.   Eat a variety of fruits every day.  Choose fresh or canned fruit (canned in its own juice or light syrup) instead of juice. Fruit juice has very little or no fiber.  Eat low-fat dairy foods.  Drink fat-free (skim) milk or 1% milk. Eat fat-free yogurt and low-fat cottage cheese. Try low-fat cheeses such as mozzarella and other reduced-fat cheeses.  Choose meat and other protein foods that are low in fat.  Choose beans or other legumes such as split peas or lentils. Choose fish, skinless poultry (chicken or turkey), or lean cuts of red meat (beef or pork). Before you cook meat or poultry, cut off any visible fat.   Use less fat and oil.  Try baking foods instead of frying them. Add less fat, such as margarine, sour cream, regular salad dressing and mayonnaise to foods. Eat fewer high-fat foods. Some examples of high-fat foods  include french fries, doughnuts, ice cream, and cakes.  Eat fewer sweets.  Limit foods and drinks that are high in sugar. This includes candy, cookies, regular soda, and sweetened drinks.  Exercise:  Exercise at least 30 minutes per day on most days of the week. Some examples of exercise include walking, biking, dancing, and swimming. You can also fit in more physical activity by taking the stairs instead of the elevator or parking farther away from stores. Ask your healthcare provider about the best exercise plan for you.      © Copyright [employer] 2018 Information is for End User's use only and may not be sold, redistributed or otherwise used for commercial purposes. All illustrations and images included in CareNotes® are the copyrighted property of [employer] or [employer]

## 2025-04-01 NOTE — ASSESSMENT & PLAN NOTE
Will repeat fasting labs. Deferring screenings. Will follow up in one year or sooner if need be

## 2025-04-03 ENCOUNTER — APPOINTMENT (OUTPATIENT)
Dept: LAB | Facility: HOSPITAL | Age: 70
End: 2025-04-03
Payer: COMMERCIAL

## 2025-04-03 DIAGNOSIS — I10 ACCELERATED HYPERTENSION: Chronic | ICD-10-CM

## 2025-04-03 DIAGNOSIS — E78.1 HYPERTRIGLYCERIDEMIA: Chronic | ICD-10-CM

## 2025-04-03 LAB
ALBUMIN SERPL BCG-MCNC: 4.2 G/DL (ref 3.5–5)
ALP SERPL-CCNC: 56 U/L (ref 34–104)
ALT SERPL W P-5'-P-CCNC: 15 U/L (ref 7–52)
ANION GAP SERPL CALCULATED.3IONS-SCNC: 8 MMOL/L (ref 4–13)
AST SERPL W P-5'-P-CCNC: 14 U/L (ref 13–39)
BASOPHILS # BLD AUTO: 0.06 THOUSANDS/ÂΜL (ref 0–0.1)
BASOPHILS NFR BLD AUTO: 1 % (ref 0–1)
BILIRUB SERPL-MCNC: 0.65 MG/DL (ref 0.2–1)
BUN SERPL-MCNC: 19 MG/DL (ref 5–25)
CALCIUM SERPL-MCNC: 9 MG/DL (ref 8.4–10.2)
CHLORIDE SERPL-SCNC: 105 MMOL/L (ref 96–108)
CHOLEST SERPL-MCNC: 163 MG/DL (ref ?–200)
CO2 SERPL-SCNC: 26 MMOL/L (ref 21–32)
CREAT SERPL-MCNC: 0.52 MG/DL (ref 0.6–1.3)
EOSINOPHIL # BLD AUTO: 0.17 THOUSAND/ÂΜL (ref 0–0.61)
EOSINOPHIL NFR BLD AUTO: 3 % (ref 0–6)
ERYTHROCYTE [DISTWIDTH] IN BLOOD BY AUTOMATED COUNT: 12.9 % (ref 11.6–15.1)
GFR SERPL CREATININE-BSD FRML MDRD: 108 ML/MIN/1.73SQ M
GLUCOSE P FAST SERPL-MCNC: 120 MG/DL (ref 65–99)
HCT VFR BLD AUTO: 39.8 % (ref 36.5–49.3)
HDLC SERPL-MCNC: 61 MG/DL
HGB BLD-MCNC: 13 G/DL (ref 12–17)
IMM GRANULOCYTES # BLD AUTO: 0.03 THOUSAND/UL (ref 0–0.2)
IMM GRANULOCYTES NFR BLD AUTO: 1 % (ref 0–2)
LDLC SERPL CALC-MCNC: 86 MG/DL (ref 0–100)
LYMPHOCYTES # BLD AUTO: 1.18 THOUSANDS/ÂΜL (ref 0.6–4.47)
LYMPHOCYTES NFR BLD AUTO: 23 % (ref 14–44)
MCH RBC QN AUTO: 28.6 PG (ref 26.8–34.3)
MCHC RBC AUTO-ENTMCNC: 32.7 G/DL (ref 31.4–37.4)
MCV RBC AUTO: 88 FL (ref 82–98)
MONOCYTES # BLD AUTO: 0.32 THOUSAND/ÂΜL (ref 0.17–1.22)
MONOCYTES NFR BLD AUTO: 6 % (ref 4–12)
NEUTROPHILS # BLD AUTO: 3.48 THOUSANDS/ÂΜL (ref 1.85–7.62)
NEUTS SEG NFR BLD AUTO: 66 % (ref 43–75)
NONHDLC SERPL-MCNC: 102 MG/DL
NRBC BLD AUTO-RTO: 0 /100 WBCS
PLATELET # BLD AUTO: 194 THOUSANDS/UL (ref 149–390)
PMV BLD AUTO: 9.6 FL (ref 8.9–12.7)
POTASSIUM SERPL-SCNC: 3.8 MMOL/L (ref 3.5–5.3)
PROT SERPL-MCNC: 6.9 G/DL (ref 6.4–8.4)
RBC # BLD AUTO: 4.55 MILLION/UL (ref 3.88–5.62)
SODIUM SERPL-SCNC: 139 MMOL/L (ref 135–147)
TRIGL SERPL-MCNC: 82 MG/DL (ref ?–150)
TSH SERPL DL<=0.05 MIU/L-ACNC: 1.05 UIU/ML (ref 0.45–4.5)
WBC # BLD AUTO: 5.24 THOUSAND/UL (ref 4.31–10.16)

## 2025-04-03 PROCEDURE — 84443 ASSAY THYROID STIM HORMONE: CPT

## 2025-04-03 PROCEDURE — 36415 COLL VENOUS BLD VENIPUNCTURE: CPT

## 2025-04-03 PROCEDURE — 85025 COMPLETE CBC W/AUTO DIFF WBC: CPT

## 2025-04-03 PROCEDURE — 80061 LIPID PANEL: CPT

## 2025-04-03 PROCEDURE — 80053 COMPREHEN METABOLIC PANEL: CPT

## 2025-04-04 ENCOUNTER — TELEPHONE (OUTPATIENT)
Age: 70
End: 2025-04-04

## 2025-04-04 NOTE — TELEPHONE ENCOUNTER
Patient called to say he reviewed his 4/3/25 lab results and is concerned with his sugar level.    Patient states he plans to eliminate sweets from his diet completely and work on weight loss.    He would like a complete repeat set of labs ordered to include all that were done on 4/3/25 so he can do again in 6 months.

## 2025-04-08 ENCOUNTER — RESULTS FOLLOW-UP (OUTPATIENT)
Dept: INTERNAL MEDICINE CLINIC | Facility: CLINIC | Age: 70
End: 2025-04-08

## 2025-04-08 NOTE — TELEPHONE ENCOUNTER
Pt called back and was given results, asked about having labs redone in six months as he is changing diet and trying to lose weight, please advise

## 2025-04-09 NOTE — TELEPHONE ENCOUNTER
Patient called again regarding results.  I reread results to him.  He had already seen them in [facility].  He does want to do labs again in 6 months.  He is already working on having less carbs for the last 3 days and doing well.  Please add the order for him.  Thank you.

## 2025-04-25 NOTE — ASSESSMENT & PLAN NOTE
"GENERAL GI PATIENT INTAKE:    COVID symptoms in the last 7 days (runny nose, sore throat, congestion, cough, fever): No  PCP: Harman Rodríguez  If not PCP-  number given to establish [phone]: N/A    ALLERGIES REVIEWED:  Yes    CHIEF COMPLAINT:    Chief Complaint   Patient presents with    GI Problem    Abdominal Pain       VITAL SIGNS:  Ht 5' 1" (1.549 m)   Wt 129.9 kg (286 lb 6 oz)   BMI 54.11 kg/m²      Change in medical, surgical, family or social history: No      REVIEWED MEDICATION LIST RECONCILED INCLUDING ABOVE MEDS:  Yes      " Patient has documented vitamin D deficiency patient currently taking cholecalciferol 2000 units daily      - Continue cholecalciferol 2000 units daily  - Continued management on outpatient with primary care

## 2025-05-05 ENCOUNTER — PATIENT MESSAGE (OUTPATIENT)
Dept: NEUROLOGY | Facility: CLINIC | Age: 70
End: 2025-05-05

## 2025-05-14 ENCOUNTER — VBI (OUTPATIENT)
Dept: ADMINISTRATIVE | Facility: OTHER | Age: 70
End: 2025-05-14

## 2025-05-14 NOTE — TELEPHONE ENCOUNTER
05/14/25 8:52 AM     Chart reviewed for CRC: Colonoscopy was/were not submitted to the patient's insurance.     Nunu Collins MA   [facility]

## 2025-05-19 DIAGNOSIS — E55.9 VITAMIN D DEFICIENCY: ICD-10-CM

## 2025-05-20 RX ORDER — CHOLECALCIFEROL (VITAMIN D3) 25 MCG
TABLET ORAL
Qty: 90 TABLET | Refills: 0 | Status: SHIPPED | OUTPATIENT
Start: 2025-05-20

## 2025-05-30 ENCOUNTER — PATIENT MESSAGE (OUTPATIENT)
Dept: NEUROLOGY | Facility: CLINIC | Age: 70
End: 2025-05-30

## 2025-05-30 DIAGNOSIS — G40.109 FOCAL EPILEPSY ORIGINATING IN TEMPORAL LOBE (HCC): ICD-10-CM

## 2025-05-30 DIAGNOSIS — G31.84 MILD COGNITIVE IMPAIRMENT: Primary | ICD-10-CM

## 2025-05-30 NOTE — PATIENT COMMUNICATION
Recd call from pt's spouse Radhika inquiring if Dr. Hdez reviewed pt cisimple message form 5/5/25.   Please review questions # 1 & 2 and advise. Thank you!    pt scheduled FU 10/7/25 [facility] office

## 2025-06-13 ENCOUNTER — TELEPHONE (OUTPATIENT)
Age: 70
End: 2025-06-13

## 2025-06-13 NOTE — TELEPHONE ENCOUNTER
Recd call from [facility] [phone]. They recd referral and the doctor is requesting pt's LOV note.       Clerical Team - please fax 10/7/24 LOV note to [facility] at:    [phone] (f)

## 2025-07-08 DIAGNOSIS — G40.109 FOCAL EPILEPSY ORIGINATING IN TEMPORAL LOBE (HCC): ICD-10-CM

## 2025-07-09 RX ORDER — LEVETIRACETAM 1000 MG/1
1000 TABLET ORAL 2 TIMES DAILY
Qty: 180 TABLET | Refills: 0 | Status: SHIPPED | OUTPATIENT
Start: 2025-07-09

## 2025-07-23 DIAGNOSIS — E55.9 VITAMIN D DEFICIENCY: ICD-10-CM

## 2025-07-24 RX ORDER — CHOLECALCIFEROL (VITAMIN D3) 25 MCG
TABLET ORAL
Qty: 90 TABLET | Refills: 1 | Status: SHIPPED | OUTPATIENT
Start: 2025-07-24

## 2025-08-20 ENCOUNTER — PATIENT MESSAGE (OUTPATIENT)
Dept: NEUROLOGY | Facility: CLINIC | Age: 70
End: 2025-08-20

## 2025-08-20 DIAGNOSIS — G40.109 FOCAL EPILEPSY ORIGINATING IN TEMPORAL LOBE (HCC): ICD-10-CM

## 2025-08-20 RX ORDER — LEVETIRACETAM 1000 MG/1
1000 TABLET ORAL 2 TIMES DAILY
Qty: 180 TABLET | Refills: 3 | Status: SHIPPED | OUTPATIENT
Start: 2025-08-20